# Patient Record
Sex: MALE | Race: WHITE | Employment: OTHER | URBAN - METROPOLITAN AREA
[De-identification: names, ages, dates, MRNs, and addresses within clinical notes are randomized per-mention and may not be internally consistent; named-entity substitution may affect disease eponyms.]

---

## 2017-03-28 ENCOUNTER — OFFICE VISIT (OUTPATIENT)
Dept: FAMILY MEDICINE CLINIC | Age: 77
End: 2017-03-28

## 2017-03-28 VITALS
HEART RATE: 80 BPM | RESPIRATION RATE: 16 BRPM | WEIGHT: 193 LBS | BODY MASS INDEX: 26.92 KG/M2 | SYSTOLIC BLOOD PRESSURE: 112 MMHG | DIASTOLIC BLOOD PRESSURE: 64 MMHG | TEMPERATURE: 97.5 F

## 2017-03-28 DIAGNOSIS — I10 ESSENTIAL HYPERTENSION: Primary | Chronic | ICD-10-CM

## 2017-03-28 PROCEDURE — 1123F ACP DISCUSS/DSCN MKR DOCD: CPT | Performed by: FAMILY MEDICINE

## 2017-03-28 PROCEDURE — G8484 FLU IMMUNIZE NO ADMIN: HCPCS | Performed by: FAMILY MEDICINE

## 2017-03-28 PROCEDURE — G8420 CALC BMI NORM PARAMETERS: HCPCS | Performed by: FAMILY MEDICINE

## 2017-03-28 PROCEDURE — G8427 DOCREV CUR MEDS BY ELIG CLIN: HCPCS | Performed by: FAMILY MEDICINE

## 2017-03-28 PROCEDURE — 99213 OFFICE O/P EST LOW 20 MIN: CPT | Performed by: FAMILY MEDICINE

## 2017-03-28 PROCEDURE — 1036F TOBACCO NON-USER: CPT | Performed by: FAMILY MEDICINE

## 2017-03-28 PROCEDURE — 4040F PNEUMOC VAC/ADMIN/RCVD: CPT | Performed by: FAMILY MEDICINE

## 2017-03-28 RX ORDER — VERAPAMIL HYDROCHLORIDE 240 MG/1
240 TABLET, FILM COATED, EXTENDED RELEASE ORAL DAILY
Qty: 90 TABLET | Refills: 3 | Status: SHIPPED | OUTPATIENT
Start: 2017-03-28 | End: 2018-05-31 | Stop reason: SDUPTHER

## 2017-03-28 RX ORDER — OMEGA-3S/DHA/EPA/FISH OIL/D3 300MG-1000
400 CAPSULE ORAL DAILY
Status: ON HOLD | COMMUNITY
End: 2020-01-01 | Stop reason: HOSPADM

## 2017-03-28 RX ORDER — LOSARTAN POTASSIUM AND HYDROCHLOROTHIAZIDE 25; 100 MG/1; MG/1
1 TABLET ORAL DAILY
Qty: 90 TABLET | Refills: 3 | Status: SHIPPED | OUTPATIENT
Start: 2017-03-28 | End: 2018-05-31 | Stop reason: SDUPTHER

## 2017-03-28 RX ORDER — POTASSIUM CHLORIDE 20 MEQ/1
TABLET, EXTENDED RELEASE ORAL
Qty: 90 TABLET | Refills: 3 | Status: SHIPPED | OUTPATIENT
Start: 2017-03-28 | End: 2017-07-02 | Stop reason: CLARIF

## 2017-03-28 ASSESSMENT — PATIENT HEALTH QUESTIONNAIRE - PHQ9
2. FEELING DOWN, DEPRESSED OR HOPELESS: 0
1. LITTLE INTEREST OR PLEASURE IN DOING THINGS: 0
SUM OF ALL RESPONSES TO PHQ QUESTIONS 1-9: 0
SUM OF ALL RESPONSES TO PHQ9 QUESTIONS 1 & 2: 0

## 2017-03-29 ASSESSMENT — ENCOUNTER SYMPTOMS
DIARRHEA: 0
SHORTNESS OF BREATH: 0
ABDOMINAL PAIN: 0
RHINORRHEA: 0
EYE PAIN: 0
SORE THROAT: 0
NAUSEA: 0
ABDOMINAL DISTENTION: 0
SINUS PRESSURE: 0
COUGH: 0
CONSTIPATION: 0

## 2017-03-30 LAB
A/G RATIO: 1.7 (ref 1.5–2.5)
ABSOLUTE BASO #: 0 /CMM (ref 0–200)
ABSOLUTE EOS #: 400 /CMM (ref 0–500)
ABSOLUTE LYMPH #: 2000 /CMM (ref 1000–4800)
ABSOLUTE MONO #: 500 /CMM (ref 0–800)
ABSOLUTE NEUT #: 7000 /CMM (ref 1800–7700)
ALBUMIN SERPL-MCNC: 4.5 GM/DL (ref 3.5–5)
ALP BLD-CCNC: 71 IU/L (ref 41–137)
ALT SERPL-CCNC: 21 IU/L (ref 10–40)
ANION GAP SERPL CALCULATED.3IONS-SCNC: 10 MMOL/L (ref 4–12)
AST SERPL-CCNC: 19 IU/L (ref 15–41)
BASOPHILS RELATIVE PERCENT: 0.2 % (ref 0–2)
BILIRUB SERPL-MCNC: 0.9 MG/DL (ref 0.2–1)
BUN BLDV-MCNC: 22 MG/DL (ref 7–20)
CALCIUM SERPL-MCNC: 9.4 MG/DL (ref 8.8–10.5)
CHLORIDE BLD-SCNC: 104 MEQ/L (ref 101–111)
CHOLESTEROL/HDL RELATIVE RISK: 5.1 (ref 4–5)
CHOLESTEROL: 160 MG/DL
CO2: 28 MEQ/L (ref 21–32)
CREAT SERPL-MCNC: 1.26 MG/DL (ref 0.7–1.3)
CREATININE CLEARANCE: 55
DIRECT-LDL / HDL RISK: 3.5
EOSINOPHILS RELATIVE PERCENT: 3.5 % (ref 0–6)
GLUCOSE: 158 MG/DL (ref 70–110)
HCT VFR BLD CALC: 41.7 % (ref 40–49)
HDLC SERPL-MCNC: 31 MG/DL
HEMOGLOBIN: 14.6 GM/DL (ref 13.5–16.5)
LDL CHOLESTEROL DIRECT: 109 MG/DL
LYMPHOCYTES RELATIVE PERCENT: 20.1 % (ref 15–45)
MCH RBC QN AUTO: 31.1 PG (ref 27.5–33)
MCHC RBC AUTO-ENTMCNC: 35 GM/DL (ref 33–36)
MCV RBC AUTO: 88.8 CU MIC (ref 80–97)
MONOCYTES RELATIVE PERCENT: 5.3 % (ref 2–10)
NEUTROPHILS RELATIVE PERCENT: 70.9 % (ref 40–70)
NUCLEATED RBCS: 0.2 /100 WBC
PDW BLD-RTO: 13 % (ref 12–16)
PLATELET # BLD: 165 TH/CMM (ref 150–400)
POTASSIUM SERPL-SCNC: 3.7 MEQ/L (ref 3.6–5)
RBC # BLD: 4.7 MIL/CMM (ref 4.5–6)
SODIUM BLD-SCNC: 142 MEQ/L (ref 135–145)
TOTAL PROTEIN: 7.1 G/DL (ref 6.2–8)
TRIGL SERPL-MCNC: 234 MG/DL
VLDLC SERPL CALC-MCNC: 46 MG/DL
WBC # BLD: 9.9 TH/CMM (ref 4.4–10.5)

## 2017-06-26 ENCOUNTER — OFFICE VISIT (OUTPATIENT)
Dept: FAMILY MEDICINE CLINIC | Age: 77
End: 2017-06-26

## 2017-06-26 VITALS
TEMPERATURE: 98.2 F | WEIGHT: 186 LBS | SYSTOLIC BLOOD PRESSURE: 138 MMHG | BODY MASS INDEX: 25.19 KG/M2 | DIASTOLIC BLOOD PRESSURE: 76 MMHG | RESPIRATION RATE: 20 BRPM | HEIGHT: 72 IN | HEART RATE: 84 BPM

## 2017-06-26 DIAGNOSIS — R13.10 DYSPHAGIA, UNSPECIFIED TYPE: ICD-10-CM

## 2017-06-26 DIAGNOSIS — E43 SEVERE MALNUTRITION (HCC): ICD-10-CM

## 2017-06-26 DIAGNOSIS — R42 DIZZINESS: ICD-10-CM

## 2017-06-26 DIAGNOSIS — F32.A DEPRESSION, UNSPECIFIED DEPRESSION TYPE: ICD-10-CM

## 2017-06-26 DIAGNOSIS — R73.9 HYPERGLYCEMIA: ICD-10-CM

## 2017-06-26 DIAGNOSIS — R23.1 PALLOR: ICD-10-CM

## 2017-06-26 DIAGNOSIS — G47.9 SLEEP DISTURBANCE: ICD-10-CM

## 2017-06-26 DIAGNOSIS — R63.4 WEIGHT LOSS: Primary | ICD-10-CM

## 2017-06-26 DIAGNOSIS — R53.82 CHRONIC FATIGUE: ICD-10-CM

## 2017-06-26 PROCEDURE — 99214 OFFICE O/P EST MOD 30 MIN: CPT | Performed by: NURSE PRACTITIONER

## 2017-06-26 PROCEDURE — G8419 CALC BMI OUT NRM PARAM NOF/U: HCPCS | Performed by: NURSE PRACTITIONER

## 2017-06-26 PROCEDURE — 4040F PNEUMOC VAC/ADMIN/RCVD: CPT | Performed by: NURSE PRACTITIONER

## 2017-06-26 PROCEDURE — 1036F TOBACCO NON-USER: CPT | Performed by: NURSE PRACTITIONER

## 2017-06-26 PROCEDURE — 1123F ACP DISCUSS/DSCN MKR DOCD: CPT | Performed by: NURSE PRACTITIONER

## 2017-06-26 PROCEDURE — G8427 DOCREV CUR MEDS BY ELIG CLIN: HCPCS | Performed by: NURSE PRACTITIONER

## 2017-06-26 RX ORDER — HYDROXYZINE HYDROCHLORIDE 25 MG/1
25 TABLET, FILM COATED ORAL 3 TIMES DAILY PRN
Qty: 60 TABLET | Refills: 0 | Status: SHIPPED | OUTPATIENT
Start: 2017-06-26 | End: 2017-07-11

## 2017-06-26 RX ORDER — CITALOPRAM 10 MG/1
10 TABLET ORAL DAILY
Qty: 30 TABLET | Refills: 3 | Status: SHIPPED | OUTPATIENT
Start: 2017-06-26 | End: 2017-06-29

## 2017-06-27 LAB
A/G RATIO: 2 (ref 1.5–2.5)
ABSOLUTE BASO #: 0 /CMM (ref 0–200)
ABSOLUTE EOS #: 297 /CMM (ref 0–500)
ABSOLUTE LYMPH #: 1485 /CMM (ref 1000–4800)
ABSOLUTE MONO #: 495 /CMM (ref 0–800)
ABSOLUTE NEUT #: 7623 /CMM (ref 1800–7700)
ALBUMIN SERPL-MCNC: 4.7 GM/DL (ref 3.5–5)
ALP BLD-CCNC: 68 IU/L (ref 41–137)
ALT SERPL-CCNC: 21 IU/L (ref 10–40)
ANION GAP SERPL CALCULATED.3IONS-SCNC: 7 MMOL/L (ref 4–12)
APPEARANCE: CLEAR
AST SERPL-CCNC: 19 IU/L (ref 15–41)
BILIRUB SERPL-MCNC: 1 MG/DL (ref 0.2–1)
BILIRUBIN URINE: NEGATIVE
BUN BLDV-MCNC: 24 MG/DL (ref 7–20)
CALCIUM SERPL-MCNC: 9.6 MG/DL (ref 8.8–10.5)
CHLORIDE BLD-SCNC: 104 MEQ/L (ref 101–111)
CO2: 28 MEQ/L (ref 21–32)
COLOR: YELLOW
CREAT SERPL-MCNC: 1.03 MG/DL (ref 0.7–1.3)
CREATININE CLEARANCE: >60
EOSINOPHIL # BLD: 3 % (ref 0–6)
FOLATE: > 23.7 NG/ML
GLUCOSE URINE: NEGATIVE
GLUCOSE: 198 MG/DL (ref 70–110)
HCT VFR BLD CALC: 42.7 % (ref 40–49)
HEMOGLOBIN: 15 GM/DL (ref 13.5–16.5)
INSULIN,ULTRASENSITIVE: 11.58 MIU/ML (ref 1.9–23)
IRON SATURATION: 34 % (ref 20–50)
IRON, SERUM: 99 MCG/DL (ref 45–182)
KETONES, URINE: NEGATIVE
LEUKOCYTES, UA: NEGATIVE
LYMPHOCYTES # BLD: 15 % (ref 15–45)
MCH RBC QN AUTO: 30.8 PG (ref 27.5–33)
MCHC RBC AUTO-ENTMCNC: 35.1 GM/DL (ref 33–36)
MCV RBC AUTO: 87.8 CU MIC (ref 80–97)
MONOCYTES: 5 % (ref 2–10)
MORPHOLOGY: ABNORMAL
MUCUS: PRESENT
NEUTROPHILS SEGMENTED: 77 % (ref 40–70)
NITRITE, URINE: NEGATIVE
PDW BLD-RTO: 13.1 % (ref 12–16)
PH, URINE: 6 (ref 5–8)
PLATELET # BLD: 152 TH/CMM (ref 150–400)
POTASSIUM SERPL-SCNC: 3.5 MEQ/L (ref 3.6–5)
PROTEIN, URINE: NEGATIVE
RBC # BLD: 4.86 MIL/CMM (ref 4.5–6)
RBC URINE: NORMAL /HPF
SODIUM BLD-SCNC: 139 MEQ/L (ref 135–145)
SPECIFIC GRAVITY, URINE: 1.01 (ref 1–1.03)
SQUAMOUS EPITHELIAL: NORMAL /HPF
T4 FREE: 1.24 NG/DL (ref 0.61–1.12)
TOTAL PROTEIN: 7.1 G/DL (ref 6.2–8)
TRANSFERRIN: 207 MG/DL (ref 180–329)
TSH SERPL DL<=0.05 MIU/L-ACNC: 1.97 MCIU/ML (ref 0.49–4.67)
URINALYSIS REFLEX: NO
URINE HGB: NEGATIVE
UROBILINOGEN, URINE: NORMAL (ref 0.2–1)
VITAMIN B-12: 397 PG/ML (ref 180–914)
WBC # BLD: 9.9 TH/CMM (ref 4.4–10.5)
WBC URINE: NORMAL /HPF

## 2017-06-28 LAB — THYROID PEROXIDASE ANTIBODY: 0.3 IU/ML

## 2017-06-29 ENCOUNTER — CARE COORDINATOR VISIT (OUTPATIENT)
Dept: CARE COORDINATION | Age: 77
End: 2017-06-29

## 2017-06-29 ENCOUNTER — OFFICE VISIT (OUTPATIENT)
Dept: FAMILY MEDICINE CLINIC | Age: 77
End: 2017-06-29

## 2017-06-29 VITALS
SYSTOLIC BLOOD PRESSURE: 130 MMHG | HEART RATE: 112 BPM | TEMPERATURE: 98.1 F | WEIGHT: 182 LBS | DIASTOLIC BLOOD PRESSURE: 80 MMHG | RESPIRATION RATE: 16 BRPM | BODY MASS INDEX: 24.65 KG/M2 | HEIGHT: 72 IN

## 2017-06-29 DIAGNOSIS — R63.0 ANOREXIA: ICD-10-CM

## 2017-06-29 DIAGNOSIS — R63.4 WEIGHT LOSS: ICD-10-CM

## 2017-06-29 DIAGNOSIS — R73.01 IMPAIRED FASTING GLUCOSE: Primary | ICD-10-CM

## 2017-06-29 DIAGNOSIS — I10 ESSENTIAL HYPERTENSION: Chronic | ICD-10-CM

## 2017-06-29 DIAGNOSIS — R22.32 MASS OF SKIN OF LEFT SHOULDER: ICD-10-CM

## 2017-06-29 LAB
ESTIMATED AVERAGE GLUCOSE: 157 MG/DL
HBA1C MFR BLD: 7.1 % (ref 4.4–6.4)
VITAMIN D 1,25-DIHYDROXY: 41 PG/ML (ref 18–64)

## 2017-06-29 PROCEDURE — G8420 CALC BMI NORM PARAMETERS: HCPCS | Performed by: NURSE PRACTITIONER

## 2017-06-29 PROCEDURE — 1036F TOBACCO NON-USER: CPT | Performed by: NURSE PRACTITIONER

## 2017-06-29 PROCEDURE — 4040F PNEUMOC VAC/ADMIN/RCVD: CPT | Performed by: NURSE PRACTITIONER

## 2017-06-29 PROCEDURE — 99214 OFFICE O/P EST MOD 30 MIN: CPT | Performed by: NURSE PRACTITIONER

## 2017-06-29 PROCEDURE — G8427 DOCREV CUR MEDS BY ELIG CLIN: HCPCS | Performed by: NURSE PRACTITIONER

## 2017-06-29 PROCEDURE — 1123F ACP DISCUSS/DSCN MKR DOCD: CPT | Performed by: NURSE PRACTITIONER

## 2017-06-29 PROCEDURE — 96372 THER/PROPH/DIAG INJ SC/IM: CPT | Performed by: NURSE PRACTITIONER

## 2017-06-29 RX ORDER — CYANOCOBALAMIN 1000 UG/ML
1000 INJECTION INTRAMUSCULAR; SUBCUTANEOUS ONCE
Status: COMPLETED | OUTPATIENT
Start: 2017-06-29 | End: 2017-06-29

## 2017-06-29 RX ORDER — SERTRALINE HYDROCHLORIDE 25 MG/1
25 TABLET, FILM COATED ORAL DAILY
Qty: 30 TABLET | Refills: 3 | Status: SHIPPED | OUTPATIENT
Start: 2017-06-29 | End: 2017-07-12

## 2017-06-29 RX ORDER — METFORMIN HYDROCHLORIDE 750 MG/1
750 TABLET, EXTENDED RELEASE ORAL
Qty: 30 TABLET | Refills: 3 | Status: SHIPPED | OUTPATIENT
Start: 2017-06-29 | End: 2017-08-25 | Stop reason: SDUPTHER

## 2017-06-29 RX ADMIN — CYANOCOBALAMIN 1000 MCG: 1000 INJECTION INTRAMUSCULAR; SUBCUTANEOUS at 10:03

## 2017-06-30 ENCOUNTER — TELEPHONE (OUTPATIENT)
Dept: FAMILY MEDICINE CLINIC | Age: 77
End: 2017-06-30

## 2017-07-03 PROBLEM — R33.9 URINARY RETENTION: Status: ACTIVE | Noted: 2017-07-03

## 2017-07-03 PROBLEM — N40.0 BPH (BENIGN PROSTATIC HYPERPLASIA): Status: ACTIVE | Noted: 2017-07-03

## 2017-07-03 RX ORDER — GLUCOSAMINE HCL/CHONDROITIN SU 500-400 MG
CAPSULE ORAL
Qty: 100 STRIP | Refills: 3 | Status: SHIPPED | OUTPATIENT
Start: 2017-07-03

## 2017-07-03 RX ORDER — LANCETS 30 GAUGE
EACH MISCELLANEOUS
Qty: 100 EACH | Refills: 3 | Status: SHIPPED | OUTPATIENT
Start: 2017-07-03

## 2017-07-04 PROBLEM — K22.10 EROSIVE ESOPHAGITIS: Status: ACTIVE | Noted: 2017-07-04

## 2017-07-04 PROBLEM — N40.1 BENIGN PROSTATIC HYPERPLASIA WITH LOWER URINARY TRACT SYMPTOMS: Status: ACTIVE | Noted: 2017-07-03

## 2017-07-04 PROBLEM — K29.00 ACUTE GASTRITIS WITHOUT HEMORRHAGE: Status: ACTIVE | Noted: 2017-07-04

## 2017-07-04 PROBLEM — K29.80 DUODENITIS: Status: ACTIVE | Noted: 2017-07-04

## 2017-07-05 ENCOUNTER — TELEPHONE (OUTPATIENT)
Dept: FAMILY MEDICINE CLINIC | Age: 77
End: 2017-07-05

## 2017-07-10 ENCOUNTER — TELEPHONE (OUTPATIENT)
Dept: FAMILY MEDICINE CLINIC | Age: 77
End: 2017-07-10

## 2017-07-11 ENCOUNTER — TELEPHONE (OUTPATIENT)
Dept: FAMILY MEDICINE CLINIC | Age: 77
End: 2017-07-11

## 2017-07-11 RX ORDER — TAMSULOSIN HYDROCHLORIDE 0.4 MG/1
0.4 CAPSULE ORAL NIGHTLY
COMMUNITY

## 2017-07-11 RX ORDER — ESOMEPRAZOLE MAGNESIUM 40 MG/1
40 CAPSULE, DELAYED RELEASE ORAL
COMMUNITY
End: 2018-05-10 | Stop reason: DRUGHIGH

## 2017-07-11 ASSESSMENT — ENCOUNTER SYMPTOMS
GASTROINTESTINAL NEGATIVE: 1
RESPIRATORY NEGATIVE: 1
EYES NEGATIVE: 1

## 2017-07-12 ENCOUNTER — OFFICE VISIT (OUTPATIENT)
Dept: FAMILY MEDICINE CLINIC | Age: 77
End: 2017-07-12

## 2017-07-12 VITALS
TEMPERATURE: 98.4 F | DIASTOLIC BLOOD PRESSURE: 70 MMHG | SYSTOLIC BLOOD PRESSURE: 136 MMHG | BODY MASS INDEX: 23.52 KG/M2 | RESPIRATION RATE: 20 BRPM | WEIGHT: 173.4 LBS | HEART RATE: 88 BPM

## 2017-07-12 DIAGNOSIS — R44.0 AUDITORY HALLUCINATION: Primary | ICD-10-CM

## 2017-07-12 DIAGNOSIS — R41.0 CONFUSION: ICD-10-CM

## 2017-07-12 DIAGNOSIS — E46 MALNUTRITION (HCC): ICD-10-CM

## 2017-07-12 PROCEDURE — 1123F ACP DISCUSS/DSCN MKR DOCD: CPT | Performed by: FAMILY MEDICINE

## 2017-07-12 PROCEDURE — G8427 DOCREV CUR MEDS BY ELIG CLIN: HCPCS | Performed by: FAMILY MEDICINE

## 2017-07-12 PROCEDURE — 99214 OFFICE O/P EST MOD 30 MIN: CPT | Performed by: FAMILY MEDICINE

## 2017-07-12 PROCEDURE — 4040F PNEUMOC VAC/ADMIN/RCVD: CPT | Performed by: FAMILY MEDICINE

## 2017-07-12 PROCEDURE — 1036F TOBACCO NON-USER: CPT | Performed by: FAMILY MEDICINE

## 2017-07-12 PROCEDURE — G8420 CALC BMI NORM PARAMETERS: HCPCS | Performed by: FAMILY MEDICINE

## 2017-07-12 PROCEDURE — 1111F DSCHRG MED/CURRENT MED MERGE: CPT | Performed by: FAMILY MEDICINE

## 2017-07-13 ENCOUNTER — TELEPHONE (OUTPATIENT)
Dept: FAMILY MEDICINE CLINIC | Age: 77
End: 2017-07-13

## 2017-07-13 DIAGNOSIS — R41.82 ALTERED MENTAL STATUS, UNSPECIFIED ALTERED MENTAL STATUS TYPE: Primary | ICD-10-CM

## 2017-07-13 RX ORDER — CEFUROXIME AXETIL 250 MG/1
250 TABLET ORAL 2 TIMES DAILY
Qty: 28 TABLET | Refills: 0 | Status: SHIPPED | OUTPATIENT
Start: 2017-07-13 | End: 2017-07-27

## 2017-07-14 ENCOUNTER — TELEPHONE (OUTPATIENT)
Dept: FAMILY MEDICINE CLINIC | Age: 77
End: 2017-07-14

## 2017-07-14 ENCOUNTER — CARE COORDINATION (OUTPATIENT)
Dept: FAMILY MEDICINE CLINIC | Age: 77
End: 2017-07-14

## 2017-07-15 ASSESSMENT — ENCOUNTER SYMPTOMS
EYES NEGATIVE: 1
RESPIRATORY NEGATIVE: 1
GASTROINTESTINAL NEGATIVE: 1

## 2017-07-17 ENCOUNTER — TELEPHONE (OUTPATIENT)
Dept: PHARMACY | Age: 77
End: 2017-07-17

## 2017-07-18 ENCOUNTER — CARE COORDINATION (OUTPATIENT)
Dept: CARE COORDINATION | Age: 77
End: 2017-07-18

## 2017-07-25 ENCOUNTER — CARE COORDINATION (OUTPATIENT)
Dept: CARE COORDINATION | Age: 77
End: 2017-07-25

## 2017-08-01 ENCOUNTER — INITIAL CONSULT (OUTPATIENT)
Dept: NEUROLOGY | Age: 77
End: 2017-08-01
Payer: MEDICARE

## 2017-08-01 VITALS
WEIGHT: 172.4 LBS | SYSTOLIC BLOOD PRESSURE: 124 MMHG | HEART RATE: 74 BPM | HEIGHT: 73 IN | DIASTOLIC BLOOD PRESSURE: 71 MMHG | BODY MASS INDEX: 22.85 KG/M2

## 2017-08-01 DIAGNOSIS — R41.0 CONFUSION: Primary | ICD-10-CM

## 2017-08-01 PROCEDURE — 1123F ACP DISCUSS/DSCN MKR DOCD: CPT | Performed by: PSYCHIATRY & NEUROLOGY

## 2017-08-01 PROCEDURE — 99204 OFFICE O/P NEW MOD 45 MIN: CPT | Performed by: PSYCHIATRY & NEUROLOGY

## 2017-08-01 PROCEDURE — G8420 CALC BMI NORM PARAMETERS: HCPCS | Performed by: PSYCHIATRY & NEUROLOGY

## 2017-08-01 PROCEDURE — 1111F DSCHRG MED/CURRENT MED MERGE: CPT | Performed by: PSYCHIATRY & NEUROLOGY

## 2017-08-01 PROCEDURE — 4040F PNEUMOC VAC/ADMIN/RCVD: CPT | Performed by: PSYCHIATRY & NEUROLOGY

## 2017-08-01 PROCEDURE — 1036F TOBACCO NON-USER: CPT | Performed by: PSYCHIATRY & NEUROLOGY

## 2017-08-01 PROCEDURE — G8427 DOCREV CUR MEDS BY ELIG CLIN: HCPCS | Performed by: PSYCHIATRY & NEUROLOGY

## 2017-08-01 RX ORDER — RIVASTIGMINE 4.6 MG/24H
1 PATCH, EXTENDED RELEASE TRANSDERMAL DAILY
COMMUNITY
End: 2017-08-03 | Stop reason: SDUPTHER

## 2017-08-01 RX ORDER — ESCITALOPRAM OXALATE 20 MG/1
20 TABLET ORAL DAILY
COMMUNITY
End: 2017-08-03 | Stop reason: SDUPTHER

## 2017-08-03 ENCOUNTER — OFFICE VISIT (OUTPATIENT)
Dept: FAMILY MEDICINE CLINIC | Age: 77
End: 2017-08-03
Payer: MEDICARE

## 2017-08-03 VITALS
DIASTOLIC BLOOD PRESSURE: 58 MMHG | RESPIRATION RATE: 20 BRPM | BODY MASS INDEX: 22.59 KG/M2 | WEIGHT: 171.2 LBS | SYSTOLIC BLOOD PRESSURE: 102 MMHG | TEMPERATURE: 98.4 F | HEART RATE: 88 BPM

## 2017-08-03 DIAGNOSIS — F03.90 DEMENTIA WITHOUT BEHAVIORAL DISTURBANCE, UNSPECIFIED DEMENTIA TYPE: ICD-10-CM

## 2017-08-03 DIAGNOSIS — F41.9 ANXIETY DISORDER, UNSPECIFIED TYPE: Primary | ICD-10-CM

## 2017-08-03 DIAGNOSIS — R44.0 AUDITORY HALLUCINATIONS: Primary | ICD-10-CM

## 2017-08-03 DIAGNOSIS — R41.0 CONFUSION: ICD-10-CM

## 2017-08-03 PROCEDURE — G8427 DOCREV CUR MEDS BY ELIG CLIN: HCPCS | Performed by: FAMILY MEDICINE

## 2017-08-03 PROCEDURE — 99213 OFFICE O/P EST LOW 20 MIN: CPT | Performed by: FAMILY MEDICINE

## 2017-08-03 PROCEDURE — 4040F PNEUMOC VAC/ADMIN/RCVD: CPT | Performed by: FAMILY MEDICINE

## 2017-08-03 PROCEDURE — 1036F TOBACCO NON-USER: CPT | Performed by: FAMILY MEDICINE

## 2017-08-03 PROCEDURE — 1123F ACP DISCUSS/DSCN MKR DOCD: CPT | Performed by: FAMILY MEDICINE

## 2017-08-03 PROCEDURE — 1111F DSCHRG MED/CURRENT MED MERGE: CPT | Performed by: FAMILY MEDICINE

## 2017-08-03 PROCEDURE — G8420 CALC BMI NORM PARAMETERS: HCPCS | Performed by: FAMILY MEDICINE

## 2017-08-03 RX ORDER — RIVASTIGMINE 4.6 MG/24H
1 PATCH, EXTENDED RELEASE TRANSDERMAL DAILY
Qty: 90 PATCH | Refills: 3 | Status: SHIPPED | OUTPATIENT
Start: 2017-08-03 | End: 2019-01-01 | Stop reason: SDUPTHER

## 2017-08-03 RX ORDER — OLANZAPINE 2.5 MG/1
1.25 TABLET ORAL NIGHTLY
Qty: 45 TABLET | Refills: 3 | Status: SHIPPED | OUTPATIENT
Start: 2017-08-03 | End: 2018-03-22

## 2017-08-03 RX ORDER — ESCITALOPRAM OXALATE 20 MG/1
20 TABLET ORAL DAILY
Qty: 90 TABLET | Refills: 3 | Status: SHIPPED | OUTPATIENT
Start: 2017-08-03 | End: 2019-01-01 | Stop reason: SDUPTHER

## 2017-08-03 ASSESSMENT — ENCOUNTER SYMPTOMS
ABDOMINAL DISTENTION: 0
SINUS PRESSURE: 0
ABDOMINAL PAIN: 0
NAUSEA: 0
RHINORRHEA: 0
COUGH: 0
DIARRHEA: 0
SHORTNESS OF BREATH: 0
SORE THROAT: 0
EYE PAIN: 0
CONSTIPATION: 0

## 2017-08-07 ENCOUNTER — TELEPHONE (OUTPATIENT)
Dept: FAMILY MEDICINE CLINIC | Age: 77
End: 2017-08-07

## 2017-08-09 ENCOUNTER — TELEPHONE (OUTPATIENT)
Dept: FAMILY MEDICINE CLINIC | Age: 77
End: 2017-08-09

## 2017-08-15 ENCOUNTER — HOSPITAL ENCOUNTER (OUTPATIENT)
Dept: NEUROLOGY | Age: 77
Discharge: HOME | End: 2017-08-15
Payer: MEDICARE

## 2017-08-15 PROCEDURE — 95819 EEG AWAKE AND ASLEEP: CPT

## 2017-08-25 ENCOUNTER — CARE COORDINATION (OUTPATIENT)
Dept: CARE COORDINATION | Age: 77
End: 2017-08-25

## 2017-08-25 ENCOUNTER — TELEPHONE (OUTPATIENT)
Dept: FAMILY MEDICINE CLINIC | Age: 77
End: 2017-08-25

## 2017-08-28 RX ORDER — METFORMIN HYDROCHLORIDE 750 MG/1
750 TABLET, EXTENDED RELEASE ORAL
Qty: 90 TABLET | Refills: 3 | Status: SHIPPED | OUTPATIENT
Start: 2017-08-28 | End: 2018-05-31 | Stop reason: SDUPTHER

## 2017-10-09 ENCOUNTER — CARE COORDINATION (OUTPATIENT)
Dept: CARE COORDINATION | Age: 77
End: 2017-10-09

## 2017-10-10 ENCOUNTER — CARE COORDINATION (OUTPATIENT)
Dept: CARE COORDINATION | Age: 77
End: 2017-10-10

## 2017-10-10 NOTE — CARE COORDINATION
MD Dwain   Glucose Blood (BLOOD GLUCOSE TEST STRIPS) STRP One Touch Verio Test Strips Test QD or as directed --DX E11.9 7/3/17   Davin Levi NP   Lancets MISC One Touch Verio Lancets  DX E11.9 Test QD or as directed 7/3/17   Davin Levi NP   potassium chloride (KLOR-CON M20) 20 MEQ extended release tablet Take 20 mEq by mouth daily    Historical Provider, MD   vitamin D3 (CHOLECALCIFEROL) 400 UNITS TABS tablet Take 400 Units by mouth daily    Historical Provider, MD   verapamil (CALAN SR) 240 MG extended release tablet Take 1 tablet by mouth daily 3/28/17   Nely Sorensen MD   losartan-hydrochlorothiazide [facility]) 100-25 MG per tablet Take 1 tablet by mouth daily 3/28/17   Nely Sorensen MD   Ascorbic Acid (VITAMIN C) 500 MG tablet Take 500 mg by mouth daily    Historical Provider, MD   finasteride (PROSCAR) 5 MG tablet Take 5 mg by mouth daily  12/29/14   Historical Provider, MD   aspirin 81 MG tablet Take 81 mg by mouth daily. Historical Provider, MD   therapeutic multivitamin-minerals (THERAGRAN-M) tablet Take 1 tablet by mouth daily.       Historical Provider, MD       Future Appointments  Date Time Provider Genesis Welsh   10/23/2017 3:15 PM Mica Bateman MD [address] [facility] [email or web address]   10/25/2017 10:30 AM Nely [facility]nsen [facility]

## 2017-10-18 ENCOUNTER — NURSE ONLY (OUTPATIENT)
Dept: FAMILY MEDICINE CLINIC | Age: 77
End: 2017-10-18
Payer: MEDICARE

## 2017-10-18 DIAGNOSIS — Z23 NEED FOR INFLUENZA VACCINATION: Primary | ICD-10-CM

## 2017-10-18 PROCEDURE — 90662 IIV NO PRSV INCREASED AG IM: CPT | Performed by: NURSE PRACTITIONER

## 2017-10-18 PROCEDURE — G0008 ADMIN INFLUENZA VIRUS VAC: HCPCS | Performed by: NURSE PRACTITIONER

## 2017-10-23 ENCOUNTER — OFFICE VISIT (OUTPATIENT)
Dept: NEUROLOGY | Age: 77
End: 2017-10-23
Payer: MEDICARE

## 2017-10-23 VITALS
HEART RATE: 66 BPM | DIASTOLIC BLOOD PRESSURE: 67 MMHG | SYSTOLIC BLOOD PRESSURE: 116 MMHG | WEIGHT: 192.4 LBS | HEIGHT: 72 IN | BODY MASS INDEX: 26.06 KG/M2

## 2017-10-23 DIAGNOSIS — R41.0 CONFUSION: Primary | ICD-10-CM

## 2017-10-23 PROCEDURE — 4040F PNEUMOC VAC/ADMIN/RCVD: CPT | Performed by: PSYCHIATRY & NEUROLOGY

## 2017-10-23 PROCEDURE — G8417 CALC BMI ABV UP PARAM F/U: HCPCS | Performed by: PSYCHIATRY & NEUROLOGY

## 2017-10-23 PROCEDURE — G8484 FLU IMMUNIZE NO ADMIN: HCPCS | Performed by: PSYCHIATRY & NEUROLOGY

## 2017-10-23 PROCEDURE — 1036F TOBACCO NON-USER: CPT | Performed by: PSYCHIATRY & NEUROLOGY

## 2017-10-23 PROCEDURE — 1123F ACP DISCUSS/DSCN MKR DOCD: CPT | Performed by: PSYCHIATRY & NEUROLOGY

## 2017-10-23 PROCEDURE — 99213 OFFICE O/P EST LOW 20 MIN: CPT | Performed by: PSYCHIATRY & NEUROLOGY

## 2017-10-23 PROCEDURE — G8427 DOCREV CUR MEDS BY ELIG CLIN: HCPCS | Performed by: PSYCHIATRY & NEUROLOGY

## 2017-10-23 NOTE — PROGRESS NOTES
no intra-or extra-axial collections. There is no hydrocephalus, midline shift or mass effect. There is mineralization in the basal ganglia bilaterally. No other areas of susceptibility artifact are present. The major intracranial vascular flow voids are present. The midline craniocervical junction structures are normal.  The pituitary gland and brainstem are normal.       The right maxillary sinus is completely opacified. This is stable. Impression       1. No evidence of an acute process. 2. Mild severity chronic small vessel ischemic changes. 3. Completely opacified right maxillary sinus. Assessment:    1. Confusion          No reported confusion episodes. He is doing much better. He is better and back to baseline. He is doing much better since changing his medications by psychiatry. After detailed discussion with patient we agreed on the following plan. Plan:  1. Follow up as needed. 2. Call if any questions or concerns. Please call if any questions.      Brooke Salazar MD

## 2017-10-25 ENCOUNTER — OFFICE VISIT (OUTPATIENT)
Dept: FAMILY MEDICINE CLINIC | Age: 77
End: 2017-10-25
Payer: MEDICARE

## 2017-10-25 VITALS
SYSTOLIC BLOOD PRESSURE: 100 MMHG | BODY MASS INDEX: 26.15 KG/M2 | HEART RATE: 72 BPM | DIASTOLIC BLOOD PRESSURE: 58 MMHG | WEIGHT: 192.8 LBS | TEMPERATURE: 98.3 F | RESPIRATION RATE: 18 BRPM

## 2017-10-25 DIAGNOSIS — F03.90 DEMENTIA WITHOUT BEHAVIORAL DISTURBANCE, UNSPECIFIED DEMENTIA TYPE: ICD-10-CM

## 2017-10-25 DIAGNOSIS — I10 ESSENTIAL HYPERTENSION: Primary | Chronic | ICD-10-CM

## 2017-10-25 DIAGNOSIS — R44.3 HALLUCINATIONS: ICD-10-CM

## 2017-10-25 DIAGNOSIS — F41.9 ANXIETY DISORDER, UNSPECIFIED TYPE: ICD-10-CM

## 2017-10-25 DIAGNOSIS — E11.9 TYPE 2 DIABETES MELLITUS TREATED WITHOUT INSULIN (HCC): ICD-10-CM

## 2017-10-25 PROCEDURE — 4040F PNEUMOC VAC/ADMIN/RCVD: CPT | Performed by: FAMILY MEDICINE

## 2017-10-25 PROCEDURE — 1123F ACP DISCUSS/DSCN MKR DOCD: CPT | Performed by: FAMILY MEDICINE

## 2017-10-25 PROCEDURE — G8417 CALC BMI ABV UP PARAM F/U: HCPCS | Performed by: FAMILY MEDICINE

## 2017-10-25 PROCEDURE — G8427 DOCREV CUR MEDS BY ELIG CLIN: HCPCS | Performed by: FAMILY MEDICINE

## 2017-10-25 PROCEDURE — G8484 FLU IMMUNIZE NO ADMIN: HCPCS | Performed by: FAMILY MEDICINE

## 2017-10-25 PROCEDURE — 1036F TOBACCO NON-USER: CPT | Performed by: FAMILY MEDICINE

## 2017-10-25 PROCEDURE — 99214 OFFICE O/P EST MOD 30 MIN: CPT | Performed by: FAMILY MEDICINE

## 2017-10-29 ASSESSMENT — ENCOUNTER SYMPTOMS
EYE PAIN: 0
SORE THROAT: 0
ABDOMINAL PAIN: 0
DIARRHEA: 0
ABDOMINAL DISTENTION: 0
SINUS PRESSURE: 0
SHORTNESS OF BREATH: 0
COUGH: 0
CONSTIPATION: 0
NAUSEA: 0
RHINORRHEA: 0

## 2017-10-29 NOTE — PROGRESS NOTES
[address]  [facility]  [facility]  Dept: [phone]  Dept Fax: [phone]: [phone]  PROGRESS NOTE      Visit Date: 10/29/2017    Hamida Monsalve is a 68 y.o. male who presents today for:  Chief Complaint   Patient presents with    Follow-up     Mental status         Subjective:  HPI  Patient comes in follow-up medical issues and mental state. Hypertension stable. Blood sugars improved. Sugar log reviewed. Mental status is improved greatly and appears to be stable. His wife is pleased with his interaction. Patient feels more clear cognitively    Review of Systems   Constitutional: Negative for appetite change and fever. HENT: Negative for congestion, ear pain, postnasal drip, rhinorrhea, sinus pressure and sore throat. Eyes: Negative for pain and visual disturbance. Respiratory: Negative for cough and shortness of breath. Cardiovascular: Negative for chest pain. Gastrointestinal: Negative for abdominal distention, abdominal pain, constipation, diarrhea and nausea. Genitourinary: Negative for dysuria, frequency and urgency. Musculoskeletal: Negative for arthralgias. Skin: Negative for rash. Neurological: Negative for dizziness.      Past Medical History:   Diagnosis Date    Depression     Diabetes mellitus ([address]     Hyperglycemia     Hypertension       Past Surgical History:   Procedure Laterality Date    OTHER SURGICAL HISTORY  1970    right thumb surgery      Family History   Problem Relation Age of Onset    Alzheimer's Disease Mother     Stroke Father     Heart Attack Brother      Social History   Substance Use Topics    Smoking status: Passive Smoke Exposure - Never Smoker    Smokeless tobacco: Never Used    Alcohol use No      Current Outpatient Prescriptions   Medication Sig Dispense Refill    metFORMIN (GLUCOPHAGE XR) 750 MG extended release tablet Take 1 tablet by mouth daily (with breakfast) 90 tablet 3 atraumatic. Right Ear: External ear normal.   Left Ear: External ear normal.   Eyes: Conjunctivae are normal.   Neck: No JVD present. Cardiovascular: Normal rate, regular rhythm and normal heart sounds. Pulmonary/Chest: Effort normal and breath sounds normal. He has no wheezes. He has no rales. Musculoskeletal: He exhibits no edema or tenderness. Neurological: He is alert and oriented to person, place, and time. Skin: Skin is warm and dry. He is not diaphoretic. No pallor. BP (!) 100/58 (Site: Left Arm, Position: Sitting, Cuff Size: Medium Adult)   Pulse 72   Temp 98.3 °F (36.8 °C)   Resp 18   Wt 192 lb 12.8 oz (87.5 kg)   BMI 26.15 kg/m²       Impression/Plan:  1. Essential hypertension    2. Type 2 diabetes mellitus treated without insulin (HCC)    3. Hallucinations    4. Dementia without behavioral disturbance, unspecified dementia type    5. Anxiety disorder, unspecified type      Requested Prescriptions      No prescriptions requested or ordered in this encounter     Orders Placed This Encounter   Procedures    Lipid Panel     Standing Status:   Future     Standing Expiration Date:   10/25/2018     Order Specific Question:   Is Patient Fasting?/# of Hours     Answer:   yes/12    CBC Auto Differential     Standing Status:   Future     Standing Expiration Date:   10/25/2018    Comprehensive Metabolic Panel     Standing Status:   Future     Standing Expiration Date:   10/25/2018    Hemoglobin A1C     Standing Status:   Future     Standing Expiration Date:   10/25/2018    Amb External Referral To Psychiatry     Referral Priority:   Routine     Referral Type:   Consult for Advice and Opinion     Referred to Provider:   Kamilla Villasenor MD     Requested Specialty:   Psychiatry     Number of Visits Requested:   1     Discussed the need for continued psychiatric surveillance and he would like to get set up locally with Dr. Josi Kim  Patient given educational materials - see patient instructions.

## 2017-11-14 ENCOUNTER — CARE COORDINATION (OUTPATIENT)
Dept: CARE COORDINATION | Age: 77
End: 2017-11-14

## 2017-11-14 LAB
A/G RATIO: 1.8 (ref 1.5–2.5)
ABSOLUTE BASO #: 0 /CMM (ref 0–200)
ABSOLUTE EOS #: 300 /CMM (ref 0–500)
ABSOLUTE LYMPH #: 1900 /CMM (ref 1000–4800)
ABSOLUTE MONO #: 600 /CMM (ref 0–800)
ABSOLUTE NEUT #: 4500 /CMM (ref 1800–7700)
ALBUMIN SERPL-MCNC: 4.2 GM/DL (ref 3.5–5)
ALP BLD-CCNC: 60 IU/L (ref 41–137)
ALT SERPL-CCNC: 29 IU/L (ref 10–40)
ANION GAP SERPL CALCULATED.3IONS-SCNC: 7 MMOL/L (ref 4–12)
AST SERPL-CCNC: 18 IU/L (ref 15–41)
BASOPHILS RELATIVE PERCENT: 0.2 % (ref 0–2)
BILIRUB SERPL-MCNC: 0.8 MG/DL (ref 0.2–1)
BUN BLDV-MCNC: 27 MG/DL (ref 7–20)
CALCIUM SERPL-MCNC: 9.2 MG/DL (ref 8.8–10.5)
CHLORIDE BLD-SCNC: 106 MEQ/L (ref 101–111)
CHOLESTEROL/HDL RELATIVE RISK: 4.8 (ref 4–5)
CHOLESTEROL: 154 MG/DL
CO2: 28 MEQ/L (ref 21–32)
CREAT SERPL-MCNC: 1.21 MG/DL (ref 0.7–1.3)
CREATININE CLEARANCE: 58
DIRECT-LDL / HDL RISK: 3.2
EOSINOPHILS RELATIVE PERCENT: 4.3 % (ref 0–6)
ESTIMATED AVERAGE GLUCOSE: 126 MG/DL
GLUCOSE: 141 MG/DL (ref 70–110)
HBA1C MFR BLD: 6 % (ref 4.4–6.4)
HCT VFR BLD CALC: 37.9 % (ref 40–49)
HDLC SERPL-MCNC: 32 MG/DL
HEMOGLOBIN: 13.4 GM/DL (ref 13.5–16.5)
LDL CHOLESTEROL DIRECT: 105 MG/DL
LYMPHOCYTES RELATIVE PERCENT: 26.4 % (ref 15–45)
MCH RBC QN AUTO: 31 PG (ref 27.5–33)
MCHC RBC AUTO-ENTMCNC: 35.3 GM/DL (ref 33–36)
MCV RBC AUTO: 87.7 CU MIC (ref 80–97)
MONOCYTES RELATIVE PERCENT: 8 % (ref 2–10)
NEUTROPHILS RELATIVE PERCENT: 61.1 % (ref 40–70)
NUCLEATED RBCS: 0.1 /100 WBC
PDW BLD-RTO: 12.6 % (ref 12–16)
PLATELET # BLD: 132 TH/CMM (ref 150–400)
POTASSIUM SERPL-SCNC: 3.7 MEQ/L (ref 3.6–5)
RBC # BLD: 4.32 MIL/CMM (ref 4.5–6)
SODIUM BLD-SCNC: 141 MEQ/L (ref 135–145)
TOTAL PROTEIN: 6.6 G/DL (ref 6.2–8)
TRIGL SERPL-MCNC: 242 MG/DL
VLDLC SERPL CALC-MCNC: 48 MG/DL
WBC # BLD: 7.3 TH/CMM (ref 4.4–10.5)

## 2017-11-16 ENCOUNTER — TELEPHONE (OUTPATIENT)
Dept: FAMILY MEDICINE CLINIC | Age: 77
End: 2017-11-16

## 2017-11-17 NOTE — TELEPHONE ENCOUNTER
Pt's wife called back stating that is ok. They will keep seeing Psychiatrist Dr. Melisa Valle in [facility]. States he has seen her once and is doing fine and has a f/u appt in February. If sx would change she will call and move up appointment.  Will cancel this referral.

## 2017-11-20 ENCOUNTER — TELEPHONE (OUTPATIENT)
Dept: FAMILY MEDICINE CLINIC | Age: 77
End: 2017-11-20

## 2017-11-20 DIAGNOSIS — E11.9 TYPE 2 DIABETES MELLITUS TREATED WITHOUT INSULIN (HCC): Primary | ICD-10-CM

## 2017-11-20 DIAGNOSIS — E78.1 HYPERTRIGLYCERIDEMIA: ICD-10-CM

## 2017-11-20 NOTE — TELEPHONE ENCOUNTER
----- Message from Delma Duncan MD sent at 11/19/2017  1:37 PM EST -----  Sugars improved but triglycerides remain elevated.   Continue to follow diabetic diet, repeat lipid panel hemoglobin A1c and CMP in 6 months

## 2017-11-20 NOTE — TELEPHONE ENCOUNTER
----- Message from Sallie Stokes MD sent at 11/19/2017  1:37 PM EST -----  Sugars improved but triglycerides remain elevated.   Continue to follow diabetic diet, repeat lipid panel hemoglobin A1c and CMP in 6 months

## 2017-12-28 ENCOUNTER — CARE COORDINATION (OUTPATIENT)
Dept: CARE COORDINATION | Age: 77
End: 2017-12-28

## 2018-01-02 ENCOUNTER — CARE COORDINATION (OUTPATIENT)
Dept: CARE COORDINATION | Age: 78
End: 2018-01-02

## 2018-01-02 NOTE — CARE COORDINATION
Ambulatory Care Coordination Note  1/2/2018  CM Risk Score: 6  Ned Mortality Risk Score: 20.84    ACC: Samantha Russo, RN    Summary Note: Wife reports Sophia Painter is doing well. He is following with Psychiatry in [facility] and his next appt is Feb 8th. Mentally is doing better and has been able to help her with things. Blood sugars are doing well, highest 140 per wife. Care Coordination Interventions    Program Enrollment:  Rising Risk  Referral from Primary Care Provider:  Yes  Suggested Interventions and Community Resources  Diabetes Education: In Process  Fall Risk Prevention:  Not Started  Zone Management Tools:  Not Started         Prior to Admission medications    Medication Sig Start Date End Date Taking?  Authorizing Provider   metFORMIN (GLUCOPHAGE XR) 750 MG extended release tablet Take 1 tablet by mouth daily (with breakfast) 8/28/17   Avtar Lind MD   rivastigmine (EXELON) 4.6 MG/24HR Place 1 patch onto the skin daily 8/3/17   Avtar Lind MD   escitalopram (LEXAPRO) 20 MG tablet Take 1 tablet by mouth daily 8/3/17   Avtar Lind MD   OLANZapine [facility]) 2.5 MG tablet Take 0.5 tablets by mouth nightly 8/3/17   Avtar Lind MD   esomeprazole (NEXIUM) 40 MG delayed release capsule Take 40 mg by mouth every morning (before breakfast)    Historical Provider, MD   tamsulosin (FLOMAX) 0.4 MG capsule Take 0.4 mg by mouth nightly Per Mio Forman    Historical Provider, MD   acetaminophen (TYLENOL) 325 MG tablet Take 2 tablets by mouth every 4 hours as needed for Pain 7/5/17   Fabiola Prakash MD   Glucose Blood (BLOOD GLUCOSE TEST STRIPS) STRP One Touch Verio Test Strips Test QD or as directed --DX E11.9 7/3/17   Princess Sensor, NP   Lancets MISC One Touch Verio Lancets  DX E11.9 Test QD or as directed 7/3/17   Princess Sensor, NP   potassium chloride (KLOR-CON M20) 20 MEQ extended release tablet Take 20 mEq by mouth daily    Historical Provider, MD   vitamin D3 (CHOLECALCIFEROL) 400 UNITS TABS tablet Take 400 Units by mouth daily    Historical Provider, MD   verapamil (CALAN SR) 240 MG extended release tablet Take 1 tablet by mouth daily 3/28/17   Goyo Jordan MD   losartan-hydrochlorothiazide [facility]) 100-25 MG per tablet Take 1 tablet by mouth daily 3/28/17   Goyo Jordan MD   Ascorbic Acid (VITAMIN C) 500 MG tablet Take 500 mg by mouth daily    Historical Provider, MD   finasteride (PROSCAR) 5 MG tablet Take 5 mg by mouth daily  12/29/14   Historical Provider, MD   aspirin 81 MG tablet Take 81 mg by mouth daily. Historical Provider, MD   therapeutic multivitamin-minerals (THERAGRAN-M) tablet Take 1 tablet by mouth daily.       Historical Provider, MD       Future Appointments  Date Time Provider Genesis Welsh   3/15/2018 3:00 PM Alinda Chough, MD [facility]

## 2018-02-12 ENCOUNTER — CARE COORDINATION (OUTPATIENT)
Dept: CARE COORDINATION | Age: 78
End: 2018-02-12

## 2018-02-12 NOTE — CARE COORDINATION
Ambulatory Care Coordination Note  2/12/18  CM Risk Score: 6  Ned Mortality Risk Score: 20.84    ACC: Luis Mendoza, RN    Summary Note: Early March will have an appt with his psychiatrist. It was rescheduled due to provider being ill. Per wife he is doing well. No psych issues. Helping around the house, eating well, doing word search books. Is using his stationary bike. Monitors BS twice weekly 109-130. Will place in surveillance as Fani Mccracken is doing well. Care Coordination Interventions    Program Enrollment:  Complex Care  Referral from Primary Care Provider:  Yes  Suggested Interventions and Community Resources  Diabetes Education:  Completed  Fall Risk Prevention:  Completed  Zone Management Tools:  Completed         Goals Addressed             Most Recent     Conditions and Symptoms   On track (2/12/2018)             I will follow my Zone Management tool to seek urgent or emergent care. Barriers: impairment:  cognitive and overwhelmed by complexity of regimen  Plan for overcoming my barriers: wife will assist patient with needs  Confidence: 10/10  Anticipated Goal Completion Date: 9/29/17              Prior to Admission medications    Medication Sig Start Date End Date Taking?  Authorizing Provider   metFORMIN (GLUCOPHAGE XR) 750 MG extended release tablet Take 1 tablet by mouth daily (with breakfast) 8/28/17   Ale Espinoza MD   rivastigmine (EXELON) 4.6 MG/24HR Place 1 patch onto the skin daily 8/3/17   Ale Espinoza MD   escitalopram (LEXAPRO) 20 MG tablet Take 1 tablet by mouth daily 8/3/17   Ale Espinoza MD   OLANZapine [facility]) 2.5 MG tablet Take 0.5 tablets by mouth nightly 8/3/17   Ale Espinoza MD   esomeprazole (NEXIUM) 40 MG delayed release capsule Take 40 mg by mouth every morning (before breakfast)    Historical Provider, MD   tamsulosin (FLOMAX) 0.4 MG capsule Take 0.4 mg by mouth nightly Per Abhi Garcia    Historical Provider, MD   acetaminophen (TYLENOL) 325 MG tablet Take 2

## 2018-04-18 ENCOUNTER — CARE COORDINATION (OUTPATIENT)
Dept: CARE COORDINATION | Age: 78
End: 2018-04-18

## 2018-04-19 ENCOUNTER — CARE COORDINATION (OUTPATIENT)
Dept: CARE COORDINATION | Age: 78
End: 2018-04-19

## 2018-05-10 ENCOUNTER — OFFICE VISIT (OUTPATIENT)
Dept: FAMILY MEDICINE CLINIC | Age: 78
End: 2018-05-10
Payer: MEDICARE

## 2018-05-10 VITALS
TEMPERATURE: 97.4 F | SYSTOLIC BLOOD PRESSURE: 120 MMHG | RESPIRATION RATE: 20 BRPM | BODY MASS INDEX: 24.95 KG/M2 | WEIGHT: 184 LBS | HEART RATE: 88 BPM | DIASTOLIC BLOOD PRESSURE: 80 MMHG

## 2018-05-10 DIAGNOSIS — R04.0 RIGHT-SIDED EPISTAXIS: Primary | ICD-10-CM

## 2018-05-10 DIAGNOSIS — E43 SEVERE MALNUTRITION (HCC): ICD-10-CM

## 2018-05-10 PROCEDURE — G8420 CALC BMI NORM PARAMETERS: HCPCS | Performed by: NURSE PRACTITIONER

## 2018-05-10 PROCEDURE — 99213 OFFICE O/P EST LOW 20 MIN: CPT | Performed by: NURSE PRACTITIONER

## 2018-05-10 PROCEDURE — G8427 DOCREV CUR MEDS BY ELIG CLIN: HCPCS | Performed by: NURSE PRACTITIONER

## 2018-05-10 PROCEDURE — 1036F TOBACCO NON-USER: CPT | Performed by: NURSE PRACTITIONER

## 2018-05-10 PROCEDURE — 1123F ACP DISCUSS/DSCN MKR DOCD: CPT | Performed by: NURSE PRACTITIONER

## 2018-05-10 PROCEDURE — 4040F PNEUMOC VAC/ADMIN/RCVD: CPT | Performed by: NURSE PRACTITIONER

## 2018-05-10 RX ORDER — ESOMEPRAZOLE MAGNESIUM 40 MG/1
40 CAPSULE, DELAYED RELEASE ORAL EVERY OTHER DAY
COMMUNITY
End: 2018-11-30 | Stop reason: DRUGHIGH

## 2018-05-11 LAB
A/G RATIO: 1.7 (ref 1.5–2.5)
ABSOLUTE BASO #: 0 /CMM (ref 0–200)
ABSOLUTE EOS #: 200 /CMM (ref 0–500)
ABSOLUTE LYMPH #: 2100 /CMM (ref 1000–4800)
ABSOLUTE MONO #: 600 /CMM (ref 0–800)
ABSOLUTE NEUT #: 6400 /CMM (ref 1800–7700)
ALBUMIN SERPL-MCNC: 4.5 GM/DL (ref 3.5–5)
ALP BLD-CCNC: 65 IU/L (ref 41–137)
ALT SERPL-CCNC: 30 IU/L (ref 10–40)
ANION GAP SERPL CALCULATED.3IONS-SCNC: 11 MMOL/L (ref 4–12)
AST SERPL-CCNC: 23 IU/L (ref 15–41)
BASOPHILS RELATIVE PERCENT: 0.2 % (ref 0–2)
BILIRUB SERPL-MCNC: 0.8 MG/DL (ref 0.2–1)
BUN BLDV-MCNC: 27 MG/DL (ref 7–20)
CALCIUM SERPL-MCNC: 9.5 MG/DL (ref 8.8–10.5)
CHLORIDE BLD-SCNC: 104 MEQ/L (ref 101–111)
CO2: 25 MEQ/L (ref 21–32)
CREAT SERPL-MCNC: 1.3 MG/DL (ref 0.7–1.3)
CREATININE CLEARANCE: 53
EOSINOPHILS RELATIVE PERCENT: 2.3 % (ref 0–6)
ESTIMATED AVERAGE GLUCOSE: 123 MG/DL
GLUCOSE: 180 MG/DL (ref 70–110)
HBA1C MFR BLD: 5.9 % (ref 4.4–6.4)
HCT VFR BLD CALC: 40.9 % (ref 40–49)
HEMOGLOBIN: 14.3 GM/DL (ref 13.5–16.5)
LYMPHOCYTES RELATIVE PERCENT: 22.1 % (ref 15–45)
MCH RBC QN AUTO: 31.7 PG (ref 27.5–33)
MCHC RBC AUTO-ENTMCNC: 34.8 GM/DL (ref 33–36)
MCV RBC AUTO: 91 CU MIC (ref 80–97)
MONOCYTES RELATIVE PERCENT: 6.2 % (ref 2–10)
NEUTROPHILS RELATIVE PERCENT: 69.2 % (ref 40–70)
NUCLEATED RBCS: 0.1 /100 WBC
PDW BLD-RTO: 13.5 % (ref 12–16)
PLATELET # BLD: 166 TH/CMM (ref 150–400)
POTASSIUM SERPL-SCNC: 3.4 MEQ/L (ref 3.6–5)
RBC # BLD: 4.5 MIL/CMM (ref 4.5–6)
SODIUM BLD-SCNC: 140 MEQ/L (ref 135–145)
TOTAL PROTEIN: 7.1 G/DL (ref 6.2–8)
WBC # BLD: 9.3 TH/CMM (ref 4.4–10.5)

## 2018-05-24 ENCOUNTER — CARE COORDINATION (OUTPATIENT)
Dept: CARE COORDINATION | Age: 78
End: 2018-05-24

## 2018-05-31 ENCOUNTER — OFFICE VISIT (OUTPATIENT)
Dept: FAMILY MEDICINE CLINIC | Age: 78
End: 2018-05-31
Payer: MEDICARE

## 2018-05-31 VITALS
SYSTOLIC BLOOD PRESSURE: 112 MMHG | WEIGHT: 183 LBS | DIASTOLIC BLOOD PRESSURE: 68 MMHG | HEART RATE: 96 BPM | RESPIRATION RATE: 16 BRPM | TEMPERATURE: 98.2 F | BODY MASS INDEX: 24.82 KG/M2

## 2018-05-31 DIAGNOSIS — E11.9 TYPE 2 DIABETES MELLITUS TREATED WITHOUT INSULIN (HCC): ICD-10-CM

## 2018-05-31 DIAGNOSIS — I10 ESSENTIAL HYPERTENSION: Primary | Chronic | ICD-10-CM

## 2018-05-31 DIAGNOSIS — E43 SEVERE MALNUTRITION (HCC): ICD-10-CM

## 2018-05-31 PROCEDURE — G8427 DOCREV CUR MEDS BY ELIG CLIN: HCPCS | Performed by: FAMILY MEDICINE

## 2018-05-31 PROCEDURE — 1036F TOBACCO NON-USER: CPT | Performed by: FAMILY MEDICINE

## 2018-05-31 PROCEDURE — 99213 OFFICE O/P EST LOW 20 MIN: CPT | Performed by: FAMILY MEDICINE

## 2018-05-31 PROCEDURE — 4040F PNEUMOC VAC/ADMIN/RCVD: CPT | Performed by: FAMILY MEDICINE

## 2018-05-31 PROCEDURE — 1123F ACP DISCUSS/DSCN MKR DOCD: CPT | Performed by: FAMILY MEDICINE

## 2018-05-31 PROCEDURE — G8420 CALC BMI NORM PARAMETERS: HCPCS | Performed by: FAMILY MEDICINE

## 2018-05-31 RX ORDER — POTASSIUM CHLORIDE 20 MEQ/1
20 TABLET, EXTENDED RELEASE ORAL DAILY
Qty: 90 TABLET | Refills: 3 | Status: SHIPPED | OUTPATIENT
Start: 2018-05-31 | End: 2019-01-01 | Stop reason: SDUPTHER

## 2018-05-31 RX ORDER — LOSARTAN POTASSIUM AND HYDROCHLOROTHIAZIDE 25; 100 MG/1; MG/1
1 TABLET ORAL DAILY
Qty: 90 TABLET | Refills: 3 | Status: SHIPPED | OUTPATIENT
Start: 2018-05-31 | End: 2018-07-26 | Stop reason: DRUGHIGH

## 2018-05-31 RX ORDER — METFORMIN HYDROCHLORIDE 750 MG/1
750 TABLET, EXTENDED RELEASE ORAL
Qty: 90 TABLET | Refills: 3 | Status: SHIPPED | OUTPATIENT
Start: 2018-05-31 | End: 2019-01-01 | Stop reason: SDUPTHER

## 2018-05-31 RX ORDER — VERAPAMIL HYDROCHLORIDE 240 MG/1
240 TABLET, FILM COATED, EXTENDED RELEASE ORAL DAILY
Qty: 90 TABLET | Refills: 3 | Status: SHIPPED | OUTPATIENT
Start: 2018-05-31 | End: 2019-01-01 | Stop reason: SDUPTHER

## 2018-06-02 ASSESSMENT — ENCOUNTER SYMPTOMS
DIARRHEA: 0
NAUSEA: 0
SORE THROAT: 0
SHORTNESS OF BREATH: 0
EYE PAIN: 0
ABDOMINAL PAIN: 0
CONSTIPATION: 0
COUGH: 0
SINUS PRESSURE: 0
RHINORRHEA: 0
ABDOMINAL DISTENTION: 0

## 2018-07-13 ENCOUNTER — CARE COORDINATION (OUTPATIENT)
Dept: CARE COORDINATION | Age: 78
End: 2018-07-13

## 2018-07-26 ENCOUNTER — OFFICE VISIT (OUTPATIENT)
Dept: FAMILY MEDICINE CLINIC | Age: 78
End: 2018-07-26
Payer: MEDICARE

## 2018-07-26 VITALS
HEIGHT: 72 IN | WEIGHT: 172.6 LBS | HEART RATE: 88 BPM | TEMPERATURE: 98.2 F | SYSTOLIC BLOOD PRESSURE: 94 MMHG | DIASTOLIC BLOOD PRESSURE: 62 MMHG | BODY MASS INDEX: 23.38 KG/M2 | RESPIRATION RATE: 16 BRPM

## 2018-07-26 DIAGNOSIS — K40.90 NON-RECURRENT UNILATERAL INGUINAL HERNIA WITHOUT OBSTRUCTION OR GANGRENE: Primary | ICD-10-CM

## 2018-07-26 DIAGNOSIS — D17.22 LIPOMA OF LEFT SHOULDER: ICD-10-CM

## 2018-07-26 PROCEDURE — 4040F PNEUMOC VAC/ADMIN/RCVD: CPT | Performed by: FAMILY MEDICINE

## 2018-07-26 PROCEDURE — 1101F PT FALLS ASSESS-DOCD LE1/YR: CPT | Performed by: FAMILY MEDICINE

## 2018-07-26 PROCEDURE — 1123F ACP DISCUSS/DSCN MKR DOCD: CPT | Performed by: FAMILY MEDICINE

## 2018-07-26 PROCEDURE — G8427 DOCREV CUR MEDS BY ELIG CLIN: HCPCS | Performed by: FAMILY MEDICINE

## 2018-07-26 PROCEDURE — 99213 OFFICE O/P EST LOW 20 MIN: CPT | Performed by: FAMILY MEDICINE

## 2018-07-26 PROCEDURE — 1036F TOBACCO NON-USER: CPT | Performed by: FAMILY MEDICINE

## 2018-07-26 PROCEDURE — G8420 CALC BMI NORM PARAMETERS: HCPCS | Performed by: FAMILY MEDICINE

## 2018-07-26 RX ORDER — LOSARTAN POTASSIUM AND HYDROCHLOROTHIAZIDE 25; 100 MG/1; MG/1
0.5 TABLET ORAL DAILY
Qty: 90 TABLET | Refills: 3 | Status: SHIPPED
Start: 2018-07-26 | End: 2019-01-01 | Stop reason: SDUPTHER

## 2018-07-29 ASSESSMENT — ENCOUNTER SYMPTOMS
DIARRHEA: 0
SORE THROAT: 0
RHINORRHEA: 0
ABDOMINAL DISTENTION: 0
COUGH: 0
EYE PAIN: 0
ABDOMINAL PAIN: 0
CONSTIPATION: 0
SINUS PRESSURE: 0
SHORTNESS OF BREATH: 0
NAUSEA: 0

## 2018-07-29 NOTE — PROGRESS NOTES
[address]  [facility]  [facility]  Dept: [phone]  Dept Fax: [phone]: [phone]  PROGRESS NOTE      Visit Date: 7/29/2018    Pasquale Tucker is a 66 y.o. male who presents today for:  Chief Complaint   Patient presents with    Hernia     here today for hernia- lower abdomen area- bothering patinet.  Other     has a cyst on back of left shoulder- wants taken off. Subjective:  HPI  Patient comes in as to hernia release a bulging area in his groin causing some pain and discomfort. He also has a lipoma the shoulder that he would like taken off if he is going to have hernia surgery. Discussed this with the patient and his wife that I would really be at the discretion of the surgeon    Review of Systems   Constitutional: Negative for appetite change and fever. HENT: Negative for congestion, ear pain, postnasal drip, rhinorrhea, sinus pressure and sore throat. Eyes: Negative for pain and visual disturbance. Respiratory: Negative for cough and shortness of breath. Cardiovascular: Negative for chest pain. Gastrointestinal: Negative for abdominal distention, abdominal pain, constipation, diarrhea and nausea. Genitourinary: Negative for dysuria, frequency and urgency. Musculoskeletal: Negative for arthralgias. Skin: Negative for rash. Neurological: Negative for dizziness.      Past Medical History:   Diagnosis Date    Anxiety     Depression     Diabetes mellitus (HCC)     GERD (gastroesophageal reflux disease)     Hyperglycemia     Hypertension       Past Surgical History:   Procedure Laterality Date    OTHER SURGICAL HISTORY  1970    right thumb surgery     UPPER GASTROINTESTINAL ENDOSCOPY  2017    EGD     Family History   Problem Relation Age of Onset    Alzheimer's Disease Mother     Stroke Father     Heart Attack Brother     Colon Cancer Neg Hx      Social History   Substance Use Topics    Smoking status: recognition technology. **       Electronically signed by Kely Mckeon MD on 7/29/2018 at 7:33 PM

## 2018-08-01 ENCOUNTER — OFFICE VISIT (OUTPATIENT)
Dept: SURGERY | Age: 78
End: 2018-08-01
Payer: MEDICARE

## 2018-08-01 VITALS
TEMPERATURE: 97.3 F | BODY MASS INDEX: 24.23 KG/M2 | SYSTOLIC BLOOD PRESSURE: 120 MMHG | WEIGHT: 173.1 LBS | OXYGEN SATURATION: 98 % | DIASTOLIC BLOOD PRESSURE: 60 MMHG | HEART RATE: 106 BPM | RESPIRATION RATE: 18 BRPM | HEIGHT: 71 IN

## 2018-08-01 DIAGNOSIS — I10 ESSENTIAL HYPERTENSION: Primary | ICD-10-CM

## 2018-08-01 DIAGNOSIS — K40.90 INGUINAL HERNIA OF LEFT SIDE WITHOUT OBSTRUCTION OR GANGRENE: ICD-10-CM

## 2018-08-01 DIAGNOSIS — D17.22 LIPOMA OF LEFT SHOULDER: ICD-10-CM

## 2018-08-01 DIAGNOSIS — K40.30 NON-RECURRENT INGUINAL HERNIA OF RIGHT SIDE WITH OBSTRUCTION AND WITHOUT GANGRENE: ICD-10-CM

## 2018-08-01 DIAGNOSIS — Z01.818 PRE-OP TESTING: ICD-10-CM

## 2018-08-01 PROBLEM — K40.20 BILATERAL INGUINAL HERNIA WITHOUT OBSTRUCTION OR GANGRENE: Status: ACTIVE | Noted: 2018-08-01

## 2018-08-01 PROCEDURE — 1036F TOBACCO NON-USER: CPT | Performed by: SURGERY

## 2018-08-01 PROCEDURE — 99203 OFFICE O/P NEW LOW 30 MIN: CPT | Performed by: SURGERY

## 2018-08-01 PROCEDURE — G8427 DOCREV CUR MEDS BY ELIG CLIN: HCPCS | Performed by: SURGERY

## 2018-08-01 PROCEDURE — 1123F ACP DISCUSS/DSCN MKR DOCD: CPT | Performed by: SURGERY

## 2018-08-01 PROCEDURE — 1101F PT FALLS ASSESS-DOCD LE1/YR: CPT | Performed by: SURGERY

## 2018-08-01 PROCEDURE — G8420 CALC BMI NORM PARAMETERS: HCPCS | Performed by: SURGERY

## 2018-08-01 PROCEDURE — 4040F PNEUMOC VAC/ADMIN/RCVD: CPT | Performed by: SURGERY

## 2018-08-01 ASSESSMENT — ENCOUNTER SYMPTOMS
EYE REDNESS: 0
NAUSEA: 0
DIARRHEA: 0
RECTAL PAIN: 0
PHOTOPHOBIA: 0
EYE PAIN: 0
RHINORRHEA: 0
SORE THROAT: 0
SINUS PRESSURE: 0
STRIDOR: 0
COLOR CHANGE: 0
APNEA: 0
FACIAL SWELLING: 0
CHEST TIGHTNESS: 0
SHORTNESS OF BREATH: 0
CHOKING: 0
ABDOMINAL PAIN: 1
COUGH: 0
EYE ITCHING: 0
WHEEZING: 0
BACK PAIN: 0
EYE DISCHARGE: 0
VOMITING: 0
TROUBLE SWALLOWING: 0
ABDOMINAL DISTENTION: 0
VOICE CHANGE: 0
SINUS PAIN: 0
ANAL BLEEDING: 0
CONSTIPATION: 1
BLOOD IN STOOL: 0

## 2018-08-01 NOTE — PROGRESS NOTES
Subjective:      Patient ID: Cisco Sultana is a 66 y.o. male. Chief Complaint   Patient presents with    Surgical Consult     New patient-referred by Dr Russell-Bilateral inguinal hernia,lipoma of shoulder       HPI    Review of Systems   Constitutional: Positive for appetite change and unexpected weight change. Negative for activity change, chills, diaphoresis, fatigue and fever. HENT: Negative for congestion, dental problem, drooling, ear discharge, ear pain, facial swelling, hearing loss, mouth sores, nosebleeds, postnasal drip, rhinorrhea, sinus pain, sinus pressure, sneezing, sore throat, tinnitus, trouble swallowing and voice change. Eyes: Negative for photophobia, pain, discharge, redness, itching and visual disturbance. Respiratory: Negative for apnea, cough, choking, chest tightness, shortness of breath, wheezing and stridor. Cardiovascular: Negative for chest pain, palpitations and leg swelling. Gastrointestinal: Positive for abdominal pain and constipation. Negative for abdominal distention, anal bleeding, blood in stool, diarrhea, nausea, rectal pain and vomiting. Endocrine: Negative for cold intolerance, heat intolerance, polydipsia, polyphagia and polyuria. Genitourinary: Positive for difficulty urinating, scrotal swelling and testicular pain. Negative for decreased urine volume, discharge, dysuria, enuresis, flank pain, frequency, genital sores, hematuria, penile pain, penile swelling and urgency. Musculoskeletal: Negative for arthralgias, back pain, gait problem, joint swelling, myalgias, neck pain and neck stiffness. Skin: Negative for color change, pallor, rash and wound. Allergic/Immunologic: Negative for environmental allergies, food allergies and immunocompromised state. Neurological: Positive for dizziness and weakness. Negative for tremors, seizures, syncope, facial asymmetry, speech difficulty, light-headedness, numbness and headaches.    Hematological: Negative for

## 2018-08-01 NOTE — LETTER
the use of mesh and its advantages and disadvantages. We discussed the anesthetic options, conduct of the operation, outpatient status, post op recovery and length of time off of work. After this discussion, the patient's questions were answered and has elected to proceed with surgical repair. (OPEN)  3. In the office, I had a discussion with the patient regarding the risks of hernia surgery to include bleeding, infection, recurrence, injury to surrounding structures, continued pain in the area and possible conversion to an open procedure . We also discussed the use of mesh and its advantages and disadvantages. We discussed the anesthetic options, conduct of the operation, outpatient status, post op recovery and length of time off of work. After this discussion, the patient's questions were answered and has elected to proceed with surgical repair. (L/S)  4. Status: outpatient  5. Planned anesthesia: MAC  6. He will undergo pre-operative clearance per anesthesia guidelines with risk factors listed under the past medical history diagnosis & problem list.  7. Perioperative discontinuation of ASA, Plavix, warfarin, Brillinta, Effient, Pradaxa, Eliquis and Xarelto. Continuation of 81 mg Aspirin is acceptable. 8. Perioperative medical clearance is not required   Orders Placed This Encounter:  Orders Placed This Encounter   Procedures    REPAIR HERNIA INGUINAL     Standing Status:   Future     Standing Expiration Date:   8/1/2019     Order Specific Question:   Pre-procedure Diagnosis     Answer:   RIGHT INGUINAL HERNIA    EKG 12 Lead     Standing Status:   Future     Standing Expiration Date:   8/1/2019     Order Specific Question:   Reason for Exam?     Answer:   Hypertension    VA IMPLANT MESH HERNIA REPAIR/DEBRIDEMENT CLOSURE                If I can provide any additional assistance or you have any concerns, please feel free to contact me. Thank you for allowing to participate in the care of your patients.

## 2018-08-01 NOTE — PROGRESS NOTES
(GLUCOPHAGE XR) 750 MG extended release tablet Take 1 tablet by mouth daily (with breakfast) 90 tablet 3    esomeprazole (NEXIUM) 40 MG delayed release capsule Take 40 mg by mouth every other day      ibuprofen (ADVIL;MOTRIN) 200 MG tablet Take 200 mg by mouth as needed for Pain      rivastigmine (EXELON) 4.6 MG/24HR Place 1 patch onto the skin daily 90 patch 3    escitalopram (LEXAPRO) 20 MG tablet Take 1 tablet by mouth daily 90 tablet 3    tamsulosin (FLOMAX) 0.4 MG capsule Take 0.4 mg by mouth nightly Per       Glucose Blood (BLOOD GLUCOSE TEST STRIPS) STRP One Touch Verio Test Strips Test QD or as directed --DX E11.9 100 strip 3    Lancets MISC One Touch Verio Lancets  DX E11.9 Test QD or as directed 100 each 3    vitamin D3 (CHOLECALCIFEROL) 400 UNITS TABS tablet Take 400 Units by mouth daily      Ascorbic Acid (VITAMIN C) 500 MG tablet Take 500 mg by mouth daily      finasteride (PROSCAR) 5 MG tablet Take 5 mg by mouth daily       aspirin 81 MG tablet Take 81 mg by mouth daily.  therapeutic multivitamin-minerals (THERAGRAN-M) tablet Take 1 tablet by mouth daily. No current facility-administered medications on file prior to visit. Allergies  No Known Allergies    Family History  Family History   Problem Relation Age of Onset    Alzheimer's Disease Mother     Stroke Father     Heart Attack Brother     Colon Cancer Neg Hx        Social History  Social History     Social History    Marital status:      Spouse name: N/A    Number of children: N/A    Years of education: N/A     Occupational History    Not on file.      Social History Main Topics    Smoking status: Never Smoker    Smokeless tobacco: Never Used    Alcohol use No    Drug use: No    Sexual activity: Not on file     Other Topics Concern    Not on file     Social History Narrative    No narrative on file       [address] Maintenance   Topic Date Due    Shingles Vaccine (1 of 2 - 2 Dose Series) 03/29/1990    Flu vaccine (1) 09/01/2018    Potassium monitoring  05/11/2019    Creatinine monitoring  05/11/2019    DTaP/Tdap/Td vaccine (2 - Td) 03/02/2020    Pneumococcal low/med risk  Completed       Review of Systems  Constitutional: Positive for appetite change and unexpected weight change. Negative for activity change, chills, diaphoresis, fatigue and fever. HENT: Negative for congestion, dental problem, drooling, ear discharge, ear pain, facial swelling, hearing loss, mouth sores, nosebleeds, postnasal drip, rhinorrhea, sinus pain, sinus pressure, sneezing, sore throat, tinnitus, trouble swallowing and voice change. Eyes: Negative for photophobia, pain, discharge, redness, itching and visual disturbance. Respiratory: Negative for apnea, cough, choking, chest tightness, shortness of breath, wheezing and stridor. Cardiovascular: Negative for chest pain, palpitations and leg swelling. Gastrointestinal: Positive for abdominal pain and constipation. Negative for abdominal distention, anal bleeding, blood in stool, diarrhea, nausea, rectal pain and vomiting. Endocrine: Negative for cold intolerance, heat intolerance, polydipsia, polyphagia and polyuria. Genitourinary: Positive for difficulty urinating, scrotal swelling and testicular pain. Negative for decreased urine volume, discharge, dysuria, enuresis, flank pain, frequency, genital sores, hematuria, penile pain, penile swelling and urgency. Musculoskeletal: Negative for arthralgias, back pain, gait problem, joint swelling, myalgias, neck pain and neck stiffness. Skin: Negative for color change, pallor, rash and wound. Allergic/Immunologic: Negative for environmental allergies, food allergies and immunocompromised state. Neurological: Positive for dizziness and weakness. Negative for tremors, seizures, syncope, facial asymmetry, speech difficulty, light-headedness, numbness and headaches.    Hematological:

## 2018-08-01 NOTE — PATIENT INSTRUCTIONS
Patient Education        Inguinal Hernia Repair: Before Your Surgery  What is inguinal hernia repair? Inguinal hernia repair is a type of surgery. An inguinal hernia is a bulge under the skin in your groin. It happens when there is a weak spot in the groin muscle and a piece of the intestines or tissue pokes through the muscle. This can be painful. You may have pain when you're active. Or it may be painful when you strain during a bowel movement or lift something heavy. Surgery can help with your pain. It can also prevent serious problems that can happen if an organ or tissue gets stuck in the hernia. There are two ways to do this surgery. In open surgery, the doctor makes one cut near the hernia. This cut is called an incision. In laparoscopic surgery, the doctor makes several very small incisions and uses a thin, lighted scope and small tools. During surgery, the doctor pushes the bulge back in place. He or she may place a piece of mesh on top of the bulge to help keep it in place. The doctor then sews the healthy tissue back together. Laparoscopic surgery leaves several small scars. Open surgery leaves one long scar. The scars fade with time. After the surgery, you can probably return to light activity after 1 to 3 weeks. How long it takes will depend on the type of surgery. Follow-up care is a key part of your treatment and safety. Be sure to make and go to all appointments, and call your doctor if you are having problems. It's also a good idea to know your test results and keep a list of the medicines you take. What happens before surgery?   Surgery can be stressful. This information will help you understand what you can expect. And it will help you safely prepare for surgery.   Preparing for surgery    · Understand exactly what surgery is planned, along with the risks, benefits, and other options. · Tell your doctors ALL the medicines, vitamins, supplements, and herbal remedies you take.  Some of these can increase the risk of bleeding or interact with anesthesia.     · If you take aspirin or some other blood thinner, be sure to talk to your doctor. He or she will tell you if you should stop taking it before your surgery. Make sure that you understand exactly what your doctor wants you to do.     · Your doctor will tell you which medicines to take or stop before your surgery. You may need to stop taking certain medicines a week or more before surgery. So talk to your doctor as soon as you can.     · If you have an advance directive, let your doctor know. It may include a living will and a durable power of  for health care. Bring a copy to the hospital. If you don't have one, you may want to prepare one. It lets your doctor and loved ones know your health care wishes. Doctors advise that everyone prepare these papers before any type of surgery or procedure. What happens on the day of surgery? · Follow the instructions exactly about when to stop eating and drinking. If you don't, your surgery may be canceled. If your doctor told you to take your medicines on the day of surgery, take them with only a sip of water.     · Take a bath or shower before you come in for your surgery. Do not apply lotions, perfumes, deodorants, or nail polish.     · Do not shave the surgical site yourself.     · Take off all jewelry and piercings. And take out contact lenses, if you wear them.    At the hospital or surgery center   · Bring a picture ID.     · The area for surgery is often marked to make sure there are no errors.     · You will be kept comfortable and safe by your anesthesia provider. The anesthesia may make you sleep. Or it may just numb the area being worked on.     · The surgery will take about 1 to 2 hours. Going home   · Be sure you have someone to drive you home.  Anesthesia and pain medicine make it unsafe for you to drive.     · You will be given more specific instructions about recovering from your surgery. They will cover things like diet, wound care, follow-up care, driving, and getting back to your normal routine. When should you call your doctor? · You have questions or concerns.     · You don't understand how to prepare for your surgery.     · You become ill before the surgery (such as fever, flu, or a cold).     · You need to reschedule or have changed your mind about having the surgery. Where can you learn more? Go to [email or web address]"[email or web address]. org and sign in to your [employer] account. Enter [phone] in the LocalCustomer box to learn more about \"Inguinal Hernia Repair: Before Your Surgery. \"     If you do not have an account, please click on the \"Sign Up Now\" link. Current as of: May 12, 2017  Content Version: 11.6  © 0666-5109 [employer]. Care instructions adapted under license by [address] [place] [address] St. If you have questions about a medical condition or this instruction, always ask your healthcare professional. [place] [address] any warranty or liability for your use of this information. Surgery scheduled 8/20/18. Arrive at 8 am to [facility] 2nd floor registration desk, NPO after midnight, bring a , no jewelry or piercings, shower with antibacterial soap morning of surgery, hold metformin 2 days prior.

## 2018-08-02 ENCOUNTER — HOSPITAL ENCOUNTER (OUTPATIENT)
Age: 78
Discharge: HOME | End: 2018-08-02
Payer: MEDICARE

## 2018-08-02 DIAGNOSIS — I10 ESSENTIAL HYPERTENSION: ICD-10-CM

## 2018-08-02 DIAGNOSIS — Z01.818 PRE-OP TESTING: ICD-10-CM

## 2018-08-02 DIAGNOSIS — K40.30 NON-RECURRENT INGUINAL HERNIA OF RIGHT SIDE WITH OBSTRUCTION AND WITHOUT GANGRENE: ICD-10-CM

## 2018-08-02 DIAGNOSIS — D17.22 LIPOMA OF LEFT SHOULDER: ICD-10-CM

## 2018-08-02 LAB
EKG ATRIAL RATE: 69 BPM
EKG P AXIS: 64 DEGREES
EKG P-R INTERVAL: 150 MS
EKG Q-T INTERVAL: 416 MS
EKG QRS DURATION: 96 MS
EKG QTC CALCULATION (BAZETT): 445 MS
EKG R AXIS: -17 DEGREES
EKG T AXIS: 54 DEGREES
EKG VENTRICULAR RATE: 69 BPM

## 2018-08-02 PROCEDURE — 93010 ELECTROCARDIOGRAM REPORT: CPT | Performed by: INTERNAL MEDICINE

## 2018-08-02 PROCEDURE — 93005 ELECTROCARDIOGRAM TRACING: CPT

## 2018-08-06 DIAGNOSIS — Z01.818 PRE-OP EXAM: Primary | ICD-10-CM

## 2018-08-08 ENCOUNTER — TELEPHONE (OUTPATIENT)
Dept: SURGERY | Age: 78
End: 2018-08-08

## 2018-08-10 ENCOUNTER — TELEPHONE (OUTPATIENT)
Dept: SURGERY | Age: 78
End: 2018-08-10

## 2018-08-14 LAB
ANION GAP SERPL CALCULATED.3IONS-SCNC: 10 MMOL/L (ref 4–12)
BUN BLDV-MCNC: 24 MG/DL (ref 7–20)
CALCIUM SERPL-MCNC: 9.3 MG/DL (ref 8.8–10.5)
CHLORIDE BLD-SCNC: 105 MEQ/L (ref 101–111)
CO2: 27 MEQ/L (ref 21–32)
CREAT SERPL-MCNC: 1.23 MG/DL (ref 0.7–1.3)
CREATININE CLEARANCE: 57
GLUCOSE: 153 MG/DL (ref 70–110)
HCT VFR BLD CALC: 39.1 % (ref 40–49)
HEMOGLOBIN: 13.9 GM/DL (ref 13.5–16.5)
POTASSIUM SERPL-SCNC: 3.9 MEQ/L (ref 3.6–5)
SODIUM BLD-SCNC: 142 MEQ/L (ref 135–145)

## 2018-08-23 ENCOUNTER — HOSPITAL ENCOUNTER (OUTPATIENT)
Age: 78
Setting detail: OUTPATIENT SURGERY
Discharge: HOME | End: 2018-08-23
Attending: SURGERY | Admitting: SURGERY
Payer: MEDICARE

## 2018-08-23 ENCOUNTER — ANESTHESIA EVENT (OUTPATIENT)
Dept: OPERATING ROOM | Age: 78
End: 2018-08-23
Payer: MEDICARE

## 2018-08-23 ENCOUNTER — ANESTHESIA (OUTPATIENT)
Dept: OPERATING ROOM | Age: 78
End: 2018-08-23
Payer: MEDICARE

## 2018-08-23 VITALS
SYSTOLIC BLOOD PRESSURE: 157 MMHG | DIASTOLIC BLOOD PRESSURE: 78 MMHG | TEMPERATURE: 97.2 F | HEART RATE: 67 BPM | OXYGEN SATURATION: 99 % | RESPIRATION RATE: 16 BRPM

## 2018-08-23 VITALS
DIASTOLIC BLOOD PRESSURE: 74 MMHG | SYSTOLIC BLOOD PRESSURE: 138 MMHG | OXYGEN SATURATION: 98 % | RESPIRATION RATE: 1 BRPM

## 2018-08-23 DIAGNOSIS — Z87.19 S/P RIGHT INGUINAL HERNIA REPAIR: Primary | ICD-10-CM

## 2018-08-23 DIAGNOSIS — Z98.890 S/P RIGHT INGUINAL HERNIA REPAIR: Primary | ICD-10-CM

## 2018-08-23 LAB
GLUCOSE BLD-MCNC: 194 MG/DL (ref 70–108)
POTASSIUM SERPL-SCNC: 3.5 MEQ/L (ref 3.5–5.2)

## 2018-08-23 PROCEDURE — 2500000003 HC RX 250 WO HCPCS: Performed by: SURGERY

## 2018-08-23 PROCEDURE — 3600000013 HC SURGERY LEVEL 3 ADDTL 15MIN: Performed by: SURGERY

## 2018-08-23 PROCEDURE — 84132 ASSAY OF SERUM POTASSIUM: CPT

## 2018-08-23 PROCEDURE — 7100000011 HC PHASE II RECOVERY - ADDTL 15 MIN: Performed by: SURGERY

## 2018-08-23 PROCEDURE — 2500000003 HC RX 250 WO HCPCS: Performed by: SPECIALIST

## 2018-08-23 PROCEDURE — 2580000003 HC RX 258: Performed by: SURGERY

## 2018-08-23 PROCEDURE — 2709999900 HC NON-CHARGEABLE SUPPLY: Performed by: SURGERY

## 2018-08-23 PROCEDURE — C1781 MESH (IMPLANTABLE): HCPCS | Performed by: SURGERY

## 2018-08-23 PROCEDURE — 36415 COLL VENOUS BLD VENIPUNCTURE: CPT

## 2018-08-23 PROCEDURE — 3700000000 HC ANESTHESIA ATTENDED CARE: Performed by: SURGERY

## 2018-08-23 PROCEDURE — 7100000010 HC PHASE II RECOVERY - FIRST 15 MIN: Performed by: SURGERY

## 2018-08-23 PROCEDURE — 3600000003 HC SURGERY LEVEL 3 BASE: Performed by: SURGERY

## 2018-08-23 PROCEDURE — 3700000001 HC ADD 15 MINUTES (ANESTHESIA): Performed by: SURGERY

## 2018-08-23 PROCEDURE — 49505 PRP I/HERN INIT REDUC >5 YR: CPT | Performed by: SURGERY

## 2018-08-23 PROCEDURE — 82948 REAGENT STRIP/BLOOD GLUCOSE: CPT

## 2018-08-23 PROCEDURE — 6360000002 HC RX W HCPCS: Performed by: SURGERY

## 2018-08-23 PROCEDURE — 6360000002 HC RX W HCPCS: Performed by: SPECIALIST

## 2018-08-23 DEVICE — MESH HERN W1.8XL4IN INGUINAL POLYPR PRESHAPED SPERMATIC CRD: Type: IMPLANTABLE DEVICE | Site: GROIN | Status: FUNCTIONAL

## 2018-08-23 RX ORDER — HYDROCODONE BITARTRATE AND ACETAMINOPHEN 5; 325 MG/1; MG/1
1 TABLET ORAL EVERY 4 HOURS PRN
Status: DISCONTINUED | OUTPATIENT
Start: 2018-08-23 | End: 2018-08-23 | Stop reason: HOSPADM

## 2018-08-23 RX ORDER — ONDANSETRON 2 MG/ML
INJECTION INTRAMUSCULAR; INTRAVENOUS PRN
Status: DISCONTINUED | OUTPATIENT
Start: 2018-08-23 | End: 2018-08-23 | Stop reason: SDUPTHER

## 2018-08-23 RX ORDER — MORPHINE SULFATE 2 MG/ML
2 INJECTION, SOLUTION INTRAMUSCULAR; INTRAVENOUS
Status: DISCONTINUED | OUTPATIENT
Start: 2018-08-23 | End: 2018-08-23 | Stop reason: HOSPADM

## 2018-08-23 RX ORDER — ONDANSETRON 2 MG/ML
4 INJECTION INTRAMUSCULAR; INTRAVENOUS EVERY 6 HOURS PRN
Status: DISCONTINUED | OUTPATIENT
Start: 2018-08-23 | End: 2018-08-23 | Stop reason: HOSPADM

## 2018-08-23 RX ORDER — HYDROCODONE BITARTRATE AND ACETAMINOPHEN 5; 325 MG/1; MG/1
1 TABLET ORAL EVERY 6 HOURS PRN
Qty: 28 TABLET | Refills: 0 | Status: SHIPPED | OUTPATIENT
Start: 2018-08-23 | End: 2018-08-23 | Stop reason: HOSPADM

## 2018-08-23 RX ORDER — SODIUM CHLORIDE 0.9 % (FLUSH) 0.9 %
10 SYRINGE (ML) INJECTION PRN
Status: DISCONTINUED | OUTPATIENT
Start: 2018-08-23 | End: 2018-08-23 | Stop reason: HOSPADM

## 2018-08-23 RX ORDER — MORPHINE SULFATE 2 MG/ML
4 INJECTION, SOLUTION INTRAMUSCULAR; INTRAVENOUS
Status: DISCONTINUED | OUTPATIENT
Start: 2018-08-23 | End: 2018-08-23 | Stop reason: HOSPADM

## 2018-08-23 RX ORDER — DEXAMETHASONE SODIUM PHOSPHATE 4 MG/ML
INJECTION, SOLUTION INTRA-ARTICULAR; INTRALESIONAL; INTRAMUSCULAR; INTRAVENOUS; SOFT TISSUE PRN
Status: DISCONTINUED | OUTPATIENT
Start: 2018-08-23 | End: 2018-08-23 | Stop reason: SDUPTHER

## 2018-08-23 RX ORDER — ACETAMINOPHEN 325 MG/1
650 TABLET ORAL EVERY 4 HOURS PRN
Status: DISCONTINUED | OUTPATIENT
Start: 2018-08-23 | End: 2018-08-23 | Stop reason: HOSPADM

## 2018-08-23 RX ORDER — HYDROCODONE BITARTRATE AND ACETAMINOPHEN 5; 325 MG/1; MG/1
1 TABLET ORAL EVERY 6 HOURS PRN
Qty: 28 TABLET | Refills: 0 | Status: SHIPPED | OUTPATIENT
Start: 2018-08-23 | End: 2018-08-30

## 2018-08-23 RX ORDER — HYDROCODONE BITARTRATE AND ACETAMINOPHEN 5; 325 MG/1; MG/1
2 TABLET ORAL EVERY 4 HOURS PRN
Status: DISCONTINUED | OUTPATIENT
Start: 2018-08-23 | End: 2018-08-23 | Stop reason: HOSPADM

## 2018-08-23 RX ORDER — LIDOCAINE HYDROCHLORIDE 20 MG/ML
INJECTION, SOLUTION INFILTRATION; PERINEURAL PRN
Status: DISCONTINUED | OUTPATIENT
Start: 2018-08-23 | End: 2018-08-23 | Stop reason: SDUPTHER

## 2018-08-23 RX ORDER — METOPROLOL TARTRATE 5 MG/5ML
INJECTION INTRAVENOUS PRN
Status: DISCONTINUED | OUTPATIENT
Start: 2018-08-23 | End: 2018-08-23 | Stop reason: SDUPTHER

## 2018-08-23 RX ORDER — ONDANSETRON 2 MG/ML
INJECTION INTRAMUSCULAR; INTRAVENOUS
Status: COMPLETED
Start: 2018-08-23 | End: 2018-08-23

## 2018-08-23 RX ORDER — SODIUM CHLORIDE 9 MG/ML
INJECTION, SOLUTION INTRAVENOUS CONTINUOUS
Status: DISCONTINUED | OUTPATIENT
Start: 2018-08-23 | End: 2018-08-23 | Stop reason: HOSPADM

## 2018-08-23 RX ORDER — PROPOFOL 10 MG/ML
INJECTION, EMULSION INTRAVENOUS PRN
Status: DISCONTINUED | OUTPATIENT
Start: 2018-08-23 | End: 2018-08-23 | Stop reason: SDUPTHER

## 2018-08-23 RX ORDER — GLYCOPYRROLATE 1 MG/5 ML
SYRINGE (ML) INTRAVENOUS PRN
Status: DISCONTINUED | OUTPATIENT
Start: 2018-08-23 | End: 2018-08-23 | Stop reason: SDUPTHER

## 2018-08-23 RX ORDER — SODIUM CHLORIDE 0.9 % (FLUSH) 0.9 %
10 SYRINGE (ML) INJECTION EVERY 12 HOURS SCHEDULED
Status: DISCONTINUED | OUTPATIENT
Start: 2018-08-23 | End: 2018-08-23 | Stop reason: HOSPADM

## 2018-08-23 RX ORDER — FENTANYL CITRATE 50 UG/ML
INJECTION, SOLUTION INTRAMUSCULAR; INTRAVENOUS PRN
Status: DISCONTINUED | OUTPATIENT
Start: 2018-08-23 | End: 2018-08-23 | Stop reason: SDUPTHER

## 2018-08-23 RX ADMIN — LIDOCAINE HYDROCHLORIDE 50 MG: 20 INJECTION, SOLUTION INFILTRATION; PERINEURAL at 09:34

## 2018-08-23 RX ADMIN — SODIUM CHLORIDE: 9 INJECTION, SOLUTION INTRAVENOUS at 13:06

## 2018-08-23 RX ADMIN — PROPOFOL 200 MG: 10 INJECTION, EMULSION INTRAVENOUS at 09:34

## 2018-08-23 RX ADMIN — PROPOFOL 100 MG: 10 INJECTION, EMULSION INTRAVENOUS at 10:00

## 2018-08-23 RX ADMIN — FENTANYL CITRATE 50 MCG: 50 INJECTION INTRAMUSCULAR; INTRAVENOUS at 09:34

## 2018-08-23 RX ADMIN — METOPROLOL TARTRATE 2 MG: 5 INJECTION, SOLUTION INTRAVENOUS at 10:12

## 2018-08-23 RX ADMIN — SODIUM CHLORIDE: 9 INJECTION, SOLUTION INTRAVENOUS at 08:58

## 2018-08-23 RX ADMIN — Medication 0.1 MG: at 09:34

## 2018-08-23 RX ADMIN — DEXAMETHASONE SODIUM PHOSPHATE 8 MG: 4 INJECTION, SOLUTION INTRAMUSCULAR; INTRAVENOUS at 10:00

## 2018-08-23 RX ADMIN — Medication 0.2 MG: at 10:00

## 2018-08-23 RX ADMIN — LIDOCAINE HYDROCHLORIDE 50 MG: 20 INJECTION, SOLUTION INFILTRATION; PERINEURAL at 09:53

## 2018-08-23 RX ADMIN — ONDANSETRON HYDROCHLORIDE 4 MG: 4 INJECTION, SOLUTION INTRAMUSCULAR; INTRAVENOUS at 10:07

## 2018-08-23 RX ADMIN — Medication 2 G: at 09:35

## 2018-08-23 ASSESSMENT — PULMONARY FUNCTION TESTS
PIF_VALUE: 0
PIF_VALUE: 1
PIF_VALUE: 0
PIF_VALUE: 1
PIF_VALUE: 0
PIF_VALUE: 1
PIF_VALUE: 1
PIF_VALUE: 0

## 2018-08-23 ASSESSMENT — PAIN SCALES - GENERAL
PAINLEVEL_OUTOF10: 0

## 2018-08-23 NOTE — PROGRESS NOTES
Up to the bathroom. Voided. Tolerated well. Back to room in bed. Side rails up.  Call light in reach

## 2018-08-23 NOTE — ANESTHESIA PRE PROCEDURE
Social History:    Social History   Substance Use Topics    Smoking status: Never Smoker    Smokeless tobacco: Never Used    Alcohol use No                                Counseling given: Not Answered      Vital Signs (Current):   Vitals:    08/23/18 0817   BP: 134/77   Pulse: 93   Resp: 18   Temp: 98.8 °F (37.1 °C)   TempSrc: Temporal   SpO2: 98%                                              BP Readings from Last 3 Encounters:   08/23/18 134/77   08/01/18 120/60   07/26/18 94/62       NPO Status: Time of last liquid consumption: 2200                        Time of last solid consumption: 2200                        Date of last liquid consumption: 08/22/18                        Date of last solid food consumption: 08/22/18    BMI:   Wt Readings from Last 3 Encounters:   08/01/18 173 lb 1.6 oz (78.5 kg)   07/26/18 172 lb 9.6 oz (78.3 kg)   05/31/18 183 lb (83 kg)     There is no height or weight on file to calculate BMI.    CBC:   Lab Results   Component Value Date    WBC 9.3 05/11/2018    RBC 4.50 05/11/2018    HGB 13.9 08/14/2018    HCT 39.1 08/14/2018    MCV 91.0 05/11/2018    RDW 13.5 05/11/2018     05/11/2018       CMP:   Lab Results   Component Value Date     08/14/2018    K 3.9 08/14/2018     08/14/2018    CO2 27 08/14/2018    BUN 24 08/14/2018    CREATININE 1.23 08/14/2018    AGRATIO 1.7 05/11/2018    LABGLOM 65 07/13/2017    GLUCOSE 153 08/14/2018    PROT 7.1 05/11/2018    CALCIUM 9.3 08/14/2018    BILITOT 0.8 05/11/2018    ALKPHOS 65 05/11/2018    AST 23 05/11/2018    ALT 30 05/11/2018       POC Tests:   Recent Labs      08/23/18   0842   POCGLU  194*       Coags: No results found for: PROTIME, INR, APTT    HCG (If Applicable): No results found for: PREGTESTUR, PREGSERUM, HCG, HCGQUANT     ABGs: No results found for: PHART, PO2ART, WTI1SZM, CWO9MSF, BEART, L9PGIMRL     Type & Screen (If Applicable):  No results found for: LABABO, [address]    Anesthesia Evaluation    Airway: Mallampati: II  TM distance: >3 FB   Neck ROM: full  Mouth opening: > = 3 FB Dental:          Pulmonary: breath sounds clear to auscultation                             Cardiovascular:    (+) hypertension:,         Rhythm: regular                      Neuro/Psych:   (+) psychiatric history:            GI/Hepatic/Renal:   (+) GERD:,           Endo/Other:    (+) Diabetes, . Abdominal:           Vascular:                                        Anesthesia Plan      MAC     ASA 3     (History of dementia)  Induction: intravenous. MIPS: Postoperative opioids intended and Prophylactic antiemetics administered. Anesthetic plan and risks discussed with patient, spouse and child/children. Plan discussed with CRNA.                   Marian Montano MD   8/23/2018

## 2018-08-23 NOTE — OP NOTE
dissect this free from the structures and then reduce the large direct hernia it and encircled the cord structures with Penrose and retracted and then delineated the shelving portion of the inguinal ligament and the conjoined tendon. A keyhole mesh as brought into the field, cut to the appropriate size and then secured in a Bassini fashion, starting at the pubic tubercle and then running along the shelving portion of the inguinal ligament and then the conjoined  tendon. Then, the ends of the mesh were brought back together and sewed  back to recreate the internal ring. Ends of the mesh were cut and tucked  underneath the external oblique fascia. The ilioinguinal nerve was  identified running in the muscle and down on the cord. This was left  intact. The external oblique fascia was then closed over the top of the  cord structures with 2-0 silk. Bahman's closed with 3-0 plain. Skin was  closed with 4-0 Vicryl subcuticular stitch. Skin Affix glue applied.   The  patient was brought back to outpatient in stable condition    Findings:  Hernia as above          Herman Ray MD FACS  Electronically signed 8/23/2018 at 10:36 AM

## 2018-08-23 NOTE — PROGRESS NOTES
Discharge criteria met. Discharge instructions given to the patient and his wife.  Both verbalized understanding of discharge instructions

## 2018-08-24 ENCOUNTER — TELEPHONE (OUTPATIENT)
Dept: SURGERY | Age: 78
End: 2018-08-24

## 2018-09-09 PROBLEM — Z87.19 S/P RIGHT INGUINAL HERNIA REPAIR: Status: ACTIVE | Noted: 2018-09-09

## 2018-09-09 PROBLEM — Z98.890 S/P RIGHT INGUINAL HERNIA REPAIR: Status: ACTIVE | Noted: 2018-09-09

## 2018-09-09 NOTE — PROGRESS NOTES
Trevor Palmer MD [facility]  General Surgery  Postprocedure Evaluation in Office  Pt Name: Pepe Harman  Date of Birth 1940   Today's Date: 9/10/2018  Medical Record Number: [id number]  Referring Provider: No ref. provider found  Primary Care Provider: Shantel Gilliland MD  Chief Complaint   Patient presents with   [facility] Post-Op Check     s/p Right Direct Inguinal hernia repair 8/23/18     ASSESSMENT      Problem List Items Addressed This Visit     S/P right inguinal hernia repair           PLANS       Pathology reviewed with the patient who understands. All questions were answered. New Prescriptions    No medications on file     There are no Patient Instructions on file for this visit. Follow up: Return if symptoms worsen or fail to improve. Orders Placed This Encounter:  No orders of the defined types were placed in this encounter. Brooke Alvarado is seen today for post-op follow-up. He is 2 week(s) status post Right inguinal hernia repair. He is tolerating a regular diet, having regular bowel movements. Symptoms and activity have gradually improved compared to preoperative. The surgical site is clean and has no drainage. Pain is controlled without any medications. . He has compliant with postoperative instructions.   Past Medical History  Past Medical History:   Diagnosis Date    Anxiety     Depression     Diabetes mellitus (HCC)     GERD (gastroesophageal reflux disease)     Hyperglycemia     Hypertension      Past Surgical History  Past Surgical History:   Procedure Laterality Date    OTHER SURGICAL HISTORY  1970    right thumb surgery     [place] REPAIR ING HERNIA,5+Y/O,REDUCIBL Right 8/23/2018    RIGHT INGUINAL HERNIA REPAIR WITH MESH performed by Trevor Palmer MD at [facility]  2017    EGD     Medications  Current Outpatient Prescriptions on File Prior to Visit   Medication Sig Dispense Refill    losartan-hydrochlorothiazide (HYZAAR) 100-25 MG per

## 2018-09-10 ENCOUNTER — OFFICE VISIT (OUTPATIENT)
Dept: SURGERY | Age: 78
End: 2018-09-10

## 2018-09-10 VITALS
DIASTOLIC BLOOD PRESSURE: 62 MMHG | SYSTOLIC BLOOD PRESSURE: 110 MMHG | TEMPERATURE: 98.1 F | BODY MASS INDEX: 23.03 KG/M2 | HEART RATE: 93 BPM | OXYGEN SATURATION: 96 % | HEIGHT: 72 IN | RESPIRATION RATE: 18 BRPM | WEIGHT: 170 LBS

## 2018-09-10 DIAGNOSIS — Z98.890 S/P RIGHT INGUINAL HERNIA REPAIR: ICD-10-CM

## 2018-09-10 DIAGNOSIS — Z87.19 S/P RIGHT INGUINAL HERNIA REPAIR: ICD-10-CM

## 2018-09-10 PROCEDURE — 99024 POSTOP FOLLOW-UP VISIT: CPT | Performed by: SURGERY

## 2018-09-26 ENCOUNTER — CARE COORDINATION (OUTPATIENT)
Dept: CARE COORDINATION | Age: 78
End: 2018-09-26

## 2018-10-22 ENCOUNTER — TELEPHONE (OUTPATIENT)
Dept: SURGERY | Age: 78
End: 2018-10-22

## 2018-10-22 ENCOUNTER — OFFICE VISIT (OUTPATIENT)
Dept: SURGERY | Age: 78
End: 2018-10-22
Payer: MEDICARE

## 2018-10-22 VITALS
TEMPERATURE: 96.7 F | HEIGHT: 72 IN | BODY MASS INDEX: 22.75 KG/M2 | DIASTOLIC BLOOD PRESSURE: 62 MMHG | HEART RATE: 100 BPM | WEIGHT: 168 LBS | SYSTOLIC BLOOD PRESSURE: 120 MMHG | OXYGEN SATURATION: 99 % | RESPIRATION RATE: 18 BRPM

## 2018-10-22 DIAGNOSIS — I10 ESSENTIAL HYPERTENSION: Chronic | ICD-10-CM

## 2018-10-22 DIAGNOSIS — K40.90 INGUINAL HERNIA OF LEFT SIDE WITHOUT OBSTRUCTION OR GANGRENE: Primary | ICD-10-CM

## 2018-10-22 DIAGNOSIS — E11.9 TYPE 2 DIABETES MELLITUS TREATED WITHOUT INSULIN (HCC): ICD-10-CM

## 2018-10-22 PROCEDURE — 1036F TOBACCO NON-USER: CPT | Performed by: SURGERY

## 2018-10-22 PROCEDURE — 99214 OFFICE O/P EST MOD 30 MIN: CPT | Performed by: SURGERY

## 2018-10-22 PROCEDURE — 1123F ACP DISCUSS/DSCN MKR DOCD: CPT | Performed by: SURGERY

## 2018-10-22 PROCEDURE — G8427 DOCREV CUR MEDS BY ELIG CLIN: HCPCS | Performed by: SURGERY

## 2018-10-22 PROCEDURE — G8484 FLU IMMUNIZE NO ADMIN: HCPCS | Performed by: SURGERY

## 2018-10-22 PROCEDURE — G8420 CALC BMI NORM PARAMETERS: HCPCS | Performed by: SURGERY

## 2018-10-22 PROCEDURE — 1101F PT FALLS ASSESS-DOCD LE1/YR: CPT | Performed by: SURGERY

## 2018-10-22 PROCEDURE — 4040F PNEUMOC VAC/ADMIN/RCVD: CPT | Performed by: SURGERY

## 2018-10-22 ASSESSMENT — ENCOUNTER SYMPTOMS
RECTAL PAIN: 0
STRIDOR: 0
EYE DISCHARGE: 0
COLOR CHANGE: 0
ABDOMINAL DISTENTION: 0
EYE ITCHING: 0
SINUS PRESSURE: 0
BACK PAIN: 0
NAUSEA: 0
VOICE CHANGE: 0
SHORTNESS OF BREATH: 0
EYE PAIN: 0
FACIAL SWELLING: 0
APNEA: 0
DIARRHEA: 0
ABDOMINAL PAIN: 0
BLOOD IN STOOL: 0
SORE THROAT: 0
TROUBLE SWALLOWING: 0
PHOTOPHOBIA: 0
ANAL BLEEDING: 0
CONSTIPATION: 0
EYE REDNESS: 0
VOMITING: 0
CHOKING: 0
RHINORRHEA: 0
COUGH: 0
WHEEZING: 0
CHEST TIGHTNESS: 0

## 2018-10-22 NOTE — LETTER
[facility]  Radu Baltazar MD [facility]  Phone- [phone]  Fax [phone]    Pt Name: Lebron Dunbar  Medical Record Number: [id number]  Date of Birth 1940   Today's Date: 10/22/2018    Alberta Romero was evaluated in the office today. My assessment and plans are listed below. Assessment:     Edie Mobley was seen today for surgical consult. Diagnoses and all orders for this visit:    Inguinal hernia of left side without obstruction or gangrene  -     REPAIR HERNIA INGUINAL; Future    Essential hypertension    Type 2 diabetes mellitus treated without insulin ([facility] [address]         Plan:     1. Schedule Kayden for:  1. LEFT inguinal hernia repair with mesh  2. In the office, I had a discussion with the patient regarding the risks of hernia surgery to include bleeding, infection, recurrence, injury to surrounding structures and continued pain in the area. We also discussed the use of mesh and its advantages and disadvantages. We discussed the anesthetic options, conduct of the operation, outpatient status, post op recovery and length of time off of work. After this discussion, the patient's questions were answered and has elected to proceed with surgical repair. (OPEN)  3. In the office, I had a discussion with the patient regarding the risks of hernia surgery to include bleeding, infection, recurrence, injury to surrounding structures, continued pain in the area and possible conversion to an open procedure . We also discussed the use of mesh and its advantages and disadvantages. We discussed the anesthetic options, conduct of the operation, outpatient status, post op recovery and length of time off of work. After this discussion, the patient's questions were answered and has elected to proceed with surgical repair. (L/S)  4. Status: outpatient  5. Planned anesthesia: MAC  6.  He will undergo pre-operative clearance per anesthesia guidelines with

## 2018-10-22 NOTE — PROGRESS NOTES
easily. Psychiatric/Behavioral: Negative for agitation, behavioral problems, confusion, decreased concentration, dysphoric mood, hallucinations, self-injury, sleep disturbance and suicidal ideas. The patient is not nervous/anxious and is not hyperactive.       /62 (Site: Right Upper Arm, Position: Sitting, Cuff Size: Medium Adult)   Pulse 100   Temp 96.7 °F (35.9 °C) (Tympanic)   Resp 18   Ht 6' (1.829 m)   Wt 168 lb (76.2 kg)   SpO2 99%   BMI 22.78 kg/m²     Objective:   Physical Exam    Assessment:            Plan:              Ari Moncada LPN

## 2018-10-22 NOTE — PROGRESS NOTES
DX E11.9 Test QD or as directed 100 each 3    vitamin D3 (CHOLECALCIFEROL) 400 UNITS TABS tablet Take 400 Units by mouth daily      Ascorbic Acid (VITAMIN C) 500 MG tablet Take 500 mg by mouth daily      finasteride (PROSCAR) 5 MG tablet Take 5 mg by mouth daily       aspirin 81 MG tablet Take 81 mg by mouth daily.  therapeutic multivitamin-minerals (THERAGRAN-M) tablet Take 1 tablet by mouth daily. No current facility-administered medications on file prior to visit. Allergies  No Known Allergies    Family History  Family History   Problem Relation Age of Onset    Alzheimer's Disease Mother     Stroke Father     Heart Attack Brother     Colon Cancer Neg Hx        Social History  Social History     Social History    Marital status:      Spouse name: N/A    Number of children: N/A    Years of education: N/A     Occupational History    Not on file. Social History Main Topics    Smoking status: Never Smoker    Smokeless tobacco: Never Used    Alcohol use No    Drug use: No    Sexual activity: Not on file     Other Topics Concern    Not on file     Social History Narrative    No narrative on file       [address] Maintenance   Topic Date Due    Shingles Vaccine (1 of 2 - 2 Dose Series) 03/29/1990    Flu vaccine (1) 09/01/2018    Creatinine monitoring  08/14/2019    Potassium monitoring  08/23/2019    DTaP/Tdap/Td vaccine (2 - Td) 03/02/2020    Pneumococcal low/med risk  Completed       Review of Systems  Constitutional: Positive for appetite change and unexpected weight change. Negative for activity change, chills, diaphoresis, fatigue and fever. HENT: Negative for congestion, dental problem, drooling, ear discharge, ear pain, facial swelling, hearing loss, mouth sores, nosebleeds, postnasal drip, rhinorrhea, sinus pain, sinus pressure, sneezing, sore throat, tinnitus, trouble swallowing and voice change.     Eyes: Negative for photophobia, pain, discharge, redness, itching and visual disturbance. Respiratory: Negative for apnea, cough, choking, chest tightness, shortness of breath, wheezing and stridor. Cardiovascular: Negative for chest pain, palpitations and leg swelling. Gastrointestinal: Positive for abdominal pain and constipation. Negative for abdominal distention, anal bleeding, blood in stool, diarrhea, nausea, rectal pain and vomiting. Endocrine: Negative for cold intolerance, heat intolerance, polydipsia, polyphagia and polyuria. Genitourinary: Positive for difficulty urinating, scrotal swelling and testicular pain. Negative for decreased urine volume, discharge, dysuria, enuresis, flank pain, frequency, genital sores, hematuria, penile pain, penile swelling and urgency. Musculoskeletal: Negative for arthralgias, back pain, gait problem, joint swelling, myalgias, neck pain and neck stiffness. Skin: Negative for color change, pallor, rash and wound. Allergic/Immunologic: Negative for environmental allergies, food allergies and immunocompromised state. Neurological: Positive for dizziness and weakness. Negative for tremors, seizures, syncope, facial asymmetry, speech difficulty, light-headedness, numbness and headaches. Hematological: Negative for adenopathy. Does not bruise/bleed easily. Psychiatric/Behavioral: Positive for confusion and sleep disturbance. Negative for agitation, behavioral problems, decreased concentration, dysphoric mood, hallucinations, self-injury and suicidal ideas. The patient is nervous/anxious. The patient is not hyperactive    OBJECTIVE    VITALS:  height is 6' (1.829 m) and weight is 168 lb (76.2 kg). His tympanic temperature is 96.7 °F (35.9 °C). His blood pressure is 120/62 and his pulse is 100. His respiration is 18 and oxygen saturation is 99%.    CONSTITUTIONAL: Awake somewhat flat affect difficult to get him to answer any questions when he does answers are appropriate  SKIN: Skin color,

## 2018-10-23 ENCOUNTER — TELEPHONE (OUTPATIENT)
Dept: SURGERY | Age: 78
End: 2018-10-23

## 2018-10-23 NOTE — TELEPHONE ENCOUNTER
LM for patient to call me regarding his surgery as the time has changed & I need to give him the updated info.

## 2018-11-01 ENCOUNTER — HOSPITAL ENCOUNTER (OUTPATIENT)
Age: 78
Setting detail: OUTPATIENT SURGERY
Discharge: HOME | End: 2018-11-01
Attending: SURGERY | Admitting: SURGERY
Payer: MEDICARE

## 2018-11-01 ENCOUNTER — ANESTHESIA (OUTPATIENT)
Dept: OPERATING ROOM | Age: 78
End: 2018-11-01
Payer: MEDICARE

## 2018-11-01 ENCOUNTER — ANESTHESIA EVENT (OUTPATIENT)
Dept: OPERATING ROOM | Age: 78
End: 2018-11-01
Payer: MEDICARE

## 2018-11-01 VITALS — OXYGEN SATURATION: 99 % | DIASTOLIC BLOOD PRESSURE: 76 MMHG | SYSTOLIC BLOOD PRESSURE: 136 MMHG

## 2018-11-01 VITALS
SYSTOLIC BLOOD PRESSURE: 158 MMHG | TEMPERATURE: 97.8 F | HEIGHT: 73 IN | OXYGEN SATURATION: 99 % | RESPIRATION RATE: 16 BRPM | HEART RATE: 53 BPM | DIASTOLIC BLOOD PRESSURE: 73 MMHG | WEIGHT: 166 LBS | BODY MASS INDEX: 22 KG/M2

## 2018-11-01 DIAGNOSIS — Z98.890 S/P LEFT INGUINAL HERNIA REPAIR: Primary | ICD-10-CM

## 2018-11-01 DIAGNOSIS — Z87.19 S/P LEFT INGUINAL HERNIA REPAIR: Primary | ICD-10-CM

## 2018-11-01 LAB — GLUCOSE BLD-MCNC: 157 MG/DL (ref 70–108)

## 2018-11-01 PROCEDURE — 3600000003 HC SURGERY LEVEL 3 BASE: Performed by: SURGERY

## 2018-11-01 PROCEDURE — 82948 REAGENT STRIP/BLOOD GLUCOSE: CPT

## 2018-11-01 PROCEDURE — 6360000002 HC RX W HCPCS: Performed by: NURSE ANESTHETIST, CERTIFIED REGISTERED

## 2018-11-01 PROCEDURE — 49505 PRP I/HERN INIT REDUC >5 YR: CPT | Performed by: SURGERY

## 2018-11-01 PROCEDURE — 3700000000 HC ANESTHESIA ATTENDED CARE: Performed by: SURGERY

## 2018-11-01 PROCEDURE — 2500000003 HC RX 250 WO HCPCS: Performed by: NURSE ANESTHETIST, CERTIFIED REGISTERED

## 2018-11-01 PROCEDURE — 2580000003 HC RX 258: Performed by: SURGERY

## 2018-11-01 PROCEDURE — 2709999900 HC NON-CHARGEABLE SUPPLY: Performed by: SURGERY

## 2018-11-01 PROCEDURE — C1713 ANCHOR/SCREW BN/BN,TIS/BN: HCPCS | Performed by: SURGERY

## 2018-11-01 PROCEDURE — 7100000011 HC PHASE II RECOVERY - ADDTL 15 MIN: Performed by: SURGERY

## 2018-11-01 PROCEDURE — 3700000001 HC ADD 15 MINUTES (ANESTHESIA): Performed by: SURGERY

## 2018-11-01 PROCEDURE — C1781 MESH (IMPLANTABLE): HCPCS | Performed by: SURGERY

## 2018-11-01 PROCEDURE — 3600000013 HC SURGERY LEVEL 3 ADDTL 15MIN: Performed by: SURGERY

## 2018-11-01 PROCEDURE — 2500000003 HC RX 250 WO HCPCS: Performed by: SURGERY

## 2018-11-01 PROCEDURE — 6360000002 HC RX W HCPCS: Performed by: SURGERY

## 2018-11-01 PROCEDURE — 7100000010 HC PHASE II RECOVERY - FIRST 15 MIN: Performed by: SURGERY

## 2018-11-01 DEVICE — IMPLANTABLE DEVICE: Type: IMPLANTABLE DEVICE | Site: GROIN | Status: FUNCTIONAL

## 2018-11-01 RX ORDER — MORPHINE SULFATE 2 MG/ML
2 INJECTION, SOLUTION INTRAMUSCULAR; INTRAVENOUS
Status: DISCONTINUED | OUTPATIENT
Start: 2018-11-01 | End: 2018-11-01 | Stop reason: HOSPADM

## 2018-11-01 RX ORDER — HYDROCODONE BITARTRATE AND ACETAMINOPHEN 5; 325 MG/1; MG/1
1 TABLET ORAL EVERY 4 HOURS PRN
Status: DISCONTINUED | OUTPATIENT
Start: 2018-11-01 | End: 2018-11-01 | Stop reason: HOSPADM

## 2018-11-01 RX ORDER — FENTANYL CITRATE 50 UG/ML
INJECTION, SOLUTION INTRAMUSCULAR; INTRAVENOUS PRN
Status: DISCONTINUED | OUTPATIENT
Start: 2018-11-01 | End: 2018-11-01 | Stop reason: SDUPTHER

## 2018-11-01 RX ORDER — KETOROLAC TROMETHAMINE 30 MG/ML
15 INJECTION, SOLUTION INTRAMUSCULAR; INTRAVENOUS EVERY 6 HOURS
Status: DISCONTINUED | OUTPATIENT
Start: 2018-11-01 | End: 2018-11-01 | Stop reason: HOSPADM

## 2018-11-01 RX ORDER — HYDROCODONE BITARTRATE AND ACETAMINOPHEN 5; 325 MG/1; MG/1
1 TABLET ORAL EVERY 6 HOURS PRN
Qty: 20 TABLET | Refills: 0 | Status: SHIPPED | OUTPATIENT
Start: 2018-11-01 | End: 2018-11-06

## 2018-11-01 RX ORDER — LIDOCAINE HYDROCHLORIDE 20 MG/ML
INJECTION, SOLUTION INFILTRATION; PERINEURAL PRN
Status: DISCONTINUED | OUTPATIENT
Start: 2018-11-01 | End: 2018-11-01 | Stop reason: SDUPTHER

## 2018-11-01 RX ORDER — ONDANSETRON 2 MG/ML
4 INJECTION INTRAMUSCULAR; INTRAVENOUS EVERY 6 HOURS PRN
Status: DISCONTINUED | OUTPATIENT
Start: 2018-11-01 | End: 2018-11-01 | Stop reason: HOSPADM

## 2018-11-01 RX ORDER — ACETAMINOPHEN 325 MG/1
650 TABLET ORAL EVERY 4 HOURS PRN
Status: DISCONTINUED | OUTPATIENT
Start: 2018-11-01 | End: 2018-11-01 | Stop reason: HOSPADM

## 2018-11-01 RX ORDER — SODIUM CHLORIDE 0.9 % (FLUSH) 0.9 %
10 SYRINGE (ML) INJECTION EVERY 12 HOURS SCHEDULED
Status: DISCONTINUED | OUTPATIENT
Start: 2018-11-01 | End: 2018-11-01 | Stop reason: HOSPADM

## 2018-11-01 RX ORDER — MORPHINE SULFATE 2 MG/ML
4 INJECTION, SOLUTION INTRAMUSCULAR; INTRAVENOUS
Status: DISCONTINUED | OUTPATIENT
Start: 2018-11-01 | End: 2018-11-01 | Stop reason: HOSPADM

## 2018-11-01 RX ORDER — SODIUM CHLORIDE 0.9 % (FLUSH) 0.9 %
10 SYRINGE (ML) INJECTION PRN
Status: DISCONTINUED | OUTPATIENT
Start: 2018-11-01 | End: 2018-11-01 | Stop reason: HOSPADM

## 2018-11-01 RX ORDER — SODIUM CHLORIDE 9 MG/ML
INJECTION, SOLUTION INTRAVENOUS CONTINUOUS
Status: DISCONTINUED | OUTPATIENT
Start: 2018-11-01 | End: 2018-11-01 | Stop reason: HOSPADM

## 2018-11-01 RX ORDER — PROPOFOL 10 MG/ML
INJECTION, EMULSION INTRAVENOUS PRN
Status: DISCONTINUED | OUTPATIENT
Start: 2018-11-01 | End: 2018-11-01 | Stop reason: SDUPTHER

## 2018-11-01 RX ORDER — HYDROCODONE BITARTRATE AND ACETAMINOPHEN 5; 325 MG/1; MG/1
2 TABLET ORAL EVERY 4 HOURS PRN
Status: DISCONTINUED | OUTPATIENT
Start: 2018-11-01 | End: 2018-11-01 | Stop reason: HOSPADM

## 2018-11-01 RX ADMIN — SODIUM CHLORIDE: 9 INJECTION, SOLUTION INTRAVENOUS at 09:12

## 2018-11-01 RX ADMIN — LIDOCAINE HYDROCHLORIDE 40 MG: 20 INJECTION, SOLUTION INFILTRATION; PERINEURAL at 09:23

## 2018-11-01 RX ADMIN — PROPOFOL 200 MG: 10 INJECTION, EMULSION INTRAVENOUS at 09:27

## 2018-11-01 RX ADMIN — FENTANYL CITRATE 25 MCG: 50 INJECTION INTRAMUSCULAR; INTRAVENOUS at 09:39

## 2018-11-01 RX ADMIN — Medication 2 G: at 09:24

## 2018-11-01 RX ADMIN — PROPOFOL 30 MG: 10 INJECTION, EMULSION INTRAVENOUS at 09:23

## 2018-11-01 RX ADMIN — FENTANYL CITRATE 25 MCG: 50 INJECTION INTRAMUSCULAR; INTRAVENOUS at 09:46

## 2018-11-01 RX ADMIN — FENTANYL CITRATE 50 MCG: 50 INJECTION INTRAMUSCULAR; INTRAVENOUS at 09:21

## 2018-11-01 ASSESSMENT — PULMONARY FUNCTION TESTS
PIF_VALUE: 0
PIF_VALUE: 0
PIF_VALUE: 1
PIF_VALUE: 0
PIF_VALUE: 1
PIF_VALUE: 1
PIF_VALUE: 0
PIF_VALUE: 1
PIF_VALUE: 0
PIF_VALUE: 1
PIF_VALUE: 0
PIF_VALUE: 1
PIF_VALUE: 0
PIF_VALUE: 1
PIF_VALUE: 0
PIF_VALUE: 1
PIF_VALUE: 0
PIF_VALUE: 1
PIF_VALUE: 0
PIF_VALUE: 1
PIF_VALUE: 1
PIF_VALUE: 0
PIF_VALUE: 1
PIF_VALUE: 0
PIF_VALUE: 1

## 2018-11-01 ASSESSMENT — PAIN SCALES - GENERAL
PAINLEVEL_OUTOF10: 0
PAINLEVEL_OUTOF10: 2
PAINLEVEL_OUTOF10: 0
PAINLEVEL_OUTOF10: 0

## 2018-11-01 NOTE — OP NOTE
screws, the patch was fixed to the periosteum of the inferior pubic ramus, pubic tubercle, and into the posterior side of the rectus muscle anterior medially. The retractors were removed to allow the retroperitoneal contents to come down against the patch and from lateral were assured that the defect was completely covered by palpation underneath the patch. With the patch in good position, the retractors were then removed and the anterior lateral portion of the mesh was secured in the closure. The transversalis fascia was closed with a running 2-0 Vicryl. The external oblique fascia was reapproximated with 2-0 silk. Bahman's fascia was reapproximated with 3-0 chromic. The skin was closed with 4-0 Vicryl subcuticular stitch. Skin affix glue was applied. At the end of the procedure, sponge and needle counts correct. Patient tolerated procedure well with no apparent complications.     Findings:  Hernia as above            Clyde Tomlin MD FACS  Electronically signed 11/1/2018 at 10:15 AM

## 2018-11-02 ENCOUNTER — TELEPHONE (OUTPATIENT)
Dept: SURGERY | Age: 78
End: 2018-11-02

## 2018-11-14 ENCOUNTER — OFFICE VISIT (OUTPATIENT)
Dept: SURGERY | Age: 78
End: 2018-11-14

## 2018-11-14 VITALS
TEMPERATURE: 97.4 F | RESPIRATION RATE: 20 BRPM | OXYGEN SATURATION: 99 % | DIASTOLIC BLOOD PRESSURE: 76 MMHG | HEART RATE: 105 BPM | SYSTOLIC BLOOD PRESSURE: 134 MMHG

## 2018-11-14 DIAGNOSIS — Z98.890 S/P LEFT INGUINAL HERNIA REPAIR: Primary | ICD-10-CM

## 2018-11-14 DIAGNOSIS — Z87.19 S/P LEFT INGUINAL HERNIA REPAIR: Primary | ICD-10-CM

## 2018-11-14 PROCEDURE — 99024 POSTOP FOLLOW-UP VISIT: CPT | Performed by: SURGERY

## 2018-11-14 NOTE — PROGRESS NOTES
Anahi Brandon MD [facility]  General Surgery  Postprocedure Evaluation in Office  Pt Name: Shanice Larson  Date of Birth 1940   Today's Date: 11/14/2018  Medical Record Number: [id number]  Referring Provider: No ref. provider found  Primary Care Provider: Burnadette Mohs, MD  Chief Complaint   Patient presents with    Post-Op Check     s/p Left Direct Inguinal hernia repair-11/1/18     ASSESSMENT      Problem List Items Addressed This Visit     S/P left inguinal hernia repair - Primary           PLANS       Pathology reviewed with the patient who understands. All questions were answered. New Prescriptions    No medications on file     Patient Instructions   May return to full activity without restrictions. Follow up: Return if symptoms worsen or fail to improve. Orders Placed This Encounter:  No orders of the defined types were placed in this encounter. Iraj Wheeler is seen today for post-op follow-up. He is 2 week(s) status post Left inguinal hernia repair. He is tolerating a regular diet, having regular bowel movements. Symptoms and activity have gradually improved compared to preoperative. The surgical site is clean and has no drainage. Pain is controlled without any medications. . He has compliant with postoperative instructions.   Past Medical History  Past Medical History:   Diagnosis Date    Anxiety     Depression     Diabetes mellitus ([address]     GERD (gastroesophageal reflux disease)     Hyperglycemia     Hypertension      Past Surgical History  Past Surgical History:   Procedure Laterality Date    ENDOSCOPY, COLON, DIAGNOSTIC      OTHER SURGICAL HISTORY  1970    right thumb surgery     [place] REPAIR ING HERNIA,5+Y/O,REDUCIBL Right 8/23/2018    RIGHT INGUINAL HERNIA REPAIR WITH MESH performed by Anahi Brandon MD at [address] [facility] HERNIA,5+Y/O,REDUCIBL Left 11/1/2018    LEFT HERNIA INGUINAL REPAIR WITH MESH performed by Anahi Brandon MD at [address] [place] [address] GASTROINTESTINAL ENDOSCOPY  2017    EGD     Medications  Current Outpatient Prescriptions on File Prior to Visit   Medication Sig Dispense Refill    losartan-hydrochlorothiazide (HYZAAR) 100-25 MG per tablet Take 0.5 tablets by mouth daily 90 tablet 3    potassium chloride (KLOR-CON M20) 20 MEQ extended release tablet Take 1 tablet by mouth daily 90 tablet 3    verapamil (CALAN SR) 240 MG extended release tablet Take 1 tablet by mouth daily 90 tablet 3    metFORMIN (GLUCOPHAGE XR) 750 MG extended release tablet Take 1 tablet by mouth daily (with breakfast) 90 tablet 3    esomeprazole (NEXIUM) 40 MG delayed release capsule Take 40 mg by mouth every other day      rivastigmine (EXELON) 4.6 MG/24HR Place 1 patch onto the skin daily 90 patch 3    escitalopram (LEXAPRO) 20 MG tablet Take 1 tablet by mouth daily 90 tablet 3    tamsulosin (FLOMAX) 0.4 MG capsule Take 0.4 mg by mouth nightly Per       Glucose Blood (BLOOD GLUCOSE TEST STRIPS) STRP One Touch Verio Test Strips Test QD or as directed --DX E11.9 100 strip 3    Lancets MISC One Touch Verio Lancets  DX E11.9 Test QD or as directed 100 each 3    vitamin D3 (CHOLECALCIFEROL) 400 UNITS TABS tablet Take 400 Units by mouth daily      Ascorbic Acid (VITAMIN C) 500 MG tablet Take 500 mg by mouth daily      finasteride (PROSCAR) 5 MG tablet Take 5 mg by mouth daily       aspirin 81 MG tablet Take 81 mg by mouth daily.  therapeutic multivitamin-minerals (THERAGRAN-M) tablet Take 1 tablet by mouth daily.  ibuprofen (ADVIL;MOTRIN) 200 MG tablet Take 200 mg by mouth as needed for Pain       No current facility-administered medications on file prior to visit. Allergies  No Known Allergies  Social History  Social History     Social History    Marital status:      Spouse name: N/A    Number of children: N/A    Years of education: N/A     Occupational History    Not on file.      Social History Main Topics    Smoking status:

## 2018-11-19 LAB
A/G RATIO: 1.7 (ref 1.5–2.5)
ALBUMIN SERPL-MCNC: 4.3 GM/DL (ref 3.5–5)
ALP BLD-CCNC: 68 IU/L (ref 41–137)
ALT SERPL-CCNC: 20 IU/L (ref 10–40)
ANION GAP SERPL CALCULATED.3IONS-SCNC: 7 MMOL/L (ref 4–12)
AST SERPL-CCNC: 17 IU/L (ref 15–41)
BILIRUB SERPL-MCNC: 1.3 MG/DL (ref 0.2–1)
BUN BLDV-MCNC: 26 MG/DL (ref 7–20)
CALCIUM SERPL-MCNC: 9.6 MG/DL (ref 8.8–10.5)
CHLORIDE BLD-SCNC: 106 MEQ/L (ref 101–111)
CHOLESTEROL/HDL RELATIVE RISK: 4.6 (ref 4–5)
CHOLESTEROL: 166 MG/DL
CO2: 29 MEQ/L (ref 21–32)
CREAT SERPL-MCNC: 1.07 MG/DL (ref 0.7–1.3)
CREATININE CLEARANCE: >60
DIRECT-LDL / HDL RISK: 3.6
ESTIMATED AVERAGE GLUCOSE: 123 MG/DL
GLUCOSE: 141 MG/DL (ref 70–110)
HBA1C MFR BLD: 5.9 % (ref 4.4–6.4)
HDLC SERPL-MCNC: 36 MG/DL
LDL CHOLESTEROL DIRECT: 133 MG/DL
POTASSIUM SERPL-SCNC: 4.2 MEQ/L (ref 3.6–5)
SODIUM BLD-SCNC: 142 MEQ/L (ref 135–145)
TOTAL PROTEIN: 6.8 G/DL (ref 6.2–8)
TRIGL SERPL-MCNC: 146 MG/DL
VLDLC SERPL CALC-MCNC: 29 MG/DL

## 2018-11-30 ENCOUNTER — OFFICE VISIT (OUTPATIENT)
Dept: FAMILY MEDICINE CLINIC | Age: 78
End: 2018-11-30
Payer: MEDICARE

## 2018-11-30 VITALS
SYSTOLIC BLOOD PRESSURE: 120 MMHG | HEART RATE: 88 BPM | RESPIRATION RATE: 20 BRPM | DIASTOLIC BLOOD PRESSURE: 60 MMHG | WEIGHT: 165 LBS | BODY MASS INDEX: 21.77 KG/M2 | TEMPERATURE: 97.9 F

## 2018-11-30 DIAGNOSIS — I10 ESSENTIAL HYPERTENSION: Chronic | ICD-10-CM

## 2018-11-30 DIAGNOSIS — E11.9 TYPE 2 DIABETES MELLITUS TREATED WITHOUT INSULIN (HCC): Primary | ICD-10-CM

## 2018-11-30 PROCEDURE — 4040F PNEUMOC VAC/ADMIN/RCVD: CPT | Performed by: FAMILY MEDICINE

## 2018-11-30 PROCEDURE — 99213 OFFICE O/P EST LOW 20 MIN: CPT | Performed by: FAMILY MEDICINE

## 2018-11-30 PROCEDURE — G8427 DOCREV CUR MEDS BY ELIG CLIN: HCPCS | Performed by: FAMILY MEDICINE

## 2018-11-30 PROCEDURE — 1036F TOBACCO NON-USER: CPT | Performed by: FAMILY MEDICINE

## 2018-11-30 PROCEDURE — 1123F ACP DISCUSS/DSCN MKR DOCD: CPT | Performed by: FAMILY MEDICINE

## 2018-11-30 PROCEDURE — G8482 FLU IMMUNIZE ORDER/ADMIN: HCPCS | Performed by: FAMILY MEDICINE

## 2018-11-30 PROCEDURE — G8420 CALC BMI NORM PARAMETERS: HCPCS | Performed by: FAMILY MEDICINE

## 2018-11-30 PROCEDURE — 1101F PT FALLS ASSESS-DOCD LE1/YR: CPT | Performed by: FAMILY MEDICINE

## 2018-11-30 PROCEDURE — 90662 IIV NO PRSV INCREASED AG IM: CPT | Performed by: FAMILY MEDICINE

## 2018-11-30 PROCEDURE — G0008 ADMIN INFLUENZA VIRUS VAC: HCPCS | Performed by: FAMILY MEDICINE

## 2018-11-30 RX ORDER — DOCUSATE SODIUM 100 MG/1
100 CAPSULE, LIQUID FILLED ORAL PRN
Status: ON HOLD | COMMUNITY
End: 2020-01-01 | Stop reason: HOSPADM

## 2018-11-30 NOTE — PROGRESS NOTES
Immunization(s) given during visit:    Immunizations     Name Date Dose Route    Influenza, High Dose (Fluzone 65 yrs and older) 11/30/2018 0.5 mL Intramuscular    Site: Deltoid- Left    Lot: Lilliam Longoria    NDC: 58761-420-39          Most recent Vaccine Information Sheet given to pt

## 2018-12-02 ASSESSMENT — ENCOUNTER SYMPTOMS
SORE THROAT: 0
SHORTNESS OF BREATH: 0
WHEEZING: 0
VOMITING: 0
CONSTIPATION: 0
ABDOMINAL PAIN: 0
NAUSEA: 0
RHINORRHEA: 0
COUGH: 0
DIARRHEA: 0
BACK PAIN: 0

## 2019-01-01 ENCOUNTER — HOSPITAL ENCOUNTER (INPATIENT)
Age: 79
LOS: 9 days | Discharge: NURSING, REHAB OR LONG-TERM CARE | DRG: 062 | End: 2020-01-02
Attending: INTERNAL MEDICINE | Admitting: INTERNAL MEDICINE
Payer: MEDICARE

## 2019-01-01 ENCOUNTER — APPOINTMENT (OUTPATIENT)
Dept: CT IMAGING | Age: 79
DRG: 062 | End: 2019-01-01
Payer: MEDICARE

## 2019-01-01 ENCOUNTER — APPOINTMENT (OUTPATIENT)
Dept: ULTRASOUND IMAGING | Age: 79
DRG: 062 | End: 2019-01-01
Payer: MEDICARE

## 2019-01-01 ENCOUNTER — APPOINTMENT (OUTPATIENT)
Dept: MRI IMAGING | Age: 79
DRG: 062 | End: 2019-01-01
Payer: MEDICARE

## 2019-01-01 ENCOUNTER — OFFICE VISIT (OUTPATIENT)
Dept: FAMILY MEDICINE CLINIC | Age: 79
End: 2019-01-01
Payer: MEDICARE

## 2019-01-01 ENCOUNTER — TELEPHONE (OUTPATIENT)
Dept: FAMILY MEDICINE CLINIC | Age: 79
End: 2019-01-01

## 2019-01-01 ENCOUNTER — APPOINTMENT (OUTPATIENT)
Dept: GENERAL RADIOLOGY | Age: 79
DRG: 062 | End: 2019-01-01
Payer: MEDICARE

## 2019-01-01 VITALS
RESPIRATION RATE: 16 BRPM | DIASTOLIC BLOOD PRESSURE: 60 MMHG | HEART RATE: 84 BPM | BODY MASS INDEX: 21.26 KG/M2 | WEIGHT: 157 LBS | HEIGHT: 72 IN | TEMPERATURE: 98.1 F | SYSTOLIC BLOOD PRESSURE: 120 MMHG

## 2019-01-01 VITALS
BODY MASS INDEX: 20.77 KG/M2 | DIASTOLIC BLOOD PRESSURE: 64 MMHG | WEIGHT: 157.4 LBS | RESPIRATION RATE: 16 BRPM | HEART RATE: 64 BPM | SYSTOLIC BLOOD PRESSURE: 110 MMHG

## 2019-01-01 VITALS
SYSTOLIC BLOOD PRESSURE: 128 MMHG | TEMPERATURE: 98.1 F | BODY MASS INDEX: 20.83 KG/M2 | DIASTOLIC BLOOD PRESSURE: 62 MMHG | HEART RATE: 84 BPM | OXYGEN SATURATION: 98 % | WEIGHT: 153.6 LBS

## 2019-01-01 DIAGNOSIS — Z00.00 ROUTINE GENERAL MEDICAL EXAMINATION AT A HEALTH CARE FACILITY: Primary | ICD-10-CM

## 2019-01-01 DIAGNOSIS — E43 SEVERE MALNUTRITION (HCC): ICD-10-CM

## 2019-01-01 DIAGNOSIS — E11.9 TYPE 2 DIABETES MELLITUS TREATED WITHOUT INSULIN (HCC): Primary | ICD-10-CM

## 2019-01-01 DIAGNOSIS — I10 ESSENTIAL HYPERTENSION: Chronic | ICD-10-CM

## 2019-01-01 DIAGNOSIS — E11.9 TYPE 2 DIABETES MELLITUS TREATED WITHOUT INSULIN (HCC): ICD-10-CM

## 2019-01-01 DIAGNOSIS — I10 ESSENTIAL HYPERTENSION: Primary | Chronic | ICD-10-CM

## 2019-01-01 DIAGNOSIS — F32.2 CURRENT SEVERE EPISODE OF MAJOR DEPRESSIVE DISORDER WITHOUT PSYCHOTIC FEATURES, UNSPECIFIED WHETHER RECURRENT (HCC): ICD-10-CM

## 2019-01-01 DIAGNOSIS — F03.90 DEMENTIA WITHOUT BEHAVIORAL DISTURBANCE, UNSPECIFIED DEMENTIA TYPE: ICD-10-CM

## 2019-01-01 LAB
A/G RATIO: 1.7 (ref 1.5–2.5)
ABSOLUTE BASO #: 0 /CMM (ref 0–200)
ABSOLUTE EOS #: 300 /CMM (ref 0–500)
ABSOLUTE LYMPH #: 1400 /CMM (ref 1000–4800)
ABSOLUTE MONO #: 300 /CMM (ref 0–800)
ABSOLUTE NEUT #: 4600 /CMM (ref 1800–7700)
ALBUMIN SERPL-MCNC: 3.6 G/DL (ref 3.5–5.1)
ALBUMIN SERPL-MCNC: 4 GM/DL (ref 3.5–5)
ALP BLD-CCNC: 70 U/L (ref 38–126)
ALP BLD-CCNC: 77 IU/L (ref 41–137)
ALT SERPL-CCNC: 13 IU/L (ref 10–40)
ALT SERPL-CCNC: 18 U/L (ref 11–66)
ANION GAP SERPL CALCULATED.3IONS-SCNC: 10 MEQ/L (ref 8–16)
ANION GAP SERPL CALCULATED.3IONS-SCNC: 10 MEQ/L (ref 8–16)
ANION GAP SERPL CALCULATED.3IONS-SCNC: 12 MEQ/L (ref 8–16)
ANION GAP SERPL CALCULATED.3IONS-SCNC: 14 MEQ/L (ref 8–16)
ANION GAP SERPL CALCULATED.3IONS-SCNC: 15 MEQ/L (ref 8–16)
ANION GAP SERPL CALCULATED.3IONS-SCNC: 16 MEQ/L (ref 8–16)
ANION GAP SERPL CALCULATED.3IONS-SCNC: 21 MEQ/L (ref 8–16)
ANION GAP SERPL CALCULATED.3IONS-SCNC: 9 MMOL/L (ref 4–12)
APTT: 26.2 SECONDS (ref 22–38)
APTT: 34.7 SECONDS (ref 22–38)
APTT: 45.9 SECONDS (ref 22–38)
APTT: 49.1 SECONDS (ref 22–38)
APTT: 74.5 SECONDS (ref 22–38)
APTT: 89.8 SECONDS (ref 22–38)
APTT: 91.8 SECONDS (ref 22–38)
AST SERPL-CCNC: 14 IU/L (ref 15–41)
AST SERPL-CCNC: 23 U/L (ref 5–40)
AVERAGE GLUCOSE: 102 MG/DL (ref 70–126)
AVERAGE GLUCOSE: 99 MG/DL (ref 70–126)
BACTERIA: ABNORMAL
BASOPHILS # BLD: 0.1 %
BASOPHILS # BLD: 0.2 %
BASOPHILS ABSOLUTE: 0 THOU/MM3 (ref 0–0.1)
BASOPHILS ABSOLUTE: 0 THOU/MM3 (ref 0–0.1)
BASOPHILS RELATIVE PERCENT: 0.2 % (ref 0–2)
BILIRUB SERPL-MCNC: 0.8 MG/DL (ref 0.2–1)
BILIRUB SERPL-MCNC: 1 MG/DL (ref 0.3–1.2)
BILIRUBIN URINE: NEGATIVE
BLOOD, URINE: ABNORMAL
BUN BLDV-MCNC: 20 MG/DL (ref 7–22)
BUN BLDV-MCNC: 23 MG/DL (ref 7–22)
BUN BLDV-MCNC: 24 MG/DL (ref 7–22)
BUN BLDV-MCNC: 24 MG/DL (ref 7–22)
BUN BLDV-MCNC: 27 MG/DL (ref 7–20)
BUN BLDV-MCNC: 30 MG/DL (ref 7–22)
BUN BLDV-MCNC: 34 MG/DL (ref 7–22)
BUN BLDV-MCNC: 37 MG/DL (ref 7–22)
CALCIUM SERPL-MCNC: 8.4 MG/DL (ref 8.5–10.5)
CALCIUM SERPL-MCNC: 9 MG/DL (ref 8.5–10.5)
CALCIUM SERPL-MCNC: 9.2 MG/DL (ref 8.5–10.5)
CALCIUM SERPL-MCNC: 9.3 MG/DL (ref 8.5–10.5)
CALCIUM SERPL-MCNC: 9.3 MG/DL (ref 8.5–10.5)
CALCIUM SERPL-MCNC: 9.6 MG/DL (ref 8.8–10.5)
CASTS: ABNORMAL /LPF
CASTS: ABNORMAL /LPF
CHARACTER, URINE: CLEAR
CHLORIDE BLD-SCNC: 105 MEQ/L (ref 98–111)
CHLORIDE BLD-SCNC: 106 MEQ/L (ref 101–111)
CHLORIDE BLD-SCNC: 107 MEQ/L (ref 98–111)
CHLORIDE BLD-SCNC: 107 MEQ/L (ref 98–111)
CHLORIDE BLD-SCNC: 108 MEQ/L (ref 98–111)
CHLORIDE BLD-SCNC: 109 MEQ/L (ref 98–111)
CHLORIDE BLD-SCNC: 112 MEQ/L (ref 98–111)
CHLORIDE BLD-SCNC: 112 MEQ/L (ref 98–111)
CHOLESTEROL, TOTAL: 130 MG/DL (ref 100–199)
CHOLESTEROL, TOTAL: 134 MG/DL (ref 100–199)
CHOLESTEROL/HDL RELATIVE RISK: 3.7 (ref 4–5)
CHOLESTEROL: 145 MG/DL
CO2: 15 MEQ/L (ref 23–33)
CO2: 20 MEQ/L (ref 23–33)
CO2: 21 MEQ/L (ref 23–33)
CO2: 21 MEQ/L (ref 23–33)
CO2: 22 MEQ/L (ref 23–33)
CO2: 23 MEQ/L (ref 23–33)
CO2: 26 MEQ/L (ref 23–33)
CO2: 28 MEQ/L (ref 21–32)
COLOR: YELLOW
CREAT SERPL-MCNC: 0.9 MG/DL (ref 0.4–1.2)
CREAT SERPL-MCNC: 1 MG/DL (ref 0.4–1.2)
CREAT SERPL-MCNC: 1 MG/DL (ref 0.4–1.2)
CREAT SERPL-MCNC: 1.1 MG/DL (ref 0.4–1.2)
CREAT SERPL-MCNC: 1.12 MG/DL (ref 0.6–1.3)
CREAT SERPL-MCNC: 1.2 MG/DL (ref 0.4–1.2)
CREAT SERPL-MCNC: 3 MG/DL (ref 0.4–1.2)
CREAT SERPL-MCNC: 3.2 MG/DL (ref 0.4–1.2)
CREATININE CLEARANCE: >60
CREATININE URINE: 72.3 MG/DL
CRYSTALS: ABNORMAL
DIRECT-LDL / HDL RISK: 2.8
EKG ATRIAL RATE: 63 BPM
EKG ATRIAL RATE: 64 BPM
EKG ATRIAL RATE: 66 BPM
EKG P AXIS: 52 DEGREES
EKG P AXIS: 67 DEGREES
EKG P AXIS: 69 DEGREES
EKG P-R INTERVAL: 132 MS
EKG P-R INTERVAL: 142 MS
EKG P-R INTERVAL: 144 MS
EKG Q-T INTERVAL: 436 MS
EKG Q-T INTERVAL: 516 MS
EKG Q-T INTERVAL: 606 MS
EKG QRS DURATION: 90 MS
EKG QRS DURATION: 92 MS
EKG QRS DURATION: 94 MS
EKG QTC CALCULATION (BAZETT): 457 MS
EKG QTC CALCULATION (BAZETT): 532 MS
EKG QTC CALCULATION (BAZETT): 620 MS
EKG R AXIS: -29 DEGREES
EKG R AXIS: -3 DEGREES
EKG R AXIS: -41 DEGREES
EKG T AXIS: -123 DEGREES
EKG T AXIS: -92 DEGREES
EKG T AXIS: 38 DEGREES
EKG VENTRICULAR RATE: 63 BPM
EKG VENTRICULAR RATE: 64 BPM
EKG VENTRICULAR RATE: 66 BPM
EOSINOPHIL # BLD: 3.5 %
EOSINOPHIL # BLD: 4 %
EOSINOPHILS ABSOLUTE: 0.2 THOU/MM3 (ref 0–0.4)
EOSINOPHILS ABSOLUTE: 0.3 THOU/MM3 (ref 0–0.4)
EOSINOPHILS RELATIVE PERCENT: 4.3 % (ref 0–6)
EPITHELIAL CELLS, UA: ABNORMAL /HPF
ERYTHROCYTE [DISTWIDTH] IN BLOOD BY AUTOMATED COUNT: 11.6 % (ref 11.5–14.5)
ERYTHROCYTE [DISTWIDTH] IN BLOOD BY AUTOMATED COUNT: 11.8 % (ref 11.5–14.5)
ERYTHROCYTE [DISTWIDTH] IN BLOOD BY AUTOMATED COUNT: 11.8 % (ref 11.5–14.5)
ERYTHROCYTE [DISTWIDTH] IN BLOOD BY AUTOMATED COUNT: 11.9 % (ref 11.5–14.5)
ERYTHROCYTE [DISTWIDTH] IN BLOOD BY AUTOMATED COUNT: 12 % (ref 11.5–14.5)
ERYTHROCYTE [DISTWIDTH] IN BLOOD BY AUTOMATED COUNT: 12.1 % (ref 11.5–14.5)
ERYTHROCYTE [DISTWIDTH] IN BLOOD BY AUTOMATED COUNT: 12.1 % (ref 11.5–14.5)
ERYTHROCYTE [DISTWIDTH] IN BLOOD BY AUTOMATED COUNT: 39.4 FL (ref 35–45)
ERYTHROCYTE [DISTWIDTH] IN BLOOD BY AUTOMATED COUNT: 40.1 FL (ref 35–45)
ERYTHROCYTE [DISTWIDTH] IN BLOOD BY AUTOMATED COUNT: 40.2 FL (ref 35–45)
ERYTHROCYTE [DISTWIDTH] IN BLOOD BY AUTOMATED COUNT: 40.2 FL (ref 35–45)
ERYTHROCYTE [DISTWIDTH] IN BLOOD BY AUTOMATED COUNT: 40.3 FL (ref 35–45)
ERYTHROCYTE [DISTWIDTH] IN BLOOD BY AUTOMATED COUNT: 40.7 FL (ref 35–45)
ERYTHROCYTE [DISTWIDTH] IN BLOOD BY AUTOMATED COUNT: 40.9 FL (ref 35–45)
ERYTHROCYTE [DISTWIDTH] IN BLOOD BY AUTOMATED COUNT: 41.7 FL (ref 35–45)
ERYTHROCYTE [DISTWIDTH] IN BLOOD BY AUTOMATED COUNT: 42.3 FL (ref 35–45)
ESTIMATED AVERAGE GLUCOSE: 108 MG/DL
GFR SERPL CREATININE-BSD FRML MDRD: 19 ML/MIN/1.73M2
GFR SERPL CREATININE-BSD FRML MDRD: 20 ML/MIN/1.73M2
GFR SERPL CREATININE-BSD FRML MDRD: 58 ML/MIN/1.73M2
GFR SERPL CREATININE-BSD FRML MDRD: 64 ML/MIN/1.73M2
GFR SERPL CREATININE-BSD FRML MDRD: 72 ML/MIN/1.73M2
GFR SERPL CREATININE-BSD FRML MDRD: 72 ML/MIN/1.73M2
GFR SERPL CREATININE-BSD FRML MDRD: 81 ML/MIN/1.73M2
GLUCOSE BLD-MCNC: 105 MG/DL (ref 70–108)
GLUCOSE BLD-MCNC: 109 MG/DL (ref 70–108)
GLUCOSE BLD-MCNC: 129 MG/DL (ref 70–108)
GLUCOSE BLD-MCNC: 130 MG/DL (ref 70–108)
GLUCOSE BLD-MCNC: 132 MG/DL (ref 70–108)
GLUCOSE BLD-MCNC: 133 MG/DL (ref 70–108)
GLUCOSE BLD-MCNC: 134 MG/DL (ref 70–108)
GLUCOSE BLD-MCNC: 138 MG/DL (ref 70–108)
GLUCOSE BLD-MCNC: 138 MG/DL (ref 70–108)
GLUCOSE BLD-MCNC: 139 MG/DL (ref 70–108)
GLUCOSE BLD-MCNC: 144 MG/DL (ref 70–108)
GLUCOSE BLD-MCNC: 144 MG/DL (ref 70–108)
GLUCOSE BLD-MCNC: 146 MG/DL (ref 70–108)
GLUCOSE BLD-MCNC: 147 MG/DL (ref 70–108)
GLUCOSE BLD-MCNC: 148 MG/DL (ref 70–108)
GLUCOSE BLD-MCNC: 148 MG/DL (ref 70–108)
GLUCOSE BLD-MCNC: 152 MG/DL (ref 70–108)
GLUCOSE BLD-MCNC: 154 MG/DL (ref 70–108)
GLUCOSE BLD-MCNC: 155 MG/DL (ref 70–108)
GLUCOSE BLD-MCNC: 184 MG/DL (ref 70–108)
GLUCOSE BLD-MCNC: 83 MG/DL (ref 70–108)
GLUCOSE BLD-MCNC: 84 MG/DL (ref 70–108)
GLUCOSE, URINE: NEGATIVE MG/DL
GLUCOSE: 122 MG/DL (ref 70–110)
HBA1C MFR BLD: 5.3 % (ref 4.4–6.4)
HBA1C MFR BLD: 5.4 % (ref 4.4–6.4)
HBA1C MFR BLD: 5.4 % (ref 4.4–6.4)
HCT VFR BLD CALC: 34.7 % (ref 42–52)
HCT VFR BLD CALC: 35 % (ref 42–52)
HCT VFR BLD CALC: 35.2 % (ref 42–52)
HCT VFR BLD CALC: 36.1 % (ref 42–52)
HCT VFR BLD CALC: 36.5 % (ref 42–52)
HCT VFR BLD CALC: 36.6 % (ref 42–52)
HCT VFR BLD CALC: 36.7 % (ref 42–52)
HCT VFR BLD CALC: 38.3 % (ref 42–52)
HCT VFR BLD CALC: 40.4 % (ref 40–49)
HCT VFR BLD CALC: 40.8 % (ref 42–52)
HDLC SERPL-MCNC: 33 MG/DL
HDLC SERPL-MCNC: 34 MG/DL
HDLC SERPL-MCNC: 39 MG/DL
HEMOGLOBIN: 11.8 GM/DL (ref 14–18)
HEMOGLOBIN: 11.9 GM/DL (ref 14–18)
HEMOGLOBIN: 12.2 GM/DL (ref 14–18)
HEMOGLOBIN: 12.4 GM/DL (ref 14–18)
HEMOGLOBIN: 12.4 GM/DL (ref 14–18)
HEMOGLOBIN: 12.5 GM/DL (ref 14–18)
HEMOGLOBIN: 12.6 GM/DL (ref 14–18)
HEMOGLOBIN: 13.2 GM/DL (ref 14–18)
HEMOGLOBIN: 13.8 GM/DL (ref 14–18)
HEMOGLOBIN: 13.9 GM/DL (ref 13.5–16.5)
IMMATURE GRANS (ABS): 0.02 THOU/MM3 (ref 0–0.07)
IMMATURE GRANS (ABS): 0.03 THOU/MM3 (ref 0–0.07)
IMMATURE GRANULOCYTES: 0.3 %
IMMATURE GRANULOCYTES: 0.4 %
KETONES, URINE: ABNORMAL
LDL CHOLESTEROL CALCULATED: 76 MG/DL
LDL CHOLESTEROL CALCULATED: 80 MG/DL
LDL CHOLESTEROL DIRECT: 111 MG/DL
LEUKOCYTE EST, POC: NEGATIVE
LV EF: 25 %
LVEF MODALITY: NORMAL
LYMPHOCYTES # BLD: 18.8 %
LYMPHOCYTES # BLD: 19.7 %
LYMPHOCYTES ABSOLUTE: 1.3 THOU/MM3 (ref 1–4.8)
LYMPHOCYTES ABSOLUTE: 1.5 THOU/MM3 (ref 1–4.8)
LYMPHOCYTES RELATIVE PERCENT: 20.8 % (ref 15–45)
MAGNESIUM: 1.8 MG/DL (ref 1.6–2.4)
MAGNESIUM: 1.8 MG/DL (ref 1.6–2.4)
MAGNESIUM: 1.9 MG/DL (ref 1.6–2.4)
MAGNESIUM: 2 MG/DL (ref 1.6–2.4)
MCH RBC QN AUTO: 31.4 PG (ref 26–33)
MCH RBC QN AUTO: 31.5 PG (ref 26–33)
MCH RBC QN AUTO: 31.8 PG (ref 26–33)
MCH RBC QN AUTO: 32 PG (ref 26–33)
MCH RBC QN AUTO: 32 PG (ref 26–33)
MCH RBC QN AUTO: 32 PG (ref 27.5–33)
MCH RBC QN AUTO: 32.1 PG (ref 26–33)
MCH RBC QN AUTO: 32.2 PG (ref 26–33)
MCH RBC QN AUTO: 32.3 PG (ref 26–33)
MCH RBC QN AUTO: 32.4 PG (ref 26–33)
MCHC RBC AUTO-ENTMCNC: 33.7 GM/DL (ref 32.2–35.5)
MCHC RBC AUTO-ENTMCNC: 33.8 GM/DL (ref 32.2–35.5)
MCHC RBC AUTO-ENTMCNC: 33.8 GM/DL (ref 32.2–35.5)
MCHC RBC AUTO-ENTMCNC: 34 GM/DL (ref 32.2–35.5)
MCHC RBC AUTO-ENTMCNC: 34.2 GM/DL (ref 32.2–35.5)
MCHC RBC AUTO-ENTMCNC: 34.3 GM/DL (ref 32.2–35.5)
MCHC RBC AUTO-ENTMCNC: 34.4 GM/DL (ref 33–36)
MCHC RBC AUTO-ENTMCNC: 34.5 GM/DL (ref 32.2–35.5)
MCHC RBC AUTO-ENTMCNC: 34.7 GM/DL (ref 32.2–35.5)
MCHC RBC AUTO-ENTMCNC: 34.9 GM/DL (ref 32.2–35.5)
MCV RBC AUTO: 92.1 FL (ref 80–94)
MCV RBC AUTO: 92.2 FL (ref 80–94)
MCV RBC AUTO: 92.4 FL (ref 80–94)
MCV RBC AUTO: 93 CU MIC (ref 80–97)
MCV RBC AUTO: 93.1 FL (ref 80–94)
MCV RBC AUTO: 93.6 FL (ref 80–94)
MCV RBC AUTO: 94.1 FL (ref 80–94)
MCV RBC AUTO: 94.6 FL (ref 80–94)
MCV RBC AUTO: 94.7 FL (ref 80–94)
MCV RBC AUTO: 94.8 FL (ref 80–94)
MISCELLANEOUS LAB TEST RESULT: ABNORMAL
MONOCYTES # BLD: 5.7 %
MONOCYTES # BLD: 7.5 %
MONOCYTES ABSOLUTE: 0.4 THOU/MM3 (ref 0.4–1.3)
MONOCYTES ABSOLUTE: 0.6 THOU/MM3 (ref 0.4–1.3)
MONOCYTES RELATIVE PERCENT: 5.3 % (ref 2–10)
MRSA SCREEN RT-PCR: NEGATIVE
MRSA SCREEN: NORMAL
NEUTROPHILS RELATIVE PERCENT: 69.4 % (ref 40–70)
NITRITE, URINE: NEGATIVE
NUCLEATED RBCS: 0.1 /100 WBC
NUCLEATED RED BLOOD CELLS: 0 /100 WBC
NUCLEATED RED BLOOD CELLS: 0 /100 WBC
OSMOLALITY CALCULATION: 288.1 MOSMOL/KG (ref 275–300)
PDW BLD-RTO: 12.6 % (ref 12–16)
PH UA: 5.5 (ref 5–9)
PLATELET # BLD: 111 THOU/MM3 (ref 130–400)
PLATELET # BLD: 124 THOU/MM3 (ref 130–400)
PLATELET # BLD: 129 THOU/MM3 (ref 130–400)
PLATELET # BLD: 138 THOU/MM3 (ref 130–400)
PLATELET # BLD: 141 THOU/MM3 (ref 130–400)
PLATELET # BLD: 143 THOU/MM3 (ref 130–400)
PLATELET # BLD: 145 THOU/MM3 (ref 130–400)
PLATELET # BLD: 148 THOU/MM3 (ref 130–400)
PLATELET # BLD: 156 TH/CMM (ref 150–400)
PLATELET # BLD: 158 THOU/MM3 (ref 130–400)
PMV BLD AUTO: 11.1 FL (ref 9.4–12.4)
PMV BLD AUTO: 11.2 FL (ref 9.4–12.4)
PMV BLD AUTO: 11.5 FL (ref 9.4–12.4)
PMV BLD AUTO: 11.6 FL (ref 9.4–12.4)
PMV BLD AUTO: 11.6 FL (ref 9.4–12.4)
PMV BLD AUTO: 11.7 FL (ref 9.4–12.4)
PMV BLD AUTO: 11.7 FL (ref 9.4–12.4)
PMV BLD AUTO: 11.9 FL (ref 9.4–12.4)
PMV BLD AUTO: 11.9 FL (ref 9.4–12.4)
POTASSIUM SERPL-SCNC: 3.4 MEQ/L (ref 3.5–5.2)
POTASSIUM SERPL-SCNC: 3.7 MEQ/L (ref 3.5–5.2)
POTASSIUM SERPL-SCNC: 3.9 MEQ/L (ref 3.5–5.2)
POTASSIUM SERPL-SCNC: 3.9 MEQ/L (ref 3.6–5)
PROTEIN UA: NEGATIVE MG/DL
RBC # BLD: 3.67 MILL/MM3 (ref 4.7–6.1)
RBC # BLD: 3.76 MILL/MM3 (ref 4.7–6.1)
RBC # BLD: 3.81 MILL/MM3 (ref 4.7–6.1)
RBC # BLD: 3.85 MILL/MM3 (ref 4.7–6.1)
RBC # BLD: 3.9 MILL/MM3 (ref 4.7–6.1)
RBC # BLD: 3.92 MILL/MM3 (ref 4.7–6.1)
RBC # BLD: 3.97 MILL/MM3 (ref 4.7–6.1)
RBC # BLD: 4.09 MILL/MM3 (ref 4.7–6.1)
RBC # BLD: 4.31 MILL/MM3 (ref 4.7–6.1)
RBC # BLD: 4.34 MIL/CMM (ref 4.5–6)
RBC URINE: ABNORMAL /HPF
RENAL EPITHELIAL, UA: ABNORMAL
SEG NEUTROPHILS: 69.2 %
SEG NEUTROPHILS: 70.6 %
SEGMENTED NEUTROPHILS ABSOLUTE COUNT: 4.6 THOU/MM3 (ref 1.8–7.7)
SEGMENTED NEUTROPHILS ABSOLUTE COUNT: 5.6 THOU/MM3 (ref 1.8–7.7)
SODIUM BLD-SCNC: 139 MEQ/L (ref 135–145)
SODIUM BLD-SCNC: 141 MEQ/L (ref 135–145)
SODIUM BLD-SCNC: 142 MEQ/L (ref 135–145)
SODIUM BLD-SCNC: 143 MEQ/L (ref 135–145)
SODIUM BLD-SCNC: 143 MEQ/L (ref 135–145)
SODIUM BLD-SCNC: 144 MEQ/L (ref 135–145)
SODIUM BLD-SCNC: 148 MEQ/L (ref 135–145)
SODIUM BLD-SCNC: 149 MEQ/L (ref 135–145)
SPECIFIC GRAVITY UA: 1.02 (ref 1–1.03)
TOTAL PROTEIN: 5.9 G/DL (ref 6.1–8)
TOTAL PROTEIN: 6.4 G/DL (ref 6.2–8)
TRIGL SERPL-MCNC: 100 MG/DL (ref 0–199)
TRIGL SERPL-MCNC: 107 MG/DL (ref 0–199)
TRIGL SERPL-MCNC: 110 MG/DL
TROPONIN T: 0.32 NG/ML
TROPONIN T: 0.44 NG/ML
TROPONIN T: < 0.01 NG/ML
UREA NITROGEN, UR: 461 MG/DL
URINE CULTURE, ROUTINE: NORMAL
UROBILINOGEN, URINE: 0.2 EU/DL (ref 0–1)
VANCOMYCIN RESISTANT ENTEROCOCCUS: NEGATIVE
VLDLC SERPL CALC-MCNC: 22 MG/DL
WBC # BLD: 10.7 THOU/MM3 (ref 4.8–10.8)
WBC # BLD: 12 THOU/MM3 (ref 4.8–10.8)
WBC # BLD: 6.5 THOU/MM3 (ref 4.8–10.8)
WBC # BLD: 6.6 TH/CMM (ref 4.4–10.5)
WBC # BLD: 7.9 THOU/MM3 (ref 4.8–10.8)
WBC # BLD: 8.1 THOU/MM3 (ref 4.8–10.8)
WBC # BLD: 8.3 THOU/MM3 (ref 4.8–10.8)
WBC # BLD: 8.5 THOU/MM3 (ref 4.8–10.8)
WBC # BLD: 8.8 THOU/MM3 (ref 4.8–10.8)
WBC # BLD: 8.9 THOU/MM3 (ref 4.8–10.8)
WBC UA: ABNORMAL /HPF
YEAST: ABNORMAL

## 2019-01-01 PROCEDURE — 99222 1ST HOSP IP/OBS MODERATE 55: CPT | Performed by: PSYCHIATRY & NEUROLOGY

## 2019-01-01 PROCEDURE — 2709999900 HC NON-CHARGEABLE SUPPLY

## 2019-01-01 PROCEDURE — 2500000003 HC RX 250 WO HCPCS: Performed by: INTERNAL MEDICINE

## 2019-01-01 PROCEDURE — 2000000000 HC ICU R&B

## 2019-01-01 PROCEDURE — 2580000003 HC RX 258: Performed by: INTERNAL MEDICINE

## 2019-01-01 PROCEDURE — 2060000000 HC ICU INTERMEDIATE R&B

## 2019-01-01 PROCEDURE — 6370000000 HC RX 637 (ALT 250 FOR IP): Performed by: PSYCHIATRY & NEUROLOGY

## 2019-01-01 PROCEDURE — 97110 THERAPEUTIC EXERCISES: CPT

## 2019-01-01 PROCEDURE — 80048 BASIC METABOLIC PNL TOTAL CA: CPT

## 2019-01-01 PROCEDURE — 6360000002 HC RX W HCPCS: Performed by: NURSE PRACTITIONER

## 2019-01-01 PROCEDURE — 93005 ELECTROCARDIOGRAM TRACING: CPT | Performed by: INTERNAL MEDICINE

## 2019-01-01 PROCEDURE — 87500 VANOMYCIN DNA AMP PROBE: CPT

## 2019-01-01 PROCEDURE — 84540 ASSAY OF URINE/UREA-N: CPT

## 2019-01-01 PROCEDURE — 70498 CT ANGIOGRAPHY NECK: CPT

## 2019-01-01 PROCEDURE — 81001 URINALYSIS AUTO W/SCOPE: CPT

## 2019-01-01 PROCEDURE — 97530 THERAPEUTIC ACTIVITIES: CPT

## 2019-01-01 PROCEDURE — 92611 MOTION FLUOROSCOPY/SWALLOW: CPT

## 2019-01-01 PROCEDURE — 99232 SBSQ HOSP IP/OBS MODERATE 35: CPT | Performed by: PSYCHIATRY & NEUROLOGY

## 2019-01-01 PROCEDURE — 92507 TX SP LANG VOICE COMM INDIV: CPT

## 2019-01-01 PROCEDURE — 85027 COMPLETE CBC AUTOMATED: CPT

## 2019-01-01 PROCEDURE — 97166 OT EVAL MOD COMPLEX 45 MIN: CPT

## 2019-01-01 PROCEDURE — 6370000000 HC RX 637 (ALT 250 FOR IP): Performed by: PHYSICIAN ASSISTANT

## 2019-01-01 PROCEDURE — 97116 GAIT TRAINING THERAPY: CPT

## 2019-01-01 PROCEDURE — 70496 CT ANGIOGRAPHY HEAD: CPT

## 2019-01-01 PROCEDURE — 84484 ASSAY OF TROPONIN QUANT: CPT

## 2019-01-01 PROCEDURE — 6360000004 HC RX CONTRAST MEDICATION: Performed by: NURSE PRACTITIONER

## 2019-01-01 PROCEDURE — 70450 CT HEAD/BRAIN W/O DYE: CPT

## 2019-01-01 PROCEDURE — C8929 TTE W OR WO FOL WCON,DOPPLER: HCPCS

## 2019-01-01 PROCEDURE — 83735 ASSAY OF MAGNESIUM: CPT

## 2019-01-01 PROCEDURE — 90653 IIV ADJUVANT VACCINE IM: CPT | Performed by: FAMILY MEDICINE

## 2019-01-01 PROCEDURE — 6370000000 HC RX 637 (ALT 250 FOR IP): Performed by: INTERNAL MEDICINE

## 2019-01-01 PROCEDURE — 93307 TTE W/O DOPPLER COMPLETE: CPT

## 2019-01-01 PROCEDURE — 85025 COMPLETE CBC W/AUTO DIFF WBC: CPT

## 2019-01-01 PROCEDURE — 99232 SBSQ HOSP IP/OBS MODERATE 35: CPT | Performed by: INTERNAL MEDICINE

## 2019-01-01 PROCEDURE — 99223 1ST HOSP IP/OBS HIGH 75: CPT | Performed by: PHYSICAL MEDICINE & REHABILITATION

## 2019-01-01 PROCEDURE — 6370000000 HC RX 637 (ALT 250 FOR IP): Performed by: NURSE PRACTITIONER

## 2019-01-01 PROCEDURE — 36415 COLL VENOUS BLD VENIPUNCTURE: CPT

## 2019-01-01 PROCEDURE — 99232 SBSQ HOSP IP/OBS MODERATE 35: CPT | Performed by: NURSE PRACTITIONER

## 2019-01-01 PROCEDURE — 97535 SELF CARE MNGMENT TRAINING: CPT

## 2019-01-01 PROCEDURE — G8427 DOCREV CUR MEDS BY ELIG CLIN: HCPCS | Performed by: FAMILY MEDICINE

## 2019-01-01 PROCEDURE — G8482 FLU IMMUNIZE ORDER/ADMIN: HCPCS | Performed by: FAMILY MEDICINE

## 2019-01-01 PROCEDURE — 82948 REAGENT STRIP/BLOOD GLUCOSE: CPT

## 2019-01-01 PROCEDURE — 94760 N-INVAS EAR/PLS OXIMETRY 1: CPT

## 2019-01-01 PROCEDURE — 87081 CULTURE SCREEN ONLY: CPT

## 2019-01-01 PROCEDURE — 2580000003 HC RX 258: Performed by: NURSE PRACTITIONER

## 2019-01-01 PROCEDURE — 1123F ACP DISCUSS/DSCN MKR DOCD: CPT | Performed by: FAMILY MEDICINE

## 2019-01-01 PROCEDURE — 85730 THROMBOPLASTIN TIME PARTIAL: CPT

## 2019-01-01 PROCEDURE — 4040F PNEUMOC VAC/ADMIN/RCVD: CPT | Performed by: FAMILY MEDICINE

## 2019-01-01 PROCEDURE — 74230 X-RAY XM SWLNG FUNCJ C+: CPT

## 2019-01-01 PROCEDURE — 6360000002 HC RX W HCPCS: Performed by: PSYCHIATRY & NEUROLOGY

## 2019-01-01 PROCEDURE — 2500000003 HC RX 250 WO HCPCS: Performed by: NURSE PRACTITIONER

## 2019-01-01 PROCEDURE — 6360000004 HC RX CONTRAST MEDICATION: Performed by: PHYSICIAN ASSISTANT

## 2019-01-01 PROCEDURE — 99223 1ST HOSP IP/OBS HIGH 75: CPT | Performed by: NURSE PRACTITIONER

## 2019-01-01 PROCEDURE — 37195 THROMBOLYTIC THERAPY STROKE: CPT

## 2019-01-01 PROCEDURE — 51702 INSERT TEMP BLADDER CATH: CPT

## 2019-01-01 PROCEDURE — 51798 US URINE CAPACITY MEASURE: CPT

## 2019-01-01 PROCEDURE — 97163 PT EVAL HIGH COMPLEX 45 MIN: CPT

## 2019-01-01 PROCEDURE — 76770 US EXAM ABDO BACK WALL COMP: CPT

## 2019-01-01 PROCEDURE — 2580000003 HC RX 258: Performed by: PHYSICIAN ASSISTANT

## 2019-01-01 PROCEDURE — G0008 ADMIN INFLUENZA VIRUS VAC: HCPCS | Performed by: FAMILY MEDICINE

## 2019-01-01 PROCEDURE — 99221 1ST HOSP IP/OBS SF/LOW 40: CPT | Performed by: INTERNAL MEDICINE

## 2019-01-01 PROCEDURE — 93005 ELECTROCARDIOGRAM TRACING: CPT | Performed by: PHYSICIAN ASSISTANT

## 2019-01-01 PROCEDURE — 83036 HEMOGLOBIN GLYCOSYLATED A1C: CPT

## 2019-01-01 PROCEDURE — 70551 MRI BRAIN STEM W/O DYE: CPT

## 2019-01-01 PROCEDURE — 99222 1ST HOSP IP/OBS MODERATE 55: CPT | Performed by: INTERNAL MEDICINE

## 2019-01-01 PROCEDURE — 92526 ORAL FUNCTION THERAPY: CPT

## 2019-01-01 PROCEDURE — 99213 OFFICE O/P EST LOW 20 MIN: CPT | Performed by: FAMILY MEDICINE

## 2019-01-01 PROCEDURE — 99214 OFFICE O/P EST MOD 30 MIN: CPT | Performed by: FAMILY MEDICINE

## 2019-01-01 PROCEDURE — 87086 URINE CULTURE/COLONY COUNT: CPT

## 2019-01-01 PROCEDURE — G0509 CRIT CARE TELEHEA CONSULT 50: HCPCS | Performed by: PSYCHIATRY & NEUROLOGY

## 2019-01-01 PROCEDURE — 80061 LIPID PANEL: CPT

## 2019-01-01 PROCEDURE — 1036F TOBACCO NON-USER: CPT | Performed by: FAMILY MEDICINE

## 2019-01-01 PROCEDURE — 97112 NEUROMUSCULAR REEDUCATION: CPT

## 2019-01-01 PROCEDURE — 6360000002 HC RX W HCPCS: Performed by: INTERNAL MEDICINE

## 2019-01-01 PROCEDURE — 99233 SBSQ HOSP IP/OBS HIGH 50: CPT | Performed by: INTERNAL MEDICINE

## 2019-01-01 PROCEDURE — 6360000002 HC RX W HCPCS: Performed by: PHYSICIAN ASSISTANT

## 2019-01-01 PROCEDURE — 92523 SPEECH SOUND LANG COMPREHEN: CPT

## 2019-01-01 PROCEDURE — 94761 N-INVAS EAR/PLS OXIMETRY MLT: CPT

## 2019-01-01 PROCEDURE — G0438 PPPS, INITIAL VISIT: HCPCS | Performed by: FAMILY MEDICINE

## 2019-01-01 PROCEDURE — G8420 CALC BMI NORM PARAMETERS: HCPCS | Performed by: FAMILY MEDICINE

## 2019-01-01 PROCEDURE — 92610 EVALUATE SWALLOWING FUNCTION: CPT

## 2019-01-01 PROCEDURE — 87641 MR-STAPH DNA AMP PROBE: CPT

## 2019-01-01 PROCEDURE — 3E03317 INTRODUCTION OF OTHER THROMBOLYTIC INTO PERIPHERAL VEIN, PERCUTANEOUS APPROACH: ICD-10-PCS | Performed by: RADIOLOGY

## 2019-01-01 PROCEDURE — 99285 EMERGENCY DEPT VISIT HI MDM: CPT

## 2019-01-01 PROCEDURE — 99231 SBSQ HOSP IP/OBS SF/LOW 25: CPT | Performed by: INTERNAL MEDICINE

## 2019-01-01 PROCEDURE — 80053 COMPREHEN METABOLIC PANEL: CPT

## 2019-01-01 PROCEDURE — 82570 ASSAY OF URINE CREATININE: CPT

## 2019-01-01 RX ORDER — FINASTERIDE 5 MG/1
5 TABLET, FILM COATED ORAL DAILY
Status: CANCELLED | OUTPATIENT
Start: 2020-01-01

## 2019-01-01 RX ORDER — HEPARIN SODIUM 10000 [USP'U]/100ML
19 INJECTION, SOLUTION INTRAVENOUS CONTINUOUS
Status: DISPENSED | OUTPATIENT
Start: 2019-01-01 | End: 2019-01-01

## 2019-01-01 RX ORDER — ACETAMINOPHEN 325 MG/1
650 TABLET ORAL EVERY 4 HOURS PRN
Status: DISCONTINUED | OUTPATIENT
Start: 2019-01-01 | End: 2020-01-01 | Stop reason: HOSPADM

## 2019-01-01 RX ORDER — LOSARTAN POTASSIUM AND HYDROCHLOROTHIAZIDE 25; 100 MG/1; MG/1
0.5 TABLET ORAL DAILY
Status: DISCONTINUED | OUTPATIENT
Start: 2019-01-01 | End: 2019-01-01 | Stop reason: CLARIF

## 2019-01-01 RX ORDER — DEXTROSE MONOHYDRATE 50 MG/ML
INJECTION, SOLUTION INTRAVENOUS CONTINUOUS
Status: DISCONTINUED | OUTPATIENT
Start: 2019-01-01 | End: 2019-01-01

## 2019-01-01 RX ORDER — METOPROLOL SUCCINATE 100 MG/1
100 TABLET, EXTENDED RELEASE ORAL DAILY
Status: DISCONTINUED | OUTPATIENT
Start: 2019-01-01 | End: 2019-01-01

## 2019-01-01 RX ORDER — SODIUM CHLORIDE 9 MG/ML
INJECTION, SOLUTION INTRAVENOUS CONTINUOUS
Status: DISCONTINUED | OUTPATIENT
Start: 2019-01-01 | End: 2019-01-01

## 2019-01-01 RX ORDER — LOSARTAN POTASSIUM AND HYDROCHLOROTHIAZIDE 25; 100 MG/1; MG/1
0.5 TABLET ORAL DAILY
Qty: 90 TABLET | Refills: 3 | Status: SHIPPED | OUTPATIENT
Start: 2019-01-01 | End: 2019-01-01 | Stop reason: SDUPTHER

## 2019-01-01 RX ORDER — VERAPAMIL HYDROCHLORIDE 240 MG/1
240 TABLET, FILM COATED, EXTENDED RELEASE ORAL DAILY
Status: DISCONTINUED | OUTPATIENT
Start: 2019-01-01 | End: 2019-01-01

## 2019-01-01 RX ORDER — POTASSIUM CHLORIDE 20 MEQ/1
40 TABLET, EXTENDED RELEASE ORAL ONCE
Status: COMPLETED | OUTPATIENT
Start: 2019-01-01 | End: 2019-01-01

## 2019-01-01 RX ORDER — ASPIRIN 81 MG/1
324 TABLET, CHEWABLE ORAL ONCE
Status: DISCONTINUED | OUTPATIENT
Start: 2019-01-01 | End: 2019-01-01

## 2019-01-01 RX ORDER — RIVASTIGMINE 4.6 MG/24H
1 PATCH, EXTENDED RELEASE TRANSDERMAL DAILY
Qty: 90 PATCH | Refills: 3 | Status: CANCELLED | OUTPATIENT
Start: 2019-01-01

## 2019-01-01 RX ORDER — ASPIRIN 81 MG/1
81 TABLET, CHEWABLE ORAL DAILY
Status: CANCELLED | OUTPATIENT
Start: 2020-01-01

## 2019-01-01 RX ORDER — ATORVASTATIN CALCIUM 80 MG/1
80 TABLET, FILM COATED ORAL NIGHTLY
Status: DISCONTINUED | OUTPATIENT
Start: 2019-01-01 | End: 2020-01-01 | Stop reason: HOSPADM

## 2019-01-01 RX ORDER — HYDROCHLOROTHIAZIDE 25 MG/1
25 TABLET ORAL DAILY
Status: DISCONTINUED | OUTPATIENT
Start: 2019-01-01 | End: 2020-01-01 | Stop reason: HOSPADM

## 2019-01-01 RX ORDER — METOPROLOL SUCCINATE 25 MG/1
25 TABLET, EXTENDED RELEASE ORAL DAILY
Status: CANCELLED | OUTPATIENT
Start: 2020-01-01

## 2019-01-01 RX ORDER — SODIUM CHLORIDE 0.9 % (FLUSH) 0.9 %
10 SYRINGE (ML) INJECTION PRN
Status: DISCONTINUED | OUTPATIENT
Start: 2019-01-01 | End: 2020-01-01 | Stop reason: HOSPADM

## 2019-01-01 RX ORDER — ESOMEPRAZOLE MAGNESIUM 40 MG/1
40 CAPSULE, DELAYED RELEASE ORAL EVERY OTHER DAY
Status: ON HOLD | COMMUNITY
End: 2020-01-01 | Stop reason: HOSPADM

## 2019-01-01 RX ORDER — METFORMIN HYDROCHLORIDE 750 MG/1
750 TABLET, EXTENDED RELEASE ORAL
Qty: 90 TABLET | Refills: 3 | Status: SHIPPED | OUTPATIENT
Start: 2019-01-01 | End: 2019-01-01

## 2019-01-01 RX ORDER — METOPROLOL SUCCINATE 50 MG/1
50 TABLET, EXTENDED RELEASE ORAL DAILY
Status: DISCONTINUED | OUTPATIENT
Start: 2019-01-01 | End: 2019-01-01

## 2019-01-01 RX ORDER — PANTOPRAZOLE SODIUM 40 MG/1
40 TABLET, DELAYED RELEASE ORAL
Status: CANCELLED | OUTPATIENT
Start: 2020-01-01

## 2019-01-01 RX ORDER — CLOPIDOGREL BISULFATE 75 MG/1
75 TABLET ORAL DAILY
Status: CANCELLED | OUTPATIENT
Start: 2020-01-01

## 2019-01-01 RX ORDER — ONDANSETRON 2 MG/ML
4 INJECTION INTRAMUSCULAR; INTRAVENOUS EVERY 6 HOURS PRN
Status: DISCONTINUED | OUTPATIENT
Start: 2019-01-01 | End: 2020-01-01 | Stop reason: HOSPADM

## 2019-01-01 RX ORDER — PANTOPRAZOLE SODIUM 40 MG/1
40 TABLET, DELAYED RELEASE ORAL
Status: DISCONTINUED | OUTPATIENT
Start: 2019-01-01 | End: 2020-01-01 | Stop reason: HOSPADM

## 2019-01-01 RX ORDER — ACETAMINOPHEN 325 MG/1
650 TABLET ORAL EVERY 4 HOURS PRN
Status: CANCELLED | OUTPATIENT
Start: 2019-01-01

## 2019-01-01 RX ORDER — DEXTROSE MONOHYDRATE 25 G/50ML
12.5 INJECTION, SOLUTION INTRAVENOUS PRN
Status: DISCONTINUED | OUTPATIENT
Start: 2019-01-01 | End: 2020-01-01 | Stop reason: HOSPADM

## 2019-01-01 RX ORDER — LOSARTAN POTASSIUM 100 MG/1
100 TABLET ORAL DAILY
Status: DISCONTINUED | OUTPATIENT
Start: 2019-01-01 | End: 2020-01-01 | Stop reason: HOSPADM

## 2019-01-01 RX ORDER — ESCITALOPRAM OXALATE 20 MG/1
20 TABLET ORAL DAILY
Qty: 90 TABLET | Refills: 3 | Status: SHIPPED | OUTPATIENT
Start: 2019-01-01

## 2019-01-01 RX ORDER — DEXTROSE MONOHYDRATE 50 MG/ML
100 INJECTION, SOLUTION INTRAVENOUS PRN
Status: DISCONTINUED | OUTPATIENT
Start: 2019-01-01 | End: 2020-01-01 | Stop reason: HOSPADM

## 2019-01-01 RX ORDER — ASCORBIC ACID 500 MG
500 TABLET ORAL DAILY
Status: CANCELLED | OUTPATIENT
Start: 2019-01-01

## 2019-01-01 RX ORDER — RIVASTIGMINE 9.5 MG/24H
1 PATCH, EXTENDED RELEASE TRANSDERMAL DAILY
Status: CANCELLED | OUTPATIENT
Start: 2019-01-01

## 2019-01-01 RX ORDER — ESCITALOPRAM OXALATE 20 MG/1
20 TABLET ORAL DAILY
Status: DISCONTINUED | OUTPATIENT
Start: 2019-01-01 | End: 2020-01-01 | Stop reason: HOSPADM

## 2019-01-01 RX ORDER — POTASSIUM CHLORIDE 20 MEQ/1
20 TABLET, EXTENDED RELEASE ORAL DAILY
Qty: 90 TABLET | Refills: 3 | Status: ON HOLD | OUTPATIENT
Start: 2019-01-01 | End: 2020-01-01 | Stop reason: HOSPADM

## 2019-01-01 RX ORDER — RIVASTIGMINE 4.6 MG/24H
1 PATCH, EXTENDED RELEASE TRANSDERMAL DAILY
Qty: 90 PATCH | Refills: 3 | Status: SHIPPED | OUTPATIENT
Start: 2019-01-01 | End: 2019-01-01

## 2019-01-01 RX ORDER — LOSARTAN POTASSIUM AND HYDROCHLOROTHIAZIDE 25; 100 MG/1; MG/1
0.5 TABLET ORAL DAILY
Qty: 90 TABLET | Refills: 3 | Status: ON HOLD | OUTPATIENT
Start: 2019-01-01 | End: 2020-01-01 | Stop reason: HOSPADM

## 2019-01-01 RX ORDER — SODIUM CHLORIDE 0.9 % (FLUSH) 0.9 %
10 SYRINGE (ML) INJECTION EVERY 12 HOURS SCHEDULED
Status: DISCONTINUED | OUTPATIENT
Start: 2019-01-01 | End: 2019-01-01 | Stop reason: SDUPTHER

## 2019-01-01 RX ORDER — VERAPAMIL HYDROCHLORIDE 240 MG/1
240 TABLET, FILM COATED, EXTENDED RELEASE ORAL DAILY
Qty: 90 TABLET | Refills: 3 | Status: ON HOLD | OUTPATIENT
Start: 2019-01-01 | End: 2020-01-01 | Stop reason: HOSPADM

## 2019-01-01 RX ORDER — POTASSIUM CHLORIDE 20 MEQ/1
20 TABLET, EXTENDED RELEASE ORAL DAILY
Qty: 90 TABLET | Refills: 3 | Status: SHIPPED | OUTPATIENT
Start: 2019-01-01 | End: 2019-01-01 | Stop reason: SDUPTHER

## 2019-01-01 RX ORDER — ATORVASTATIN CALCIUM 80 MG/1
80 TABLET, FILM COATED ORAL NIGHTLY
Status: CANCELLED | OUTPATIENT
Start: 2019-01-01

## 2019-01-01 RX ORDER — RIVASTIGMINE 9.5 MG/24H
1 PATCH, EXTENDED RELEASE TRANSDERMAL DAILY
Qty: 90 PATCH | Refills: 3 | Status: SHIPPED | OUTPATIENT
Start: 2019-01-01 | End: 2020-01-01 | Stop reason: ALTCHOICE

## 2019-01-01 RX ORDER — CLOPIDOGREL BISULFATE 75 MG/1
75 TABLET ORAL DAILY
Status: DISCONTINUED | OUTPATIENT
Start: 2019-01-01 | End: 2020-01-01 | Stop reason: HOSPADM

## 2019-01-01 RX ORDER — NICOTINE POLACRILEX 4 MG
15 LOZENGE BUCCAL PRN
Status: CANCELLED | OUTPATIENT
Start: 2019-01-01

## 2019-01-01 RX ORDER — ASPIRIN 81 MG/1
81 TABLET, CHEWABLE ORAL DAILY
Status: DISCONTINUED | OUTPATIENT
Start: 2019-01-01 | End: 2020-01-01 | Stop reason: HOSPADM

## 2019-01-01 RX ORDER — SODIUM CHLORIDE 0.9 % (FLUSH) 0.9 %
10 SYRINGE (ML) INJECTION EVERY 12 HOURS SCHEDULED
Status: DISCONTINUED | OUTPATIENT
Start: 2019-01-01 | End: 2020-01-01 | Stop reason: HOSPADM

## 2019-01-01 RX ORDER — DEXTROSE MONOHYDRATE 25 G/50ML
12.5 INJECTION, SOLUTION INTRAVENOUS
Status: ACTIVE | OUTPATIENT
Start: 2019-01-01 | End: 2019-01-01

## 2019-01-01 RX ORDER — 0.9 % SODIUM CHLORIDE 0.9 %
50 INTRAVENOUS SOLUTION INTRAVENOUS ONCE
Status: DISCONTINUED | OUTPATIENT
Start: 2019-01-01 | End: 2020-01-01 | Stop reason: HOSPADM

## 2019-01-01 RX ORDER — FINASTERIDE 5 MG/1
5 TABLET, FILM COATED ORAL DAILY
Status: DISCONTINUED | OUTPATIENT
Start: 2019-01-01 | End: 2020-01-01 | Stop reason: HOSPADM

## 2019-01-01 RX ORDER — ESCITALOPRAM OXALATE 20 MG/1
20 TABLET ORAL DAILY
Status: CANCELLED | OUTPATIENT
Start: 2020-01-01

## 2019-01-01 RX ORDER — SODIUM CHLORIDE 0.9 % (FLUSH) 0.9 %
10 SYRINGE (ML) INJECTION PRN
Status: DISCONTINUED | OUTPATIENT
Start: 2019-01-01 | End: 2019-01-01 | Stop reason: SDUPTHER

## 2019-01-01 RX ORDER — METOPROLOL SUCCINATE 25 MG/1
25 TABLET, EXTENDED RELEASE ORAL DAILY
Status: DISCONTINUED | OUTPATIENT
Start: 2019-01-01 | End: 2020-01-01 | Stop reason: HOSPADM

## 2019-01-01 RX ORDER — NICOTINE POLACRILEX 4 MG
15 LOZENGE BUCCAL PRN
Status: DISCONTINUED | OUTPATIENT
Start: 2019-01-01 | End: 2020-01-01 | Stop reason: HOSPADM

## 2019-01-01 RX ORDER — M-VIT,TX,IRON,MINS/CALC/FOLIC 27MG-0.4MG
1 TABLET ORAL DAILY
Status: CANCELLED | OUTPATIENT
Start: 2019-01-01

## 2019-01-01 RX ORDER — OMEGA-3S/DHA/EPA/FISH OIL/D3 300MG-1000
400 CAPSULE ORAL DAILY
Status: CANCELLED | OUTPATIENT
Start: 2019-01-01

## 2019-01-01 RX ADMIN — Medication 10 ML: at 08:10

## 2019-01-01 RX ADMIN — ESCITALOPRAM 20 MG: 20 TABLET, FILM COATED ORAL at 08:53

## 2019-01-01 RX ADMIN — CLOPIDOGREL BISULFATE 75 MG: 75 TABLET ORAL at 14:33

## 2019-01-01 RX ADMIN — ACETAMINOPHEN 650 MG: 325 TABLET ORAL at 08:12

## 2019-01-01 RX ADMIN — POTASSIUM CHLORIDE 40 MEQ: 1500 TABLET, EXTENDED RELEASE ORAL at 09:40

## 2019-01-01 RX ADMIN — ENOXAPARIN SODIUM 40 MG: 40 INJECTION SUBCUTANEOUS at 22:47

## 2019-01-01 RX ADMIN — Medication 10 ML: at 20:15

## 2019-01-01 RX ADMIN — SODIUM CHLORIDE: 9 INJECTION, SOLUTION INTRAVENOUS at 01:03

## 2019-01-01 RX ADMIN — PANTOPRAZOLE SODIUM 40 MG: 40 TABLET, DELAYED RELEASE ORAL at 05:39

## 2019-01-01 RX ADMIN — ATORVASTATIN CALCIUM 80 MG: 80 TABLET, FILM COATED ORAL at 20:37

## 2019-01-01 RX ADMIN — FINASTERIDE 5 MG: 5 TABLET, FILM COATED ORAL at 08:52

## 2019-01-01 RX ADMIN — Medication 10 ML: at 21:01

## 2019-01-01 RX ADMIN — DEXTROSE MONOHYDRATE 5 MG/HR: 50 INJECTION, SOLUTION INTRAVENOUS at 13:14

## 2019-01-01 RX ADMIN — FINASTERIDE 5 MG: 5 TABLET, FILM COATED ORAL at 08:12

## 2019-01-01 RX ADMIN — ENOXAPARIN SODIUM 40 MG: 40 INJECTION SUBCUTANEOUS at 21:17

## 2019-01-01 RX ADMIN — SODIUM CHLORIDE: 9 INJECTION, SOLUTION INTRAVENOUS at 12:35

## 2019-01-01 RX ADMIN — HEPARIN SODIUM AND DEXTROSE 20 UNITS/KG/HR: 10000; 5 INJECTION INTRAVENOUS at 16:56

## 2019-01-01 RX ADMIN — Medication 10 ML: at 12:35

## 2019-01-01 RX ADMIN — ASPIRIN 81 MG 81 MG: 81 TABLET ORAL at 08:12

## 2019-01-01 RX ADMIN — ASPIRIN 81 MG 81 MG: 81 TABLET ORAL at 14:33

## 2019-01-01 RX ADMIN — PANTOPRAZOLE SODIUM 40 MG: 40 TABLET, DELAYED RELEASE ORAL at 05:22

## 2019-01-01 RX ADMIN — ATORVASTATIN CALCIUM 80 MG: 80 TABLET, FILM COATED ORAL at 22:45

## 2019-01-01 RX ADMIN — POTASSIUM CHLORIDE 40 MEQ: 20 TABLET, EXTENDED RELEASE ORAL at 11:03

## 2019-01-01 RX ADMIN — ASPIRIN 81 MG 81 MG: 81 TABLET ORAL at 08:51

## 2019-01-01 RX ADMIN — BARIUM SULFATE 70 ML: 400 SUSPENSION ORAL at 11:22

## 2019-01-01 RX ADMIN — CLOPIDOGREL BISULFATE 75 MG: 75 TABLET ORAL at 08:12

## 2019-01-01 RX ADMIN — BARIUM SULFATE 20 ML: 400 PASTE ORAL at 11:22

## 2019-01-01 RX ADMIN — ASPIRIN 81 MG 81 MG: 81 TABLET ORAL at 09:29

## 2019-01-01 RX ADMIN — Medication 10 ML: at 21:17

## 2019-01-01 RX ADMIN — ESCITALOPRAM 20 MG: 20 TABLET, FILM COATED ORAL at 14:33

## 2019-01-01 RX ADMIN — ASPIRIN 81 MG 81 MG: 81 TABLET ORAL at 09:40

## 2019-01-01 RX ADMIN — Medication 10 ML: at 21:24

## 2019-01-01 RX ADMIN — FINASTERIDE 5 MG: 5 TABLET, FILM COATED ORAL at 09:40

## 2019-01-01 RX ADMIN — ESCITALOPRAM 20 MG: 20 TABLET, FILM COATED ORAL at 09:40

## 2019-01-01 RX ADMIN — IOPAMIDOL 80 ML: 755 INJECTION, SOLUTION INTRAVENOUS at 12:04

## 2019-01-01 RX ADMIN — FINASTERIDE 5 MG: 5 TABLET, FILM COATED ORAL at 09:29

## 2019-01-01 RX ADMIN — METOPROLOL SUCCINATE 25 MG: 25 TABLET, FILM COATED, EXTENDED RELEASE ORAL at 09:29

## 2019-01-01 RX ADMIN — CLOPIDOGREL BISULFATE 75 MG: 75 TABLET ORAL at 09:40

## 2019-01-01 RX ADMIN — SODIUM CHLORIDE: 9 INJECTION, SOLUTION INTRAVENOUS at 07:51

## 2019-01-01 RX ADMIN — ATORVASTATIN CALCIUM 80 MG: 80 TABLET, FILM COATED ORAL at 21:17

## 2019-01-01 RX ADMIN — ENOXAPARIN SODIUM 40 MG: 40 INJECTION SUBCUTANEOUS at 20:46

## 2019-01-01 RX ADMIN — CLOPIDOGREL BISULFATE 75 MG: 75 TABLET ORAL at 10:29

## 2019-01-01 RX ADMIN — PANTOPRAZOLE SODIUM 40 MG: 40 TABLET, DELAYED RELEASE ORAL at 09:40

## 2019-01-01 RX ADMIN — SODIUM BICARBONATE: 84 INJECTION, SOLUTION INTRAVENOUS at 20:49

## 2019-01-01 RX ADMIN — ESCITALOPRAM 20 MG: 20 TABLET, FILM COATED ORAL at 10:29

## 2019-01-01 RX ADMIN — PERFLUTREN 1.65 MG: 6.52 INJECTION, SUSPENSION INTRAVENOUS at 09:55

## 2019-01-01 RX ADMIN — ATORVASTATIN CALCIUM 80 MG: 80 TABLET, FILM COATED ORAL at 21:12

## 2019-01-01 RX ADMIN — CLOPIDOGREL BISULFATE 75 MG: 75 TABLET ORAL at 08:51

## 2019-01-01 RX ADMIN — VERAPAMIL HYDROCHLORIDE 240 MG: 240 TABLET, FILM COATED, EXTENDED RELEASE ORAL at 14:31

## 2019-01-01 RX ADMIN — ATORVASTATIN CALCIUM 80 MG: 80 TABLET, FILM COATED ORAL at 20:46

## 2019-01-01 RX ADMIN — FINASTERIDE 5 MG: 5 TABLET, FILM COATED ORAL at 10:29

## 2019-01-01 RX ADMIN — ESCITALOPRAM 20 MG: 20 TABLET, FILM COATED ORAL at 08:11

## 2019-01-01 RX ADMIN — HEPARIN SODIUM AND DEXTROSE 19 UNITS/KG/HR: 10000; 5 INJECTION INTRAVENOUS at 13:28

## 2019-01-01 RX ADMIN — HYDROCHLOROTHIAZIDE 25 MG: 25 TABLET ORAL at 14:31

## 2019-01-01 RX ADMIN — ATORVASTATIN CALCIUM 80 MG: 80 TABLET, FILM COATED ORAL at 21:00

## 2019-01-01 RX ADMIN — ALTEPLASE 59.4 MG: KIT at 11:37

## 2019-01-01 RX ADMIN — Medication 10 ML: at 21:12

## 2019-01-01 RX ADMIN — CLOPIDOGREL BISULFATE 75 MG: 75 TABLET ORAL at 09:29

## 2019-01-01 RX ADMIN — SODIUM CHLORIDE: 9 INJECTION, SOLUTION INTRAVENOUS at 08:09

## 2019-01-01 RX ADMIN — ALTEPLASE 6.6 MG: KIT at 11:33

## 2019-01-01 RX ADMIN — PANTOPRAZOLE SODIUM 40 MG: 40 TABLET, DELAYED RELEASE ORAL at 08:51

## 2019-01-01 RX ADMIN — ACETAMINOPHEN 650 MG: 325 TABLET ORAL at 01:31

## 2019-01-01 RX ADMIN — DEXTROSE MONOHYDRATE 2.5 MG/HR: 50 INJECTION, SOLUTION INTRAVENOUS at 01:00

## 2019-01-01 RX ADMIN — SODIUM CHLORIDE: 9 INJECTION, SOLUTION INTRAVENOUS at 21:33

## 2019-01-01 RX ADMIN — PANTOPRAZOLE SODIUM 40 MG: 40 TABLET, DELAYED RELEASE ORAL at 14:32

## 2019-01-01 RX ADMIN — ONDANSETRON 4 MG: 2 INJECTION INTRAMUSCULAR; INTRAVENOUS at 00:58

## 2019-01-01 RX ADMIN — BARIUM SULFATE 100 ML: 0.81 POWDER, FOR SUSPENSION ORAL at 11:22

## 2019-01-01 RX ADMIN — HEPARIN SODIUM AND DEXTROSE 12 UNITS/KG/HR: 10000; 5 INJECTION INTRAVENOUS at 18:05

## 2019-01-01 RX ADMIN — ASPIRIN 81 MG 81 MG: 81 TABLET ORAL at 10:29

## 2019-01-01 RX ADMIN — PANTOPRAZOLE SODIUM 40 MG: 40 TABLET, DELAYED RELEASE ORAL at 10:30

## 2019-01-01 RX ADMIN — LOSARTAN POTASSIUM 100 MG: 100 TABLET, FILM COATED ORAL at 14:31

## 2019-01-01 RX ADMIN — DEXTROSE MONOHYDRATE: 50 INJECTION, SOLUTION INTRAVENOUS at 10:53

## 2019-01-01 RX ADMIN — ESCITALOPRAM 20 MG: 20 TABLET, FILM COATED ORAL at 09:29

## 2019-01-01 RX ADMIN — Medication 10 ML: at 09:30

## 2019-01-01 RX ADMIN — FINASTERIDE 5 MG: 5 TABLET, FILM COATED ORAL at 14:32

## 2019-01-01 ASSESSMENT — ENCOUNTER SYMPTOMS
SORE THROAT: 0
CONSTIPATION: 0
NAUSEA: 0
RHINORRHEA: 0
SINUS PAIN: 0
SINUS PRESSURE: 0
ABDOMINAL DISTENTION: 0
SORE THROAT: 0
ABDOMINAL PAIN: 0
SINUS PRESSURE: 0
BLOOD IN STOOL: 0
SORE THROAT: 0
ABDOMINAL DISTENTION: 0
DIARRHEA: 0
BACK PAIN: 0
DIARRHEA: 0
VOMITING: 0
PHOTOPHOBIA: 0
RHINORRHEA: 0
EYE PAIN: 0
ABDOMINAL DISTENTION: 0
DIARRHEA: 0
ABDOMINAL PAIN: 0
SHORTNESS OF BREATH: 0
SHORTNESS OF BREATH: 0
COUGH: 0
COUGH: 0
TROUBLE SWALLOWING: 1
EYE PAIN: 0
CONSTIPATION: 0
NAUSEA: 0
COLOR CHANGE: 0
COUGH: 0
EYE PAIN: 0
CONSTIPATION: 0
SHORTNESS OF BREATH: 0
ABDOMINAL PAIN: 0
NAUSEA: 0
WHEEZING: 0
SINUS PRESSURE: 0

## 2019-01-01 ASSESSMENT — PAIN SCALES - GENERAL
PAINLEVEL_OUTOF10: 0
PAINLEVEL_OUTOF10: 3
PAINLEVEL_OUTOF10: 0

## 2019-01-01 ASSESSMENT — PAIN DESCRIPTION - LOCATION: LOCATION: GENERALIZED

## 2019-01-01 ASSESSMENT — LIFESTYLE VARIABLES: HOW OFTEN DO YOU HAVE A DRINK CONTAINING ALCOHOL: 0

## 2019-01-01 ASSESSMENT — PAIN - FUNCTIONAL ASSESSMENT: PAIN_FUNCTIONAL_ASSESSMENT: PREVENTS OR INTERFERES SOME ACTIVE ACTIVITIES AND ADLS

## 2019-01-01 ASSESSMENT — PAIN DESCRIPTION - PAIN TYPE: TYPE: ACUTE PAIN

## 2019-01-01 ASSESSMENT — PATIENT HEALTH QUESTIONNAIRE - PHQ9
SUM OF ALL RESPONSES TO PHQ QUESTIONS 1-9: 0
SUM OF ALL RESPONSES TO PHQ9 QUESTIONS 1 & 2: 0
SUM OF ALL RESPONSES TO PHQ QUESTIONS 1-9: 0
1. LITTLE INTEREST OR PLEASURE IN DOING THINGS: 0
2. FEELING DOWN, DEPRESSED OR HOPELESS: 0

## 2019-01-01 ASSESSMENT — ANXIETY QUESTIONNAIRES: GAD7 TOTAL SCORE: 0

## 2019-01-01 ASSESSMENT — PAIN DESCRIPTION - PROGRESSION: CLINICAL_PROGRESSION: GRADUALLY WORSENING

## 2019-01-01 ASSESSMENT — PAIN DESCRIPTION - ONSET: ONSET: GRADUAL

## 2019-05-07 NOTE — PROGRESS NOTES
HERNIA INGUINAL REPAIR WITH MESH performed by Shabnam George MD at [address] [facility]  2017    EGD     Family History   Problem Relation Age of Onset   Jennifer Shane Alzheimer's Disease Mother     Stroke Father     Heart Attack Brother     Colon Cancer Neg Hx      Social History     Tobacco Use    Smoking status: Never Smoker    Smokeless tobacco: Never Used   Substance Use Topics    Alcohol use: No      Current Outpatient Medications   Medication Sig Dispense Refill    escitalopram (LEXAPRO) 20 MG tablet Take 1 tablet by mouth daily 90 tablet 3    rivastigmine (EXELON) 4.6 MG/24HR Place 1 patch onto the skin daily 90 patch 3    metFORMIN (GLUCOPHAGE XR) 750 MG extended release tablet Take 1 tablet by mouth daily (with breakfast) 90 tablet 3    potassium chloride (KLOR-CON M20) 20 MEQ extended release tablet Take 1 tablet by mouth daily 90 tablet 3    losartan-hydrochlorothiazide (HYZAAR) 100-25 MG per tablet Take 0.5 tablets by mouth daily 90 tablet 3    verapamil (CALAN SR) 240 MG extended release tablet Take 1 tablet by mouth daily 90 tablet 3    esomeprazole (NEXIUM) 20 MG delayed release capsule Take 20 mg by mouth every morning (before breakfast)      ibuprofen (ADVIL;MOTRIN) 200 MG tablet Take 200 mg by mouth as needed for Pain      tamsulosin (FLOMAX) 0.4 MG capsule Take 0.4 mg by mouth nightly Per       Glucose Blood (BLOOD GLUCOSE TEST STRIPS) STRP One Touch Verio Test Strips Test QD or as directed --DX E11.9 100 strip 3    Lancets MISC One Touch Verio Lancets  DX E11.9 Test QD or as directed 100 each 3    vitamin D3 (CHOLECALCIFEROL) 400 UNITS TABS tablet Take 400 Units by mouth daily      Ascorbic Acid (VITAMIN C) 500 MG tablet Take 500 mg by mouth daily      finasteride (PROSCAR) 5 MG tablet Take 5 mg by mouth daily       aspirin 81 MG tablet Take 81 mg by mouth daily.         therapeutic multivitamin-minerals (THERAGRAN-M) tablet Take 1 tablet by mouth daily.        docusate sodium (COLACE) 100 MG capsule Take 100 mg by mouth as needed for Constipation       No current facility-administered medications for this visit. No Known Allergies  Health Maintenance   Topic Date Due    Shingles Vaccine (1 of 2) 03/29/1990    Potassium monitoring  11/19/2019    Creatinine monitoring  11/19/2019    DTaP/Tdap/Td vaccine (2 - Td) 03/02/2020    Flu vaccine  Completed    Pneumococcal 65+ years Vaccine  Completed         Objective:     Physical Exam   Constitutional: He is oriented to person, place, and time. He appears well-developed and well-nourished. No distress. HENT:   Head: Normocephalic and atraumatic. Right Ear: External ear normal.   Left Ear: External ear normal.   Eyes: Conjunctivae are normal.   Neck: No JVD present. Cardiovascular: Normal rate, regular rhythm and normal heart sounds. Pulmonary/Chest: Effort normal and breath sounds normal. He has no wheezes. He has no rales. Musculoskeletal: He exhibits no edema or tenderness. Neurological: He is alert and oriented to person, place, and time. Skin: Skin is warm and dry. He is not diaphoretic. No pallor. /64 (Site: Left Upper Arm)   Pulse 64   Resp 16   Wt 157 lb 6.4 oz (71.4 kg)   BMI 20.77 kg/m²       Impression/Plan:  1. Type 2 diabetes mellitus treated without insulin ([facility] [address]    2. Essential hypertension    3. Current severe episode of major depressive disorder without psychotic features, unspecified whether recurrent ([facility] [address]    4. Dementia without behavioral disturbance, unspecified dementia type    5.  Severe malnutrition (HCC)      Requested Prescriptions     Signed Prescriptions Disp Refills    escitalopram (LEXAPRO) 20 MG tablet 90 tablet 3     Sig: Take 1 tablet by mouth daily    rivastigmine (EXELON) 4.6 MG/24HR 90 patch 3     Sig: Place 1 patch onto the skin daily    metFORMIN (GLUCOPHAGE XR) 750 MG extended release tablet 90 tablet 3     Sig: Take 1 tablet by mouth daily (with breakfast)    potassium chloride (KLOR-CON M20) 20 MEQ extended release tablet 90 tablet 3     Sig: Take 1 tablet by mouth daily    losartan-hydrochlorothiazide (HYZAAR) 100-25 MG per tablet 90 tablet 3     Sig: Take 0.5 tablets by mouth daily    verapamil (CALAN SR) 240 MG extended release tablet 90 tablet 3     Sig: Take 1 tablet by mouth daily     Orders Placed This Encounter   Procedures    Lipid Panel     Standing Status:   Future     Standing Expiration Date:   5/5/2020     Order Specific Question:   Is Patient Fasting?/# of Hours     Answer:   yes/12    Hemoglobin A1C     Standing Status:   Future     Standing Expiration Date:   5/5/2020    Comprehensive Metabolic Panel     Standing Status:   Future     Standing Expiration Date:   5/5/2020    CBC Auto Differential     Standing Status:   Future     Standing Expiration Date:   5/5/2020   Counseled on adequate nutritional intake. Much of history provided by the patient's wife. Patient giveneducational materials - see patient instructions. Discussed use, benefit, and side effects of prescribed medications. All patient questions answered. Pt voiced understanding. Reviewed health maintenance. Patient agreedwith treatment plan. Follow up as directed. **This report has been created using voice recognition software. It may contain minor errorswhich are inherent in voice recognition technology. **       Electronically signed by José Clay MD on 5/6/2019 at 10:24 PM

## 2019-06-11 NOTE — PATIENT INSTRUCTIONS
Personalized Preventive Plan for Cristina Velazquez - 6/11/2019  Medicare offers a range of preventive health benefits. Some of the tests and screenings are paid in full while other may be subject to a deductible, co-insurance, and/or copay. Some of these benefits include a comprehensive review of your medical history including lifestyle, illnesses that may run in your family, and various assessments and screenings as appropriate. After reviewing your medical record and screening and assessments performed today your provider may have ordered immunizations, labs, imaging, and/or referrals for you. A list of these orders (if applicable) as well as your Preventive Care list are included within your After Visit Summary for your review. Other Preventive Recommendations:    · A preventive eye exam performed by an eye specialist is recommended every 1-2 years to screen for glaucoma; cataracts, macular degeneration, and other eye disorders. · A preventive dental visit is recommended every 6 months. · Try to get at least 150 minutes of exercise per week or 10,000 steps per day on a pedometer . · Order or download the FREE \"Exercise & Physical Activity: Your Everyday Guide\" from [employer]. Call 7-413.202.2261 or search [employer] online. · You need 3944-8915 mg of calcium and 3693-5520 IU of vitamin D per day. It is possible to meet your calcium requirement with diet alone, but a vitamin D supplement is usually necessary to meet this goal.  · When exposed to the sun, use a sunscreen that protects against both UVA and UVB radiation with an SPF of 30 or greater. Reapply every 2 to 3 hours or after sweating, drying off with a towel, or swimming. · Always wear a seat belt when traveling in a car. Always wear a helmet when riding a bicycle or motorcycle.

## 2019-06-11 NOTE — PROGRESS NOTES
cognition reveals remote memory intact, recent memory impaired. General Appearance: alert and oriented to person, place and time, well developed and well- nourished, in no acute distress  Skin: warm and dry, no rash or erythema  Head: normocephalic and atraumatic  Eyes: pupils equal, round, and reactive to light, extraocular eye movements intact, conjunctivae normal  ENT: tympanic membrane, external ear and ear canal normal bilaterally, nose without deformity, nasal mucosa and turbinates normal without polyps  Neck: supple and non-tender without mass, no thyromegaly or thyroid nodules, no cervical lymphadenopathy  Pulmonary/Chest: clear to auscultation bilaterally- no wheezes, rales or rhonchi, normal air movement, no respiratory distress  Cardiovascular: normal rate, regular rhythm, normal S1 and S2, no murmurs, rubs, clicks, or gallops, distal pulses intact, no carotid bruits  Abdomen: soft, non-tender, non-distended, normal bowel sounds, no masses or organomegaly  Extremities: no cyanosis, clubbing or edema  Musculoskeletal: normal range of motion, no joint swelling, deformity or tenderness  Neurologic: reflexes normal and symmetric, no cranial nerve deficit, gait, coordination and speech normal    Patient's complete Health Risk Assessment and screening values have been reviewed and are found in Flowsheets. The following problems were reviewed today and where indicated follow up appointments were made and/or referrals ordered. Positive Risk Factor Screenings with Interventions:     Cognitive:   Words recalled: 1 Word Recalled  Clock Drawing Test (CDT) Score: (!) Abnormal  Total Score Interpretation: Positive Mini-Cog  Did the patient refuse to take the cognition test?: No  Cognitive Impairment Interventions:  · Patient advised to follow-up in this office for further evaluation and treatment within 3 month(s)    General Health:  General  In general, how would you say your health is?: Very Good  In the past 7 days, have you experienced any of the following? New or Increased Pain, New or Increased Fatigue, Loneliness, Social Isolation, Stress or Anger?: None of These  Do you get the social and emotional support that you need?: Yes  Do you have a Living Will?: (!) No  General Health Risk Interventions:  · No Living Will: have paperwork    Health Habits/Nutrition:  Health Habits/Nutrition  Do you exercise for at least 20 minutes 2-3 times per week?: (!) No  Have you lost any weight without trying in the past 3 months?: (!) Yes  Do you eat fewer than 2 meals per day?: No  Have you seen a dentist within the past year?: Yes  Body mass index is 21.29 kg/m². Health Habits/Nutrition Interventions:  · na    Hearing/Vision:  Hearing/Vision  Do you or your family notice any trouble with your hearing?: No  Do you have difficulty driving, watching TV, or doing any of your daily activities because of your eyesight?: No  Have you had an eye exam within the past year?: (!) No  Hearing/Vision Interventions:  · na    ADL:  ADLs  In the past 7 days, did you need help from others to perform any of the following everyday activities? Eating, dressing, grooming, bathing, toileting, or walking/balance?: None  In the past 7 days, did you need help from others to take care of any of the following?  Laundry, housekeeping, banking/finances, shopping, telephone use, food preparation, transportation, or taking medications?: (!) Laundry, Banking/Finances, Shopping, Telephone Use, Food Preparation, Transportation  ADL Interventions:  · Patient declines any further evaluation/treatment for this issue    Personalized Preventive Plan   Current Health Maintenance Status  Immunization History   Administered Date(s) Administered    Influenza Vaccine, unspecified formulation 10/27/2014, 10/04/2015    Influenza Whole 09/28/2011    Influenza, High Dose (Fluzone 65 yrs and older) 10/18/2017, 11/30/2018    Influenza, [place], 3 Years and older, IM (Fluzone 3 yrs and older or Afluria 5 yrs and older) 10/10/2016    Pneumococcal 13-valent Conjugate ([id number]) 09/20/2016    Pneumococcal Polysaccharide ([id number]) 09/10/2010    Tdap (Boostrix, Adacel) 03/02/2010        Health Maintenance   Topic Date Due    Shingles Vaccine (1 of 2) 03/29/1990    Potassium monitoring  11/19/2019    Creatinine monitoring  11/19/2019    DTaP/Tdap/Td vaccine (2 - Td) 03/02/2020    Flu vaccine  Completed    Pneumococcal 65+ years Vaccine  Completed     Recommendations for Preventive Services Due: see orders and patient instructions/AVS.  .   Recommended screening schedule for the next 5-10 years is provided to the patient in written form: see Patient Instructions/AVS.

## 2019-12-24 PROBLEM — I63.9 ACUTE CVA (CEREBROVASCULAR ACCIDENT) (HCC): Status: ACTIVE | Noted: 2019-01-01

## 2019-12-24 NOTE — ED PROVIDER NOTES
[facility]  eMERGENCY dEPARTMENT eNCOUnter          CHIEF COMPLAINT       Chief Complaint   Patient presents with   Valerio Joseph Cerebrovascular Accident       Nurses Notes reviewed and I agree except as noted in the HPI. HISTORY OF PRESENT ILLNESS    Constancia Sandifer is a 78 y.o. male who presents to the Emergency Department via EMS for the evaluation of a CVA. The patient was found on the floor sitting up around 0630 this morning by his wife, who heard him fall. She reports a drooping mouth on the right side, slurred speech, and right sided weakness when she found him at 0630. The patient's last known well was 22:00 last night. The patient is unable to walk because his left leg is weak, and the patient had to be lifted into a chair after he was found on the floor. The patient takes 81 mg ASA per day, but he hasn't take it or any of his other medications today. The patient has a history of DM, and his wife states he had some \"sugary Cheerios\" this morning. The patient has see Dr. Charmaine Jordan (neurology) in the past. The past medical history includes anxiety, GERD, hyperglycemia ,and HTN. There are no further symptoms or concerns at this time. The HPI was provided by the patient. REVIEW OF SYSTEMS     Review of Systems   Constitutional: Negative for chills and fever. HENT: Negative for congestion, ear pain, sinus pressure, sinus pain and sore throat. Eyes: Negative for pain. Respiratory: Negative for cough and shortness of breath. Cardiovascular: Negative for chest pain and leg swelling. Gastrointestinal: Negative for abdominal distention, abdominal pain, blood in stool, constipation, diarrhea, nausea and vomiting. Genitourinary: Negative for difficulty urinating, frequency and hematuria. Musculoskeletal: Positive for gait problem (unable to ambulate from weakness). Negative for arthralgias. Skin: Negative for rash.    Neurological: Positive for facial asymmetry (droop of right side of mouth), speech difficulty (slurred speech) and weakness (right sided). Negative for dizziness and headaches. PAST MEDICAL HISTORY    has a past medical history of Anxiety, Depression, Diabetes mellitus ([address] GERD (gastroesophageal reflux disease), Hyperglycemia, and Hypertension. SURGICAL HISTORY      has a past surgical history that includes other surgical history (1970); Upper gastrointestinal endoscopy (2017); pr repair ing hernia,5+y/o,reducibl (Right, 8/23/2018); Endoscopy, colon, diagnostic; and pr repair ing hernia,5+y/o,reducibl (Left, 11/1/2018).     CURRENT MEDICATIONS       Current Discharge Medication List      CONTINUE these medications which have NOT CHANGED    Details   losartan-hydrochlorothiazide (HYZAAR) 100-25 MG per tablet Take 0.5 tablets by mouth daily  Qty: 90 tablet, Refills: 3      potassium chloride (KLOR-CON M20) 20 MEQ extended release tablet Take 1 tablet by mouth daily  Qty: 90 tablet, Refills: 3      rivastigmine (EXELON) 9.5 MG/24HR Place 1 patch onto the skin daily  Qty: 90 patch, Refills: 3      esomeprazole (NEXIUM) 40 MG delayed release capsule Take 40 mg by mouth every other day      escitalopram (LEXAPRO) 20 MG tablet Take 1 tablet by mouth daily  Qty: 90 tablet, Refills: 3      verapamil (CALAN SR) 240 MG extended release tablet Take 1 tablet by mouth daily  Qty: 90 tablet, Refills: 3      docusate sodium (COLACE) 100 MG capsule Take 100 mg by mouth as needed for Constipation      ibuprofen (ADVIL;MOTRIN) 200 MG tablet Take 200 mg by mouth as needed for Pain      tamsulosin (FLOMAX) 0.4 MG capsule Take 0.4 mg by mouth nightly Per       Glucose Blood (BLOOD GLUCOSE TEST STRIPS) STRP One Touch Verio Test Strips Test QD or as directed --DX E11.9  Qty: 100 strip, Refills: 3      Lancets MISC One Touch Verio Lancets  DX E11.9 Test QD or as directed  Qty: 100 each, Refills: 3      vitamin D3 (CHOLECALCIFEROL) 400 UNITS TABS tablet Take 400 Units by mouth daily Ascorbic Acid (VITAMIN C) 500 MG tablet Take 500 mg by mouth daily      finasteride (PROSCAR) 5 MG tablet Take 5 mg by mouth daily       aspirin 81 MG tablet Take 81 mg by mouth daily. therapeutic multivitamin-minerals (THERAGRAN-M) tablet Take 1 tablet by mouth daily. ALLERGIES     has No Known Allergies. FAMILY HISTORY     He indicated that his mother is . He indicated that his father is . He indicated that his brother is . He indicated that the status of his neg hx is unknown.   family history includes Alzheimer's Disease in his mother; Heart Attack in his brother; Stroke in his father. SOCIAL HISTORY      reports that he has never smoked. He has never used smokeless tobacco. He reports that he does not drink alcohol or use drugs. PHYSICAL EXAM     INITIAL VITALS:  height is 6' (1.829 m) and weight is 152 lb 1.9 oz (69 kg). His oral temperature is 98.1 °F (36.7 °C). His blood pressure is 159/76 (abnormal) and his pulse is 70. His respiration is 19 and oxygen saturation is 99%. Physical Exam  Vitals signs and nursing note reviewed. Constitutional:       Appearance: He is well-developed. He is not toxic-appearing or diaphoretic. HENT:      Head: Normocephalic and atraumatic. Right Ear: Tympanic membrane and external ear normal.      Left Ear: Tympanic membrane and external ear normal.      Nose: Nose normal.      Mouth/Throat:      Pharynx: No oropharyngeal exudate or posterior oropharyngeal erythema. Eyes:      Conjunctiva/sclera: Conjunctivae normal.   Neck:      Musculoskeletal: Normal range of motion and neck supple. Vascular: No JVD. Cardiovascular:      Rate and Rhythm: Normal rate and regular rhythm. Pulses: Normal pulses. Heart sounds: Normal heart sounds. No murmur. No friction rub. No gallop. Pulmonary:      Effort: Pulmonary effort is normal. No respiratory distress. Breath sounds: Normal breath sounds.  No decreased breath sounds, wheezing, rhonchi or rales. Abdominal:      General: Bowel sounds are normal. There is no distension. Palpations: Abdomen is soft. Tenderness: There is no tenderness. There is no guarding or rebound. Musculoskeletal: Normal range of motion. Skin:     General: Skin is warm and dry. Findings: No rash. Neurological:      Mental Status: He is alert and oriented to person, place, and time. Motor: Pronator drift (right upper extremity) present. No abnormal muscle tone. Coordination: Coordination normal.      Comments: The patient has an NIH Stroke Score of 8. INITIAL NIH STROKE SCALE    Time Performed:  10:04 AM     1a. Level of consciousness:  1 - not alert but arousable by minor stimulation to obey, answer or respond  1b. Level of consciousness questions:  0 - answers both questions correctly  1c. Level of consciousness questions:  0 - performs both tasks correctly  2. Best Gaze:  0 - normal  3. Visual:  0 - no visual loss  4. Facial Palsy:  2 - partial paralysis (total or near total paralysis of the lower face)  5a. Motor left arm:  0 - no drift, limb holds 90 (or 45) degrees for full 10 seconds  5b. Motor right arm:  2 - some effort against gravity, limb cannot get to or maintain (if cued) 90 (or 45) degrees, drifts down to bed, but has some effort against gravity   6a. Motor left le - no drift; leg holds 30 degree position for full 5 seconds  6b. Motor right le - no drift; leg holds 30 degree position for full 5 seconds  7. Limb Ataxia:  2 - present in two limbs  8. Sensory:  0 - normal; no sensory loss  9. Best Language:  1 - mild to moderate aphasia; some obvious loss of fluency or facility of comprehension without significant limitation on ideas expressed or form of expression. Reduction of speech and/or comprehension, however, makes conversation about provided materials difficult or impossible.   For example, in conversation about provided materials, examiner can identify picture or naming card content from patient's response. 10.  Dysarthria:  0 - normal  11. Extinction and Inattention:  0 - no abnormality    TOTAL:  8          DIFFERENTIAL DIAGNOSIS:   Including but not limited to a TIA, CVA, and others    DIAGNOSTIC RESULTS     EKG: All EKG's are interpreted by the Emergency Department Physician who either signs or Co-signs this chart in the absence of a cardiologist.        RADIOLOGY: non-plainfilm images(s) such as CT, Ultrasound and MRI are read by the radiologist.    CTA HEAD W WO CONTRAST   Final Result    CTA head:   1. Mural calcifications in the cavernous and clinoid segments of the internal carotid arteries with a focal area of moderate stenosis in the right cavernous segment and focal area of mild to moderate stenosis in the left clinoid segment. 2. Long segment of mild to moderate stenosis of the basilar artery. 3. Focal area of moderate stenosis in the P1 segment and focal area of severe stenosis in the P2 segment of the right posterior cerebral artery. 4. Focal area of moderate to severe stenosis in the right superior cerebellar artery. CTA NECK:   1. Mild mural plaque at the carotid bifurcations without hemodynamically significant stenosis by NASCET criteria. 2. Focal area of moderate stenosis in the V4 segment of the dominant left vertebral artery. **This report has been created using voice recognition software. It may contain minor errors which are inherent in voice recognition technology. **      Final report electronically signed by Dr. Iglesia Clayton MD on 12/24/2019 1:23 PM      CTA NECK W WO CONTRAST   Final Result    CTA head:   1. Mural calcifications in the cavernous and clinoid segments of the internal carotid arteries with a focal area of moderate stenosis in the right cavernous segment and focal area of mild to moderate stenosis in the left clinoid segment. 2. Long segment of mild to moderate stenosis of the basilar artery. 3. Focal area of moderate stenosis in the P1 segment and focal area of severe stenosis in the P2 segment of the right posterior cerebral artery. 4. Focal area of moderate to severe stenosis in the right superior cerebellar artery. CTA NECK:   1. Mild mural plaque at the carotid bifurcations without hemodynamically significant stenosis by NASCET criteria. 2. Focal area of moderate stenosis in the V4 segment of the dominant left vertebral artery. **This report has been created using voice recognition software. It may contain minor errors which are inherent in voice recognition technology. **      Final report electronically signed by Dr. Thais Morton MD on 12/24/2019 1:23 PM      MRI BRAIN WO CONTRAST   Final Result       1. 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 2. Moderate chronic microvascular angiopathy superimposed on volume loss, grossly similar to prior exam.      Findings were discussed with Dr. Andreia Gonzalez via telephone at the time of interpretation. **This report has been created using voice recognition software. It may contain minor errors which are inherent in voice recognition technology. **      Final report electronically signed by Dr. Thais Morton MD on 12/24/2019 11:17 AM      CT HEAD WO CONTRAST (CODE STROKE)   Final Result   No acute process. **This report has been created using voice recognition software. It may contain minor errors which are inherent in voice recognition technology. **      Final report electronically signed by Dr. Kavita Wren on 12/24/2019 9:58 AM      RADIOLOGY REPORT   Final Result          LABS:     Labs Reviewed   CBC WITH AUTO DIFFERENTIAL - Abnormal; Notable for the following components:       Result Value    RBC 3.76 (*)     Hemoglobin 11.8 (*)     Hematocrit 35.0 (*)     Platelets 566 (*)     All other components within normal limits   BASIC with a voice recognition program.  Efforts were made to edit the dictations but occasionally words are mis-transcribed.)    The patient was given an opportunity to see the Emergency Attending. The patient voiced understanding that I was a Mid-LevelProvider and was in agreement with being seen independently by myself. Scribe:  Ronna Conde 12/24/19 10:04 AM Scribing for and in the presence of Yaima Fisher PA-C. Signed by: Ronna Conde (Name), Scribmegan, 12/24/19 1:57 PM    Provider:  I personally performed the services described in the documentation, reviewed and edited the documentation which was dictated to the scribe in my presence, and it accurately records my words and actions.     Yaima Fisher PA-C 12/24/19 1:57 PM       Caryle Parkins, PA  12/24/19 [id number]

## 2019-12-24 NOTE — LETTER
[facility]  [address] [facility] [address]  Phone: [phone]             January 1, 2020    Patient: Natasha Murray   YOB: 1940   Date of Visit: 12/24/2019       To Whom It May Concern:    Leah Pulliam was seen and treated in our facility  beginning 12/24/2019 and Continues to be in the hospital.. Jose C Gama was visiting his family member on December 26, 27.         Sincerely,       Norlene Goldberg, RN         Signature:__________________________________

## 2019-12-24 NOTE — PLAN OF CARE
Problem: COMMUNICATION IMPAIRMENT  Goal: Ability to express needs and understand communication  Outcome: Met This Shift  Note:   Pt able to express needs though is experiencing some dysarthria and delayed speech     Problem: HEMODYNAMIC STATUS  Goal: Patient has stable vital signs and fluid balance  Outcome: Ongoing  Note:   Pt requiring low dose Nicardipine infusion for BP control. Problem: Bleeding:  Goal: Will show no signs and symptoms of excessive bleeding  Description  Will show no signs and symptoms of excessive bleeding  Outcome: Ongoing  Note:   Continuing to monitor for any signs of bleeding post TPA infusion. Problem: ACTIVITY INTOLERANCE/IMPAIRED MOBILITY  Goal: Mobility/activity is maintained at optimum level for patient  Outcome: Not Met This Shift  Note:   Pt remains on bed rest post TPA infusion. Right side much weaker than left.

## 2019-12-24 NOTE — H&P
Assessment and Plan:          1. Acute CVA: s/p tPA administration, Lipitor, speech, PT/OT, will need rehab. Plans for dual antiplatelet therapy after 24 hours, neurology following. 2. Normocytic anemia: monitor, daily H/H   3. Hypertension: Cardene drip, goal b/p less then 180  4. GERD: Protonix   5. DM: A1C pending, noted no to be on home meds, but does have DM diagnosis   6. Depression/anxiety: Lexapro     CC:  CVA  HPI: Marcelle Ramirez is a 78year old white male who presented to the ED via EMS with complaints of possible CVA. He has a past medical history of hypertension, hyperglycemia, GERD, DM, depression and anxiety. Per report he was found sitting on the floor around 0630 by his wife, who heard him fall. She reports that he was drooling out of the right side of his mouth and had slurred speech with right sided weakness. His last known well was 2200 the prior evening. He does take 81 mg ASA daily. On arrival to ED he was taken to CT and tele stroke was called. Dr. Kaushik Gonzalez did evaluate and did give tPA. He was admitted to the ICU, Cardene was started to keep b/p less then 180. Review of Systems   Constitutional: Negative for fatigue and fever. HENT: Positive for trouble swallowing. Negative for sore throat. Eyes: Positive for visual disturbance. Negative for photophobia. Respiratory: Negative for shortness of breath and wheezing. Cardiovascular: Negative for chest pain. Gastrointestinal: Negative for abdominal pain, nausea and vomiting. Endocrine: Negative for polydipsia and polyphagia. Genitourinary: Negative for flank pain. Musculoskeletal: Negative for back pain and neck pain. Skin: Negative for color change, pallor and rash. Allergic/Immunologic: Negative for food allergies and immunocompromised state. Neurological: Positive for speech difficulty and weakness (right sided). Negative for seizures and headaches. Hematological: Negative for adenopathy.    Psychiatric/Behavioral: Dr. Jose Austin     Electronically signed by Rod Martinez. Feldman Pop - CNP on 12/24/2019 at 5:20 PM              Patient seen by me. Case discussed with NP. Case discussed with Dr. Td Jha. No indication for BP augmentation. Electronically signed by Michelle Austin MD.

## 2019-12-24 NOTE — ED NOTES
[facility] speaking w/ Dr Hughes Balloon- robot at bedside     Cale Ramirez RN  12/24/19 [id number]

## 2019-12-24 NOTE — ED NOTES
Pt and family updated on POC- pt continues restful on cart. VSS.  Will monitor     Kerri Cunha RN  12/24/19 [id number]

## 2019-12-25 PROBLEM — R47.1 DYSARTHRIA: Status: ACTIVE | Noted: 2019-01-01

## 2019-12-25 PROBLEM — G81.91 RIGHT HEMIPARESIS (HCC): Status: ACTIVE | Noted: 2019-01-01

## 2019-12-25 NOTE — CONSULTS
Movements: intact   Cranial nerve V: Facial sensation: intact   Cranial nerve VII:Facial strength: there is with facial asymmetry. Cranial nerve VIII: Hearing: intact   Cranial nerve IX: Palate Elevation intact bilaterally  Cranial nerve XI: Shoulder shrug intact bilaterally  Cranial nerve XII: Tongue midline   neck supple without rigidity  DTR's are decreased distal and symmetric  Babinski sign is negative on bilaterally. Motor exam is right hemiparesis, arm and leg. Left side power is 5/5 in the arm and leg. Normal muscle tone . There is no muscle atrophy. There is no muscle fasciculation . Drift Yes, right. Sensory is intact forlight touch and cortical sensation . Coordination: finger to nose intact on the left  Gait and station not tested  Abnormal movement none. Long tracts are intact . Skin - no rashes or lesions, warm and dry to touch  Superficial temporal artery pulses are normal.   Musculoskeletal: Has hand arthritis, no limitation of ROM in any of the four extremities. no joint tenderness, deformity or swelling. There is no leg edema. The Heart was regular in rate and rhythm. No heart murmur  Chest- Clear, good effort. Abdomen: soft, intact bowel sounds. Labs:    CBC:   Recent Labs     12/24/19  1013   WBC 6.5   HGB 11.8*   *   MCV 93.1   MCH 31.4   MCHC 33.7     CMP:  Recent Labs     12/24/19  1013      K 3.9      CO2 26   BUN 24*   CREATININE 1.1   LABGLOM 64*   GLUCOSE 148*   CALCIUM 9.3     Reviewed   Results for orders placed during the hospital encounter of 12/24/19   CTA HEAD W WO CONTRAST    Narrative PROCEDURE: CTA HEAD W WO CONTRAST, CTA [address] INFORMATION: Stroke . COMPARISON: CT head and MR brain from the same date.     TECHNIQUE: Helical CT from the aortic arch through the head in arterial phase during intravenous administration of 80 mL Isovue-370 injected in the right forearm with multiplanar reformatted images to include volumetric maximum intensity projection   sequences. FINDINGS:     CTA HEAD:  There are mural calcifications in the cavernous and clinoid segments of the internal carotid arteries with an area of moderate stenosis in the right cavernous segment and the area of mild to moderate stenosis in the left clinoid segment. Intracranial   segments of internal carotid arteries are otherwise patent without flow-limiting stenosis or visualized aneurysm. The bilateral middle cerebral and anterior cerebral arteries are patent without focal abnormality identified. There is long segment mild to   moderate stenosis of the basilar artery. There is a focal area of moderate stenosis at the P1 segment of the right posterior cerebral artery and a focal area of severe stenosis at the P2 segment. The left posterior cerebral artery appears patent. There   is a focal area of moderate to severe stenosis in the right superior cerebellar artery. The left superior cerebral artery appears patent. The left inferior cerebellar artery is patent. The right inferior cerebral artery is not well visualized. The   anterior inferior cerebral arteries are not visualized. CTA NECK:  There is a typical 3 vessel arch. The brachiocephalic artery is patent. The proximal left subclavian artery is widely patent. There are areas of mild stenosis more distally in the left subclavian artery. The common carotid arteries are patent without   focal stenosis. There is mild calcified and noncalcified mural plaque at the carotid bifurcations without hemodynamically significant stenosis by NASCET criteria. Cervical segments of the internal carotid arteries are patent without focal stenosis. The   left vertebral artery is dominant. There is mural calcification in the V4 segment of the left vertebral artery just proximal to the posterior inferior cerebellar artery takeoff with an area of moderate stenosis.  Vertebral arteries are otherwise patent   without focal and Speech Therapy for Dysphagia/Languge  Modify Risk factors (example Hypertension, Diabetes, hyperlipidemia, Smoking Cessation)  CTA head & Neck noted. Cardiac echo with bubble study. Permissive hypertension for up to one week desired -180. DVT prophylaxis. Homocysteine Level, Start Folic acid 1 mg Daily until results obtained. Patient and family questions were answered. He may be transferred out of ICU from neurology stand point. Case was discussed with primary service. All questions were answered. It was my pleasure to evaluate Briana Larry today. Please call with questions.       Electronically signed by Kerri Antonio MD on 12/25/2019 at 11:17 AM

## 2019-12-25 NOTE — PLAN OF CARE
Problem: HEMODYNAMIC STATUS  Goal: Patient has stable vital signs and fluid balance  Outcome: Ongoing     Problem: ACTIVITY INTOLERANCE/IMPAIRED MOBILITY  Goal: Mobility/activity is maintained at optimum level for patient  Outcome: Ongoing  Note:   PT OT eval and treat has been placed     Problem: COMMUNICATION IMPAIRMENT  Goal: Ability to express needs and understand communication  Outcome: Ongoing     Problem: Bleeding:  Goal: Will show no signs and symptoms of excessive bleeding  Description  Will show no signs and symptoms of excessive bleeding  Outcome: Ongoing     Problem: Falls - Risk of:  Goal: Will remain free from falls  Description  Will remain free from falls  Outcome: Ongoing  Goal: Absence of physical injury  Description  Absence of physical injury  Outcome: Ongoing     Problem: Risk for Impaired Skin Integrity  Goal: Tissue integrity - skin and mucous membranes  Description  Structural intactness and normal physiological function of skin and  mucous membranes.   Outcome: Ongoing     Problem: Discharge Planning:  Goal: Discharged to appropriate level of care  Description  Discharged to appropriate level of care  Outcome: Ongoing     Problem: Aspiration:  Goal: Absence of aspiration  Description  Absence of aspiration  Outcome: Ongoing     Problem: Serum Glucose Level - Abnormal:  Goal: Ability to maintain appropriate glucose levels will improve to within specified parameters  Description  Ability to maintain appropriate glucose levels will improve to within specified parameters  Outcome: Ongoing     Problem: Urinary Elimination:  Goal: Signs and symptoms of infection will decrease  Description  Signs and symptoms of infection will decrease  Outcome: Ongoing     Problem: Infection - Central Venous Catheter-Associated Bloodstream Infection:  Goal: Will show no infection signs and symptoms  Description  Will show no infection signs and symptoms  Outcome: Ongoing     Problem: Infection - Ventilator-Associated Pneumonia:  Goal: Prevent a ventilator associated event (VAE)  Description  Prevent a ventilator associated event (VAE)  Outcome: Ongoing  . Dexter Carver Care plan reviewed with patient. Patient verbalize understanding of the plan of care and contribute to goal setting.

## 2019-12-26 NOTE — PROGRESS NOTES
palpitations. Renal : no flank pain, no hematuria  Skin: no rash    Medications:     verapamil  240 mg Oral Daily    losartan  100 mg Oral Daily    And    hydrochlorothiazide  25 mg Oral Daily    aspirin  81 mg Oral Daily    clopidogrel  75 mg Oral Daily    sodium chloride flush  10 mL Intravenous 2 times per day    sodium chloride  50 mL Intravenous Once    enoxaparin  40 mg Subcutaneous Q24H    atorvastatin  80 mg Oral Nightly    escitalopram  20 mg Oral Daily    finasteride  5 mg Oral Daily    pantoprazole  40 mg Oral QAM AC       Data:   CBC:   Recent Labs     12/24/19  1013 12/25/19  1725 12/26/19  0352   WBC 6.5 7.9 8.3  8.1   HGB 11.8* 12.5* 12.4*  12.4*   * 148 138  141     BMP:    Recent Labs     12/24/19  1013 12/26/19  0352    144   K 3.9 3.4*    107   CO2 26 22*   BUN 24* 24*   CREATININE 1.1 0.9   GLUCOSE 148* 84     Reviewed   Results for orders placed during the hospital encounter of 12/24/19   CTA HEAD W WO CONTRAST    Narrative PROCEDURE: CTA HEAD W WO CONTRAST, CTA NECK W WO CONTRAST    CLINICAL INFORMATION: Stroke . COMPARISON: CT head and MR brain from the same date. TECHNIQUE: Helical CT from the aortic arch through the head in arterial phase during intravenous administration of 80 mL Isovue-370 injected in the right forearm with multiplanar reformatted images to include volumetric maximum intensity projection   sequences. FINDINGS:     CTA HEAD:  There are mural calcifications in the cavernous and clinoid segments of the internal carotid arteries with an area of moderate stenosis in the right cavernous segment and the area of mild to moderate stenosis in the left clinoid segment. Intracranial   segments of internal carotid arteries are otherwise patent without flow-limiting stenosis or visualized aneurysm. The bilateral middle cerebral and anterior cerebral arteries are patent without focal abnormality identified.  There is long segment mild to moderate stenosis of the basilar artery. There is a focal area of moderate stenosis at the P1 segment of the right posterior cerebral artery and a focal area of severe stenosis at the P2 segment. The left posterior cerebral artery appears patent. There   is a focal area of moderate to severe stenosis in the right superior cerebellar artery. The left superior cerebral artery appears patent. The left inferior cerebellar artery is patent. The right inferior cerebral artery is not well visualized. The   anterior inferior cerebral arteries are not visualized. CTA NECK:  There is a typical 3 vessel arch. The brachiocephalic artery is patent. The proximal left subclavian artery is widely patent. There are areas of mild stenosis more distally in the left subclavian artery. The common carotid arteries are patent without   focal stenosis. There is mild calcified and noncalcified mural plaque at the carotid bifurcations without hemodynamically significant stenosis by NASCET criteria. Cervical segments of the internal carotid arteries are patent without focal stenosis. The   left vertebral artery is dominant. There is mural calcification in the V4 segment of the left vertebral artery just proximal to the posterior inferior cerebellar artery takeoff with an area of moderate stenosis. Vertebral arteries are otherwise patent   without focal stenosis. There is streak artifact from dental amalgam which partially obscures oral and perioral soft tissues. Given this caveat, mucosal surfaces of the aerodigestive tract are symmetric without focal nodular thickening or visualized mass. No cervical   lymphadenopathy is identified. The parotid, submandibular and thyroid glands are unremarkable. The visualized portions of the lungs are clear. There are moderate degenerative changes of the cervical spine. Impression  CTA head:  1.  Mural calcifications in the cavernous and clinoid segments of the internal carotid arteries with a focal area of moderate stenosis in the right cavernous segment and focal area of mild to moderate stenosis in the left clinoid segment. 2. Long segment of mild to moderate stenosis of the basilar artery. 3. Focal area of moderate stenosis in the P1 segment and focal area of severe stenosis in the P2 segment of the right posterior cerebral artery. 4. Focal area of moderate to severe stenosis in the right superior cerebellar artery. CTA NECK:  1. Mild mural plaque at the carotid bifurcations without hemodynamically significant stenosis by NASCET criteria. 2. Focal area of moderate stenosis in the V4 segment of the dominant left vertebral artery. **This report has been created using voice recognition software. It may contain minor errors which are inherent in voice recognition technology. **    Final report electronically signed by Dr. Brianna Mejia MD on 12/24/2019 1:23 PM     Results for orders placed during the hospital encounter of 12/24/19   CTA NECK W WO CONTRAST    Narrative PROCEDURE: CTA HEAD W WO CONTRAST, CTA [address] INFORMATION: Stroke . COMPARISON: CT head and MR brain from the same date. TECHNIQUE: Helical CT from the aortic arch through the head in arterial phase during intravenous administration of 80 mL Isovue-370 injected in the right forearm with multiplanar reformatted images to include volumetric maximum intensity projection   sequences. FINDINGS:     CTA HEAD:  There are mural calcifications in the cavernous and clinoid segments of the internal carotid arteries with an area of moderate stenosis in the right cavernous segment and the area of mild to moderate stenosis in the left clinoid segment. Intracranial   segments of internal carotid arteries are otherwise patent without flow-limiting stenosis or visualized aneurysm. The bilateral middle cerebral and anterior cerebral arteries are patent without focal abnormality identified.  There is long segment mild to   moderate stenosis of the basilar artery. There is a focal area of moderate stenosis at the P1 segment of the right posterior cerebral artery and a focal area of severe stenosis at the P2 segment. The left posterior cerebral artery appears patent. There   is a focal area of moderate to severe stenosis in the right superior cerebellar artery. The left superior cerebral artery appears patent. The left inferior cerebellar artery is patent. The right inferior cerebral artery is not well visualized. The   anterior inferior cerebral arteries are not visualized. CTA NECK:  There is a typical 3 vessel arch. The brachiocephalic artery is patent. The proximal left subclavian artery is widely patent. There are areas of mild stenosis more distally in the left subclavian artery. The common carotid arteries are patent without   focal stenosis. There is mild calcified and noncalcified mural plaque at the carotid bifurcations without hemodynamically significant stenosis by NASCET criteria. Cervical segments of the internal carotid arteries are patent without focal stenosis. The   left vertebral artery is dominant. There is mural calcification in the V4 segment of the left vertebral artery just proximal to the posterior inferior cerebellar artery takeoff with an area of moderate stenosis. Vertebral arteries are otherwise patent   without focal stenosis. There is streak artifact from dental amalgam which partially obscures oral and perioral soft tissues. Given this caveat, mucosal surfaces of the aerodigestive tract are symmetric without focal nodular thickening or visualized mass. No cervical   lymphadenopathy is identified. The parotid, submandibular and thyroid glands are unremarkable. The visualized portions of the lungs are clear. There are moderate degenerative changes of the cervical spine. Impression  CTA head:  1.  Mural calcifications in the cavernous and clinoid segments of the internal carotid arteries with a focal area of moderate stenosis in the right cavernous segment and focal area of mild to moderate stenosis in the left clinoid segment. 2. Long segment of mild to moderate stenosis of the basilar artery. 3. Focal area of moderate stenosis in the P1 segment and focal area of severe stenosis in the P2 segment of the right posterior cerebral artery. 4. Focal area of moderate to severe stenosis in the right superior cerebellar artery. CTA NECK:  1. Mild mural plaque at the carotid bifurcations without hemodynamically significant stenosis by NASCET criteria. 2. Focal area of moderate stenosis in the V4 segment of the dominant left vertebral artery. **This report has been created using voice recognition software. It may contain minor errors which are inherent in voice recognition technology. **    Final report electronically signed by Dr. Elizabeth Grover MD on 12/24/2019 1:23 PM     Results for orders placed during the hospital encounter of 12/24/19   MRI BRAIN WO CONTRAST    Narrative PROCEDURE: MRI BRAIN WO CONTRAST    INDICATION:  RIght side weakness. Symptoms starting this morning. COMPARISON: CT head from the same date and MR brain dated 7/12/2017. TECHNIQUE: Multiplanar and multiple spin echo MRI images were obtained of the brain without contrast.    FINDINGS:  There is stable moderate volume loss. There is a small to moderate-sized area of restricted diffusion in the left hemipons with minimal hyperintense T2 signal in a portion of the lesion. No other focal areas of restricted diffusion are present. There are   moderate patchy areas of T2/FLAIR prolongation in the periventricular, subcortical and deep white matter elsewhere, grossly stable compared to prior exam. No intra or extra-axial mass is identified. The major vascular flow voids appear patent. Orbits are unremarkable. There is minimal mucosal thickening in the left maxillary sinus. Mastoid air cells are clear.

## 2019-12-26 NOTE — PROGRESS NOTES
Bernard [facility] 60  INPATIENT OCCUPATIONAL THERAPY  [facility] NEUROSCIENCES 4A  EVALUATION    Time:    Time In: 1430  Time Out: 1502  Timed Code Treatment Minutes: 17 Minutes  Minutes: 32          Date: 2019  Patient Name: Chago Tello,   Gender: male      MRN: [id number]  : 1940  (78 y.o.)  Referring Practitioner: LYNDON HEDRICK, CNP  Diagnosis: acute CVA  Additional Pertinent Hx: Per ER note on 19:  78 y.o. male who presents to the Emergency Department via EMS for the evaluation of a CVA. The patient was found on the floor sitting up around 0630 this morning by his wife, who heard him fall. She reports a drooping mouth on the right side, slurred speech, and right sided weakness when she found him at 0630. The patient's last known well was 22:00 last night. The patient is unable to walk because his left leg is weak, and the patient had to be lifted into a chair after he was found on the floor. 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 Restrictions/Precautions:  Restrictions/Precautions: General Precautions, Fall Risk  Position Activity Restriction  Other position/activity restrictions: pt pushes to right, R romain    Subjective  Chart Reviewed: Yes, Orders, Progress Notes, History and Physical  Patient assessed for rehabilitation services?: Yes  Family / Caregiver Present: No    Subjective: Pt up in recliner upon arrival of therapist, nurse requesting A to get Pt back to bed. Pt sliding out of chair-high fall risk per nurse   **therapist noted Pt closing L eye when interacting with nurse upon arrival of therapist, Pt denies visual deficits. Need to continue to monitor.      Pain:  Pain Assessment  Patient Currently in Pain: No(no c/o pain during session)    Social/Functional History:  Lives With: Spouse  Type of Home: House  Home Layout: One level  Home Access: Stairs to enter with rails  Entrance Stairs - Number of Steps: 2 with grab bar  Home Equipment: Quad cane(used appropriate  Long term goals  Time Frame for Long term goals : No LTG set d/t short ELOS         Following session, patient left in safe position with all fall risk precautions in place.

## 2019-12-26 NOTE — PROGRESS NOTES
[address]  Bedside Swallowing Evaluation      SLP Individual Minutes  Time In: 6988  Time Out: 7380  Minutes: 10  Timed Code Treatment Minutes: 0 Minutes       Date: 2019  Patient Name: Vanna Nguyen      CSN: [id number]   : 1940  (78 y.o.)  Gender: male   Referring Physician: RYLEY Shepherd CNP  Diagnosis: Acute CVA   Secondary Diagnosis: Dysphagia; dysarthria     History of Present Illness/Injury: Pt admit with above dx. Per chart review, imaging results indicative of left pontine stroke. ST consult for BSE in light of new stroke and presence of dysarthria for completion of BSE to further assess and determine appropriate dietary recommendations. Past Medical History:   Diagnosis Date    Anxiety     Depression     Diabetes mellitus (HCC)     GERD (gastroesophageal reflux disease)     Hyperglycemia     Hypertension        SUBJECTIVE:  Pt seen upright in bed with wife and son present. Family left upon initiation of session. Pt alert and cooperative. OBJECTIVE:    Pain:  No pain reported.     Current Diet: NPO    Respiratory Status:  Independent    Behavioral Observation:  Alert and Dysarthric    Oral Mechanism Evaluation:      Facial / Labial Impaired Right facial droop, decreased strength and labial closure    Lingual Impaired Decreased strength, ROM and coordination    Dentition WFL    Velum WFL    Vocal Quality WFL    Sensation Not Tested    Cough WFL      Patient Evaluated Using: Puree, thin liquids by cup/straw     Oral Phase:  Impaired:  anterior spillage, concerns for decreased oral bolus control     Pharyngeal Phase: Impaired:  Delayed Swallow, Decreased Hyolaryngeal Elevation and Audible Swallow    Signs and Symptoms of Laryngeal Penetration/Aspiration: Delayed Cough    Impresssions: Pt presents with at least mild-moderate oropharyngeal dysphagia evidenced by presence of anterior spillage of liquid bolus d/t decreased

## 2019-12-26 NOTE — FLOWSHEET NOTE
12/25/19 1927   Rhythm Interpretation   Pulse 57   Provider Notification   Reason for Communication Review case   Provider Name intensivist PM   Provider Notification Physician   Method of Communication Other (Comment)   Response See orders   Notification Time 1927   Talked with intensivist regarding CT scan result and that Neuro was fine with transferring patient to  also stated patient is throwing PACs occasionally

## 2019-12-26 NOTE — PROGRESS NOTES
[address]  Modified Barium Swallow    SLP Individual Minutes  Time In: 4765  Time Out: 1120  Minutes: 15  Timed Code Treatment Minutes: 0 Minutes       Date: 2019  Patient Name: Mary Daigle      CSN: [id number]   : 1940  (78 y.o.)  Gender: male   Referring Physician: Dr. Velásquez   Diagnosis: Acute CVA  Secondary Diagnosis: Dysphagia   Precautions: Fall risk, aspiration precautions   History of Present Illness/Injury: Pt admit with above dx. Please refer to H&P for full pt medical hx. Questionable s/s of aspiration present with completion of BSE. Recommendations for MBS to further assess and r/o pharyngeal dysfunction. has a past medical history of Anxiety, Depression, Diabetes mellitus ([address] GERD (gastroesophageal reflux disease), Hyperglycemia, and Hypertension. Current Diet: NPO    Pain: No pain reported. SUBJECTIVE:  Pt brought to fluoroscopy suite via bed. Alert and cooperative.      OBJECTIVE:    Respiratory Status:  Independent    Behavioral Observation:  Alert    PATIENT WAS EVALUATED USING:  Puree, soft solid, thin barium by spoon/cup/straw, mildly thick barium by cup/straw     ORAL PREPARATION PHASE:  Impaired:  Slow Mastication, Uncoordinated Mastication, Decreased Bolus Control and Decreased Bolus Formation    ORAL PHASE: Impaired:  Uncoordinated AP Movement, Slow AP Movement and Uncontrolled Bolus/Diffuse Fall Over Tongue Base     ORAL PHASE SHIMA SCORE: (Dysphagia outcome and severity scale)  3 = Moderate Dysphagia - Total assist with strategies - Two or more consistencies restricted - Moderate oral residue clears with cue    PHARYNGEAL PHASE:  Impaired: Delayed Swallow, Decreased Airway Protection, Decreased Hyolaryngeal Elevation, Residue in the Valleculae and Residue Along the Posterior Pharyngeal Wall     PHARYNGEAL PHASE SHIMA SCORE: (Dysphagia outcome and severity scale)  4 = Mild-Moderate Dysphagia - One or two consistencies during the swallow with trials of thin barium towards end of the study by cup. Deep penetration of mildly thick barium via straw with no aspiration to follow. Volitional/cued cough ineffective in clearing penetrated/aspirated material. No observed penetration/aspiration of mildly thick barium by cup, puree and/or soft solid. Recommendations for diet advancement to minced/moist and mildly thick liquids with restriction on use of straw. Diet Recommendations:  Minced/moist and mildly (nectar) thick liquids   Strategies:  Full Upright Position, Small Bite/Sip, No Straw, Multiple Swallow, Pulmonary Monitoring, Oral Care after all Meals, Alternate Solids and Liquids, Limit Distractions and Monitor for Fatigue   Rehabilitation Potential: excellent    EDUCATION:  Learner: Patient  Education:  Reviewed results and recommendations of this evaluation, Reviewed diet and strategies and Reviewed signs, symptoms and risks of aspiration  Evaluation of Education: Verbalizes understanding, Demonstrates with assistance, Needs further instruction and Family not present    PLAN:  Speech Therapy evaluation to assess speech, language, cognition and/or voice and Skilled SLP intervention on acute care 3-5 x per week or until goals met and/or pt plateaus in function. Specific interventions for next session may include: ST evaluation . PATIENT GOAL:    Did not state. Will further assess during treatment. SHORT TERM GOALS:  Short-term Goals  Timeframe for Short-term Goals: 2 weeks   Goal 1: Pt will tolerate a minced/moist diet and mildly thick liquids (no straws) without overt s/s of aspiration to meet nutritional/hydration measures safely. Goal 2: Pt will complete labial/lingual/pharyngeal strengthening, coordionation, ROM exercises x10 for improved timeliness of oral bolus formation/transit, maximized speech intelligibility and improved airway protection. Goal 3: Complete full ST evaluation and add goals as indicated. LONG TERM GOALS: No established LTG's given short ELOS     Haroldo Tracey M.S. Sofya Mayberry 12/26/2019

## 2019-12-26 NOTE — PROGRESS NOTES
[facility]  INPATIENT PHYSICAL THERAPY  EVALUATION  [facility] 4A - 4A-15/015-A    Time In: 1300  Time Out: 1337  Timed Code Treatment Minutes: 25 Minutes  Minutes: 37          Date: 2019  Patient Name: Chester Ramirez,  Gender:  male        MRN: [id number]  : 1940  (78 y.o.)      Referring Practitioner: Cresencio Edwards CNP  Diagnosis: acute CVA  Additional Pertinent Hx: admit with above diagnosis, left pontine stroke, right hemiparesis, dysphagia, dysarthria, did receive TPA     Restrictions/Precautions:  Restrictions/Precautions: General Precautions, Fall Risk  Position Activity Restriction  Other position/activity restrictions: pt pushes to right    Subjective:  Chart Reviewed: Yes  Patient assessed for rehabilitation services?: Yes  Family / Caregiver Present: Yes  Subjective: cooperative, family very supportive, pt attempting to communicate but slurred speech    General:                      Pain:  Denies.   Pain Assessment  Pain Level: 0       Social/Functional History:    Lives With: Spouse  Type of Home: House  Home Layout: One level(has basement but rarely goes down there per family)  Home Access: Stairs to enter with rails  Entrance Stairs - Number of Steps: 2 with grab bar  Home Equipment: Quad cane(used cane intermittently, family wanted pt to use all of the time)                   Ambulation Assistance: Independent  Transfer Assistance: Independent               OBJECTIVE:  Range of Motion:  Right Lower Extremity: Impaired - A/AAROM WFL  Left Lower Extremity: WNL    Strength:  Right Lower Extremity: Impaired - hip girdle 2/5, quad 1/5, hams 2/5, ankle 0-1/5  LLE WNL    Sensation: pt with min dec sensation RLE compared to LLE, pt answered correctly to lt touch testing however when right foot was getting caught on BR pt not able to sense it    Balance:  Static Sitting Balance:  Maximum Assistance, fluctuated to CGA, pt pushes to right, pt tactile and vcs pt correcting but with right, dec sensation RLE, inc assist for safe mobility, pt with heart monitor and IV lines, recommend cont PT to inc pt functional mobility  Prognosis: Excellent    REQUIRES PT FOLLOW UP: Yes    Discharge Recommendations:  Discharge Recommendations: Continue to assess pending progress, IP Rehab, Patient would benefit from continued therapy after discharge    Patient Education:  PT Education: Plan of Care  Patient Education: midline orientation, mobility    Equipment Recommendations: Other: cont to assess needs    Plan:  Times per week: 6x N  Times per day: Daily  Specific instructions for Next Treatment: therex, bed mobility, sitting balance to improve midline, transfers, pregait activity, PT To assess gait    Goals:  Patient goals : get stronger  Short term goals  Time Frame for Short term goals: by discharge  Short term goal 1: bed mobility with modA  Short term goal 2: sit to std, maintaining midline, with Anne to get in/out of chairs  Short term goal 3: std pivot transfer with modA to get bed to/from chair safely  Short term goal 4: tolerate >30 seconds std pregait activity with RUE supported and </=modA to demonstrate improved balance/strength for progression with amb  Short term goal 5: PT to assess gait  Long term goals  Time Frame for Long term goals : no LTGs set secondary to short ELOS    Following session, patient left in safe position with all fall risk precautions in place.

## 2019-12-27 NOTE — PROGRESS NOTES
[facility] 4A  Speech - Language - Cognitive Evaluation    SLP Individual Minutes  Time In: 5680  Time Out: 8849  Minutes: 30  Timed Code Treatment Minutes: 0 Minutes       Date: 2019  Patient Name: Efren Kerns      CSN: [id number]   : 1940  (78 y.o.)  Gender: male   Referring Physician: RYLEY Alejo CNP  Diagnosis: Acute CVA  Secondary Diagnosis: Dysphagia; dysarthria; cognitive-linguistic deficits   Precautions: Fall risk, aspiration precautions   History of Present Illness/Injury: Pt admit with above dx. Per chart review, imaging results indicative of left pontine stroke. ST consult for full ST assessment in order to further establish Goals/POC. Past Medical History:   Diagnosis Date    Anxiety     Depression     Diabetes mellitus (HCC)     GERD (gastroesophageal reflux disease)     Hyperglycemia     Hypertension        Pain: No pain reported. Subjective: Pt seen resting in bed. Easily awakened via verbal cues. High level of lethargy noted this date. Poor ability to maintain upright positioning (despite ST assist with repositioning pt to midline) with heavy lean towards pt's left (strong) side. Physician, Dr. Taran Finn in during [address] [facility] for completion of rounding/assessment. Plans for potential to IPR Monday pending pt medical status. SOCIAL HISTORY: Pt living with wife and 37year old son. Independent with ADL's PTA. Wife completes majority of cooking/cleaning tasks. Pt responsible for financial management. Wife manages pt's finances and completes driving tasks. Pt reports discontinuing driving tasks ~3 years ago. Pt is right handed and does have hearing aids which he reports he \"occasionally\" wears. No difficulty with comprehending ST this date. Pt wears glasses and has hx of 12 years of education. Reports he is retired from [employer].    Type of Home: House  Home Layout: One level  Home Access: Stairs to enter with abstraction- 2/2  Sequencing: impaired   Thought Organization: at least moderate impairment   Insight: fair   Attention: impaired basic attention   Math Computation: 0/2  Executive Functioning: unable to complete d/t extremity weakness     SWALLOWING:  Current Diet: Minced/moist and mildly thick liquids   Impaired - MBS completed 12/26-- see report for details          RECOMMENDATIONS/ASSESSMENT:  DIAGNOSTIC IMPRESSIONS:  Pt presents with at least moderate cognitive impairments evidenced by derived adjusted MOCA score of 11/25. Deficits within the domains of immediate/delayed/working recall, impaired functional problem solving/safety awareness with presence of telesitter, decreased thought organization, basic attention and math computation. Receptive language skills fully intact. Pt with moderate expressive language deficits evidenced by impaired high level naming, decreased lexical retrieval, impaired sentence formulation with reliance on 1-2 word phrases and reduced syntax, reduced sentence repetition and poor verbal sequencing/description. Fluctuating dysarthria that varies from moderate-severe given presence of imprecise articulatory precision, blended word boundaries and reduced breath support. Mild dysphonia with hoarse vocal quality present. MBS completed prior date with recommendations for minced/moist diet and mildly thick liquids (see report for details).  Pt would highly benefit from continued ST intervention in order to progress [id number] skills to a supervision level of assist in order to progress towards functional discharge to home setting upon discharge    **Great IPR candidate   Rehabilitation Potential: excellent    EDUCATION:  Learner: Patient  Education:  Reviewed results and recommendations of this evaluation and Reviewed ST goals and Plan of Care  Evaluation of Education: Verbalizes understanding, Demonstrates with assistance, Needs further instruction and Family not present    PLAN:  Skilled SLP intervention on acute care 3-5 x per week or until goals met and/or pt plateaus in function. Specific interventions for next session may include: memory, organization, verbal description/sequencing, dysarthria/dysphagia tx . PATIENT GOAL:    Did not state. Will further assess during treatment. SHORT TERM GOALS:  Short-term Goals  Timeframe for Short-term Goals: 2 weeks   Goal 1: Pt will tolerate a minced/moist diet and mildly thick liquids (no straws) without overt s/s of aspiration to meet nutritional/hydration measures safely. Goal 2: Pt will complete labial/lingual/pharyngeal strengthening, coordionation, ROM exercises, DDK rates, rote speech tasks with SOS strategies and tongue twisters x10 for improved timeliness of oral bolus formation/transit, maximized speech intelligibility and improved airway protection. Goal 3: Pt will complete immediate/delayed/working memory tasks with 70% accuracy, mod cues for improved retention of newly learned medical information. Goal 4: Pt will complete problem solving/safety awareness, executive functioning and attention tasks (i.e. time/money management, scheduling, etc) with 70% accuracy, mod cues for improved ADL/IADL management and safety awareness/insight. Goal 5: Pt will complete complex naming, verbal sequencing/description and sentence formulation/repetition tasks with 70% accuracy, mod cues for improved expression of basic/complex thoughts.        LONG TERM GOALS: No established LTG's given short VIGNESH Connor M.S. Dahlia Barrientos 12/27/2019

## 2019-12-27 NOTE — PLAN OF CARE
reviewed with patient. Patient verbalized understanding of the plan of care and contributed to goal setting.

## 2019-12-27 NOTE — PROGRESS NOTES
of the right posterior cerebral artery and a focal area of severe stenosis at the P2 segment. The left posterior cerebral artery appears patent. There   is a focal area of moderate to severe stenosis in the right superior cerebellar artery. The left superior cerebral artery appears patent. The left inferior cerebellar artery is patent. The right inferior cerebral artery is not well visualized. The   anterior inferior cerebral arteries are not visualized. CTA NECK:  There is a typical 3 vessel arch. The brachiocephalic artery is patent. The proximal left subclavian artery is widely patent. There are areas of mild stenosis more distally in the left subclavian artery. The common carotid arteries are patent without   focal stenosis. There is mild calcified and noncalcified mural plaque at the carotid bifurcations without hemodynamically significant stenosis by NASCET criteria. Cervical segments of the internal carotid arteries are patent without focal stenosis. The   left vertebral artery is dominant. There is mural calcification in the V4 segment of the left vertebral artery just proximal to the posterior inferior cerebellar artery takeoff with an area of moderate stenosis. Vertebral arteries are otherwise patent   without focal stenosis. There is streak artifact from dental amalgam which partially obscures oral and perioral soft tissues. Given this caveat, mucosal surfaces of the aerodigestive tract are symmetric without focal nodular thickening or visualized mass. No cervical   lymphadenopathy is identified. The parotid, submandibular and thyroid glands are unremarkable. The visualized portions of the lungs are clear. There are moderate degenerative changes of the cervical spine. Impression  CTA head:  1.  Mural calcifications in the cavernous and clinoid segments of the internal carotid arteries with a focal area of moderate stenosis in the right cavernous segment and focal area of mild to moderate

## 2019-12-27 NOTE — PROGRESS NOTES
fluctuates during needing cues and unable to correct when LOB occurs performed some therex and stretching at EOB. Exercise:  Patient was guided in seated/supine set(s) 10 reps of exercise to both lower extremities. Ankle pumps, Heelslides, Long arc quads, Hip abduction/adduction, Seated hip flexion and also perfomed B stetches [id number] for heelcords, and hips . Exercises were completed for increased independence with functional mobility. Functional Outcome Measures: Completed  AM-PAC Inpatient Mobility without Stair Climbing Raw Score : 9  AM-PAC Inpatient without Stair Climbing T-Scale Score : 32.44    ASSESSMENT:  Assessment: Patient progressing toward established goals. Activity Tolerance:  Patient tolerance of  treatment: fair. Equipment Recommendations: Other: cont to assess needs  Discharge Recommendations:    Continue to assess pending progress, IP Rehab, Patient would benefit from continued therapy after discharge    Plan: Times per week: 6x N  Times per day: Daily  Specific instructions for Next Treatment: therex, bed mobility, sitting balance to improve midline, transfers, pregait activity, PT To assess gait    Patient Education  Patient Education: Plan of Care, Bed Mobility    Goals:  Patient goals : get stronger  Short term goals  Time Frame for Short term goals: by discharge  Short term goal 1: bed mobility with modA  Short term goal 2: sit to std, maintaining midline, with Anne to get in/out of chairs  Short term goal 3: std pivot transfer with modA to get bed to/from chair safely  Short term goal 4: tolerate >30 seconds std pregait activity with RUE supported and </=modA to demonstrate improved balance/strength for progression with amb  Short term goal 5: PT to assess gait  Long term goals  Time Frame for Long term goals : no LTGs set secondary to short ELOS    Following session, patient left in safe position with all fall risk precautions in place.

## 2019-12-27 NOTE — CONSULTS
mobility. O.T:  ADL's:  Feeding: Setup(Pt completing with non dominant hand with difficulty)  LE Dressing: Dependent/Total  Toileting: Dependent/Total(incontinent of stool, max A x 1 to  FELISHA STEDY while dependent of another for washing bottom)     Functional Mobility:  Bed mobility  Sit to Supine: Maximum assistance(x 2)            Balance:  Balance  Sitting Balance: Maximum assistance(Pt pushing to R side)  Standing Balance: Maximum assistance(x 2 )     Transfers:  Sit to stand: Dependent/Total(x 2 from recliner (1st trial, limited WB through LE even with blocking of BLE))  Transfer Comments: 2nd sit-stand onto FELISHA STEDY with heavy max A x 2; Pt leaning to R side-very difficult to break pushing pattern     Upper Extremity Assessment:   LUE AROM : WFL  RUE AROM : (0 AROM noted on this date)     LUE Strength  Gross LUE Strength: (4/5 grossly)  RUE Strength  Gross RUE Strength: (0/5)     Sensation  Overall Sensation Status: (decreased proproception RUE, reports numbness ).      Speech:  PATIENT WAS EVALUATED USING:  Puree, soft solid, thin barium by spoon/cup/straw, mildly thick barium by cup/straw      ORAL PREPARATION PHASE:  Impaired:  Slow Mastication, Uncoordinated Mastication, Decreased Bolus Control and Decreased Bolus Formation     ORAL PHASE: Impaired:  Uncoordinated AP Movement, Slow AP Movement and Uncontrolled Bolus/Diffuse Fall Over Tongue Base       ORAL PHASE SHIMA SCORE: (Dysphagia outcome and severity scale)  3 = Moderate Dysphagia - Total assist with strategies - Two or more consistencies restricted - Moderate oral residue clears with cue     PHARYNGEAL PHASE:  Impaired: Delayed Swallow, Decreased Airway Protection, Decreased Hyolaryngeal Elevation, Residue in the Valleculae and Residue Along the Posterior Pharyngeal Wall                 PHARYNGEAL PHASE SHIMA SCORE: (Dysphagia outcome and severity scale)  4 = Mild-Moderate Dysphagia - One or two consistencies restricted - may exhibit one with trials of thin barium towards end of the study by cup. Deep penetration of mildly thick barium via straw with no aspiration to follow. Volitional/cued cough ineffective in clearing penetrated/aspirated material. No observed penetration/aspiration of mildly thick barium by cup, puree and/or soft solid. Recommendations for diet advancement to minced/moist and mildly thick liquids with restriction on use of straw.      Diet Recommendations:  Minced/moist and mildly (nectar) thick liquids   Strategies:  Full Upright Position, Small Bite/Sip, No Straw, Multiple Swallow, Pulmonary Monitoring, Oral Care after all Meals, Alternate Solids and Liquids, Limit Distractions and Monitor for Fatigue              Rehabilitation Potential: excellent.       Past Medical History:        Diagnosis Date    Anxiety     Depression     Diabetes mellitus (HCC)     GERD (gastroesophageal reflux disease)     Hyperglycemia     Hypertension        Past Surgical History:        Procedure Laterality Date    ENDOSCOPY, COLON, DIAGNOSTIC      OTHER SURGICAL HISTORY  1970    right thumb surgery     WI REPAIR ING HERNIA,5+Y/O,REDUCIBL Right 8/23/2018    RIGHT INGUINAL HERNIA REPAIR WITH MESH performed [facility] Elisabeth Casas MD at [facility] HERNIA,5+Y/O,REDUCIBL Left 11/1/2018    LEFT HERNIA INGUINAL REPAIR WITH MESH performed by Elisabeth Casas MD at [facility]  2017    EGD       Allergies:    No Known Allergies     Current Medications:   Current Facility-Administered Medications: acetaminophen (TYLENOL) tablet 650 mg, 650 mg, Oral, Q4H PRN  perflutren lipid microspheres (DEFINITY) injection 2.2 mg, 2 mL, Intravenous, ONCE PRN  verapamil (CALAN SR) extended release tablet 240 mg, 240 mg, Oral, Daily  losartan (COZAAR) tablet 100 mg, 100 mg, Oral, Daily **AND** hydrochlorothiazide (HYDRODIURIL) tablet 25 mg, 25 mg, Oral, Daily  aspirin chewable tablet 81 mg, 81 mg, Oral, Daily  clopidogrel (PLAVIX) tablet 75 mg, 75 mg, Oral, Daily  sodium chloride flush 0.9 % injection 10 mL, 10 mL, Intravenous, 2 times per day  sodium chloride flush 0.9 % injection 10 mL, 10 mL, Intravenous, PRN  [COMPLETED] alteplase (ACTIVASE) injection 6.6 mg, 0.09 mg/kg, Intravenous, Once **FOLLOWED BY** [COMPLETED] alteplase (ACTIVASE) injection 59.4 mg, 0.81 mg/kg, Intravenous, Once **FOLLOWED BY** 0.9 % sodium chloride bolus, 50 mL, Intravenous, Once  magnesium hydroxide (MILK OF MAGNESIA) 400 MG/5ML suspension 30 mL, 30 mL, Oral, Daily PRN  ondansetron (ZOFRAN) injection 4 mg, 4 mg, Intravenous, Q6H PRN  enoxaparin (LOVENOX) injection 40 mg, 40 mg, Subcutaneous, Q24H  atorvastatin (LIPITOR) tablet 80 mg, 80 mg, Oral, Nightly  escitalopram (LEXAPRO) tablet 20 mg, 20 mg, Oral, Daily  finasteride (PROSCAR) tablet 5 mg, 5 mg, Oral, Daily  pantoprazole (PROTONIX) tablet 40 mg, 40 mg, Oral, QAM AC    Social History:  Social History     Socioeconomic History    Marital status:      Spouse name: Not on file    Number of children: Not on file    Years of education: Not on file    Highest education level: Not on file   Occupational History    Not on file   Social Needs    Financial resource strain: Not on file    Food insecurity:     Worry: Not on file     Inability: Not on file    Transportation needs:     Medical: Not on file     Non-medical: Not on file   Tobacco Use    Smoking status: Never Smoker    Smokeless tobacco: Never Used   Substance and Sexual Activity    Alcohol use: No    Drug use: No    Sexual activity: Not on file   Lifestyle    Physical activity:     Days per week: Not on file     Minutes per session: Not on file    Stress: Not on file   Relationships    Social connections:     Talks on phone: Not on file     Gets together: Not on file     Attends Anabaptist service: Not on file     Active member of club or organization: Not on file     Attends meetings of clubs or organizations: Not on file     Relationship status: Not on file    Intimate partner violence:     Fear of current or ex partner: Not on file     Emotionally abused: Not on file     Physically abused: Not on file     Forced sexual activity: Not on file   Other Topics Concern    Not on file   Social History Narrative    Not on file     Occupation: Retired from [employer]. Lives with: his wife. Home setup: 1 level home with 2 steps to enter. Previous level of independence: He had been independent but did use a cane at times with his gait. Family History:       Problem Relation Age of Onset    Alzheimer's Disease Mother     Stroke Father     Heart Attack Brother     Colon Cancer Neg Hx        Review of Systems:  CONSTITUTIONAL:  negative  EYES:  negative  HEENT:  positive for  hoarseness, voice change and dysphagia. RESPIRATORY:  negative  CARDIOVASCULAR:  negative  GASTROINTESTINAL:  negative  GENITOURINARY:  negative  SKIN:  negative  HEMATOLOGIC/LYMPHATIC:  negative  MUSCULOSKELETAL:  positive for  decreased range of motion and muscle weakness  NEUROLOGICAL:  positive for speech problems, coordination problems, gait problems, dysphagia, weakness and numbness  BEHAVIOR/PSYCH:  positive for decreased energy level, poor concentration and fatigue. Chronic history of depression and anxiety. 10 point system review otherwise negative    Physical Exam:  /71   Pulse 80   Temp 98.3 °F (36.8 °C) (Oral)   Resp 16   Ht 6' (1.829 m)   Wt 145 lb 14.4 oz (66.2 kg)   SpO2 98%   BMI 19.79 kg/m²   He was noted to be awake, alert, and in no acute distress. Orientation: Not evaluated at this time. Mood: labile  Affect: labile, calm and flat  General appearance: Patient is well nourished, well developed, well groomed and in no acute distress, appearing stated age, flattened affect, thin. Memory:   abnormal   Attention/Concentration: abnormal   Language:  abnormal - with moderate expressive aphasia.   Herat: S-1 Acute CVA [facility]cerebrovascular accident) ([facility]    Right hemiparesis ([facility]    Dysarthria    Hypokalemia    Chronic depression      Recommendations:  · Patient will be a good candidate for inpatient rehab. once he is medically able. · He is to continue with his current inpatient rehab. therapies. · Will continue to monitor his medical and functional recovery. It was my pleasure to evaluate Jeferson Parada today. Please call with questions. Greater than 50% of the 70 minutes was spent with the patient on counseling and with respect to coordinating his inpatient rehab care so that his functional abilities are improved.     Neyda Hein MD

## 2019-12-27 NOTE — FLOWSHEET NOTE
12/26/19 1910   Encounter Summary   Services provided to: Patient and family together   Referral/Consult From: [facility];Family members   Place of [facility] none   Continue Visiting Yes  (12/26/19 Stroke/ continue support)   Complexity of Encounter Moderate   Length of Encounter 15 minutes   Spiritual Assessment Completed Yes   Routine   Type Initial   Assessment Calm;Coping   Intervention Active listening;Nurtured hope;Prayer;Discussed illness/injury and it's impact   Outcome Engaged in conversation;Expressed gratitude     Mick James is a 66year old male who is sitting on his bed with several family members in the room with him. Patient's grand daughter said patient had a stroke on [place] and was admitted to the hospital. She also said that patient is experiencing speech issues as he tried to communicate. Patient also made an effort to tell me that one side of his body is weak but will continue to work as time permits. I offered active listening and nurtured hope. SC will follow up with daily spiritual support and pastoral care.

## 2019-12-27 NOTE — FLOWSHEET NOTE
12/27/19 0115   Provider Notification   Reason for Communication Review case   Provider Name [facility]   Provider Notification Physician Assistant   Method of Communication Call   Response See orders   Notification Time 0115   Notified [facility], [place], that while patient was attempted to have a bowel movement, patient became tachycardic in the 130s and diaphoretic (patient is in the 110s now). Patient also complaining of nausea. Zofran given as well as tepid bath. Also requested order for tylenol for pain. See new orders. Monitoring heart rate at this time.

## 2019-12-27 NOTE — PROGRESS NOTES
25 mg Oral Daily    aspirin  81 mg Oral Daily    clopidogrel  75 mg Oral Daily    sodium chloride flush  10 mL Intravenous 2 times per day    sodium chloride  50 mL Intravenous Once    enoxaparin  40 mg Subcutaneous Q24H    atorvastatin  80 mg Oral Nightly    escitalopram  20 mg Oral Daily    finasteride  5 mg Oral Daily    pantoprazole  40 mg Oral QAM AC     PRN Meds: acetaminophen, perflutren lipid microspheres, sodium chloride flush, magnesium hydroxide, ondansetron    Ins and outs:      Intake/Output Summary (Last 24 hours) at 12/27/2019 1248  Last data filed at 12/27/2019 1000  Gross per 24 hour   Intake 1912.94 ml   Output 100 ml   Net 1812.94 ml       Physical Examination     /71   Pulse 80   Temp 98.3 °F (36.8 °C) (Oral)   Resp 16   Ht 6' (1.829 m)   Wt 145 lb 14.4 oz (66.2 kg)   SpO2 98%   BMI 19.79 kg/m²     General appearance: Restless and agitated  HEENT: Extraocular motion intact. Neck: Supple. Trachea midline  Respiratory:  Decreased breath sounds at bilateral lung lobes otherwise CTA. Cardiovascular: RRR with normal S1/S2 without murmurs, rubs or gallops. Abdomen: Soft, non-tender, non-distended with normal bowel sounds. Musculoskeletal: RUE weakness definite. Unable to obtain an accurate neurological and musculoskeletal examination otherwise  Neurologic: RUE weakness. Psychiatric: Unable to determine if patient oriented. Alert and restless in bed  Vascular: Dorsalis pedis palpable bilaterally. Radial pulses palpable bilaterally. Skin:  No visible rashes or lesions. Labs     Recent Labs     12/25/19  1725 12/26/19  0352   WBC 7.9 8.3  8.1   HGB 12.5* 12.4*  12.4*   HCT 36.6* 36.7*  36.5*    138  141     Recent Labs     12/26/19  0352      K 3.4*      CO2 22*   BUN 24*   CREATININE 0.9   CALCIUM 9.0     Recent Labs     12/26/19  0352   AST 23   ALT 18   BILITOT 1.0   ALKPHOS 70     No results for input(s): INR in the last 72 hours.   No results dominant left vertebral artery. **This report has been created using voice recognition software. It may contain minor errors which are inherent in voice recognition technology. ** Final report electronically signed by Dr. Nahed Severino MD on 12/24/2019 1:23 PM    Ct Head Wo Contrast    Result Date: 12/25/2019  PROCEDURE: CT HEAD WO CONTRAST CLINICAL INFORMATION: Recent TPA infusion TECHNIQUE: CT scan of the head was performed from the vertex to the skull base without use of intravenous contrast. Axial images as well as coronal and sagittal reconstructions were obtained. All CT scans at this facility use dose modulation, iterative reconstruction, and/or weight-based dosing when appropriate to reduce radiation dose to as low as reasonably achievable. COMPARISON: CT head 12/24/2019 FINDINGS: There is no mass effect, midline shift or acute hemorrhage. Ventricles and sulci are prominent. Diffuse periventricular white matter hypoattenuation is again noted. There is hypoattenuation of the bilateral basal ganglia are likely secondary to  prior lacunar infarcts. Cerebrovascular calcifications are noted. Visualized orbits, paranasal sinuses and mastoid air cells are unremarkable. 1. No mass effect or acute hemorrhage. 2. Chronic periventricular small vessel ischemic changes and cerebral atrophy. **This report has been created using voice recognition software. It may contain minor errors which are inherent in voice recognition technology. ** Final report electronically signed by Dr. Ugo Sam on 12/25/2019 6:32 PM    Ct Head Wo Contrast    Result Date: 12/24/2019  PROCEDURE: CT HEAD WO CONTRAST CLINICAL INFORMATION: CVA with worsening neuro deficits. COMPARISON: No prior study. TECHNIQUE: Noncontrast 5 mm axial images were obtained through the brain.  All CT scans at this facility use dose modulation, iterative reconstruction, and/or weight-based dosing when appropriate to reduce radiation dose to as low as reasonably achievable. FINDINGS: No acute intracranial findings. Mild generalized volume loss and small vessel ischemic changes. Old lacunar infarctions in the basal ganglia. Vascular calcifications. Ventricles are within normal limits for age. No acute hemorrhage or midline shift. Paranasal sinuses are clear. Mastoid air cells are patent. Skull: Unremarkable. Soft tissues: Unremarkable. No acute intracranial findings. **This report has been created using voice recognition software. It may contain minor errors which are inherent in voice recognition technology. ** Final report electronically signed by Dr. Shailesh Vivar on 12/24/2019 5:17 PM    Ct Head Wo Contrast (code Stroke)    Result Date: 12/24/2019  PROCEDURE: CT HEAD WO CONTRAST CLINICAL INFORMATION: stroke like symptoms . COMPARISON: 7/2/2017 TECHNIQUE: 2-D multiplanar noncontrast CT brain All CT scans at this facility use dose modulation, iterative reconstruction, and/or weight-based dosing when appropriate to reduce radiation dose to as low as reasonably achievable. FINDINGS: Generalized cerebral volume loss. Moderate severity chronic small vessel ischemic disease changes. No acute hemorrhage. No infarction identified. No extra-axial fluid collection. Ventricles are normal. No midline shift or mass effect. Calvarium is intact and visualized paranasal sinuses and mastoid air cells are clear. Dense calcific atherosclerosis of the vertebral and basilar artery and intracranial carotid arteries     No acute process. **This report has been created using voice recognition software. It may contain minor errors which are inherent in voice recognition technology. ** Final report electronically signed by Dr. Salima Saleem on 12/24/2019 9:58 AM    Cta Neck W Wo Contrast    Result Date: 12/24/2019  PROCEDURE: CTA HEAD W WO CONTRAST, CTA NECK W WO CONTRAST CLINICAL INFORMATION: Stroke . COMPARISON: CT head and MR brain from the same date.  TECHNIQUE: Helical CT from the to the posterior inferior cerebellar artery takeoff with an area of moderate stenosis. Vertebral arteries are otherwise patent without focal stenosis. There is streak artifact from dental amalgam which partially obscures oral and perioral soft tissues. Given this caveat, mucosal surfaces of the aerodigestive tract are symmetric without focal nodular thickening or visualized mass. No cervical lymphadenopathy is identified. The parotid, submandibular and thyroid glands are unremarkable. The visualized portions of the lungs are clear. There are moderate degenerative changes of the cervical spine. CTA head: 1. Mural calcifications in the cavernous and clinoid segments of the internal carotid arteries with a focal area of moderate stenosis in the right cavernous segment and focal area of mild to moderate stenosis in the left clinoid segment. 2. Long segment of mild to moderate stenosis of the basilar artery. 3. Focal area of moderate stenosis in the P1 segment and focal area of severe stenosis in the P2 segment of the right posterior cerebral artery. 4. Focal area of moderate to severe stenosis in the right superior cerebellar artery. CTA NECK: 1. Mild mural plaque at the carotid bifurcations without hemodynamically significant stenosis by NASCET criteria. 2. Focal area of moderate stenosis in the V4 segment of the dominant left vertebral artery. **This report has been created using voice recognition software. It may contain minor errors which are inherent in voice recognition technology. ** Final report electronically signed by Dr. Hector Patel MD on 12/24/2019 1:23 PM    Mri Brain Wo Contrast    Result Date: 12/24/2019  PROCEDURE: MRI BRAIN WO CONTRAST INDICATION:  RIght side weakness. Symptoms starting this morning. COMPARISON: CT head from the same date and MR brain dated 7/12/2017.  TECHNIQUE: Multiplanar and multiple spin echo MRI images were obtained of the brain without contrast. FINDINGS: There is stable moderate volume loss. There is a small to moderate-sized area of restricted diffusion in the left hemipons with minimal hyperintense T2 signal in a portion of the lesion. No other focal areas of restricted diffusion are present. There are  moderate patchy areas of T2/FLAIR prolongation in the periventricular, subcortical and deep white matter elsewhere, grossly stable compared to prior exam. No intra or extra-axial mass is identified. The major vascular flow voids appear patent. Orbits are unremarkable. There is minimal mucosal thickening in the left maxillary sinus. Mastoid air cells are clear. 1. 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 2. Moderate chronic microvascular angiopathy superimposed on volume loss, grossly similar to prior exam. Findings were discussed with Dr. Jo Spencer via telephone at the time of interpretation. **This report has been created using voice recognition software. It may contain minor errors which are inherent in voice recognition technology. ** Final report electronically signed by Dr. Katelin Norton MD on 12/24/2019 11:17 AM    Fl Modified Barium Swallow W Video    Result Date: 12/26/2019  PROCEDURE: FL MODIFIED BARIUM SWALLOW W VIDEO CLINICAL INFORMATION: Dysphasia TECHNIQUE: Fluoroscopy was provided for modified barium swallowing study performed by speech therapy. With the patient in the lateral projection, swallowing mechanism was evaluated using barium of various consistencies. The radiologist was available for the entire examination. 45 clips were obtained. Total fluoroscopy time 3.52 minutes. Speech therapist: Gumaro Cuevas COMPARISON: None. FINDINGS: Oral, pharyngeal and esophageal structures appear normal. There is laryngeal penetration of nectar thick and thin barium with aspiration of thin barium. 1. Laryngeal penetration of nectar thick and thin barium with aspiration of thin barium. 2. Additional recommendations from the speech therapist will follow.  **This report has been created using voice recognition software. It may contain minor errors which are inherent in voice recognition technology. ** Final report electronically signed by Dr. Rachel De Souza on 12/26/2019 12:20 PM      DVT prophylaxis: [x] Lovenox                                 [] SCDs                                 [] SQ Heparin                                 [] Encourage ambulation           [] Already on Anticoagulation     Disposition:    [x] Inpatient rehab if accepted versus outside nursing facility       [] TCU       [] Rehab       [] Psych       [] SNF       [] [place]       [] Other-

## 2019-12-28 PROBLEM — R33.8 ACUTE URINARY RETENTION: Status: ACTIVE | Noted: 2017-07-03

## 2019-12-28 NOTE — PROGRESS NOTES
Pharmacy Renal Adjustment    Sammi Concepcion is a 78 y.o. male. Pharmacy renally adjust the following medications per [employer] approved policy: lovenox    Recent Labs     12/26/19  0352 12/28/19  0422   BUN 24* 34*       Recent Labs     12/26/19  0352 12/28/19  0422   CREATININE 0.9 3.2*       Estimated Creatinine Clearance: 18 mL/min (A) (based on SCr of 3.2 mg/dL [facility] [place]   Calculated CrCl:    Height:   Ht Readings from Last 1 Encounters:   12/24/19 6' (1.829 m)     Weight:  Wt Readings from Last 1 Encounters:   12/28/19 150 lb 9.6 oz (68.3 kg)       Plan: Adjustments based on renal function:          Decrease lovenox 40 mg daily to 30 mg daily    Zack Cheung, PharmD, BCPS  12/28/2019  10:40 AM

## 2019-12-28 NOTE — FLOWSHEET NOTE
Bernard Shirley 60  OCCUPATIONAL THERAPY MISSED TREATMENT NOTE  [facility], [place] NEUROSCIENCES 4A  [id number]      Date: 2019  Patient Name: Gina Hubbard        CSN: [id number]   : 1940  (78 y.o.)  Gender: male   Referring Practitioner: LYNDON HEDRICK CNP  Diagnosis: acute CVA       REASON FOR MISSED TREATMENT: Hold Treatment per Nursing - patient on hold today d/t changes in EKG.

## 2019-12-28 NOTE — PLAN OF CARE
Problem: HEMODYNAMIC STATUS  Goal: Patient has stable vital signs and fluid balance  Outcome: Ongoing  Note:   Vitals:    12/27/19 1545   BP: 128/79   Pulse: 71   Resp: 16   Temp: 98.3 °F (36.8 °C)   SpO2: 98%          Problem: ACTIVITY INTOLERANCE/IMPAIRED MOBILITY  Goal: Mobility/activity is maintained at optimum level for patient  Outcome: Ongoing  Note:   Pt continues to work with therapy. Arm still flaccid. Minimal movement in leg. Problem: COMMUNICATION IMPAIRMENT  Goal: Ability to express needs and understand communication  Outcome: Ongoing     Problem: Bleeding:  Goal: Will show no signs and symptoms of excessive bleeding  Description  Will show no signs and symptoms of excessive bleeding  Outcome: Not Met This Shift     Problem: Falls - Risk of:  Goal: Will remain free from falls  Description  Will remain free from falls  Outcome: Ongoing  Note:   Telesitter in room for added safety. Care plan reviewed with patient and family. Patient and family verbalize understanding of the plan of care and contribute to goal setting.

## 2019-12-28 NOTE — PROGRESS NOTES
Hospitalist Progress Note    Patient:  Nick Medellin  YOB: 1940  MRN: [id number]   PCP: Ryley Rice MD         Acct: [de-identified]  Unit/Bed: [address] [place] [address]    Date of Admission: 12/24/2019      ASSESSMENT     1. 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 Neurology following on aspirin, Plavix, atorvastatin. PT/OT/ST. Question as to whether patient had a seizure  2. Abnormal EKG with elevated troponin: Echo showed EF of 25% with severe global hypokinesis of the left ventricle ith some sparing of the basal wall suggesting possible stress induced cardiomyopathy. Concern for NSTEMI vs Takotsubo cardiomyopathy. Cardiology consulted  3. DYAN: Likely cardiorenal syndrome. Patient also received contrast on admission. Avoid nephrotoxins. Has urinary retention and Solis catheter in place. UA does not show signs of a UTI. Nephrology consulted  4. Acute urinary retention: Solis catheter in place  5. Unspecified leukocytosis: Likely inflammatory related to #2. Continue to monitor. Blood cultures if patient has a fever  6. Oropharyngeal dysphagia: Barium swallow showed laryngeal penetration and aspiration. ST following  7. Uncontrolled HTN: on losartan, hydrochlorothiazide, verapamil. Will reassess prior to discharge. Permissive HTN for now  8. Hypokalemia: continue to monitor. Replace prn  9. BPH: finasteride  10. Chronic depression: escitalopram  11. ?DM Type 2: HgbA1C <6.5: patient denies history of diabetes    PLAN     1. Will await recommendations from Cardiology. 2. Nephrology consulted regarding DYAN  3.  Patient to be discharged to inpatient rehab when medically stable    Anticipated Discharge in :2-3 days    Code Status: Full Code    Electronically signed by Berenice Lawrence MD on 12/28/2019 at 1:20 PM      Chief Complaint     Right sided weakness    SUBJECTIVE     The patient is a 78 y.o. male w/ a PMH of hypertension, GERD rubs or gallops. Abdomen: Soft, non-tender, non-distended with normal bowel sounds. Musculoskeletal: RUE weakness definite. Neurologic: RUE weakness. Psychiatric: Lying in bed answers questions appropriately  Vascular: Dorsalis pedis palpable bilaterally. Radial pulses palpable bilaterally. Cool extremities. Skin:  No visible rashes or lesions. Labs     Recent Labs     12/25/19  1725 12/26/19  0352 12/28/19  0422   WBC 7.9 8.3  8.1 12.0*   HGB 12.5* 12.4*  12.4* 13.8*   HCT 36.6* 36.7*  36.5* 40.8*    138  141 158     Recent Labs     12/26/19  0352 12/28/19  0422    143   K 3.4* 3.9    107   CO2 22* 20*   BUN 24* 34*   CREATININE 0.9 3.2*   CALCIUM 9.0 9.2     Recent Labs     12/26/19  0352   AST 23   ALT 18   BILITOT 1.0   ALKPHOS 70     No results for input(s): INR in the last 72 hours. No results for input(s): [place] in the last 72 hours. Urinalysis:      Lab Results   Component Value Date    NITRU NEGATIVE 12/28/2019    WBCUA 0-2 12/28/2019    WBCUA 0-5 06/27/2017    BACTERIA NONE SEEN 12/28/2019    RBCUA 3-5 12/28/2019    BLOODU MODERATE 12/28/2019    SPECGRAV 1.020 12/28/2019    GLUCOSEU NEGATIVE 07/04/2017       Diagnostic imaging/procedures       Cta Head W Wo Contrast    Result Date: 12/24/2019  PROCEDURE: CTA HEAD W WO CONTRAST, CTA NECK W WO CONTRAST CLINICAL INFORMATION: Stroke . COMPARISON: CT head and MR brain from the same date. TECHNIQUE: Helical CT from the aortic arch through the head in arterial phase during intravenous administration of 80 mL Isovue-370 injected in the right forearm with multiplanar reformatted images to include volumetric maximum intensity projection sequences. FINDINGS: CTA HEAD: There are mural calcifications in the cavernous and clinoid segments of the internal carotid arteries with an area of moderate stenosis in the right cavernous segment and the area of mild to moderate stenosis in the left clinoid segment.  Intracranial segments of internal carotid arteries are otherwise patent without flow-limiting stenosis or visualized aneurysm. The bilateral middle cerebral and anterior cerebral arteries are patent without focal abnormality identified. There is long segment mild to moderate stenosis of the basilar artery. There is a focal area of moderate stenosis at the P1 segment of the right posterior cerebral artery and a focal area of severe stenosis at the P2 segment. The left posterior cerebral artery appears patent. There is a focal area of moderate to severe stenosis in the right superior cerebellar artery. The left superior cerebral artery appears patent. The left inferior cerebellar artery is patent. The right inferior cerebral artery is not well visualized. The anterior inferior cerebral arteries are not visualized. CTA NECK: There is a typical 3 vessel arch. The brachiocephalic artery is patent. The proximal left subclavian artery is widely patent. There are areas of mild stenosis more distally in the left subclavian artery. The common carotid arteries are patent without focal stenosis. There is mild calcified and noncalcified mural plaque at the carotid bifurcations without hemodynamically significant stenosis by NASCET criteria. Cervical segments of the internal carotid arteries are patent without focal stenosis. The left vertebral artery is dominant. There is mural calcification in the V4 segment of the left vertebral artery just proximal to the posterior inferior cerebellar artery takeoff with an area of moderate stenosis. Vertebral arteries are otherwise patent without focal stenosis. There is streak artifact from dental amalgam which partially obscures oral and perioral soft tissues. Given this caveat, mucosal surfaces of the aerodigestive tract are symmetric without focal nodular thickening or visualized mass. No cervical lymphadenopathy is identified. The parotid, submandibular and thyroid glands are unremarkable.  The visualized portions of the lungs are clear. There are moderate degenerative changes of the cervical spine. CTA head: 1. Mural calcifications in the cavernous and clinoid segments of the internal carotid arteries with a focal area of moderate stenosis in the right cavernous segment and focal area of mild to moderate stenosis in the left clinoid segment. 2. Long segment of mild to moderate stenosis of the basilar artery. 3. Focal area of moderate stenosis in the P1 segment and focal area of severe stenosis in the P2 segment of the right posterior cerebral artery. 4. Focal area of moderate to severe stenosis in the right superior cerebellar artery. CTA NECK: 1. Mild mural plaque at the carotid bifurcations without hemodynamically significant stenosis by NASCET criteria. 2. Focal area of moderate stenosis in the V4 segment of the dominant left vertebral artery. **This report has been created using voice recognition software. It may contain minor errors which are inherent in voice recognition technology. ** Final report electronically signed by Dr. Josee Ramirez MD on 12/24/2019 1:23 PM    Ct Head Wo Contrast    Result Date: 12/25/2019  PROCEDURE: CT HEAD WO CONTRAST CLINICAL INFORMATION: Recent TPA infusion TECHNIQUE: CT scan of the head was performed from the vertex to the skull base without use of intravenous contrast. Axial images as well as coronal and sagittal reconstructions were obtained. All CT scans at this facility use dose modulation, iterative reconstruction, and/or weight-based dosing when appropriate to reduce radiation dose to as low as reasonably achievable. COMPARISON: CT head 12/24/2019 FINDINGS: There is no mass effect, midline shift or acute hemorrhage. Ventricles and sulci are prominent. Diffuse periventricular white matter hypoattenuation is again noted. There is hypoattenuation of the bilateral basal ganglia are likely secondary to  prior lacunar infarcts.  Cerebrovascular calcifications are noted. Visualized orbits, paranasal sinuses and mastoid air cells are unremarkable. 1. No mass effect or acute hemorrhage. 2. Chronic periventricular small vessel ischemic changes and cerebral atrophy. **This report has been created using voice recognition software. It may contain minor errors which are inherent in voice recognition technology. ** Final report electronically signed by Dr. Zach Aleman on 12/25/2019 6:32 PM    Ct Head Wo Contrast    Result Date: 12/24/2019  PROCEDURE: CT HEAD WO CONTRAST CLINICAL INFORMATION: CVA with worsening neuro deficits. COMPARISON: No prior study. TECHNIQUE: Noncontrast 5 mm axial images were obtained through the brain. All CT scans at this facility use dose modulation, iterative reconstruction, and/or weight-based dosing when appropriate to reduce radiation dose to as low as reasonably achievable. FINDINGS: No acute intracranial findings. Mild generalized volume loss and small vessel ischemic changes. Old lacunar infarctions in the basal ganglia. Vascular calcifications. Ventricles are within normal limits for age. No acute hemorrhage or midline shift. Paranasal sinuses are clear. Mastoid air cells are patent. Skull: Unremarkable. Soft tissues: Unremarkable. No acute intracranial findings. **This report has been created using voice recognition software. It may contain minor errors which are inherent in voice recognition technology. ** Final report electronically signed by Dr. Tesha Knutson on 12/24/2019 5:17 PM    Ct Head Wo Contrast (code Stroke)    Result Date: 12/24/2019  PROCEDURE: CT HEAD WO CONTRAST CLINICAL INFORMATION: stroke like symptoms . COMPARISON: 7/2/2017 TECHNIQUE: 2-D multiplanar noncontrast CT brain All CT scans at this facility use dose modulation, iterative reconstruction, and/or weight-based dosing when appropriate to reduce radiation dose to as low as reasonably achievable. FINDINGS: Generalized cerebral volume loss.  Moderate severity chronic cerebral artery appears patent. The left inferior cerebellar artery is patent. The right inferior cerebral artery is not well visualized. The anterior inferior cerebral arteries are not visualized. CTA NECK: There is a typical 3 vessel arch. The brachiocephalic artery is patent. The proximal left subclavian artery is widely patent. There are areas of mild stenosis more distally in the left subclavian artery. The common carotid arteries are patent without focal stenosis. There is mild calcified and noncalcified mural plaque at the carotid bifurcations without hemodynamically significant stenosis by NASCET criteria. Cervical segments of the internal carotid arteries are patent without focal stenosis. The left vertebral artery is dominant. There is mural calcification in the V4 segment of the left vertebral artery just proximal to the posterior inferior cerebellar artery takeoff with an area of moderate stenosis. Vertebral arteries are otherwise patent without focal stenosis. There is streak artifact from dental amalgam which partially obscures oral and perioral soft tissues. Given this caveat, mucosal surfaces of the aerodigestive tract are symmetric without focal nodular thickening or visualized mass. No cervical lymphadenopathy is identified. The parotid, submandibular and thyroid glands are unremarkable. The visualized portions of the lungs are clear. There are moderate degenerative changes of the cervical spine. CTA head: 1. Mural calcifications in the cavernous and clinoid segments of the internal carotid arteries with a focal area of moderate stenosis in the right cavernous segment and focal area of mild to moderate stenosis in the left clinoid segment. 2. Long segment of mild to moderate stenosis of the basilar artery. 3. Focal area of moderate stenosis in the P1 segment and focal area of severe stenosis in the P2 segment of the right posterior cerebral artery.  4. Focal area of moderate to severe stenosis in the right superior cerebellar artery. CTA NECK: 1. Mild mural plaque at the carotid bifurcations without hemodynamically significant stenosis by NASCET criteria. 2. Focal area of moderate stenosis in the V4 segment of the dominant left vertebral artery. **This report has been created using voice recognition software. It may contain minor errors which are inherent in voice recognition technology. ** Final report electronically signed by Dr. Joseph De MD on 12/24/2019 1:23 PM    Mri Brain Wo Contrast    Result Date: 12/24/2019  PROCEDURE: MRI BRAIN WO CONTRAST INDICATION:  RIght side weakness. Symptoms starting this morning. COMPARISON: CT head from the same date and MR brain dated 7/12/2017. TECHNIQUE: Multiplanar and multiple spin echo MRI images were obtained of the brain without contrast. FINDINGS: There is stable moderate volume loss. There is a small to moderate-sized area of restricted diffusion in the left hemipons with minimal hyperintense T2 signal in a portion of the lesion. No other focal areas of restricted diffusion are present. There are  moderate patchy areas of T2/FLAIR prolongation in the periventricular, subcortical and deep white matter elsewhere, grossly stable compared to prior exam. No intra or extra-axial mass is identified. The major vascular flow voids appear patent. Orbits are unremarkable. There is minimal mucosal thickening in the left maxillary sinus. Mastoid air cells are clear. 1. 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 2. Moderate chronic microvascular angiopathy superimposed on volume loss, grossly similar to prior exam. Findings were discussed with Dr. Aviva Brooks via telephone at the time of interpretation. **This report has been created using voice recognition software. It may contain minor errors which are inherent in voice recognition technology. ** Final report electronically signed by Dr. Joseph De MD on 12/24/2019 11:17 AM    Fl Modified Barium Swallow W Video    Result Date: 12/26/2019  PROCEDURE: FL MODIFIED BARIUM SWALLOW W VIDEO CLINICAL INFORMATION: Dysphasia TECHNIQUE: Fluoroscopy was provided for modified barium swallowing study performed by speech therapy. With the patient in the lateral projection, swallowing mechanism was evaluated using barium of various consistencies. The radiologist was available for the entire examination. 45 clips were obtained. Total fluoroscopy time 3.52 minutes. Speech therapist: Luis Carlos Ochoa COMPARISON: None. FINDINGS: Oral, pharyngeal and esophageal structures appear normal. There is laryngeal penetration of nectar thick and thin barium with aspiration of thin barium. 1. Laryngeal penetration of nectar thick and thin barium with aspiration of thin barium. 2. Additional recommendations from the speech therapist will follow. **This report has been created using voice recognition software. It may contain minor errors which are inherent in voice recognition technology. ** Final report electronically signed by Dr. Hyun Catalan on 12/26/2019 12:20 PM      DVT prophylaxis: [x] Lovenox                                 [] SCDs                                 [] SQ Heparin                                 [] Encourage ambulation           [] Already on Anticoagulation     Disposition:    [x] Inpatient rehab if accepted versus outside nursing facility       [] TCU       [] Rehab       [] Psych       [] SNF       [] [place]       [] Other-

## 2019-12-28 NOTE — PROGRESS NOTES
[address] [address]  PHYSICAL THERAPY MISSED TREATMENT NOTE  ACUTE [facility]ARE  [facility]       Pt was HOLD per nursing request due to EKG changes, PTA asked if pt OK for bed therex only and nurse requested hold off on those as well for today.         Missed Treatment  [facility] PTA [id number]

## 2019-12-28 NOTE — CONSULTS
Physician  Leonard Ibarra MD      Age             78 year(s)   Sonographer          Teja Seo, [facility],                                                     RVT                                   Interpreting         Echo reader of the week                                Physician            Milana Wadsworth MD     Procedure    Type of Study      TTE procedure:ECHOCARDIOGRAM COMPLETE 2D W DOPPLER W COLOR. Procedure Date  Date: 12/27/2019 Start: 09:22 AM    Study Location: Bedside  Technical Quality: Limited visualization due to uncooperative patient. Indications:Stroke. Additional Medical History:hypertension, diabetes    Patient Status: Routine    Contrast Medium: Definity. Amount - 2 ml    Height: 72 inches Weight: 145 pounds BSA: 1.86 m^2 BMI: 19.67 kg/m^2    BP: 169/79 mmHg     Conclusions      Summary   Left Ventricular size is Moderately increased . Normal left ventricular wall thickness. There was severe global hypokinesis of the left ventricle with some   sparing of basal wall motion suggest possible stress induced   cardiomyopathy. Systolic function was severely reduced. Ejection fraction is visually estimated at 25%. Doppler parameters were consistent with abnormal left ventricular   relaxation (grade 1 diastolic dysfunction). The left atrium is Mildly dilated. Signature      ----------------------------------------------------------------   Electronically signed by Milana Wadsworth MD (Interpreting   physician) on 12/27/2019 at 06:58 PM   ----------------------------------------------------------------      Findings      Mitral Valve   The mitral valve structure was normal with normal leaflet separation. DOPPLER: The transmitral velocity was within the normal range with no   evidence for mitral stenosis. There was no evidence of mitral   regurgitation.       Aortic Valve   The aortic valve was trileaflet with normal thickness and cuspal   separation. DOPPLER: Transaortic velocity was within the normal range with   no evidence of aortic stenosis. Mild aortic regurgitation is noted. Tricuspid Valve   The tricuspid valve structure was normal with normal leaflet separation. DOPPLER: There was no evidence of tricuspid stenosis. Mild tricuspid regurgitation visualized. Pulmonic Valve   The pulmonic valve leaflets exhibited normal thickness, no calcification,   and normal cuspal separation. DOPPLER: The transpulmonic velocity was   within the normal range with no evidence for regurgitation. Left Atrium   The left atrium is Mildly dilated. Left Ventricle   Left Ventricular size is Moderately increased . Normal left ventricular wall thickness. There was severe global hypokinesis of the left ventricle with some   sparing of basal wall motion suggest possible stress induced   cardiomyopathy. Systolic function was severely reduced. Ejection fraction is visually estimated at 25%. Doppler parameters were consistent with abnormal left ventricular   relaxation (grade 1 diastolic dysfunction). Right Atrium   Right atrial size was normal.      Right Ventricle   The right ventricular size was normal with normal systolic function and   wall thickness. Pericardial Effusion   The pericardium was normal in appearance with no evidence of a pericardial   effusion. Pleural Effusion   No evidence of pleural effusion. Aorta / Great Vessels   -Aortic root dimension within normal limits.   -The Pulmonary artery is within normal limits. -IVC size is within normal limits with normal respiratory phasic changes.      M-Mode/2D Measurements & Calculations      LV Diastolic    LV Systolic Dimension:    AV Cusp Separation: 2.4 cmLA   Dimension: 4.5  3.9 cm                    Dimension: 3.2 cmAO Root   cm              LV Volume Diastolic:      Dimension: 4 cmLA Area: 17.1   LV FS:13.3 %    135.3 ml cm^2   LV PW           LV Volume Systolic: 94 ml   Diastolic: 0.7  LV EDV/LV EDV Index:   cm              135.3 ml/73 m^2LV ESV/LV   Septum          ESV Index: 94 QS/71 m^2   Diastolic: 1.2  EF Calculated: 30.5 %     LA/Aorta: 0.8   cm                   LV Length: 8.1 cm         LA volume/Index: 49.3 ml /27m^2      LV Area   Diastolic: 30.2   cm^2   LV Area   Systolic: 12.5   cm^2     Doppler Measurements & Calculations      MV Peak E-Wave: 52.8 cm/s AV Peak Velocity: 102  LVOT Peak Velocity: 76.4   MV Peak A-Wave: 124 cm/s  cm/s                   cm/s   MV E/A Ratio: 0.43        AV Peak Gradient: 4.16 LVOT Peak Gradient: 2   MV Peak Gradient: 1.12    mmHg                   mmHg   mmHg                                                    TV Peak E-Wave: 38.6 cm/s   MV Deceleration Time: 173                        TV Peak A-Wave: 31.1 cm/s   msec   MV P1/2t: 51 msec         AV P1/2t: 649 msec     TV Peak Gradient: 0.6   MVA by PHT:4.31 cm^2                             mmHg                                                    TR Velocity:250 cm/s   MV E' Septal Velocity:                           TR Gradient:25 mmHg   3.7 cm/s                  AV DVI (Vmax):0.75   MV A' Septal Velocity:   10.8 cm/s   MV E' Lateral Velocity:   2.7 cm/s   MV A' Lateral Velocity:   10 cm/s   E/E' septal: 14.27   E/E' lateral: 19.56     [id number]        Past Medical History:    Past Medical History:   Diagnosis Date    Anxiety     Depression     Diabetes mellitus (HCC)     GERD (gastroesophageal reflux disease)     Hyperglycemia     Hypertension        Past Surgical History:    Past Surgical History:   Procedure Laterality Date    ENDOSCOPY, COLON, DIAGNOSTIC      OTHER SURGICAL HISTORY  1970    right thumb surgery     OK REPAIR ING HERNIA,5+Y/O,REDUCIBL Right 8/23/2018    RIGHT INGUINAL HERNIA father. REVIEW OF SYSTEMS:  Constitutional: negative for anorexia, chills and fevers,weight change  Skin: negative for new skin rash per patient  HEENT: negative for head trauma or new visual changes  Respiratory: negative for cough, dyspnea on exertion, hemoptysis, shortness of breath and wheezing  Cardiovascular: negative for  orthopnea, palpitations and syncope. Gastrointestinal: negative for abdominal pain,nausea , vomiting, constipation, diarrhea. Hematologic/lymphatic: negative for bruising,prolonged bleeding,blood clots  Musculoskeletal:negative for muscle weakness, myalgias,wasting  Neurological: positive for hemiplegia, speech abnormality   Behavioral/Psych:negative for mood/sleep disturbance      PHYSICAL EXAM:   Vitals:  Patient Vitals for the past 24 hrs:   BP Temp Temp src Pulse Resp SpO2 Weight   12/28/19 1351 -- -- -- -- -- 100 % --   12/28/19 1147 114/67 97.4 °F (36.3 °C) Oral 62 16 100 % --   12/28/19 0755 (!) 105/59 97.5 °F (36.4 °C) Oral 63 16 95 % --   12/28/19 0316 119/65 98.4 °F (36.9 °C) Oral 68 18 95 % 150 lb 9.6 oz (68.3 kg)   12/28/19 0316 -- -- -- 68 -- -- --   12/27/19 2314 118/61 -- -- 67 -- 97 % --   12/27/19 2314 -- -- -- 67 -- -- --   12/27/19 2105 (!) 147/85 98.4 °F (36.9 °C) Oral 75 18 100 % --   12/27/19 1545 128/79 98.3 °F (36.8 °C) Oral 71 16 98 % --       Last 3 weights: Wt Readings from Last 3 Encounters:   12/28/19 150 lb 9.6 oz (68.3 kg)   11/06/19 153 lb 9.6 oz (69.7 kg)   06/11/19 157 lb (71.2 kg)     24 hour intake/output:    Intake/Output Summary (Last 24 hours) at 12/28/2019 1538  Last data filed at 12/28/2019 1516  Gross per 24 hour   Intake 842.35 ml   Output 1200 ml   Net -357.65 ml     BMI:Body mass index is 20.43 kg/m².     General Appearance: abnormal speech, alert and oriented to person, place and time, well developed and well- nourished, in no acute distress  Skin: warm and dry, no rash or erythema  Eyes: pupils equal, round, and reactive to light, extraocular eye movements intact, conjunctivae normal  Neck: supple and non-tender without mass, no thyromegaly or thyroid nodules, no cervical lymphadenopathy  Pulmonary/Chest: clear to auscultation bilaterally- no wheezes, rales or rhonchi, normal air movement, no respiratory distress  Cardiovascular: normal rate, regular rhythm, normal S1 and S2, II/VI systoluc murmur. No rubs, clicks, or gallops, distal pulses intact, no carotid bruits, Negative JVD  Radial Pulses: intact 2+  Abdomen: soft, non-tender, non-distended, normal bowel sounds, no masses or organomegaly  Extremities: no cyanosis, clubbing . no Edema. Right sided weakness   Musculoskeletal: normal range of motion, no joint swelling, deformity or tenderness    RADIOLOGY   No results found.     LABS:  Recent Labs     12/28/19  0918   TROPONINT 0.436*     CBC:   Lab Results   Component Value Date    WBC 12.0 12/28/2019    RBC 4.31 12/28/2019    HGB 13.8 12/28/2019    HCT 40.8 12/28/2019    MCV 94.7 12/28/2019    MCH 32.0 12/28/2019    MCHC 33.8 12/28/2019    RDW 12.6 11/04/2019     12/28/2019    MPV 11.7 12/28/2019     BMP:    Lab Results   Component Value Date     12/28/2019    K 3.7 12/28/2019     12/28/2019    CO2 15 12/28/2019    BUN 37 12/28/2019    LABALBU 3.6 12/26/2019    CREATININE 3.0 12/28/2019    CALCIUM 9.0 12/28/2019    LABGLOM 20 12/28/2019    GLUCOSE 133 12/28/2019    GLUCOSE 122 11/04/2019     Hepatic Function Panel:    Lab Results   Component Value Date    ALKPHOS 70 12/26/2019    ALT 18 12/26/2019    AST 23 12/26/2019    PROT 5.9 12/26/2019    BILITOT 1.0 12/26/2019    BILIDIR <0.2 07/13/2017    LABALBU 3.6 12/26/2019     Magnesium:    Lab Results   Component Value Date    MG 1.8 12/26/2019     Warfarin PT/INR:  No components found for: PTPATWAR, PTINRWAR  HgBA1c:    Lab Results   Component Value Date    LABA1C 5.3 12/26/2019     FLP:    Lab Results   Component Value Date    TRIG 107 12/26/2019    HDL 33 12/26/2019 daily, titrate dose up if BP allows   - monitor I/O, volume status  - Telemetry     Above findings and plan of care were discussed with patient. Thank you for allowing me to participate in the care of this patient. Please let me know if I can be of any further assistance.     Td Barnes MD   3:38 PM  12/28/2019

## 2019-12-28 NOTE — PROGRESS NOTES
[address] [address]  PHYSICAL THERAPY MISSED TREATMENT NOTE  ACUTE CARE  [facility]              Missed Treatment  Pt. On hold per RN until Cardio sees due to abnormal EKG. Will check back later this date as time allows.

## 2019-12-28 NOTE — PROGRESS NOTES
joint tenderness. Leg edema none  Skin: no lesions, no rash, warm and moist to touch. Abdomen is soft intact bowel sounds. ROS:    Cardiac: no chest pain. No palpitations. Renal : no flank pain, no hematuria  Skin: no rash    Medications:     enoxaparin  30 mg Subcutaneous Q24H    verapamil  240 mg Oral Daily    [Held by provider] losartan  100 mg Oral Daily    And    [Held by provider] hydrochlorothiazide  25 mg Oral Daily    aspirin  81 mg Oral Daily    clopidogrel  75 mg Oral Daily    sodium chloride flush  10 mL Intravenous 2 times per day    sodium chloride  50 mL Intravenous Once    atorvastatin  80 mg Oral Nightly    escitalopram  20 mg Oral Daily    finasteride  5 mg Oral Daily    pantoprazole  40 mg Oral QAM AC       Data:   CBC:   Recent Labs     12/25/19  1725 12/26/19  0352 12/28/19  0422   WBC 7.9 8.3  8.1 12.0*   HGB 12.5* 12.4*  12.4* 13.8*    138  141 158     BMP:    Recent Labs     12/26/19  0352 12/28/19  0422 12/28/19  0918    143 148*   K 3.4* 3.9 3.7    107 112*   CO2 22* 20* 15*   BUN 24* 34* 37*   CREATININE 0.9 3.2* 3.0*   GLUCOSE 84 138* 133*     Reviewed   Results for orders placed during the hospital encounter of 12/24/19   CTA HEAD W WO CONTRAST    Narrative PROCEDURE: CTA HEAD W WO CONTRAST, CTA NECK W WO CONTRAST    CLINICAL INFORMATION: Stroke . COMPARISON: CT head and MR brain from the same date. TECHNIQUE: Helical CT from the aortic arch through the head in arterial phase during intravenous administration of 80 mL Isovue-370 injected in the right forearm with multiplanar reformatted images to include volumetric maximum intensity projection   sequences. FINDINGS:     CTA HEAD:  There are mural calcifications in the cavernous and clinoid segments of the internal carotid arteries with an area of moderate stenosis in the right cavernous segment and the area of mild to moderate stenosis in the left clinoid segment.  Intracranial   segments of internal carotid arteries are otherwise patent without flow-limiting stenosis or visualized aneurysm. The bilateral middle cerebral and anterior cerebral arteries are patent without focal abnormality identified. There is long segment mild to   moderate stenosis of the basilar artery. There is a focal area of moderate stenosis at the P1 segment of the right posterior cerebral artery and a focal area of severe stenosis at the P2 segment. The left posterior cerebral artery appears patent. There   is a focal area of moderate to severe stenosis in the right superior cerebellar artery. The left superior cerebral artery appears patent. The left inferior cerebellar artery is patent. The right inferior cerebral artery is not well visualized. The   anterior inferior cerebral arteries are not visualized. CTA NECK:  There is a typical 3 vessel arch. The brachiocephalic artery is patent. The proximal left subclavian artery is widely patent. There are areas of mild stenosis more distally in the left subclavian artery. The common carotid arteries are patent without   focal stenosis. There is mild calcified and noncalcified mural plaque at the carotid bifurcations without hemodynamically significant stenosis by NASCET criteria. Cervical segments of the internal carotid arteries are patent without focal stenosis. The   left vertebral artery is dominant. There is mural calcification in the V4 segment of the left vertebral artery just proximal to the posterior inferior cerebellar artery takeoff with an area of moderate stenosis. Vertebral arteries are otherwise patent   without focal stenosis. There is streak artifact from dental amalgam which partially obscures oral and perioral soft tissues. Given this caveat, mucosal surfaces of the aerodigestive tract are symmetric without focal nodular thickening or visualized mass. No cervical   lymphadenopathy is identified.  The parotid, submandibular and thyroid glands are unremarkable. The visualized portions of the lungs are clear. There are moderate degenerative changes of the cervical spine. Impression  CTA head:  1. Mural calcifications in the cavernous and clinoid segments of the internal carotid arteries with a focal area of moderate stenosis in the right cavernous segment and focal area of mild to moderate stenosis in the left clinoid segment. 2. Long segment of mild to moderate stenosis of the basilar artery. 3. Focal area of moderate stenosis in the P1 segment and focal area of severe stenosis in the P2 segment of the right posterior cerebral artery. 4. Focal area of moderate to severe stenosis in the right superior cerebellar artery. CTA NECK:  1. Mild mural plaque at the carotid bifurcations without hemodynamically significant stenosis by NASCET criteria. 2. Focal area of moderate stenosis in the V4 segment of the dominant left vertebral artery. **This report has been created using voice recognition software. It may contain minor errors which are inherent in voice recognition technology. **    Final report electronically signed by Dr. Gabriel Gill MD on 12/24/2019 1:23 PM     Results for orders placed during the hospital encounter of 12/24/19   CTA NECK W WO CONTRAST    Narrative PROCEDURE: CTA HEAD W WO CONTRAST, CTA [address] INFORMATION: Stroke . COMPARISON: CT head and MR brain from the same date. TECHNIQUE: Helical CT from the aortic arch through the head in arterial phase during intravenous administration of 80 mL Isovue-370 injected in the right forearm with multiplanar reformatted images to include volumetric maximum intensity projection   sequences. FINDINGS:     CTA HEAD:  There are mural calcifications in the cavernous and clinoid segments of the internal carotid arteries with an area of moderate stenosis in the right cavernous segment and the area of mild to moderate stenosis in the left clinoid segment. Intracranial   segments of internal carotid arteries are otherwise patent without flow-limiting stenosis or visualized aneurysm. The bilateral middle cerebral and anterior cerebral arteries are patent without focal abnormality identified. There is long segment mild to   moderate stenosis of the basilar artery. There is a focal area of moderate stenosis at the P1 segment of the right posterior cerebral artery and a focal area of severe stenosis at the P2 segment. The left posterior cerebral artery appears patent. There   is a focal area of moderate to severe stenosis in the right superior cerebellar artery. The left superior cerebral artery appears patent. The left inferior cerebellar artery is patent. The right inferior cerebral artery is not well visualized. The   anterior inferior cerebral arteries are not visualized. CTA NECK:  There is a typical 3 vessel arch. The brachiocephalic artery is patent. The proximal left subclavian artery is widely patent. There are areas of mild stenosis more distally in the left subclavian artery. The common carotid arteries are patent without   focal stenosis. There is mild calcified and noncalcified mural plaque at the carotid bifurcations without hemodynamically significant stenosis by NASCET criteria. Cervical segments of the internal carotid arteries are patent without focal stenosis. The   left vertebral artery is dominant. There is mural calcification in the V4 segment of the left vertebral artery just proximal to the posterior inferior cerebellar artery takeoff with an area of moderate stenosis. Vertebral arteries are otherwise patent   without focal stenosis. There is streak artifact from dental amalgam which partially obscures oral and perioral soft tissues. Given this caveat, mucosal surfaces of the aerodigestive tract are symmetric without focal nodular thickening or visualized mass. No cervical   lymphadenopathy is identified.  The parotid, submandibular and cerebral atrophy. **This report has been created using voice recognition software. It may contain minor errors which are inherent in voice recognition technology. **    Final report electronically signed by Dr. Ean Royal on 12/25/2019 6:32 PM     Cardiac echo:12/27/2019   Conclusions      Summary   Left Ventricular size is Moderately increased . Normal left ventricular wall thickness. There was severe global hypokinesis of the left ventricle with some   sparing of basal wall motion suggest possible stress induced   cardiomyopathy. Systolic function was severely reduced. Ejection fraction is visually estimated at 25%. Doppler parameters were consistent with abnormal left ventricular   relaxation (grade 1 diastolic dysfunction). The left atrium is Mildly dilated. Signature    Assessment:  Principal Problem:    Left pontine stroke ([address]  Active Problems:    Uncontrolled hypertension    Dysphagia    Acute urinary retention    Type 2 diabetes mellitus treated without insulin (HCC)    Right hemiparesis (HCC)    Dysarthria    Hypokalemia    Chronic depression    Abnormal EKG    Elevated troponin    Acute kidney injury ([address]    Leukocytosis  Resolved Problems:    * No resolved hospital problems. *  This is a 70-year-old male status post left pontine ischemic stroke with resultant right hemiparesis dysarthric speech, symptoms not much improved after TPA. He denies headache. He has been maintained on permissive hypertension with systolic blood pressure target above 150. The patient was found to have a low ejection fraction, renal functions also show renal insufficiency in addition to elevated cardiac enzymes. He is pending an evaluation with nephrology, and cardiology. He has been started on dual antiplatelets, with the intent of maintaining back for at least 90 days and then changing him to aspirin.   He has been started on thickened food, as he is at risk of aspiration following swallow eval. He does have evidence of intra-and extracranial vascular stenosis that will be addressed electively. Plan:    1. Maintain on dual antiplatelets for 90 days  2. Lipid-lowering medication  3. Modify risk factors for stroke (blood pressure, cholesterol, diabetes, smoking cessation etc.. Control)   4. Physical therapy  5. Occupational Therapy  6. Speech therapy  7. DVT prophylaxis  8. Cardiac echo noted, cardiology consult pending. 9. Discussed with primary service. 8. All family questions were answered.      Deena Duran MD, 12/28/2019 2:56 PM

## 2019-12-28 NOTE — CONSULTS
Nephrology Consult Note  Patient's Name: Tim Ott  6:19 PM  12/28/2019    Nephrologist: Weekly    Reason for Consult: Acute kidney injury  Requesting Physician:  Toma Curtis MD  PCP: Miguelina Parry MD    Chief Complaint: None  Assessment  1. Acute kidney injury likely multifactorial resulting from combination of renal hypoperfusion from hypotension, recent diuretic and use of nonsteroidal anti-inflammatory medications. This may be further compounded by multiple intravenous contrast studies. Possibility of obstructive uropathy is considered because of his advanced age  3. Cardiomyopathy with low ejection fraction  3. Hypernatremia  4. Metabolic acidosis from acute kidney injury  5. Normocytic anemia  6. Moderate right carotid artery stenosis  7. Moderate stenosis of the basilar artery  8. Severe stenosis of the right posterior cerebral artery  9. Moderate to severe stenosis in the right superior cerebellar artery  10. Moderate stenosis of the left vertebral artery  11. Acute left cerebrovascular accident    Plan    1. I tried to discuss my thought processes with the patient but uncertain how much he understood as he appeared to be very sleepy. 2. Labs reviewed  3. MRI of the brain report reviewed  4. CT scan of the head report reviewed  5. CTA of the neck report reviewed  6. CTA of the head report reviewed   7. Echocardiogram report reviewed  8. Medications reviewed  9. Decrease metoprolol succinate from 100 mg a day to 50 mg a day as blood pressure has been on the low end of normal.  10. D5W with 100 mEq of sodium bicarbonate at 40 mL/h. This will help with both acidosis and hypernatremia  11. Kidney ultrasound  12. Labs in the morning  13.  We will follow      History Obtained From: Staff and medical record  History of Present Ilness:    Tim Ott is a 78 y.o. male with history of diabetes mellitus type 2, gastroesophageal reflux disease, chronic anxiety, mental depression, hypertension who was admitted through the emergency department after the patient presented there with the symptoms of possible cerebrovascular accident. CAT scan of the head were unremarkable. MRI of the brain confirmed  left cerebrovascular accident. Admitted for evaluation and management. Serum creatinine was 1.1 mg/dL on admission. Yesterday it was 0.9 mg/dL. However today it went up to 3.2 mg/dL. We thought that that may be an error. A repeat came back at 3.0 mg/dL. As a result we were asked to see him. Very sleepy. Unable to obtain history. History is from the staff and medical record. Past Medical History:   Diagnosis Date    Anxiety     Depression     Diabetes mellitus ([facility] [address]     GERD (gastroesophageal reflux disease)     Hyperglycemia     Hypertension        Past Surgical History:   Procedure Laterality Date    ENDOSCOPY, COLON, DIAGNOSTIC      OTHER SURGICAL HISTORY  1970    right thumb surgery     UT REPAIR ING HERNIA,5+Y/O,REDUCIBL Right 8/23/2018    RIGHT INGUINAL HERNIA REPAIR WITH MESH performed by Masoud Rose MD at [address] [facility] HERNIA,5+Y/O,REDUCIBL Left 11/1/2018    LEFT HERNIA INGUINAL REPAIR WITH MESH performed by Masoud Rose MD at [facility]  2017    EGD       Family History   Problem Relation Age of Onset    Alzheimer's Disease Mother     Stroke Father     Heart Attack Brother     Colon Cancer Neg Hx         reports that he has never smoked. He has never used smokeless tobacco. He reports that he does not drink alcohol or use drugs. Allergies:  Patient has no known allergies.     Current Medications:    0.9 % sodium chloride infusion, Continuous  enoxaparin (LOVENOX) injection 30 mg, Q24H  heparin 25,000 units in dextrose 5% 250 mL infusion, Continuous  [START ON 12/29/2019] metoprolol succinate (TOPROL XL) extended release tablet 100 mg, Daily  acetaminophen (TYLENOL) tablet 650 mg, Q4H PRN  perflutren lipid microspheres

## 2019-12-28 NOTE — PLAN OF CARE
Problem: HEMODYNAMIC STATUS  Goal: Patient has stable vital signs and fluid balance  Outcome: Ongoing  Note:   Patient SR on telemetry monitor. No changes in cardiac rhythm noted with each tele strip reading. No evidence of DVT this shift. DVT prophylaxis in place- patient has Lovenox. Patient denies chest pain or shortness of breath with assessments. Problem: ACTIVITY INTOLERANCE/IMPAIRED MOBILITY  Goal: Mobility/activity is maintained at optimum level for patient  Outcome: Ongoing  Note:   PT/OT/Speech ordered. On hold this shift due to EKG changes. Problem: COMMUNICATION IMPAIRMENT  Goal: Ability to express needs and understand communication  Outcome: Ongoing  Note:   Alert/oriented to person, place, time. Dysarthria noted. Able to express needs and concern.s     Problem: Bleeding:  Goal: Will show no signs and symptoms of excessive bleeding  Description  Will show no signs and symptoms of excessive bleeding  Outcome: Met This Shift     Problem: Falls - Risk of:  Goal: Will remain free from falls  Description  Will remain free from falls  Outcome: Ongoing  Note:   Call light in reach, bed in lowest position, and bed alarm activated. Education given on use of call light before ambulation and when in need of assistance. Patient expressed understanding. Hourly visual checks performed and charted. Toileting offered to patient. No falls this shift, at any time. Arm band and falling star in place. Will continue to monitor. Problem: Falls - Risk of:  Goal: Absence of physical injury  Description  Absence of physical injury  Outcome: Met This Shift     Problem: Discharge Planning:  Goal: Discharged to appropriate level of care  Description  Discharged to appropriate level of care  Outcome: Ongoing  Note:   Discharge planning in process and discussed with patient/family. Social work consulted for any additional needs. Care manager aware of discharge needs.         Problem: Aspiration:  Goal:

## 2019-12-29 NOTE — PROGRESS NOTES
Heparin Consult  Lab Results   Component Value Date    APTT 49.1 12/29/2019     Lab Results   Component Value Date    HGB 12.6 12/29/2019    HCT 36.1 12/29/2019     12/29/2019       Current Rate: 17 units/kg/hr    Plan:  Rate: Increase to 20 units/kg/hr  Next aPTT: 12/29/19 @ 2500 SKelli Muoñz PharmD, BCPS  12/29/2019  3:43 PM

## 2019-12-29 NOTE — PROGRESS NOTES
escitalopram  12. Thrombocytopenia: continue to monitor while on heparin drip  13. Incidental finding of distended gallbladder with gallstones on renal US  14. ?DM Type 2: HgbA1C <6.5: patient denies history of diabetes. May have been prediabetic in the past    PLAN     1. Will likely wait 48 hours before patient has cath to make sure he does not have a secondary hemorrhagic stroke- discussed with Dr. Ginny Gallegos and Dr. Denis Choi. Per Dr. Denis Choi no need for additional imaging,  unless patient develops neurological deficits while on heparin drip. 2. Nephrology managing CVA  3. Plan is for patient to be discharged to inpatient rehab when medically stable    Anticipated Discharge in :2-3 days    Code Status: Full Code    Electronically signed by Benji Cedeno MD on 12/29/2019 at 12:53 PM      Chief Complaint     Right sided weakness    SUBJECTIVE     The patient is a 78 y.o. male w/ a PMH of hypertension, GERD depression and anxiety  With last known normal at 220 on 12//23 who was found sitting on the floor around 0630 on 12/24/2019 by his wife who heard him fall. She reported drooling out of the right side of his mouth along with slurred speech and right sided weakness. He was evaluated by Dr. Melanie Childress in the ED and tPA was given. He was initially admitted to the ICU and I assumed care on 12/27/2019.    -  No acute issues. Tolerating heparin drip with no further neurological deficits.       OBJECTIVE     Medications:  Reviewed    Infusion Medications    dextrose 40 mL/hr at 12/29/19 1053    dextrose      heparin (porcine) 17 Units/kg/hr (12/29/19 1038)     Scheduled Medications    insulin lispro  0-12 Units Subcutaneous TID WC    insulin lispro  0-6 Units Subcutaneous Nightly    metoprolol succinate  50 mg Oral Daily    [Held by provider] losartan  100 mg Oral Daily    And    [Held by provider] hydrochlorothiazide  25 mg Oral Daily    aspirin  81 mg Oral Daily    clopidogrel  75 mg Oral Daily    sodium chloride flush  10 mL Intravenous 2 times per day    sodium chloride  50 mL Intravenous Once    atorvastatin  80 mg Oral Nightly    escitalopram  20 mg Oral Daily    finasteride  5 mg Oral Daily    pantoprazole  40 mg Oral QAM AC     PRN Meds: glucose, dextrose, glucagon (rDNA), dextrose, acetaminophen, perflutren lipid microspheres, sodium chloride flush, magnesium hydroxide, ondansetron    Ins and outs:      Intake/Output Summary (Last 24 hours) at 12/29/2019 1253  Last data filed at 12/29/2019 0426  Gross per 24 hour   Intake 1312.35 ml   Output 1575 ml   Net -262.65 ml       Physical Examination     /75   Pulse 68   Temp 98.3 °F (36.8 °C) (Oral)   Resp 16   Ht 6' (1.829 m)   Wt 150 lb 9.6 oz (68.3 kg)   SpO2 100%   BMI 20.43 kg/m²     General appearance: Lying in bed. Responsive to questions. Surrounded by family members at bedside . More alert today  HEENT: Extraocular motion intact. Neck: Supple. Trachea midline  Respiratory:  Decreased breath sounds at bilateral lung lobes otherwise CTA. Cardiovascular: RRR with normal S1/S2 without murmurs, rubs or gallops. Abdomen: Soft, non-tender, non-distended with normal bowel sounds. Musculoskeletal: RUE weakness definite. Neurologic: RUE weakness. Psychiatric: Lying in bed answers questions appropriately  Vascular: Dorsalis pedis palpable bilaterally. Radial pulses palpable bilaterally. Cool extremities improved  Skin:  No visible rashes or lesions. Labs     Recent Labs     12/28/19  0422 12/28/19  1655 12/29/19  0725   WBC 12.0* 8.5 8.9   HGB 13.8* 12.2* 12.6*   HCT 40.8* 35.2* 36.1*    145 124*     Recent Labs     12/28/19  0422 12/28/19  0918 12/29/19  0725    148* 149*   K 3.9 3.7 3.4*    112* 112*   CO2 20* 15* 23   BUN 34* 37* 30*   CREATININE 3.2* 3.0* 1.2   CALCIUM 9.2 9.0 9.0     No results for input(s): AST, ALT, BILIDIR, BILITOT, ALKPHOS in the last 72 hours.   No results for input(s): INR in the last 72 a typical 3 vessel arch. The brachiocephalic artery is patent. The proximal left subclavian artery is widely patent. There are areas of mild stenosis more distally in the left subclavian artery. The common carotid arteries are patent without focal stenosis. There is mild calcified and noncalcified mural plaque at the carotid bifurcations without hemodynamically significant stenosis by NASCET criteria. Cervical segments of the internal carotid arteries are patent without focal stenosis. The left vertebral artery is dominant. There is mural calcification in the V4 segment of the left vertebral artery just proximal to the posterior inferior cerebellar artery takeoff with an area of moderate stenosis. Vertebral arteries are otherwise patent without focal stenosis. There is streak artifact from dental amalgam which partially obscures oral and perioral soft tissues. Given this caveat, mucosal surfaces of the aerodigestive tract are symmetric without focal nodular thickening or visualized mass. No cervical lymphadenopathy is identified. The parotid, submandibular and thyroid glands are unremarkable. The visualized portions of the lungs are clear. There are moderate degenerative changes of the cervical spine. CTA head: 1. Mural calcifications in the cavernous and clinoid segments of the internal carotid arteries with a focal area of moderate stenosis in the right cavernous segment and focal area of mild to moderate stenosis in the left clinoid segment. 2. Long segment of mild to moderate stenosis of the basilar artery. 3. Focal area of moderate stenosis in the P1 segment and focal area of severe stenosis in the P2 segment of the right posterior cerebral artery. 4. Focal area of moderate to severe stenosis in the right superior cerebellar artery. CTA NECK: 1. Mild mural plaque at the carotid bifurcations without hemodynamically significant stenosis by NASCET criteria.  2. Focal area of moderate stenosis in the V4 segment of the dominant left vertebral artery. **This report has been created using voice recognition software. It may contain minor errors which are inherent in voice recognition technology. ** Final report electronically signed by Dr. Aster Sosa MD on 12/24/2019 1:23 PM    Ct Head Wo Contrast    Result Date: 12/25/2019  PROCEDURE: CT HEAD WO CONTRAST CLINICAL INFORMATION: Recent TPA infusion TECHNIQUE: CT scan of the head was performed from the vertex to the skull base without use of intravenous contrast. Axial images as well as coronal and sagittal reconstructions were obtained. All CT scans at this facility use dose modulation, iterative reconstruction, and/or weight-based dosing when appropriate to reduce radiation dose to as low as reasonably achievable. COMPARISON: CT head 12/24/2019 FINDINGS: There is no mass effect, midline shift or acute hemorrhage. Ventricles and sulci are prominent. Diffuse periventricular white matter hypoattenuation is again noted. There is hypoattenuation of the bilateral basal ganglia are likely secondary to  prior lacunar infarcts. Cerebrovascular calcifications are noted. Visualized orbits, paranasal sinuses and mastoid air cells are unremarkable. 1. No mass effect or acute hemorrhage. 2. Chronic periventricular small vessel ischemic changes and cerebral atrophy. **This report has been created using voice recognition software. It may contain minor errors which are inherent in voice recognition technology. ** Final report electronically signed by Dr. Jimi Hunter on 12/25/2019 6:32 PM    Ct Head Wo Contrast    Result Date: 12/24/2019  PROCEDURE: CT HEAD WO CONTRAST CLINICAL INFORMATION: CVA with worsening neuro deficits. COMPARISON: No prior study. TECHNIQUE: Noncontrast 5 mm axial images were obtained through the brain.  All CT scans at this facility use dose modulation, iterative reconstruction, and/or weight-based dosing when appropriate to reduce radiation dose to as low as aortic arch through the head in arterial phase during intravenous administration of 80 mL Isovue-370 injected in the right forearm with multiplanar reformatted images to include volumetric maximum intensity projection sequences. FINDINGS: CTA HEAD: There are mural calcifications in the cavernous and clinoid segments of the internal carotid arteries with an area of moderate stenosis in the right cavernous segment and the area of mild to moderate stenosis in the left clinoid segment. Intracranial segments of internal carotid arteries are otherwise patent without flow-limiting stenosis or visualized aneurysm. The bilateral middle cerebral and anterior cerebral arteries are patent without focal abnormality identified. There is long segment mild to moderate stenosis of the basilar artery. There is a focal area of moderate stenosis at the P1 segment of the right posterior cerebral artery and a focal area of severe stenosis at the P2 segment. The left posterior cerebral artery appears patent. There is a focal area of moderate to severe stenosis in the right superior cerebellar artery. The left superior cerebral artery appears patent. The left inferior cerebellar artery is patent. The right inferior cerebral artery is not well visualized. The anterior inferior cerebral arteries are not visualized. CTA NECK: There is a typical 3 vessel arch. The brachiocephalic artery is patent. The proximal left subclavian artery is widely patent. There are areas of mild stenosis more distally in the left subclavian artery. The common carotid arteries are patent without focal stenosis. There is mild calcified and noncalcified mural plaque at the carotid bifurcations without hemodynamically significant stenosis by NASCET criteria. Cervical segments of the internal carotid arteries are patent without focal stenosis. The left vertebral artery is dominant.  There is mural calcification in the V4 segment of the left vertebral artery just proximal to the posterior inferior cerebellar artery takeoff with an area of moderate stenosis. Vertebral arteries are otherwise patent without focal stenosis. There is streak artifact from dental amalgam which partially obscures oral and perioral soft tissues. Given this caveat, mucosal surfaces of the aerodigestive tract are symmetric without focal nodular thickening or visualized mass. No cervical lymphadenopathy is identified. The parotid, submandibular and thyroid glands are unremarkable. The visualized portions of the lungs are clear. There are moderate degenerative changes of the cervical spine. CTA head: 1. Mural calcifications in the cavernous and clinoid segments of the internal carotid arteries with a focal area of moderate stenosis in the right cavernous segment and focal area of mild to moderate stenosis in the left clinoid segment. 2. Long segment of mild to moderate stenosis of the basilar artery. 3. Focal area of moderate stenosis in the P1 segment and focal area of severe stenosis in the P2 segment of the right posterior cerebral artery. 4. Focal area of moderate to severe stenosis in the right superior cerebellar artery. CTA NECK: 1. Mild mural plaque at the carotid bifurcations without hemodynamically significant stenosis by NASCET criteria. 2. Focal area of moderate stenosis in the V4 segment of the dominant left vertebral artery. **This report has been created using voice recognition software. It may contain minor errors which are inherent in voice recognition technology. ** Final report electronically signed by Dr. Je Campbell MD on 12/24/2019 1:23 PM    Us Renal Complete    Result Date: 12/29/2019  PROCEDURE: US RENAL COMPLETE CLINICAL INFORMATION: acute kidney injury. COMPARISON: No prior study. TECHNIQUE:Real-time and color flow ultrasound of the kidneys and bladder were performed. FINDINGS: Kidneys: Morphology: Normal size and shape.  The kidneys are echogenic consistent with nonspecific chronic medical renal disease. Mass/cyst: There is a 2 x 1.5 x 1.9 cm simple cyst of the posterior upper pole the right kidney. Hydronephrosis: none Calculi: none RIGHT KIDNEY - 9.6 x 4.7 x 3.9 cm Resistive Index - 0.66 Cortical Thickness - 1.1 cm LEFT KIDNEY - 11.9 x 6.8 x 5.2 cm Resistive Index - 0.73 Cortical Thickness - 1.5 cm URINARY BLADDER-there is a Solis catheter in the bladder. There is no obvious bladder wall thickening or mass. There is possible debris in the dependent portion of the bladder versus artifact related to the Solis catheter balloon. Prostate: Prostate is enlarged measuring 5.2 x 3.9 x 3.8 cm with a volume of 41 cc. Gallbladder: Gallbladder is mildly distended and contains multiple gallstones. No hydronephrosis. Echogenic kidneys consistent with nonspecific chronic medical renal disease. 2 cm cyst upper pole right kidney. Elevated left renal resistive index. Enlarged prostate. Mildly distended gallbladder containing multiple stones. Possible debris in the bladder. **This report has been created using voice recognition software. It may contain minor errors which are inherent in voice recognition technology. ** Final report electronically signed by Dr. Dave Howard on 12/29/2019 1:58 AM    Mri Brain Wo Contrast    Result Date: 12/24/2019  PROCEDURE: MRI BRAIN WO CONTRAST INDICATION:  RIght side weakness. Symptoms starting this morning. COMPARISON: CT head from the same date and MR brain dated 7/12/2017. TECHNIQUE: Multiplanar and multiple spin echo MRI images were obtained of the brain without contrast. FINDINGS: There is stable moderate volume loss. There is a small to moderate-sized area of restricted diffusion in the left hemipons with minimal hyperintense T2 signal in a portion of the lesion. No other focal areas of restricted diffusion are present.  There are  moderate patchy areas of T2/FLAIR prolongation in the periventricular, subcortical and deep white matter elsewhere, grossly Encourage ambulation           [] Already on Anticoagulation     Disposition:    [x] Inpatient rehab if accepted versus outside nursing facility       [] TCU       [] Rehab       [] Psych       [] SNF       [] [place]       [] Other-

## 2019-12-29 NOTE — PROGRESS NOTES
Heparin Consult  Lab Results   Component Value Date    APTT 34.7 12/28/2019     Lab Results   Component Value Date    HGB 12.2 12/28/2019    HCT 35.2 12/28/2019     12/28/2019       Current Rate: 12 units/kg/hr    Plan:  Bolus: NONE  Rate: Increase drip to 14 units/kg/hr  Next aPTT: Colin Serrano RP 12/29/2019 12:21 AM

## 2019-12-29 NOTE — PROGRESS NOTES
[address]  Occupational Therapy  Daily Note  Time:   Time In: 1559  Time Out: 0969  Timed Code Treatment Minutes: 21 Minutes  Minutes: 21          Date: 2019  Patient Name: Kandace Davalos,   Gender: male      Room: -15/015-A  MRN: [id number]  : 1940  (78 y.o.)  Referring Practitioner: LYNDON HEDRICK CNP  Diagnosis: acute CVA  Additional Pertinent Hx: Per ER note on 19:  78 y.o. male who presents to the Emergency Department via EMS for the evaluation of a CVA. The patient was found on the floor sitting up around 0630 this morning by his wife, who heard him fall. She reports a drooping mouth on the right side, slurred speech, and right sided weakness when she found him at 0630. The patient's last known well was 22:00 last night. The patient is unable to walk because his left leg is weak, and the patient had to be lifted into a chair after he was found on the floor. 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 Restrictions/Precautions:  Restrictions/Precautions: General Precautions, Fall Risk  Position Activity Restriction  Other position/activity restrictions: pt pushes to right, R romain    SUBJECTIVE: Pleasant and cooperative. Pt reported feeling tired. Pt was able to respond with 1 word to questions. Easy to understand. Pt agreed to do some exercises only. He demonstrated use of the incentive spirometer with min cues provided. PAIN: Denies. COGNITION: WFL    ADL:   No ADL's completed this session. [place] BALANCE:  Not demonstrated secondary to pt's refusal    FUNCTIONAL MOBILITY:  Pt refused sitting at edge of bed at this time secondary to fatigue. ADDITIONAL ACTIVITIES:  Pt demonstrated BUE and BLE ROM exercises with assist provided for each leg and pt able to use his L arm to assist with movements of his RUE.   Exercises completed to increase his body awareness and his strength in his R hemibody: 10 reps each of Shoulder

## 2019-12-29 NOTE — PROGRESS NOTES
Heparin Consult  Lab Results   Component Value Date    APTT 45.9 12/29/2019     Lab Results   Component Value Date    HGB 12.6 12/29/2019    HCT 36.1 12/29/2019     12/29/2019       Current Rate: 14 units/kg/hr    Plan:  Bolus: NONE  Rate: Increase to 17 units/kg/hr  Next aPTT: 12/29/19 @ [address], PharmD, BCPS  12/29/2019  9:15 AM

## 2019-12-29 NOTE — PROGRESS NOTES
Leg edema none  Skin: no lesions, no rash, warm and moist to touch. Abdomen is soft intact bowel sounds. ROS:    Cardiac: no chest pain. No palpitations. Renal : no flank pain, no hematuria  Skin: no rash    Medications:     insulin lispro  0-12 Units Subcutaneous TID WC    insulin lispro  0-6 Units Subcutaneous Nightly    metoprolol succinate  50 mg Oral Daily    [Held by provider] losartan  100 mg Oral Daily    And    [Held by provider] hydrochlorothiazide  25 mg Oral Daily    aspirin  81 mg Oral Daily    clopidogrel  75 mg Oral Daily    sodium chloride flush  10 mL Intravenous 2 times per day    sodium chloride  50 mL Intravenous Once    atorvastatin  80 mg Oral Nightly    escitalopram  20 mg Oral Daily    finasteride  5 mg Oral Daily    pantoprazole  40 mg Oral QAM AC       Data:   CBC:   Recent Labs     12/28/19  0422 12/28/19  1655 12/29/19  0725   WBC 12.0* 8.5 8.9   HGB 13.8* 12.2* 12.6*    145 124*     BMP:    Recent Labs     12/28/19  0422 12/28/19  0918 12/29/19  0725    148* 149*   K 3.9 3.7 3.4*    112* 112*   CO2 20* 15* 23   BUN 34* 37* 30*   CREATININE 3.2* 3.0* 1.2   GLUCOSE 138* 133* 154*     Reviewed   Results for orders placed during the hospital encounter of 12/24/19   CTA HEAD W WO CONTRAST    Narrative PROCEDURE: CTA HEAD W WO CONTRAST, CTA NECK W WO CONTRAST    CLINICAL INFORMATION: Stroke . COMPARISON: CT head and MR brain from the same date. TECHNIQUE: Helical CT from the aortic arch through the head in arterial phase during intravenous administration of 80 mL Isovue-370 injected in the right forearm with multiplanar reformatted images to include volumetric maximum intensity projection   sequences.     FINDINGS:     CTA HEAD:  There are mural calcifications in the cavernous and clinoid segments of the internal carotid arteries with an area of moderate stenosis in the right cavernous segment and the area of mild to moderate stenosis in the left clinoid segment. Intracranial   segments of internal carotid arteries are otherwise patent without flow-limiting stenosis or visualized aneurysm. The bilateral middle cerebral and anterior cerebral arteries are patent without focal abnormality identified. There is long segment mild to   moderate stenosis of the basilar artery. There is a focal area of moderate stenosis at the P1 segment of the right posterior cerebral artery and a focal area of severe stenosis at the P2 segment. The left posterior cerebral artery appears patent. There   is a focal area of moderate to severe stenosis in the right superior cerebellar artery. The left superior cerebral artery appears patent. The left inferior cerebellar artery is patent. The right inferior cerebral artery is not well visualized. The   anterior inferior cerebral arteries are not visualized. CTA NECK:  There is a typical 3 vessel arch. The brachiocephalic artery is patent. The proximal left subclavian artery is widely patent. There are areas of mild stenosis more distally in the left subclavian artery. The common carotid arteries are patent without   focal stenosis. There is mild calcified and noncalcified mural plaque at the carotid bifurcations without hemodynamically significant stenosis by NASCET criteria. Cervical segments of the internal carotid arteries are patent without focal stenosis. The   left vertebral artery is dominant. There is mural calcification in the V4 segment of the left vertebral artery just proximal to the posterior inferior cerebellar artery takeoff with an area of moderate stenosis. Vertebral arteries are otherwise patent   without focal stenosis. There is streak artifact from dental amalgam which partially obscures oral and perioral soft tissues. Given this caveat, mucosal surfaces of the aerodigestive tract are symmetric without focal nodular thickening or visualized mass. No cervical   lymphadenopathy is identified.  The parotid, The parotid, submandibular and thyroid glands are unremarkable. The visualized portions of the lungs are clear. There are moderate degenerative changes of the cervical spine. Impression  CTA head:  1. Mural calcifications in the cavernous and clinoid segments of the internal carotid arteries with a focal area of moderate stenosis in the right cavernous segment and focal area of mild to moderate stenosis in the left clinoid segment. 2. Long segment of mild to moderate stenosis of the basilar artery. 3. Focal area of moderate stenosis in the P1 segment and focal area of severe stenosis in the P2 segment of the right posterior cerebral artery. 4. Focal area of moderate to severe stenosis in the right superior cerebellar artery. CTA NECK:  1. Mild mural plaque at the carotid bifurcations without hemodynamically significant stenosis by NASCET criteria. 2. Focal area of moderate stenosis in the V4 segment of the dominant left vertebral artery. **This report has been created using voice recognition software. It may contain minor errors which are inherent in voice recognition technology. **    Final report electronically signed by Dr. Jazzy Fuchs MD on 12/24/2019 1:23 PM     Results for orders placed during the hospital encounter of 12/24/19   MRI BRAIN WO CONTRAST    Narrative PROCEDURE: MRI BRAIN WO CONTRAST    INDICATION:  RIght side weakness. Symptoms starting this morning. COMPARISON: CT head from the same date and MR brain dated 7/12/2017. TECHNIQUE: Multiplanar and multiple spin echo MRI images were obtained of the brain without contrast.    FINDINGS:  There is stable moderate volume loss. There is a small to moderate-sized area of restricted diffusion in the left hemipons with minimal hyperintense T2 signal in a portion of the lesion. No other focal areas of restricted diffusion are present.  There are   moderate patchy areas of T2/FLAIR prolongation in the periventricular, subcortical and deep white matter elsewhere, grossly stable compared to prior exam. No intra or extra-axial mass is identified. The major vascular flow voids appear patent. Orbits are unremarkable. There is minimal mucosal thickening in the left maxillary sinus. Mastoid air cells are clear. Impression    1. 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 2. Moderate chronic microvascular angiopathy superimposed on volume loss, grossly similar to prior exam.    Findings were discussed with Dr. Wilfred Erickson via telephone at the time of interpretation. **This report has been created using voice recognition software. It may contain minor errors which are inherent in voice recognition technology. **    Final report electronically signed by Dr. Genoveva Nuñez MD on 12/24/2019 11:17 AM     Reviewed   Results for orders placed during the hospital encounter of 12/24/19   CT HEAD WO CONTRAST    Narrative PROCEDURE: CT HEAD WO CONTRAST    CLINICAL INFORMATION: Recent TPA infusion    TECHNIQUE: CT scan of the head was performed from the vertex to the skull base without use of intravenous contrast. Axial images as well as coronal and sagittal reconstructions were obtained. All CT scans at this facility use dose modulation, iterative reconstruction, and/or weight-based dosing when appropriate to reduce radiation dose to as low as reasonably achievable. COMPARISON: CT head 12/24/2019    FINDINGS: There is no mass effect, midline shift or acute hemorrhage. Ventricles and sulci are prominent. Diffuse periventricular white matter hypoattenuation is again noted. There is hypoattenuation of the bilateral basal ganglia are likely secondary to   prior lacunar infarcts. Cerebrovascular calcifications are noted. Visualized orbits, paranasal sinuses and mastoid air cells are unremarkable. Impression 1. No mass effect or acute hemorrhage.   2. Chronic periventricular small vessel ischemic changes and cerebral antiplatelets for 90 days  2. Lipid-lowering medication  3. Modify risk factors for stroke (blood pressure, cholesterol, diabetes, smoking cessation etc.. Control)   4. Physical therapy  5. Occupational Therapy  6. Speech therapy  7. DVT prophylaxis  8. Cardiac echo reviewed, audiology consultation input appreciated  9. Nephrology input appreciated. 10. Discussed with primary service. 6. All family questions were answered.      Rachael Rubio MD, 12/29/2019 12:50 PM

## 2019-12-29 NOTE — PROGRESS NOTES
Hospitalist note    Family members have a bunch of requests  - They would like a troponin done today. They want an EKG today. Echo later in the week. No cath at this time only if consented by wife.     Electronically signed by Luis Hawk MD on 12/29/2019 at 1:39 PM

## 2019-12-30 NOTE — PROGRESS NOTES
texture modification. Patient seen and reports that his appetite has not been great. Patient reports ~20# weight loss in ~1 year. Patient is not able to say why he lost weight. Discussed ONS option and patient agreeable to try magic cup. Labs: potassium: 3.4, POC: 138, Hgb A1C: 5.3. Meds: humalog.   · Wound Type: None  · Current Nutrition Therapies:  · Oral Diet Orders: Dysphagia Minced and Moist (Dysphagia 2), Mildly Thick, No Straws   · Oral Diet intake: 26-50%, %  · Oral Nutrition Supplement (ONS) Orders: Frozen Oral Supplement(magic cup TID)  · ONS intake: Unable to assess(new order)  · Anthropometric Measures:  · Ht: 6' (182.9 cm)   · Current Body Wt: 150 lb (68 kg)(12/28/19, bedscale, no edema)  · Admission Body Wt: 161 lb (73 kg)(12/24/19, actual)  · Usual Body Wt: 157 lb (71.2 kg)(EMR on 6/11/19 and 5/6/19)  · % Weight Change:  ,  patient reports ~20# loss in 1 year (12% of body weight), EMR shows a 7# loss in 6 months (4% of body weight)  · Ideal Body Wt: 178 lb (80.7 kg)  · BMI Classification: BMI 18.5 - 24.9 Normal Weight(20.5)    Nutrition Interventions:   Start ONS(Diet as per SLP recommendations)  Continued Inpatient Monitoring, Speech Therapy, Education Not Indicated    Nutrition Evaluation:   · Evaluation: Goals set   · Goals: Patient will safely consume 75% or greater of meals and ONS during LOS    · Monitoring: Nutrition Progression, Meal Intake, Supplement Intake, Diet Tolerance, Pertinent Labs, Chewing/Swallowing, Weight      Electronically signed by Wilda Mohan RD, LD on 12/30/19 at 3:14 PM    Contact Number: [phone]

## 2019-12-30 NOTE — PROGRESS NOTES
Heparin Consult  Lab Results   Component Value Date    APTT 74.5 12/30/2019     Lab Results   Component Value Date    HGB 11.9 12/30/2019    HCT 34.7 12/30/2019     12/30/2019       Current Rate: 19 units/kg/hr    Plan:    Rate: continue at 19 units/kg/hr  Next aPTT: 1200

## 2019-12-30 NOTE — PROGRESS NOTES
[address]  Occupational Therapy  Daily Note  Time:   Time In: 1110  Time Out: 3215  Timed Code Treatment Minutes: 25 Minutes  Minutes: 25          Date: 2019  Patient Name: Demetrice Beckham,   Gender: male      Room: -15/015-A  MRN: [id number]  : 1940  (78 y.o.)  Referring Practitioner: LYNDON HEDRICK CNP  Diagnosis: acute CVA  Additional Pertinent Hx: Per ER note on 19:  78 y.o. male who presents to the Emergency Department via EMS for the evaluation of a CVA. The patient was found on the floor sitting up around 0630 this morning by his wife, who heard him fall. She reports a drooping mouth on the right side, slurred speech, and right sided weakness when she found him at 0630. The patient's last known well was 22:00 last night. The patient is unable to walk because his left leg is weak, and the patient had to be lifted into a chair after he was found on the floor. 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 Restrictions/Precautions:  Restrictions/Precautions: General Precautions, Fall Risk  Position Activity Restriction  Other position/activity restrictions: pt pushes to right, R romain    SUBJECTIVE: Pt reclined in bedside chair upon arrival, requesting to get back to bed due to fatigue. PAIN: Denies    COGNITION: Slow Processing, Decreased Problem Solving and expressive aphasia    ADL:   Upper Extremity Dressing: Maximum Assistance. donning gown; required assist to thread BUEs and pull up to shoulders; assist to tie  Lower Extremity Dressing: Dependent. donning brief  Toileting: total assist for hygiene task completion as pt slightly incont of bm upon standing from chair; pt required moderate assist X1 and CGA X1 for standing balance while hygiene completed. BALANCE:  Sitting Balance:  Minimal Assistance, with verbal cues  While seated at edge of chair, on sera stedy seat, and at EOB.  Pt demoes R lateral lean, able to initiate correcting with cues although difficulty maintaining midline throughout  Standing Balance: Moderate Assistance, X 2, with verbal cues . In sera stedy. Pt tolerated standing ~30 seconds while hygiene task completed. Slight buckling of R knee noted although blocked by sera stedy. Pt required support through RUE throughout to ensure proper positioning, poor grasp on sera stedy bar    BED MOBILITY:  Rolling to Left: Moderate Assistance, X 1 completed for repositioning of linens and adjustment of brief  Rolling to Right: Minimal Assistance completed for repositioning of linens and adjustment of brief  Sit to Supine: Moderate Assistance, X 2      TRANSFERS:  Sit to Stand:  Minimal Assistance, Moderate Assistance, X 2. from bedside chair; required assist to support RUE throughout; moderate cues for overall technique/safety  Stand to Sit: Minimal Assistance, Moderate Assistance, X 2. for controlled descent to EOB; continued to require cues to maintain midline   Pt required mod-max cues throughout for RUE awareness during transfers/bed mobility    FUNCTIONAL MOBILITY:  Not appropriate/safe to attempt at this time. ASSESSMENT:     Activity Tolerance:  Patient tolerance of  treatment: fair. Discharge Recommendations: IP Rehab, Continue to assess pending progress  Equipment Recommendations:  Other: Monitor pending progress  Plan: Times per week: 6x  Current Treatment Recommendations: Balance Training, Self-Care / ADL, ROM, Strengthening, Functional Mobility Training, Endurance Training, Neuromuscular Re-education, Safety Education & Training    Patient Education  Patient Education: ADL's and transfer training, RUE hemibody awareness    Goals  Short term goals  Time Frame for Short term goals:  2 weeks  Short term goal 1: Complete various sit-stand t/fs using FELISHA STEDY with 0>mod A x 2 for increased indep with eventual BSC t/fs  Short term goal 2: Complete 5-7 min EOB ADL task with 0>mod A for sitting balance & min

## 2019-12-30 NOTE — PROGRESS NOTES
Heparin Consult  Lab Results   Component Value Date    APTT 89.8 12/30/2019     Lab Results   Component Value Date    HGB 11.9 12/30/2019    HCT 34.7 12/30/2019     12/30/2019       Current Rate: 19 units/kg/hr    Plan:    Rate: continue at 19 units/kg/hr until 1805 and stop

## 2019-12-30 NOTE — PROGRESS NOTES
atorvastatin  80 mg Oral Nightly    escitalopram  20 mg Oral Daily    finasteride  5 mg Oral Daily    pantoprazole  40 mg Oral QAM AC     PRN Meds: glucose, dextrose, glucagon (rDNA), dextrose, acetaminophen, perflutren lipid microspheres, sodium chloride flush, magnesium hydroxide, ondansetron    Ins and outs:      Intake/Output Summary (Last 24 hours) at 12/30/2019 1117  Last data filed at 12/30/2019 1000  Gross per 24 hour   Intake 1511.8 ml   Output 900 ml   Net 611.8 ml       Physical Examination     /62   Pulse 66   Temp 97.9 °F (36.6 °C) (Oral)   Resp 18   Ht 6' (1.829 m)   Wt 150 lb 9.6 oz (68.3 kg)   SpO2 99%   BMI 20.43 kg/m²     General appearance: Lying in bed. Responsive to questions. Alert  HEENT: Extraocular motion intact. Neck: Supple. Trachea midline  Respiratory:  Decreased breath sounds at bilateral lung lobes otherwise CTA. Cardiovascular: RRR with normal S1/S2 without murmurs, rubs or gallops. Abdomen: Soft, non-tender, non-distended with normal bowel sounds. Musculoskeletal: RUE weakness  Neurologic: RUE weakness. Psychiatric: Lying in bed answers questions appropriately  Vascular: Dorsalis pedis palpable bilaterally. Radial pulses palpable bilaterally. Cool extremities improved  Skin:  No visible rashes or lesions. Labs     Recent Labs     12/28/19  1655 12/29/19  0725 12/30/19  0356   WBC 8.5 8.9 8.8   HGB 12.2* 12.6* 11.9*   HCT 35.2* 36.1* 34.7*    124* 111*     Recent Labs     12/28/19  0918 12/29/19  0725 12/30/19  0356   * 149* 139   K 3.7 3.4* 3.4*   * 112* 108   CO2 15* 23 21*   BUN 37* 30* 23*   CREATININE 3.0* 1.2 1.0   CALCIUM 9.0 9.0 8.4*     No results for input(s): AST, ALT, BILIDIR, BILITOT, ALKPHOS in the last 72 hours. No results for input(s): INR in the last 72 hours. No results for input(s): Emmanuel Everardo in the last 72 hours.     Urinalysis:      Lab Results   Component Value Date    NITRU NEGATIVE 12/28/2019    WBCUA 0-2 12/28/2019    WBCUA 0-5 06/27/2017    BACTERIA NONE SEEN 12/28/2019    RBCUA 3-5 12/28/2019    BLOODU MODERATE 12/28/2019    SPECGRAV 1.020 12/28/2019    GLUCOSEU NEGATIVE 07/04/2017       Diagnostic imaging/procedures       Cta Head W Wo Contrast    Result Date: 12/24/2019  PROCEDURE: CTA HEAD W WO CONTRAST, CTA NECK W WO CONTRAST CLINICAL INFORMATION: Stroke . COMPARISON: CT head and MR brain from the same date. TECHNIQUE: Helical CT from the aortic arch through the head in arterial phase during intravenous administration of 80 mL Isovue-370 injected in the right forearm with multiplanar reformatted images to include volumetric maximum intensity projection sequences. FINDINGS: CTA HEAD: There are mural calcifications in the cavernous and clinoid segments of the internal carotid arteries with an area of moderate stenosis in the right cavernous segment and the area of mild to moderate stenosis in the left clinoid segment. Intracranial segments of internal carotid arteries are otherwise patent without flow-limiting stenosis or visualized aneurysm. The bilateral middle cerebral and anterior cerebral arteries are patent without focal abnormality identified. There is long segment mild to moderate stenosis of the basilar artery. There is a focal area of moderate stenosis at the P1 segment of the right posterior cerebral artery and a focal area of severe stenosis at the P2 segment. The left posterior cerebral artery appears patent. There is a focal area of moderate to severe stenosis in the right superior cerebellar artery. The left superior cerebral artery appears patent. The left inferior cerebellar artery is patent. The right inferior cerebral artery is not well visualized. The anterior inferior cerebral arteries are not visualized. CTA NECK: There is a typical 3 vessel arch. The brachiocephalic artery is patent. The proximal left subclavian artery is widely patent.  There are areas of mild stenosis more distally in the left subclavian artery. The common carotid arteries are patent without focal stenosis. There is mild calcified and noncalcified mural plaque at the carotid bifurcations without hemodynamically significant stenosis by NASCET criteria. Cervical segments of the internal carotid arteries are patent without focal stenosis. The left vertebral artery is dominant. There is mural calcification in the V4 segment of the left vertebral artery just proximal to the posterior inferior cerebellar artery takeoff with an area of moderate stenosis. Vertebral arteries are otherwise patent without focal stenosis. There is streak artifact from dental amalgam which partially obscures oral and perioral soft tissues. Given this caveat, mucosal surfaces of the aerodigestive tract are symmetric without focal nodular thickening or visualized mass. No cervical lymphadenopathy is identified. The parotid, submandibular and thyroid glands are unremarkable. The visualized portions of the lungs are clear. There are moderate degenerative changes of the cervical spine. CTA head: 1. Mural calcifications in the cavernous and clinoid segments of the internal carotid arteries with a focal area of moderate stenosis in the right cavernous segment and focal area of mild to moderate stenosis in the left clinoid segment. 2. Long segment of mild to moderate stenosis of the basilar artery. 3. Focal area of moderate stenosis in the P1 segment and focal area of severe stenosis in the P2 segment of the right posterior cerebral artery. 4. Focal area of moderate to severe stenosis in the right superior cerebellar artery. CTA NECK: 1. Mild mural plaque at the carotid bifurcations without hemodynamically significant stenosis by NASCET criteria. 2. Focal area of moderate stenosis in the V4 segment of the dominant left vertebral artery. **This report has been created using voice recognition software.  It may contain minor errors which are inherent in voice recognition technology. ** Final report electronically signed by Dr. Ade Rosario MD on 12/24/2019 1:23 PM    Ct Head Wo Contrast    Result Date: 12/25/2019  PROCEDURE: CT HEAD WO CONTRAST CLINICAL INFORMATION: Recent TPA infusion TECHNIQUE: CT scan of the head was performed from the vertex to the skull base without use of intravenous contrast. Axial images as well as coronal and sagittal reconstructions were obtained. All CT scans at this facility use dose modulation, iterative reconstruction, and/or weight-based dosing when appropriate to reduce radiation dose to as low as reasonably achievable. COMPARISON: CT head 12/24/2019 FINDINGS: There is no mass effect, midline shift or acute hemorrhage. Ventricles and sulci are prominent. Diffuse periventricular white matter hypoattenuation is again noted. There is hypoattenuation of the bilateral basal ganglia are likely secondary to  prior lacunar infarcts. Cerebrovascular calcifications are noted. Visualized orbits, paranasal sinuses and mastoid air cells are unremarkable. 1. No mass effect or acute hemorrhage. 2. Chronic periventricular small vessel ischemic changes and cerebral atrophy. **This report has been created using voice recognition software. It may contain minor errors which are inherent in voice recognition technology. ** Final report electronically signed by Dr. Bertha Dai on 12/25/2019 6:32 PM    Ct Head Wo Contrast    Result Date: 12/24/2019  PROCEDURE: CT HEAD WO CONTRAST CLINICAL INFORMATION: CVA with worsening neuro deficits. COMPARISON: No prior study. TECHNIQUE: Noncontrast 5 mm axial images were obtained through the brain. All CT scans at this facility use dose modulation, iterative reconstruction, and/or weight-based dosing when appropriate to reduce radiation dose to as low as reasonably achievable. FINDINGS: No acute intracranial findings. Mild generalized volume loss and small vessel ischemic changes.  Old lacunar infarctions in the basal ganglia. Vascular calcifications. Ventricles are within normal limits for age. No acute hemorrhage or midline shift. Paranasal sinuses are clear. Mastoid air cells are patent. Skull: Unremarkable. Soft tissues: Unremarkable. No acute intracranial findings. **This report has been created using voice recognition software. It may contain minor errors which are inherent in voice recognition technology. ** Final report electronically signed by Dr. Jyoti Saravia on 12/24/2019 5:17 PM    Ct Head Wo Contrast (code Stroke)    Result Date: 12/24/2019  PROCEDURE: CT HEAD WO CONTRAST CLINICAL INFORMATION: stroke like symptoms . COMPARISON: 7/2/2017 TECHNIQUE: 2-D multiplanar noncontrast CT brain All CT scans at this facility use dose modulation, iterative reconstruction, and/or weight-based dosing when appropriate to reduce radiation dose to as low as reasonably achievable. FINDINGS: Generalized cerebral volume loss. Moderate severity chronic small vessel ischemic disease changes. No acute hemorrhage. No infarction identified. No extra-axial fluid collection. Ventricles are normal. No midline shift or mass effect. Calvarium is intact and visualized paranasal sinuses and mastoid air cells are clear. Dense calcific atherosclerosis of the vertebral and basilar artery and intracranial carotid arteries     No acute process. **This report has been created using voice recognition software. It may contain minor errors which are inherent in voice recognition technology. ** Final report electronically signed by Dr. Geeta Moon on 12/24/2019 9:58 AM    Cta Neck W Wo Contrast    Result Date: 12/24/2019  PROCEDURE: CTA HEAD W WO CONTRAST, CTA NECK W WO CONTRAST CLINICAL INFORMATION: Stroke . COMPARISON: CT head and MR brain from the same date.  TECHNIQUE: Helical CT from the aortic arch through the head in arterial phase during intravenous administration of 80 mL Isovue-370 injected in the right forearm with multiplanar reformatted images to include volumetric maximum intensity projection sequences. FINDINGS: CTA HEAD: There are mural calcifications in the cavernous and clinoid segments of the internal carotid arteries with an area of moderate stenosis in the right cavernous segment and the area of mild to moderate stenosis in the left clinoid segment. Intracranial segments of internal carotid arteries are otherwise patent without flow-limiting stenosis or visualized aneurysm. The bilateral middle cerebral and anterior cerebral arteries are patent without focal abnormality identified. There is long segment mild to moderate stenosis of the basilar artery. There is a focal area of moderate stenosis at the P1 segment of the right posterior cerebral artery and a focal area of severe stenosis at the P2 segment. The left posterior cerebral artery appears patent. There is a focal area of moderate to severe stenosis in the right superior cerebellar artery. The left superior cerebral artery appears patent. The left inferior cerebellar artery is patent. The right inferior cerebral artery is not well visualized. The anterior inferior cerebral arteries are not visualized. CTA NECK: There is a typical 3 vessel arch. The brachiocephalic artery is patent. The proximal left subclavian artery is widely patent. There are areas of mild stenosis more distally in the left subclavian artery. The common carotid arteries are patent without focal stenosis. There is mild calcified and noncalcified mural plaque at the carotid bifurcations without hemodynamically significant stenosis by NASCET criteria. Cervical segments of the internal carotid arteries are patent without focal stenosis. The left vertebral artery is dominant. There is mural calcification in the V4 segment of the left vertebral artery just proximal to the posterior inferior cerebellar artery takeoff with an area of moderate stenosis. Vertebral arteries are otherwise patent without focal stenosis.  There is none RIGHT KIDNEY - 9.6 x 4.7 x 3.9 cm Resistive Index - 0.66 Cortical Thickness - 1.1 cm LEFT KIDNEY - 11.9 x 6.8 x 5.2 cm Resistive Index - 0.73 Cortical Thickness - 1.5 cm URINARY BLADDER-there is a Solis catheter in the bladder. There is no obvious bladder wall thickening or mass. There is possible debris in the dependent portion of the bladder versus artifact related to the Solis catheter balloon. Prostate: Prostate is enlarged measuring 5.2 x 3.9 x 3.8 cm with a volume of 41 cc. Gallbladder: Gallbladder is mildly distended and contains multiple gallstones. No hydronephrosis. Echogenic kidneys consistent with nonspecific chronic medical renal disease. 2 cm cyst upper pole right kidney. Elevated left renal resistive index. Enlarged prostate. Mildly distended gallbladder containing multiple stones. Possible debris in the bladder. **This report has been created using voice recognition software. It may contain minor errors which are inherent in voice recognition technology. ** Final report electronically signed by Dr. Cristy Salomon on 12/29/2019 1:58 AM    Mri Brain Wo Contrast    Result Date: 12/24/2019  PROCEDURE: MRI BRAIN WO CONTRAST INDICATION:  RIght side weakness. Symptoms starting this morning. COMPARISON: CT head from the same date and MR brain dated 7/12/2017. TECHNIQUE: Multiplanar and multiple spin echo MRI images were obtained of the brain without contrast. FINDINGS: There is stable moderate volume loss. There is a small to moderate-sized area of restricted diffusion in the left hemipons with minimal hyperintense T2 signal in a portion of the lesion. No other focal areas of restricted diffusion are present. There are  moderate patchy areas of T2/FLAIR prolongation in the periventricular, subcortical and deep white matter elsewhere, grossly stable compared to prior exam. No intra or extra-axial mass is identified. The major vascular flow voids appear patent. Orbits are unremarkable.  There is minimal mucosal thickening in the left maxillary sinus. Mastoid air cells are clear. 1. 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 2. Moderate chronic microvascular angiopathy superimposed on volume loss, grossly similar to prior exam. Findings were discussed with Dr. Sandro Sheth via telephone at the time of interpretation. **This report has been created using voice recognition software. It may contain minor errors which are inherent in voice recognition technology. ** Final report electronically signed by Dr. Dafne Venegas MD on 12/24/2019 11:17 AM    Fl Modified Barium Swallow W Video    Result Date: 12/26/2019  PROCEDURE: FL MODIFIED BARIUM SWALLOW W VIDEO CLINICAL INFORMATION: Dysphasia TECHNIQUE: Fluoroscopy was provided for modified barium swallowing study performed by speech therapy. With the patient in the lateral projection, swallowing mechanism was evaluated using barium of various consistencies. The radiologist was available for the entire examination. 45 clips were obtained. Total fluoroscopy time 3.52 minutes. Speech therapist: Catia Connor COMPARISON: None. FINDINGS: Oral, pharyngeal and esophageal structures appear normal. There is laryngeal penetration of nectar thick and thin barium with aspiration of thin barium. 1. Laryngeal penetration of nectar thick and thin barium with aspiration of thin barium. 2. Additional recommendations from the speech therapist will follow. **This report has been created using voice recognition software. It may contain minor errors which are inherent in voice recognition technology. ** Final report electronically signed by Dr. Mi Stephens on 12/26/2019 12:20 PM        DVT prophylaxis: [x] Lovenox                                 [] SCDs                                 [] SQ Heparin                                 [] Encourage ambulation           [] Already on Anticoagulation     Disposition:    [x] Inpatient rehab if accepted versus outside nursing facility       [] TCU       [] Rehab       [] Psych       [] SNF       [] [facility]       [] Other-

## 2019-12-30 NOTE — FLOWSHEET NOTE
Hermilo Seo is a 66-year-old  (48 years) who is recovering from an acute CVA. He was approachable and receptive while sitting up in his chair with his breakfast tray, as well as his wife present to clean him up. He expressed \"doing okay,\" and improving following his incident. He expressed \"no needs\" for additional spiritual care at this time, but was grateful for the encounter and ministry provided. Hermilo Seo is sustained through support from his wife, 6 children (4 live in the area), as well as other family members. His purpose and meaning moving forward comes from his family unit. He is part of the [employer] tradition, but his wife stated he has never attended a Faith in their years of marriage (she attends [employer]). He is coping with his situation during recovery, and is hopeful for continued recovery and the ability to return home some following a positive outcome.  had prayer with the patient and his wife prior to departing. Chaplains remain available for further emotional and spiritual support as needed. 12/30/19 1000   Encounter Summary   Services provided to: Patient and family together   Referral/Consult From: [facility]; Children;Family members   Place of [facility] None   Continue Visiting Yes  (12/30/2019)   Complexity of Encounter Moderate   Length of Encounter 15 minutes   Spiritual/Hinduism   Type Spiritual support   Assessment Approachable;Coping; Hopeful   Intervention Active listening;Explored feelings, thoughts, concerns; Discussed illness/injury and it's impact; Discussed belief system/M[employer]onism practices/kristine;Sustaining presence/ Ministry of presence;Nurtured hope;Prayer   Outcome Receptive; Expressed feelings/needs/concerns;Engaged in conversation;Expressed gratitude

## 2019-12-30 NOTE — PROGRESS NOTES
[address] [id number]  INPATIENT SPEECH THERAPY  [facility] NEUROSCIENCES 4A  DAILY NOTE    TIME   SLP Individual Minutes  Time In: 3234  Time Out: 1230  Minutes: 25  Timed Code Treatment Minutes: 0 Minutes       Date: 2019  Patient Name: Nicolas Georges      CSN: [id number]   : 1940  (78 y.o.)  Gender: male   Referring Physician: Leeta Mortimer, APRN - CNP  Diagnosis: Acute CVA  Secondary Diagnosis: Dysphagia; dysarthria; cognitive-linguistic deficits   Precautions: Fall risk, aspiration precautions   Current Diet: Nectar/mildly thick liquids, minced and most solids  Swallowing Strategies: Standard Universal Swallow Precautions  Date of Last MBS: Not Applicable    Pain:  No pain reported. Subjective:  Pt was upright, alert, and completing treatment session with OT upon arrival. The pt was receptive to completing a ST session at this time. No family present. Short-Term Goals:  SHORT TERM GOAL #1:  Goal 1: Pt will tolerate a minced/moist diet and mildly thick liquids (no straws) without overt s/s of aspiration to meet nutritional/hydration measures safely. INTERVENTIONS:Completed trials of nectar via cup 4/4 cup drinks completed with pt in control, SUP for single drinks, no anterior loss, good oral clearance, and no overt s/s of aspiration/penetration. Completed minced & moist trials, the pt presented with slightly prolonged oral phase, contributed to environmental distractions, and pt focusing on the right side of the oral cavity to clear effectively--noted eventual completion with no pocketing and no overt s/s of aspiration/penetration. Although pt is tolerating current diet/liquid level, recommended remain on this diet without upgrade d/t concerns for fatigue and decreased nutritional intake.      SHORT TERM GOAL #2:  Goal 2: Pt will complete labial/lingual/pharyngeal strengthening, coordionation, ROM exercises, DDK rates, rote speech tasks with SOS strategies and tongue twisters x10 for Assessment/Plan: Patient progressing toward established goals. Continues to require skilled care of licensed speech pathologist to progress toward achievement of established goals and plan of care. .     Plan for Next Session: Pharyngeal strengthening exercises, dysarthria, and cognitive linguistic skills. Yesenia Jacobson MA., OIQ-ROK

## 2019-12-30 NOTE — PROGRESS NOTES
[facility]  INPATIENT PHYSICAL THERAPY  DAILY NOTE  [facility] NEUROSCIENCES 4A - 4A-15/015-A      Time In: 8277  Time Out: 7469  Timed Code Treatment Minutes: 41 Minutes  Minutes: 41          Date: 2019  Patient Name: Constancia Sandifer,  Gender:  male        MRN: [id number]  : 1940  (78 y.o.)     Referring Practitioner: Devante Boss CNP  Diagnosis: acute CVA  Additional Pertinent Hx: admit with above diagnosis, left pontine stroke, right hemiparesis, dysphagia, dysarthria, did receive TPA     Prior Level of Function:  Lives With: Spouse  Type of Home: House  Home Layout: One level  Home Access: Stairs to enter with rails  Entrance Stairs - Number of Steps: 2 with grab bar  Home Equipment: Quad cane(used cane intermittently, family wanted pt to use all of the time)        ADL Assistance: Independent  Ambulation Assistance: Independent  Transfer Assistance: Independent  Additional Comments: No family present at time of OT eval. Per PT eval Pt was indep with mobility with cane sometimes, Pt reports indep with ADLs. Restrictions/Precautions:  Restrictions/Precautions: General Precautions, Fall Risk  Position Activity Restriction  Other position/activity restrictions: pt pushes to right, R romain    SUBJECTIVE: nursing ok'd therapy, pt cooperative and motivated he can be a little impulsive at times noted good carryover with cues to support his right UE     PAIN: 0/10:       OBJECTIVE:  Bed Mobility:  Rolling to Left: Moderate Assistance, had pt supporting his right UE to roll and he needed assist to get right LE into hooklying position    Supine to Sit: Moderate Assistance, pt used his left LE to assist right LE over edge of bed and needed mod assist at trunk he was still supporting his right UE   Scooting: Maximum Assistance, to edge of bed while needing assist for sitting balance as well     Transfers:  Sit to Stand:  Moderate Assistance, of one and CGA of another, pt pullign to standing using puma steady with hand over hand at right UE and assist for midline   Stand to Sit:Moderate Assistance, and CGA of another   Bed to chair using puma steady     Balance:  pt sat edge of bed for > 15 min with right UE in wbing at side pt needed CGA to min assist for static sitting with cues for midline as he did lean to the right with decreased awareness however would come back to midline, pt completed dynamic balance activity with left UE while wbing at right UE with input at right elbow pt was impulsive at times and did require mod to max assist to come back to midline at times when reaching to the right of midline and needed cues to find midline before attempting to reach again  Static standing balance in puma steady with mod assist of one and CGA of another pts right knee was blocked with the puma steady and he needed manual assist for erect trunk and hip extension cues for midline as he does tend to lean to the right   Exercise:  Patient was guided in 1 set(s) 10 reps of exercise to both lower extremities. ankle pumps noted at right no active ankle df/pf followed with heelcord stretch as he was tight, hip flexion/ext with assist at right he was able to get almost 1/4 of the range but fatigued easy, hooklying hip abd/add with cues for technique as he fatiuged noted decreased control at right LE, lower trunk rotations, bridges with faciliation at hips and to hold LEs in midline, short arc qauds was able to feel contraction but unable to lift right heel off bed, while standing in puma steady pt also completed graded lifts and squats with mod assist .  Exercises were completed for increased independence with functional mobility. Functional Outcome Measures: Completed  AM-PAC Inpatient Mobility without Stair Climbing Raw Score : 9  AM-PAC Inpatient without Stair Climbing T-Scale Score : 32.44    ASSESSMENT:  Assessment: Patient progressing toward established goals.  and pt tolerated session well, he cont to demonstrate decreased right romain body strength and midline awareness but tolerated increased standing activity this date, pt would greatly benefit from cont skilled therapy to work on strength, balance, endurance, and increased independence and safety with functional mobility prior to return home   Activity Tolerance:  Patient tolerance of  treatment: good. Equipment Recommendations: Other: cont to assess needs  Discharge Recommendations:    Continue to assess pending progress, IP Rehab, Patient would benefit from continued therapy after discharge    Plan: Times per week: 6x N  Times per day: Daily  Specific instructions for Next Treatment: therex, bed mobility, sitting balance to improve midline, transfers, pregait activity, PT To assess gait    Patient Education  Patient Education: Plan of Care, right UE support, midline awarness     Goals:  Patient goals : get stronger  Short term goals  Time Frame for Short term goals: by discharge  Short term goal 1: bed mobility with modA  Short term goal 2: sit to std, maintaining midline, with Anne to get in/out of chairs  Short term goal 3: std pivot transfer with modA to get bed to/from chair safely  Short term goal 4: tolerate >30 seconds std pregait activity with RUE supported and </=modA to demonstrate improved balance/strength for progression with amb  Short term goal 5: PT to assess gait  Long term goals  Time Frame for Long term goals : no LTGs set secondary to short ELOS    Following session, patient left in safe position with all fall risk precautions in place.

## 2019-12-30 NOTE — PROGRESS NOTES
Renal Progress Note    Assessment and Plan:    1. Acute kidney injury resolving  2. Diabetes mellitus type 2  3. Hypokalemia  4. Cerebrovascular accident with right sided residual  5. Deconditioning  6. Hyponatremia resolving  7.   Metabolic acidosis improved  PLAN:   Labs reviewed  Medications reviewed  Potassium replacement therapy  Labs in the morning  Continue intravenous fluid for now  We will follow tomorrow and if his serum creatinine remains normal we will sign off at that time  Discussed with the staff    Patient Active Problem List:     Uncontrolled hypertension     Dysphagia     Acute urinary retention     Benign prostatic hyperplasia with lower urinary tract symptoms     Erosive esophagitis     Acute gastritis without hemorrhage     Duodenitis     Microscopic hematuria     Severe malnutrition (HCC)     Type 2 diabetes mellitus treated without insulin (HCC)     Anxiety disorder     Non-recurrent inguinal hernia of right side with obstruction and without gangrene     Lipoma of left shoulder     Inguinal hernia of left side without obstruction or gangrene     S/P right inguinal hernia repair     S/P left inguinal hernia repair     Acute CVA (cerebrovascular accident) ([address]     Right hemiparesis ([address]     Dysarthria     Hypokalemia     Chronic depression     Abnormal EKG     Elevated troponin     Acute kidney injury ([address]     Leukocytosis     Hypernatremia     Thrombocytopenia (HCC)      Subjective:   Admit Date: 12/24/2019    Interval History:   Seen for acute kidney injury and hypokalemia  Awake and alert  No complaint  Updated by the staff  No issues  Stable blood pressure  Good urine output      Medications:   Scheduled Meds:   metoprolol succinate  25 mg Oral Daily    insulin lispro  0-12 Units Subcutaneous TID WC    insulin lispro  0-6 Units Subcutaneous Nightly    [Held by provider] losartan  100 mg Oral Daily    And    [Held by provider] hydrochlorothiazide  25 mg Oral Daily    aspirin  81

## 2019-12-30 NOTE — PROGRESS NOTES
Cardiology Progress Note      Patient:  Zeferino Godinez  YOB: 1940  MRN: [id number]   Acct: [de-identified]  [facility] Date:  12/24/2019  Primary Cardiologist: none  Seen by Dr. Kathi Keys     Per prior cardiology consult note-    REASON FOR CONSULT:   \" abnormal EKG with evidence for ischemia \"     CHIEF COMPLAINT:    Dysarthria  hemiplegia     HISTORY OF PRESENT ILLNESS:    Zeferino Godinez is a 78year old male patient with past medical history that includes DM, Dyslipidemia and hypertension. The patient was admitted to the hospital ON 12/24/2019 with speech abnormality and right sided weakness. He was diagnosed with CVA, acute, given tPA. Initailly, patient was treated in ICU, then transferred to floor. The patient is also being treated for DYAN, urinary retention, uncontrolled hypertension and leukocytosis. Cardiology was consulted for elevated troponin, abnormal TTE. Troponin on 12/24/2019 was <0.01. Troponin today is 0.43. Creatinine today is 3 (was 0.0 on 12/26/2019). EKG revealed SR, TWI in inferior and anterolateral leads. Patient denies chest pain, shortness of breath, dyspnea on exertion, orthopnea, paroxysmal nocturnal dyspnea, palpitations, dizziness, syncope, recent weight gain or leg swelling. MRI of the brain revealed a left hemipons acute infarct.      Subjective (Events in last 24 hours):     Pt sitting in chair - residual RT sided weakness - occasional aphasia but A/o x3    He denies any chest pains or SOB or dizziness or palpitations     VSS  DYAN- resolved     Waiting on rehab referal       Objective:   /76   Pulse 64   Temp 98.1 °F (36.7 °C) (Oral)   Resp 18   Ht 6' (1.829 m)   Wt 150 lb 9.6 oz (68.3 kg)   SpO2 100%   BMI 20.43 kg/m²        TELEMETRY:SR no ectopy    Physical Exam:  General Appearance: alert and oriented to person, place and time, in no acute distress   occ aphasia   Cardiovascular: normal rate, regular rhythm, normal S1 and S2, no murmurs, rubs, clicks, or gallops, normal range with no   evidence for mitral stenosis. There was no evidence of mitral   regurgitation. Aortic Valve   The aortic valve was trileaflet with normal thickness and cuspal   separation. DOPPLER: Transaortic velocity was within the normal range with   no evidence of aortic stenosis. Mild aortic regurgitation is noted. Tricuspid Valve   The tricuspid valve structure was normal with normal leaflet separation. DOPPLER: There was no evidence of tricuspid stenosis. Mild tricuspid regurgitation visualized. Pulmonic Valve   The pulmonic valve leaflets exhibited normal thickness, no calcification,   and normal cuspal separation. DOPPLER: The transpulmonic velocity was   within the normal range with no evidence for regurgitation. Left Atrium   The left atrium is Mildly dilated. Left Ventricle   Left Ventricular size is Moderately increased . Normal left ventricular wall thickness. There was severe global hypokinesis of the left ventricle with some   sparing of basal wall motion suggest possible stress induced   cardiomyopathy. Systolic function was severely reduced. Ejection fraction is visually estimated at 25%. Doppler parameters were consistent with abnormal left ventricular   relaxation (grade 1 diastolic dysfunction). Right Atrium   Right atrial size was normal.      Right Ventricle   The right ventricular size was normal with normal systolic function and   wall thickness. Pericardial Effusion   The pericardium was normal in appearance with no evidence of a pericardial   effusion. Pleural Effusion   No evidence of pleural effusion. Aorta / Great Vessels   -Aortic root dimension within normal limits.   -The Pulmonary artery is within normal limits. -IVC size is within normal limits with normal respiratory phasic changes. Lab Data:    Cardiac Enzymes:  No results for input(s): CKTOTAL, CKMB, CKMBINDEX, TROPONINI in the last 72 hours.     CBC: Lab Results   Component Value Date    WBC 8.8 12/30/2019    RBC 3.67 12/30/2019    HGB 11.9 12/30/2019    HCT 34.7 12/30/2019     12/30/2019       CMP:    Lab Results   Component Value Date     12/30/2019    K 3.4 12/30/2019     12/30/2019    CO2 21 12/30/2019    BUN 23 12/30/2019    CREATININE 1.0 12/30/2019    AGRATIO 1.7 11/04/2019    LABGLOM 72 12/30/2019    GLUCOSE 129 12/30/2019    GLUCOSE 122 11/04/2019    CALCIUM 8.4 12/30/2019       Hepatic Function Panel:    Lab Results   Component Value Date    ALKPHOS 70 12/26/2019    ALT 18 12/26/2019    AST 23 12/26/2019    PROT 5.9 12/26/2019    BILITOT 1.0 12/26/2019    BILIDIR <0.2 07/13/2017    LABALBU 3.6 12/26/2019       Magnesium:    Lab Results   Component Value Date    MG 2.0 12/30/2019       PT/INR:  No results found for: PROTIME, INR    HgBA1c:    Lab Results   Component Value Date    LABA1C 5.3 12/26/2019       FLP:    Lab Results   Component Value Date    TRIG 107 12/26/2019    HDL 33 12/26/2019    HDL 30 03/22/2012    LDLCALC 76 12/26/2019    LDLDIRECT 111 11/04/2019       TSH:    Lab Results   Component Value Date    TSH 2.240 07/13/2017         Assessment:    Acute infarct in the left hemipons     Elevated trop in the setting of DYAN - 0.4 / 0.3  No chest pain - no Hx CAD - + risk factors for CAD     abnormal EKG - Q waves all leads - likley secondary to acute CVA     Pt has multiple risk factors for CAD    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 [place] from CVA    HTN  DM II  Hx GERD      Plan:  · Continue treatment for CVA  · Pt has no anginal c/o  · Needs LV for dilated CDMP  · Needs cath as OP after CVA recovery   · CDMP meds - ASA / statin / BB - no ACE / ARB at present with marginal BP - he needs this down the line for CDMP   · Event or LINQ for Arrhythmia evaluation  · Limited echo tomorrow - follow          Electronically signed by RYLEY Navarrete - CNP on 12/30/2019 at 9:23 AM

## 2019-12-31 NOTE — PROGRESS NOTES
[address] [address]  INPATIENT PHYSICAL THERAPY  DAILY NOTE  [facility] 4A - 4A-15/015-A      Time In: 4442  Time Out: 1457  Timed Code Treatment Minutes: 48 Minutes  Minutes: 53          Date: 2019  Patient Name: Key Duran,  Gender:  male        MRN: [id number]  : 1940  (78 y.o.)     Referring Practitioner: Zach Flanagan CNP  Diagnosis: acute CVA  Additional Pertinent Hx: admit with above diagnosis, left pontine stroke, right hemiparesis, dysphagia, dysarthria, did receive TPA     Prior Level of Function:  Lives With: Spouse  Type of Home: House  Home Layout: One level  Home Access: Stairs to enter with rails  Entrance Stairs - Number of Steps: 2 with grab bar  Home Equipment: Quad cane(used cane intermittently, family wanted pt to use all of the time)        ADL Assistance: Independent  Ambulation Assistance: Independent  Transfer Assistance: Independent  Additional Comments: No family present at time of OT eval. Per PT eval Pt was indep with mobility with cane sometimes, Pt reports indep with ADLs.      Restrictions/Precautions:  Restrictions/Precautions: General Precautions, Fall Risk  Position Activity Restriction  Other position/activity restrictions: pt pushes to right, R romain    SUBJECTIVE: nursing ok'd therapy, pt in bed just finished with lunch and agreeable and motivated for therapy, pt demonstrated good carryover with cues for support of right UE and recalled this from previous session, noted some flexor tone in right LE this date and decreased coord in left vs as compared to yesterday pt reported that his legs do get a little restless at home prior to this     PAIN: 0/10:       OBJECTIVE:  Bed Mobility:  Rolling to Left: Minimal Assistance, once he was positioned in hooklying position, did have pt support his right UE he needed tactile cues at his shoulders    Rolling to Right: Moderate Assistance, tactile cues at shoulders   Supine to Sit: Moderate Assistance, pt needed assist >30 seconds std pregait activity with RUE supported and </=modA to demonstrate improved balance/strength for progression with amb  Short term goal 5: PT to assess gait  Long term goals  Time Frame for Long term goals : no LTGs set secondary to short ELOS    Following session, patient left in safe position with all fall risk precautions in place.

## 2019-12-31 NOTE — PROGRESS NOTES
[facility]  Acute Inpatient Rehab Preadmission Assessment    Patient Name: aMry Daigle        MRN: [id number]    : 1940  (78 y.o.)  Gender: male     Admitted from:[address]  Initial Assessment    Date of admission to the hospital: 2019  9:44 AM  Date patient eligible for admission:2019    Primary Diagnosis: Acute CVA (cerebrovascular accident) [facility]) [I63.9]       Did patient have surgery?  no    Physicians: Aicha Bryant MD, [place], Kelly Wilcox for clinical complications/co-morbidities:   Past Medical History:   Diagnosis Date    Anxiety     Depression     Diabetes mellitus ([facility]     GERD (gastroesophageal reflux disease)     Hyperglycemia     Hypertension        Financial Information  Primary insurance: Medicare    Secondary Insurance:  Commercial: Company: [employer], Contact Information: see face sheet    Has the patient had two or more falls in the past year or any fall with injury in the past year? yes    Did the patient have major surgery during the 100 days prior to admission? no    Precautions:   falls, skin and aspirations  Restrictions/Precautions: General Precautions, Fall Risk  Other position/activity restrictions: pt pushes to right, R romain    Isolation Precautions: None       Physiatrist: Dr. Yair Redding    Patients Occupation: Retired  Reviewed Lab and Diagnostic reports from Current Admission: Yes    Patients Prior Functional  Level: Prior Function  ADL Assistance: Independent  Ambulation Assistance: Independent  Transfer Assistance: Independent  Additional Comments: No family present at time of OT eval. Per PT eval Pt was indep with mobility with cane sometimes, Pt reports indep with ADLs. Current functional status for upper extremity ADLs:   Upper Extremity Dressing: Maximum Assistance.   donning gown; required assist to thread BUEs and pull up to shoulders; assist to tie  Current functional status for lower extremity ADLs: Lower Extremity Dressing: Dependent. donning brief  Current functional status for bed, chair, wheelchair transfers:  Sit to Stand: Moderate Assistance. from EOB to [facility]   Stand to Sit: Moderate Assistance. to chair from [address] [place] [address]     Current functional status for toilet transfers: Toileting: total assist for hygiene task completion as pt slightly incont of bm upon standing from chair; pt required moderate assist X1 and CGA X1 for standing balance while hygiene completed.     Current functional status for locomotion:   Not appropriate/safe to attempt at this time.      Current functional status for bladder management: danielle    Current functional status for bowel management:Total Assistance    Current functional status for comprehension: Moderate assistance    Current functional status for expression: Minimal assistance    Current functional status for social interaction: Modified independence    Current functional status for problem solving: Moderate assistance    Current functional status for memory: Minimal contact assistance    Expected level of Improvement in Self-Care:  Supervision    Expected level of Improvement in Sphincter Control:  Supervision    Expected level of Improvement in Transfers: Modified independence    Expected level of Improvement in Locomotion:  Modified independence    Expected level of Improvement in Communication and Social Cognition: Modified independence    Expected length of time to achieve that level of improvement: 4 weeks    Current rehab issues: ADL dysfunction,bladder management,bowel management,carry over of therapy techniques, discharge planning, disease and co-morbidity management, gait/mobility dysfunction, medication management, nutrition and hydration management,Ongoing assessment of safety, Pain management, Patient and family education, Prevention of secondary complications, Skin Integrity, ,cognitive impairment, communication impairment.     Required therapy: Physical Therapy, Occupational Therapy and Speech Therapy 3 hours per day, 5-6 days per week. Recreational Therapy 1 hour per week. Expected Discharge Destination: Home    Expected Post Discharge Treatments: Home Care    Other information relevant to the care needs: danielle due to retention    Acute Inpatient Rehabilitation Disclosure Statement provided to patient. Patient verbalized understanding. I have reviewed and concur with the findings and results of the pre-admission screening assessment completed by the Inpatient Rehabilitation Admissions Coordinator.     Krystal Olivares MD

## 2019-12-31 NOTE — PROGRESS NOTES
Renal Progress Note  [facility]    Patient :  Juan David Current; 78 y.o. MRN# [id number]  Location:  4A-15/015-A  Attending:  Jake Guillermo MD  Admit Date:  12/24/2019   Hospital Day: 7      Subjective:     Nephrology is following the patient for DYAN. Patient seen and examined. Family at bedside. No complaints. Plans for rehab floor on Thursday. Outpatient Medications:     Medications Prior to Admission: losartan-hydrochlorothiazide (HYZAAR) 100-25 MG per tablet, Take 0.5 tablets by mouth daily  potassium chloride (KLOR-CON M20) 20 MEQ extended release tablet, Take 1 tablet by mouth daily  rivastigmine (EXELON) 9.5 MG/24HR, Place 1 patch onto the skin daily  esomeprazole (NEXIUM) 40 MG delayed release capsule, Take 40 mg by mouth every other day  escitalopram (LEXAPRO) 20 MG tablet, Take 1 tablet by mouth daily  verapamil (CALAN SR) 240 MG extended release tablet, Take 1 tablet by mouth daily  docusate sodium (COLACE) 100 MG capsule, Take 100 mg by mouth as needed for Constipation  ibuprofen (ADVIL;MOTRIN) 200 MG tablet, Take 200 mg by mouth as needed for Pain  tamsulosin (FLOMAX) 0.4 MG capsule, Take 0.4 mg by mouth nightly Per   Glucose Blood (BLOOD GLUCOSE TEST STRIPS) STRP, One Touch Verio Test Strips Test QD or as directed --DX E11.9  Lancets MISC, One Touch Verio Lancets  DX E11.9 Test QD or as directed  vitamin D3 (CHOLECALCIFEROL) 400 UNITS TABS tablet, Take 400 Units by mouth daily  Ascorbic Acid (VITAMIN C) 500 MG tablet, Take 500 mg by mouth daily  finasteride (PROSCAR) 5 MG tablet, Take 5 mg by mouth daily   aspirin 81 MG tablet, Take 81 mg by mouth daily. therapeutic multivitamin-minerals (THERAGRAN-M) tablet, Take 1 tablet by mouth daily.       Current Medications:     Scheduled Meds:    metoprolol succinate  25 mg Oral Daily    insulin lispro  0-12 Units Subcutaneous TID WC    insulin lispro  0-6 Units Subcutaneous Nightly    [Held by provider] losartan  100 mg input(s): PHOS in the last 72 hours. Magnesium:    Recent Labs     12/29/19  0725 12/30/19  0356   MG 1.9 2.0     Albumin:  No results for input(s): LABALBU in the last 72 hours. BNP:    No results found for: BNP  DYAN:    No results found for: DYAN  SPEP:  Lab Results   Component Value Date    PROT 5.9 12/26/2019     UPEP:   No results found for: LABPE  C3:   No results found for: C3  C4:   No results found for: C4  MPO ANCA:   No results found for: MPO  PR3 ANCA:   No results found for: PR3  Anti-GBM:   No results found for: GBMABIGG  Hep BsAg:       No results found for: HEPBSAG  Hep C AB:        No results found for: HEPCAB    Urinalysis/Chemistries:      Lab Results   Component Value Date    NITRU NEGATIVE 12/28/2019    COLORU YELLOW 12/28/2019    PHUR 5.5 12/28/2019    LABCAST NONE SEEN 12/28/2019    LABCAST NONE SEEN 12/28/2019    WBCUA 0-2 12/28/2019    WBCUA 0-5 06/27/2017    RBCUA 3-5 12/28/2019    MUCUS Present 06/27/2017    YEAST NONE SEEN 12/28/2019    BACTERIA NONE SEEN 12/28/2019    SPECGRAV 1.020 12/28/2019    LEUKOCYTESUR NEGATIVE 12/28/2019    LEUKOCYTESUR NEGATIVE 07/13/2017    UROBILINOGEN 0.2 12/28/2019    BILIRUBINUR NEGATIVE 12/28/2019    BLOODU MODERATE 12/28/2019    GLUCOSEU NEGATIVE 07/04/2017    KETUA TRACE 12/28/2019     Urine Sodium:   No results found for: WAYNE  Urine Potassium:  No results found for: KUR  Urine Chloride:  No results found for: CLUR  Urine Osmolarity: No results found for: OSMOU  Urine Protein:   No components found for: TOTALPROTEIN, URINE   Urine Creatinine:   No results found for: LABCREA  Urine Eosinophils:  No components found for: UEOS        Impression and Plan:  1. DYAN: resolved  2. Hypokalemia: resolved  3. CVA  4. Cardiomyopathy with low EF  5. HTN: Bps stable  6. DM    Stable from renal standpoint. No changes. Please don't hesitate to call with any questions.   Electronically signed by Otilio Dillon DO on 12/31/2019 at 1:41 PM

## 2019-12-31 NOTE — PROGRESS NOTES
[facility]  INPATIENT SPEECH THERAPY  [facility] NEUROSCIENCES 4A  DAILY NOTE    TIME   SLP Individual Minutes  Time In: 0940  Time Out: 1000  Minutes: 20  Timed Code Treatment Minutes: 0 Minutes       Date: 2019  Patient Name: Juan Antonio Ballesteros      CSN: [id number]   : 1940  (78 y.o.)  Gender: male   Referring Physician: RYLEY Talbert CNP  Diagnosis: Acute CVA  Secondary Diagnosis: Dysphagia; dysarthria; cognitive-linguistic deficits   Precautions: Fall risk, aspiration precautions   Current Diet: Nectar/mildly thick liquids, minced and most solids  Swallowing Strategies: Standard Universal Swallow Precautions  Date of Last MBS: Not Applicable    Pain:  No pain reported. Subjective:  Pt was upright, alert, with breakfast tray present upon arrival. The pt's daughter was also present and participating in skilled ST for the duration of the session. Short-Term Goals:  SHORT TERM GOAL #1:  Goal 1: Pt will tolerate a minced/moist diet and mildly thick liquids (no straws) without overt s/s of aspiration to meet nutritional/hydration measures safely. INTERVENTIONS: Completed meal treatment session as tray was present upon arrival. The had scrambled eggs, magic cup, and puree solids on his tray + nectar/mildly thick liquids. Pt with adequate positioning upon arrival; peer pt report, RN assisted with positioning d/t weakness and decreased bed mobility. Pt was taking small bites, while he was noted to have inadequate mastication and oral clearance prior to taking another bite. Pt required 5-6 verbal cues throughout the 20 minutes dysphagia treatment session to complete a lingual sweep, alternate, and complete a liquid wash. The pt responds well to verbal cues, and attempted strategies with every verbal reminder from [facility] Kimberly Moncada. Throughout the meal, pt's daughter was present, stating the family has also been providing verbal reminders for a lingual sweep.  ST noted coughing 1x within 20 minutes,

## 2019-12-31 NOTE — PROGRESS NOTES
[address]  Occupational Therapy  Daily Note  Time:    Time In: 8311  Time Out: 0827  Timed Code Treatment Minutes: 30 Minutes  Minutes: 30          Date: 2019  Patient Name: Askhat Alex,   Gender: male      Room: -15/015-A  MRN: [id number]  : 1940  (78 y.o.)  Referring Practitioner: LYNDON HEDRICK CNP  Diagnosis: acute CVA  Additional Pertinent Hx: Per ER note on 19:  78 y.o. male who presents to the Emergency Department via EMS for the evaluation of a CVA. The patient was found on the floor sitting up around 0630 this morning by his wife, who heard him fall. She reports a drooping mouth on the right side, slurred speech, and right sided weakness when she found him at 0630. The patient's last known well was 22:00 last night. The patient is unable to walk because his left leg is weak, and the patient had to be lifted into a chair after he was found on the floor. 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 Restrictions/Precautions:  Restrictions/Precautions: General Precautions, Fall Risk  Position Activity Restriction  Other position/activity restrictions: pt pushes to right, R romain    SUBJECTIVE: Pt pleasant and cooperative. Resting in bed and agreeable to OT treatment. Pt restless and pulling at telemetry lines and noted to be pulling on bed rail in attempt to get up without assistance. PAIN: 0/10: pt denied pain    COGNITION: delayed processing, poor safety and insight, decreased problem solving and awareness of deficits, inattention    ADL:   No ADL's completed this session. ADLs completed prior to arrival    BALANCE:  Sitting Balance:  Minimal Assistance. Seated EOB with R side lean. Standing Balance:  Moderate Assistance in [address] steady with max cues for safety and positioning due to R side lean  Pt requires max cues for posture and awareness of R side lean    BED MOBILITY:  Supine to Sit: Maximum Assistance pt attempting to assist, however, not aware of R side during mobility    TRANSFERS:  Sit to Stand: Moderate Assistance. from EOB to felisha stedy   Stand to Sit: Moderate Assistance. to chair from [address]:  Pt tolerated PROM to R UE in all planes as well as R UE WB while seated EOB with max assist to support UE in WB position. Exs completed to maintain joint integrity and facilitate muscle activation for eventual return of functional use    ASSESSMENT:     Activity Tolerance:  Patient tolerance of  treatment: fair. Pt limited by cognitive deficits      Discharge Recommendations: IP Rehab, Continue to assess pending progress  Equipment Recommendations: Other: Monitor pending progress  Plan: Times per week: 6x  Current Treatment Recommendations: Balance Training, Self-Care / ADL, ROM, Strengthening, Functional Mobility Training, Endurance Training, Neuromuscular Re-education, Safety Education & Training    Patient Education  Patient Education: role of OT, balance, R UE ROM and WB    Goals  Short term goals  Time Frame for Short term goals:  2 weeks  Short term goal 1: Complete various sit-stand t/fs using FELISHA STEDY with 0>mod A x 2 for increased indep with eventual BSC t/fs  Short term goal 2: Complete 5-7 min EOB ADL task with 0>mod A for sitting balance & min vcs for midline posture  Short term goal 3: Attend to R hemibody with min vcs for increased safety with returning to ADLs  Short term goal 4: Tolerate further assessment of functional t/fs by OTR when appropriate  Long term goals  Time Frame for Long term goals : No LTG set d/t short ELOS    Following session, patient left in safe position with all fall risk precautions in place.

## 2019-12-31 NOTE — DISCHARGE SUMMARY
12/31: patient having loose stools. No on laxatives, but did not have a bowel movement for several days prior. Continue to monitor. Further studies depending on whether it persists  16. Moderate protein calorie malnutrition: Per dietitian      Discharge Medications      Brenda, [address] Medication Instructions HXX:[id number]    Printed on:12/31/19 1115   Medication Information                      Ascorbic Acid (VITAMIN C) 500 MG tablet  Take 500 mg by mouth daily             aspirin 81 MG tablet  Take 81 mg by mouth daily. docusate sodium (COLACE) 100 MG capsule  Take 100 mg by mouth as needed for Constipation             escitalopram (LEXAPRO) 20 MG tablet  Take 1 tablet by mouth daily             esomeprazole (NEXIUM) 40 MG delayed release capsule  Take 40 mg by mouth every other day             finasteride (PROSCAR) 5 MG tablet  Take 5 mg by mouth daily              Glucose Blood (BLOOD GLUCOSE TEST STRIPS) STRP  One Touch Verio Test Strips Test QD or as directed --DX E11.9             ibuprofen (ADVIL;MOTRIN) 200 MG tablet  Take 200 mg by mouth as needed for Pain             Lancets MISC  One Touch Verio Lancets  DX E11.9 Test QD or as directed             losartan-hydrochlorothiazide (HYZAAR) 100-25 MG per tablet  Take 0.5 tablets by mouth daily             potassium chloride (KLOR-CON M20) 20 MEQ extended release tablet  Take 1 tablet by mouth daily             rivastigmine (EXELON) 9.5 MG/24HR  Place 1 patch onto the skin daily             tamsulosin (FLOMAX) 0.4 MG capsule  Take 0.4 mg by mouth nightly Per              therapeutic multivitamin-minerals (THERAGRAN-M) tablet  Take 1 tablet by mouth daily.                verapamil (CALAN SR) 240 MG extended release tablet  Take 1 tablet by mouth daily             vitamin D3 (CHOLECALCIFEROL) 400 UNITS TABS tablet  Take 400 Units by mouth daily                       Physical Examination     Vitals:  Vitals:    12/31/19 0019 12/31/19 0431 12/31/19 0431 12/31/19 0745   BP: 132/68  114/71 138/67   Pulse: 64 74 74 64   Resp: 16  16 18   Temp: 98 °F (36.7 °C)  98 °F (36.7 °C) 98.1 °F (36.7 °C)   TempSrc: Oral  Oral Oral   SpO2: 95%  97%    Weight:       Height:           Weight: Weight: 150 lb 9.6 oz (68.3 kg)     24 hour intake/output:    Intake/Output Summary (Last 24 hours) at 12/31/2019 1116  Last data filed at 12/31/2019 0431  Gross per 24 hour   Intake 593.04 ml   Output 1100 ml   Net -506.96 ml     General appearance: Lying in bed. Responsive to questions. Alert  HEENT: Extraocular motion intact. Oral mucosa dry  Neck: Supple. Trachea midline  Respiratory:  Decreased breath sounds at bilateral lung lobes otherwise CTA. Cardiovascular: RRR with normal S1/S2 without murmurs, rubs or gallops. Abdomen: Soft, non-tender, non-distended with normal bowel sounds. Musculoskeletal: RUE weakness  Neurologic: RUE weakness. Psychiatric: Lying in bed answers questions appropriately  Vascular: Dorsalis pedis palpable bilaterally. Radial pulses palpable bilaterally. Cool extremities improved. No peripheral edema  Skin:  No visible rashes or lesions. Labs     Labs: For convenience and continuity at follow-up the following most recent labs are provided:      CBC:    Lab Results   Component Value Date    WBC 10.7 12/31/2019    HGB 13.2 12/31/2019    HCT 38.3 12/31/2019     12/31/2019       Renal:    Lab Results   Component Value Date     12/31/2019    K 3.9 12/31/2019     12/31/2019    CO2 21 12/31/2019    BUN 20 12/31/2019    CREATININE 1.0 12/31/2019    CALCIUM 9.3 12/31/2019         Radiology     Radiology:      Cta Head W Wo Contrast    Result Date: 12/24/2019  PROCEDURE: CTA HEAD W WO CONTRAST, CTA NECK W WO CONTRAST CLINICAL INFORMATION: Stroke . COMPARISON: CT head and MR brain from the same date.  TECHNIQUE: Helical CT from the aortic arch through the head in arterial phase during intravenous administration of 80 mL Isovue-370 injected in the right forearm with multiplanar reformatted images to include volumetric maximum intensity projection sequences. FINDINGS: CTA HEAD: There are mural calcifications in the cavernous and clinoid segments of the internal carotid arteries with an area of moderate stenosis in the right cavernous segment and the area of mild to moderate stenosis in the left clinoid segment. Intracranial segments of internal carotid arteries are otherwise patent without flow-limiting stenosis or visualized aneurysm. The bilateral middle cerebral and anterior cerebral arteries are patent without focal abnormality identified. There is long segment mild to moderate stenosis of the basilar artery. There is a focal area of moderate stenosis at the P1 segment of the right posterior cerebral artery and a focal area of severe stenosis at the P2 segment. The left posterior cerebral artery appears patent. There is a focal area of moderate to severe stenosis in the right superior cerebellar artery. The left superior cerebral artery appears patent. The left inferior cerebellar artery is patent. The right inferior cerebral artery is not well visualized. The anterior inferior cerebral arteries are not visualized. CTA NECK: There is a typical 3 vessel arch. The brachiocephalic artery is patent. The proximal left subclavian artery is widely patent. There are areas of mild stenosis more distally in the left subclavian artery. The common carotid arteries are patent without focal stenosis. There is mild calcified and noncalcified mural plaque at the carotid bifurcations without hemodynamically significant stenosis by NASCET criteria. Cervical segments of the internal carotid arteries are patent without focal stenosis. The left vertebral artery is dominant. There is mural calcification in the V4 segment of the left vertebral artery just proximal to the posterior inferior cerebellar artery takeoff with an area of moderate stenosis.  Vertebral arteries are otherwise patent without focal stenosis. There is streak artifact from dental amalgam which partially obscures oral and perioral soft tissues. Given this caveat, mucosal surfaces of the aerodigestive tract are symmetric without focal nodular thickening or visualized mass. No cervical lymphadenopathy is identified. The parotid, submandibular and thyroid glands are unremarkable. The visualized portions of the lungs are clear. There are moderate degenerative changes of the cervical spine. CTA head: 1. Mural calcifications in the cavernous and clinoid segments of the internal carotid arteries with a focal area of moderate stenosis in the right cavernous segment and focal area of mild to moderate stenosis in the left clinoid segment. 2. Long segment of mild to moderate stenosis of the basilar artery. 3. Focal area of moderate stenosis in the P1 segment and focal area of severe stenosis in the P2 segment of the right posterior cerebral artery. 4. Focal area of moderate to severe stenosis in the right superior cerebellar artery. CTA NECK: 1. Mild mural plaque at the carotid bifurcations without hemodynamically significant stenosis by NASCET criteria. 2. Focal area of moderate stenosis in the V4 segment of the dominant left vertebral artery. **This report has been created using voice recognition software. It may contain minor errors which are inherent in voice recognition technology. ** Final report electronically signed by Dr. Iglesia Clayton MD on 12/24/2019 1:23 PM    Ct Head Wo Contrast    Result Date: 12/25/2019  PROCEDURE: CT HEAD WO CONTRAST CLINICAL INFORMATION: Recent TPA infusion TECHNIQUE: CT scan of the head was performed from the vertex to the skull base without use of intravenous contrast. Axial images as well as coronal and sagittal reconstructions were obtained.  All CT scans at this facility use dose modulation, iterative reconstruction, and/or weight-based dosing when appropriate to carotid arteries with a focal area of moderate stenosis in the right cavernous segment and focal area of mild to moderate stenosis in the left clinoid segment. 2. Long segment of mild to moderate stenosis of the basilar artery. 3. Focal area of moderate stenosis in the P1 segment and focal area of severe stenosis in the P2 segment of the right posterior cerebral artery. 4. Focal area of moderate to severe stenosis in the right superior cerebellar artery. CTA NECK: 1. Mild mural plaque at the carotid bifurcations without hemodynamically significant stenosis by NASCET criteria. 2. Focal area of moderate stenosis in the V4 segment of the dominant left vertebral artery. **This report has been created using voice recognition software. It may contain minor errors which are inherent in voice recognition technology. ** Final report electronically signed by Dr. Luiza Morelos MD on 12/24/2019 1:23 PM    Us Renal Complete    Result Date: 12/29/2019  PROCEDURE: US RENAL COMPLETE CLINICAL INFORMATION: acute kidney injury. COMPARISON: No prior study. TECHNIQUE:Real-time and color flow ultrasound of the kidneys and bladder were performed. FINDINGS: Kidneys: Morphology: Normal size and shape. The kidneys are echogenic consistent with nonspecific chronic medical renal disease. Mass/cyst: There is a 2 x 1.5 x 1.9 cm simple cyst of the posterior upper pole the right kidney. Hydronephrosis: none Calculi: none RIGHT KIDNEY - 9.6 x 4.7 x 3.9 cm Resistive Index - 0.66 Cortical Thickness - 1.1 cm LEFT KIDNEY - 11.9 x 6.8 x 5.2 cm Resistive Index - 0.73 Cortical Thickness - 1.5 cm URINARY BLADDER-there is a Solis catheter in the bladder. There is no obvious bladder wall thickening or mass. There is possible debris in the dependent portion of the bladder versus artifact related to the Solis catheter balloon. Prostate: Prostate is enlarged measuring 5.2 x 3.9 x 3.8 cm with a volume of 41 cc.  Gallbladder: Gallbladder is mildly distended [id number]  [phone]    In 1 week      Panfilo Linares, APRN - CNP  [address] [facility]  [phone]      1/20/2020 Monday 1 pm [address] at [facility]         Code status at time of discharge     Full Code       Time Spent on discharge is more than 48 minutes in the examination, evaluation, counseling and review of medications and discharge plan. Signed: Thank you John Ansari MD for the opportunity to be involved in this patient's care.     Electronically signed by Darnell Wadsworth MD on 12/31/2019 at 11:16 AM

## 2019-12-31 NOTE — PROGRESS NOTES
oriented to person, place and time, in no acute distress   occ aphasia   Cardiovascular: normal rate, regular rhythm, normal S1 and S2, no murmurs, rubs, clicks, or gallops, distal pulses intact,   Pulmonary/Chest: clear to auscultation bilaterally- no wheezes, rales or rhonchi, normal air movement, no respiratory distress  Abdomen: soft, non-tender, non-distended, normal bowel sounds, no masses Extremities: no cyanosis, clubbing or edema, pulses present    Skin: warm and dry, no rash or erythema  Head: normocephalic and atraumatic   Musculoskeletal: normal range of motion, no joint swelling, deformity or tenderness  Neurological: alert, oriented, normal speech, no focal findings or movement disorder noted    Medications:    metoprolol succinate  25 mg Oral Daily    insulin lispro  0-12 Units Subcutaneous TID WC    insulin lispro  0-6 Units Subcutaneous Nightly    [Held by provider] losartan  100 mg Oral Daily    And    [Held by provider] hydrochlorothiazide  25 mg Oral Daily    aspirin  81 mg Oral Daily    clopidogrel  75 mg Oral Daily    sodium chloride flush  10 mL Intravenous 2 times per day    sodium chloride  50 mL Intravenous Once    atorvastatin  80 mg Oral Nightly    escitalopram  20 mg Oral Daily    finasteride  5 mg Oral Daily    pantoprazole  40 mg Oral QAM AC      dextrose       glucose, 15 g, PRN  dextrose, 12.5 g, PRN  glucagon (rDNA), 1 mg, PRN  dextrose, 100 mL/hr, PRN  acetaminophen, 650 mg, Q4H PRN  perflutren lipid microspheres, 2 mL, ONCE PRN  sodium chloride flush, 10 mL, PRN  magnesium hydroxide, 30 mL, Daily PRN  ondansetron, 4 mg, Q6H PRN        Diagnostics:  EKG:     Cant pull EKG from epic    Echo:    Electronically signed by Angeles Howe MD (Interpreting   physician) on 12/31/2019 at 12:48 PM   ----------------------------------------------------------------      Findings      Mitral Valve   The mitral valve structure was normal with normal leaflet separation.       Aortic Valve   The aortic valve was trileaflet with normal thickness and cuspal   separation. Tricuspid Valve   The tricuspid valve structure was normal with normal leaflet separation. Pulmonic Valve   The pulmonic valve leaflets exhibited normal thickness, no calcification,   and normal cuspal separation. Left Atrium   The left atrium is Mildly dilated. Left Ventricle   Left Ventricular size is Moderately increased . Normal left ventricular wall thickness. There was severe global hypokinesis of the left ventricle with some mild   sparing of the basal wall motion. Systolic function was severely reduced. Ejection fraction is visually estimated at 25%. Right Atrium   Right atrial size was normal.      Right Ventricle   The right ventricular size was normal with normal systolic function and   wall thickness. Pericardial Effusion   The pericardium was normal in appearance with no evidence of a pericardial   effusion. Pleural Effusion   No evidence of pleural effusion. Aorta / Great Vessels   -Aortic root dimension within normal limits.   -The Pulmonary artery is within normal limits. -IVC size is within normal limits with normal respiratory phasic changes. Electronically signed by Beau Erwin MD (Interpreting   physician) on 12/27/2019 at 06:58 PM   ----------------------------------------------------------------      Findings      Mitral Valve   The mitral valve structure was normal with normal leaflet separation. DOPPLER: The transmitral velocity was within the normal range with no   evidence for mitral stenosis. There was no evidence of mitral   regurgitation. Aortic Valve   The aortic valve was trileaflet with normal thickness and cuspal   separation. DOPPLER: Transaortic velocity was within the normal range with   no evidence of aortic stenosis. Mild aortic regurgitation is noted.       Tricuspid Valve   The tricuspid valve structure was normal with 12/31/2019    GLUCOSE 122 11/04/2019    CALCIUM 9.3 12/31/2019       Hepatic Function Panel:    Lab Results   Component Value Date    ALKPHOS 70 12/26/2019    ALT 18 12/26/2019    AST 23 12/26/2019    PROT 5.9 12/26/2019    BILITOT 1.0 12/26/2019    BILIDIR <0.2 07/13/2017    LABALBU 3.6 12/26/2019       Magnesium:    Lab Results   Component Value Date    MG 2.0 12/30/2019       PT/INR:  No results found for: PROTIME, INR    HgBA1c:    Lab Results   Component Value Date    LABA1C 5.3 12/26/2019       FLP:    Lab Results   Component Value Date    TRIG 107 12/26/2019    HDL 33 12/26/2019    HDL 30 03/22/2012    LDLCALC 76 12/26/2019    LDLDIRECT 111 11/04/2019       TSH:    Lab Results   Component Value Date    TSH 2.240 07/13/2017         Assessment:    Acute infarct in the left hemipons     Elevated trop in the setting of DYAN - 0.4 / 0.3  No chest pain - no Hx CAD - + risk factors for CAD     abnormal EKG - Q waves all leads - likley secondary to acute CVA     Pt has multiple risk factors for CAD    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 Takutsabu from CVA   ?  ischemic     HTN  DM II  Hx GERD      Plan:  · Continue treatment for CVA  · Pt has no anginal c/o  · Needs LV for dilated CDMP  · Needs cath as OP after CVA recovery   · CDMP meds - ASA / statin / BB - no ACE / ARB at present with marginal BP - he needs this down the line for CDMP   · Event or LINQ for Arrhythmia evaluation  · Can go to [facility] after LV placed - we will follow as OP after CVA recovery          Electronically signed by RYLEY High CNP on 12/31/2019 at 1:20 PM

## 2019-12-31 NOTE — PROGRESS NOTES
Plan admission to rehab on Thursday, 1/2 per Dr Simone Randle. Needs to have life vest in place prior to admission if it is needed.

## 2019-12-31 NOTE — PROGRESS NOTES
Hospitalist Progress Note    Patient:  Astrid Castillo  YOB: 1940  MRN: [id number]   PCP: Makenzie Tavarez MD         Acct: [de-identified]  Unit/Bed: [address] [place] [address]    Date of Admission: 12/24/2019      ASSESSMENT     1. 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 Neurology following on aspirin, Plavix, atorvastatin. PT/OT/ST. 2. Abnormal EKG with elevated troponin: Echo showed EF of 25% with severe global hypokinesis of the left ventricle with some sparing of the basal wall suggesting possible stress induced cardiomyopathy. Concern for NSTEMI vs Takotsubo cardiomyopathy. Cardiology consulted and patient placed on heparin drip starting 12/28 to 12/30, also on metoprolol. Heart failure consult placed. Lipid panel unremarkable started on atorvastatin. Plan for cath as outpatient. Family declined cath inpatient. Repeat Echo on 12/31 shows similar findings. Cardiology to decide on Life Vest.   3. DYAN, improved: Likely multifactorial related to combination of cardiorenal syndrome, contrast administration and obstructive uropathy. .  Has urinary retention and Solis catheter in place. UA does not show signs of a UTI. Nephrology consulted patient started on bicarb drip on 12/28 and now improved. Renal US suggestive of intrinsic kidney disease and also showed enlarged prostate and possible debris in bladder- urine culture unremarkable so far. Urology consulted- may not have coverage and patient needs to be seen as outpatient  4. Acute urinary retention: Solis catheter in place. Urology consulted- will see as outpatient at this point as no Urologist currently in hospital  5. Unspecified leukocytosis: Resolved. Likely inflammatory related to #2. Continue to monitor. Blood cultures if patient has a fever  6. Oropharyngeal dysphagia: Barium swallow showed laryngeal penetration and aspiration. ST following. On dysphagia diet  7.  Uncontrolled HTN: on losartan, hydrochlorothiazide, verapamil. Per cardiology verapamil discontinued and patient placed on metoprolol. Better controlled. Continue to monitor Continue to hold hydrochlorothiazide and losartan. Hypernatremia: s/p D5W. Improved Nephrology following  8. Hypokalemia: continue to monitor. Replace prn  9. BPH: finasteride  10. Chronic depression: escitalopram  11. Thrombocytopenia: improved. 12. Incidental finding of distended gallbladder with gallstones on renal US  13. ?DM Type 2: HgbA1C <6.5: patient denies history of diabetes. May have been prediabetic in the past  14. Loose stools: Noted on 12/31:  Patient did not take stool softeners or laxatives. Continue to monitor. 15. Moderate protein calorie malnutrition: Per dietitian    PLAN     1. Plan to discharge to inpatient rehab on Thursday  2. Question as to whether patient will need Life Vest. Cardiology to decide    Anticipated Discharge in : 2 days. Code Status: Full Code    Electronically signed by Luis Hawk MD on 12/31/2019 at 5:20 PM      Chief Complaint     Right sided weakness    SUBJECTIVE     The patient is a 78 y.o. male w/ a PMH of hypertension, GERD depression and anxiety  With last known normal at 220 on 12//23 who was found sitting on the floor around 0630 on 12/24/2019 by his wife who heard him fall. She reported drooling out of the right side of his mouth along with slurred speech and right sided weakness. He was evaluated by Dr. Jo Spencer in the ED and tPA was given. He was initially admitted to the ICU and I assumed care on 12/27/2019.    -  Overnight patient, had 4 large bowel movements. Prior to that he had very small ones. Denies abdominal pain, nausea, vomiting, CP or SOB.     OBJECTIVE     Medications:  Reviewed    Infusion Medications    dextrose       Scheduled Medications    metoprolol succinate  25 mg Oral Daily    insulin lispro  0-12 Units Subcutaneous TID WC    insulin lispro  0-6 Units Subcutaneous Nightly    BILIDIR, BILITOT, ALKPHOS in the last 72 hours. No results for input(s): INR in the last 72 hours. No results for input(s): [place] in the last 72 hours. Urinalysis:      Lab Results   Component Value Date    NITRU NEGATIVE 12/28/2019    WBCUA 0-2 12/28/2019    WBCUA 0-5 06/27/2017    BACTERIA NONE SEEN 12/28/2019    RBCUA 3-5 12/28/2019    BLOODU MODERATE 12/28/2019    SPECGRAV 1.020 12/28/2019    GLUCOSEU NEGATIVE 07/04/2017       Diagnostic imaging/procedures       Cta Head W Wo Contrast    Result Date: 12/24/2019  PROCEDURE: CTA HEAD W WO CONTRAST, CTA NECK W WO CONTRAST CLINICAL INFORMATION: Stroke . COMPARISON: CT head and MR brain from the same date. TECHNIQUE: Helical CT from the aortic arch through the head in arterial phase during intravenous administration of 80 mL Isovue-370 injected in the right forearm with multiplanar reformatted images to include volumetric maximum intensity projection sequences. FINDINGS: CTA HEAD: There are mural calcifications in the cavernous and clinoid segments of the internal carotid arteries with an area of moderate stenosis in the right cavernous segment and the area of mild to moderate stenosis in the left clinoid segment. Intracranial segments of internal carotid arteries are otherwise patent without flow-limiting stenosis or visualized aneurysm. The bilateral middle cerebral and anterior cerebral arteries are patent without focal abnormality identified. There is long segment mild to moderate stenosis of the basilar artery. There is a focal area of moderate stenosis at the P1 segment of the right posterior cerebral artery and a focal area of severe stenosis at the P2 segment. The left posterior cerebral artery appears patent. There is a focal area of moderate to severe stenosis in the right superior cerebellar artery. The left superior cerebral artery appears patent. The left inferior cerebellar artery is patent.  The right inferior cerebral artery is not well visualized. The anterior inferior cerebral arteries are not visualized. CTA NECK: There is a typical 3 vessel arch. The brachiocephalic artery is patent. The proximal left subclavian artery is widely patent. There are areas of mild stenosis more distally in the left subclavian artery. The common carotid arteries are patent without focal stenosis. There is mild calcified and noncalcified mural plaque at the carotid bifurcations without hemodynamically significant stenosis by NASCET criteria. Cervical segments of the internal carotid arteries are patent without focal stenosis. The left vertebral artery is dominant. There is mural calcification in the V4 segment of the left vertebral artery just proximal to the posterior inferior cerebellar artery takeoff with an area of moderate stenosis. Vertebral arteries are otherwise patent without focal stenosis. There is streak artifact from dental amalgam which partially obscures oral and perioral soft tissues. Given this caveat, mucosal surfaces of the aerodigestive tract are symmetric without focal nodular thickening or visualized mass. No cervical lymphadenopathy is identified. The parotid, submandibular and thyroid glands are unremarkable. The visualized portions of the lungs are clear. There are moderate degenerative changes of the cervical spine. CTA head: 1. Mural calcifications in the cavernous and clinoid segments of the internal carotid arteries with a focal area of moderate stenosis in the right cavernous segment and focal area of mild to moderate stenosis in the left clinoid segment. 2. Long segment of mild to moderate stenosis of the basilar artery. 3. Focal area of moderate stenosis in the P1 segment and focal area of severe stenosis in the P2 segment of the right posterior cerebral artery. 4. Focal area of moderate to severe stenosis in the right superior cerebellar artery. CTA NECK: 1.  Mild mural plaque at the carotid bifurcations without hemodynamically significant stenosis by NASCET criteria. 2. Focal area of moderate stenosis in the V4 segment of the dominant left vertebral artery. **This report has been created using voice recognition software. It may contain minor errors which are inherent in voice recognition technology. ** Final report electronically signed by Dr. Josep Villeda MD on 12/24/2019 1:23 PM    Ct Head Wo Contrast    Result Date: 12/25/2019  PROCEDURE: CT HEAD WO CONTRAST CLINICAL INFORMATION: Recent TPA infusion TECHNIQUE: CT scan of the head was performed from the vertex to the skull base without use of intravenous contrast. Axial images as well as coronal and sagittal reconstructions were obtained. All CT scans at this facility use dose modulation, iterative reconstruction, and/or weight-based dosing when appropriate to reduce radiation dose to as low as reasonably achievable. COMPARISON: CT head 12/24/2019 FINDINGS: There is no mass effect, midline shift or acute hemorrhage. Ventricles and sulci are prominent. Diffuse periventricular white matter hypoattenuation is again noted. There is hypoattenuation of the bilateral basal ganglia are likely secondary to  prior lacunar infarcts. Cerebrovascular calcifications are noted. Visualized orbits, paranasal sinuses and mastoid air cells are unremarkable. 1. No mass effect or acute hemorrhage. 2. Chronic periventricular small vessel ischemic changes and cerebral atrophy. **This report has been created using voice recognition software. It may contain minor errors which are inherent in voice recognition technology. ** Final report electronically signed by Dr. Ean Royal on 12/25/2019 6:32 PM    Ct Head Wo Contrast    Result Date: 12/24/2019  PROCEDURE: CT HEAD WO CONTRAST CLINICAL INFORMATION: CVA with worsening neuro deficits. COMPARISON: No prior study. TECHNIQUE: Noncontrast 5 mm axial images were obtained through the brain.  All CT scans at this facility use dose CONTRAST CLINICAL INFORMATION: Stroke . COMPARISON: CT head and MR brain from the same date. TECHNIQUE: Helical CT from the aortic arch through the head in arterial phase during intravenous administration of 80 mL Isovue-370 injected in the right forearm with multiplanar reformatted images to include volumetric maximum intensity projection sequences. FINDINGS: CTA HEAD: There are mural calcifications in the cavernous and clinoid segments of the internal carotid arteries with an area of moderate stenosis in the right cavernous segment and the area of mild to moderate stenosis in the left clinoid segment. Intracranial segments of internal carotid arteries are otherwise patent without flow-limiting stenosis or visualized aneurysm. The bilateral middle cerebral and anterior cerebral arteries are patent without focal abnormality identified. There is long segment mild to moderate stenosis of the basilar artery. There is a focal area of moderate stenosis at the P1 segment of the right posterior cerebral artery and a focal area of severe stenosis at the P2 segment. The left posterior cerebral artery appears patent. There is a focal area of moderate to severe stenosis in the right superior cerebellar artery. The left superior cerebral artery appears patent. The left inferior cerebellar artery is patent. The right inferior cerebral artery is not well visualized. The anterior inferior cerebral arteries are not visualized. CTA NECK: There is a typical 3 vessel arch. The brachiocephalic artery is patent. The proximal left subclavian artery is widely patent. There are areas of mild stenosis more distally in the left subclavian artery. The common carotid arteries are patent without focal stenosis. There is mild calcified and noncalcified mural plaque at the carotid bifurcations without hemodynamically significant stenosis by NASCET criteria. Cervical segments of the internal carotid arteries are patent without focal stenosis.  The left vertebral artery is dominant. There is mural calcification in the V4 segment of the left vertebral artery just proximal to the posterior inferior cerebellar artery takeoff with an area of moderate stenosis. Vertebral arteries are otherwise patent without focal stenosis. There is streak artifact from dental amalgam which partially obscures oral and perioral soft tissues. Given this caveat, mucosal surfaces of the aerodigestive tract are symmetric without focal nodular thickening or visualized mass. No cervical lymphadenopathy is identified. The parotid, submandibular and thyroid glands are unremarkable. The visualized portions of the lungs are clear. There are moderate degenerative changes of the cervical spine. CTA head: 1. Mural calcifications in the cavernous and clinoid segments of the internal carotid arteries with a focal area of moderate stenosis in the right cavernous segment and focal area of mild to moderate stenosis in the left clinoid segment. 2. Long segment of mild to moderate stenosis of the basilar artery. 3. Focal area of moderate stenosis in the P1 segment and focal area of severe stenosis in the P2 segment of the right posterior cerebral artery. 4. Focal area of moderate to severe stenosis in the right superior cerebellar artery. CTA NECK: 1. Mild mural plaque at the carotid bifurcations without hemodynamically significant stenosis by NASCET criteria. 2. Focal area of moderate stenosis in the V4 segment of the dominant left vertebral artery. **This report has been created using voice recognition software. It may contain minor errors which are inherent in voice recognition technology. ** Final report electronically signed by Dr. Gabriel Gill MD on 12/24/2019 1:23 PM    Us Renal Complete    Result Date: 12/29/2019  PROCEDURE: US RENAL COMPLETE CLINICAL INFORMATION: acute kidney injury. COMPARISON: No prior study.  TECHNIQUE:Real-time and color flow ultrasound of the kidneys and are  moderate patchy areas of T2/FLAIR prolongation in the periventricular, subcortical and deep white matter elsewhere, grossly stable compared to prior exam. No intra or extra-axial mass is identified. The major vascular flow voids appear patent. Orbits are unremarkable. There is minimal mucosal thickening in the left maxillary sinus. Mastoid air cells are clear. 1. 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 2. Moderate chronic microvascular angiopathy superimposed on volume loss, grossly similar to prior exam. Findings were discussed with Dr. Bipin Oakes via telephone at the time of interpretation. **This report has been created using voice recognition software. It may contain minor errors which are inherent in voice recognition technology. ** Final report electronically signed by Dr. Luiza Morelos MD on 12/24/2019 11:17 AM    Fl Modified Barium Swallow W Video    Result Date: 12/26/2019  PROCEDURE: FL MODIFIED BARIUM SWALLOW W VIDEO CLINICAL INFORMATION: Dysphasia TECHNIQUE: Fluoroscopy was provided for modified barium swallowing study performed by speech therapy. With the patient in the lateral projection, swallowing mechanism was evaluated using barium of various consistencies. The radiologist was available for the entire examination. 45 clips were obtained. Total fluoroscopy time 3.52 minutes. Speech therapist: Hans Fuchs COMPARISON: None. FINDINGS: Oral, pharyngeal and esophageal structures appear normal. There is laryngeal penetration of nectar thick and thin barium with aspiration of thin barium. 1. Laryngeal penetration of nectar thick and thin barium with aspiration of thin barium. 2. Additional recommendations from the speech therapist will follow. **This report has been created using voice recognition software. It may contain minor errors which are inherent in voice recognition technology. ** Final report electronically signed by Dr. Opal Castro on 12/26/2019 12:20 PM      DVT prophylaxis: [x]

## 2019-12-31 NOTE — PROGRESS NOTES
touch.  Abdomen is soft intact bowel sounds. ROS:    Cardiac: no chest pain. No palpitations. Renal : no flank pain, no hematuria  Skin: no rash    Medications:     metoprolol succinate  25 mg Oral Daily    insulin lispro  0-12 Units Subcutaneous TID WC    insulin lispro  0-6 Units Subcutaneous Nightly    [Held by provider] losartan  100 mg Oral Daily    And    [Held by provider] hydrochlorothiazide  25 mg Oral Daily    aspirin  81 mg Oral Daily    clopidogrel  75 mg Oral Daily    sodium chloride flush  10 mL Intravenous 2 times per day    sodium chloride  50 mL Intravenous Once    atorvastatin  80 mg Oral Nightly    escitalopram  20 mg Oral Daily    finasteride  5 mg Oral Daily    pantoprazole  40 mg Oral QAM AC       Data:   CBC:   Recent Labs     12/28/19  1655 12/29/19  0725 12/30/19  0356   WBC 8.5 8.9 8.8   HGB 12.2* 12.6* 11.9*    124* 111*     BMP:    Recent Labs     12/28/19  0918 12/29/19  0725 12/30/19  0356   * 149* 139   K 3.7 3.4* 3.4*   * 112* 108   CO2 15* 23 21*   BUN 37* 30* 23*   CREATININE 3.0* 1.2 1.0   GLUCOSE 133* 154* 129*     Reviewed   Results for orders placed during the hospital encounter of 12/24/19   CTA HEAD W WO CONTRAST    Narrative PROCEDURE: CTA HEAD W WO CONTRAST, CTA NECK W WO CONTRAST    CLINICAL INFORMATION: Stroke . COMPARISON: CT head and MR brain from the same date. TECHNIQUE: Helical CT from the aortic arch through the head in arterial phase during intravenous administration of 80 mL Isovue-370 injected in the right forearm with multiplanar reformatted images to include volumetric maximum intensity projection   sequences. FINDINGS:     CTA HEAD:  There are mural calcifications in the cavernous and clinoid segments of the internal carotid arteries with an area of moderate stenosis in the right cavernous segment and the area of mild to moderate stenosis in the left clinoid segment.  Intracranial   segments of internal carotid internal carotid arteries are otherwise patent without flow-limiting stenosis or visualized aneurysm. The bilateral middle cerebral and anterior cerebral arteries are patent without focal abnormality identified. There is long segment mild to   moderate stenosis of the basilar artery. There is a focal area of moderate stenosis at the P1 segment of the right posterior cerebral artery and a focal area of severe stenosis at the P2 segment. The left posterior cerebral artery appears patent. There   is a focal area of moderate to severe stenosis in the right superior cerebellar artery. The left superior cerebral artery appears patent. The left inferior cerebellar artery is patent. The right inferior cerebral artery is not well visualized. The   anterior inferior cerebral arteries are not visualized. CTA NECK:  There is a typical 3 vessel arch. The brachiocephalic artery is patent. The proximal left subclavian artery is widely patent. There are areas of mild stenosis more distally in the left subclavian artery. The common carotid arteries are patent without   focal stenosis. There is mild calcified and noncalcified mural plaque at the carotid bifurcations without hemodynamically significant stenosis by NASCET criteria. Cervical segments of the internal carotid arteries are patent without focal stenosis. The   left vertebral artery is dominant. There is mural calcification in the V4 segment of the left vertebral artery just proximal to the posterior inferior cerebellar artery takeoff with an area of moderate stenosis. Vertebral arteries are otherwise patent   without focal stenosis. There is streak artifact from dental amalgam which partially obscures oral and perioral soft tissues. Given this caveat, mucosal surfaces of the aerodigestive tract are symmetric without focal nodular thickening or visualized mass. No cervical   lymphadenopathy is identified. The parotid, submandibular and thyroid glands are unremarkable. The visualized portions of the lungs are clear. There are moderate degenerative changes of the cervical spine. Impression  CTA head:  1. Mural calcifications in the cavernous and clinoid segments of the internal carotid arteries with a focal area of moderate stenosis in the right cavernous segment and focal area of mild to moderate stenosis in the left clinoid segment. 2. Long segment of mild to moderate stenosis of the basilar artery. 3. Focal area of moderate stenosis in the P1 segment and focal area of severe stenosis in the P2 segment of the right posterior cerebral artery. 4. Focal area of moderate to severe stenosis in the right superior cerebellar artery. CTA NECK:  1. Mild mural plaque at the carotid bifurcations without hemodynamically significant stenosis by NASCET criteria. 2. Focal area of moderate stenosis in the V4 segment of the dominant left vertebral artery. **This report has been created using voice recognition software. It may contain minor errors which are inherent in voice recognition technology. **    Final report electronically signed by Dr. Chelsea Benson MD on 12/24/2019 1:23 PM     Results for orders placed during the hospital encounter of 12/24/19   MRI BRAIN WO CONTRAST    Narrative PROCEDURE: MRI BRAIN WO CONTRAST    INDICATION:  RIght side weakness. Symptoms starting this morning. COMPARISON: CT head from the same date and MR brain dated 7/12/2017. TECHNIQUE: Multiplanar and multiple spin echo MRI images were obtained of the brain without contrast.    FINDINGS:  There is stable moderate volume loss. There is a small to moderate-sized area of restricted diffusion in the left hemipons with minimal hyperintense T2 signal in a portion of the lesion. No other focal areas of restricted diffusion are present.  There are   moderate patchy areas of T2/FLAIR prolongation in the periventricular, subcortical and deep white matter elsewhere, grossly stable compared to therapy  5. Occupational Therapy  6. Speech therapy  7. DVT prophylaxis  8. Cardiac echo reviewed, audiology consultation input appreciated  9. Nephrology input appreciated. 8. Patient's wife and son were present today.   Discussed with the patient and the family members     Mason Schroeder MD, 12/30/2019 8:22 PM

## 2019-12-31 NOTE — PROGRESS NOTES
This RN text Denis Collier CNP with cardiology and Dr. José Esposito with neurology if it was ok from their perspective standpoints if the patient could be discharge to inpatient rehab. Awaiting response. Denis Collier CNP with cardiology states to wait on the limited echo results as the patient may need a Life Vest.  This RN updated the  of this and she thought the patient's family refused the life vest earlier, but this RN will ask them if it is warranted will they want him to have it. This RN was updated that patient needed to be up to inpatient rehab by 1600 today or he had to wait until Thursday. This RN then updated the family of this and they are agreeable if the patient needs a life vest, then they would like him to have it. This RN text messaged Dr. Yaneli Hanson at [phone] and asked him if he could read patient's echocardiogram from this morning. Awaiting response. Dr. José Esposito called back and states it is ok to send patient to inpatient rehab and to keep him on his aspirin and plavix.

## 2019-12-31 NOTE — DISCHARGE INSTR - COC
Dysphagia R13.10    Acute urinary retention R33.8    Benign prostatic hyperplasia with lower urinary tract symptoms N40.1    Erosive esophagitis K22.10    Acute gastritis without hemorrhage K29.00    Duodenitis K29.80    Microscopic hematuria R31.29    Severe malnutrition (HCC) E43    Type 2 diabetes mellitus treated without insulin (HCC) E11.9    Anxiety disorder F41.9    Non-recurrent inguinal hernia of right side with obstruction and without gangrene K40.30    Lipoma of left shoulder D17.22    Inguinal hernia of left side without obstruction or gangrene K40.90    S/P right inguinal hernia repair Z98.890, Z87.19    S/P left inguinal hernia repair Z98.890, Z87.19    Acute CVA (cerebrovascular accident) ([address] I63.9    Right hemiparesis ([facility]) G81.91    Dysarthria R47.1    Hypokalemia E87.6    Chronic depression F32.9    Abnormal EKG R94.31    Elevated troponin R79.89    Acute kidney injury (HCC) N17.9    Leukocytosis D72.829    Hypernatremia E87.0    Thrombocytopenia (HCC) D69.6       Isolation/Infection:   Isolation          No Isolation        Patient Infection Status     None to display          Nurse Assessment:  Last Vital Signs: /67   Pulse 64   Temp 98.1 °F (36.7 °C) (Oral)   Resp 18   Ht 6' (1.829 m)   Wt 150 lb 9.6 oz (68.3 kg)   SpO2 97%   BMI 20.43 kg/m²     Last documented pain score (0-10 scale): Pain Level: 0  Last Weight:   Wt Readings from Last 1 Encounters:   12/28/19 150 lb 9.6 oz (68.3 kg)     Mental Status:  {IP PT MENTAL STATUS:20030}    IV Access:  { ANTOLIN IV ACCESS:686105194}    Nursing Mobility/ADLs:  Walking   {[facility] DME UCQY:656999698}  Transfer  {[facility] DME PXIK:250483233}  Bathing  {[facility] DME BCWY:762649536}  Dressing  {[facility] DME KKMU:547166877}  Toileting  {[facility] DME SSIA:477096228}  Feeding  {[facility] DME ZFBR:333890399}  Med Admin  {[facility] DME SPZF:330207313}  Med Delivery   {[facility] ANTOLIN MED Delivery:511185691}    Wound Care Documentation and Therapy:  Incision 11/01/18 Abdomen Left (Active)   Number of days: 425        Elimination:  Continence:   · Bowel: {YES / XP:66801}  · Bladder: {YES / FE:89608}  Urinary Catheter: {Urinary Catheter:181171692}   Colostomy/Ileostomy/Ileal Conduit: {YES / BP:85647}       Date of Last BM: ***    Intake/Output Summary (Last 24 hours) at 2019 0948  Last data filed at 2019 0431  Gross per 24 hour   Intake 833.04 ml   Output 1100 ml   Net -266.96 ml     I/O last 3 completed shifts:   In: 981 [P.O.:360; I.V.:473]  Out: 80 [Urine:1100]    Safety Concerns:     [address] [employer] Safety Concerns:198312250}    Impairments/Disabilities:      [address] [employer] Impairments/Disabilities:075752129}    Nutrition Therapy:  Current Nutrition Therapy:   [employer] Diet List:188697039}    Routes of Feeding: {[facility] DME Other Feedings:732324551}  Liquids: {Slp liquid thickness:95367}  Daily Fluid Restriction: {[facility] DME Yes amt example:338702403}  Last Modified Barium Swallow with Video (Video Swallowing Test): {Done Not Done EQYN:505727589}    Treatments at the Time of Hospital Discharge:   Respiratory Treatments: ***  Oxygen Therapy:  {Therapy; copd oxygen:45939}  Ventilator:    { CC Vent QWCH:826605260}    Rehab Therapies: {THERAPEUTIC INTERVENTION:7524935105}  Weight Bearing Status/Restrictions: [address] [employer]  Weight Bearin}  Other Medical Equipment (for information only, NOT a DME order):  {EQUIPMENT:408502900}  Other Treatments: ***    Patient's personal belongings (please select all that are sent with patient):  {[facility] DME Belongings:444270435}    RN SIGNATURE:  {Esignature:242295899}    CASE MANAGEMENT/SOCIAL WORK SECTION    Inpatient Status Date: ***    Readmission Risk Assessment Score:  Readmission Risk              Risk of Unplanned Readmission:        10           Discharging to Facility/ Agency   · Name:   · Address:  · Phone:  · Fax:    Dialysis Facility (if applicable)   · Name:  · Address:  · Dialysis Schedule:  · Phone:  · Fax:    /

## 2019-12-31 NOTE — CARE COORDINATION
12/31/19, 2:43 PM    DISCHARGE ON GOING [address] day: 7  Location: -15/015-A Reason for admit: Acute CVA (cerebrovascular accident) [facility]) [I63.9]   Procedure: ECHO  Summary   Left Ventricular size is Moderately increased . Normal left ventricular wall thickness. There was severe global hypokinesis of the left ventricle with some mild   sparing of the basal wall motion. Systolic function was severely reduced. Ejection fraction is visually estimated at 25%. The left atrium is Mildly dilated. Treatment Plan of Care: Life Vest ordered, PT/OT following, Physiatry following, diabetes management. Barriers to Discharge: Life Vest  PCP: Sol Nguyen MD  Readmission Risk Score: 10%  Patient Goals/Plan/Treatment Preferences: Pt will be able to go to IP Rehab on Thursday. Needs to have Life Vest in place.
refill/ meds to beds program?  Yes  Type of Home Care Services:  None  Patient expects to be discharged to: Inpatient rehab  Expected Discharge date:  12/28/19  Follow Up Appointment: Best Day/ Time: Monday AM    Patient Goals/Plan/Treatment Preferences: Pt from home with wife. He has a quad cane at home. PT/OT evals pending. Spoke with wife and son today; plan is for inpatient rehab if patient qualifies. Transportation/Food Security/Housekeeping Addressed:  No issues identified.  Evaluation: not at this time.

## 2020-01-01 ENCOUNTER — APPOINTMENT (OUTPATIENT)
Dept: GENERAL RADIOLOGY | Age: 80
End: 2020-01-01
Payer: MEDICARE

## 2020-01-01 ENCOUNTER — CARE COORDINATION (OUTPATIENT)
Dept: CASE MANAGEMENT | Age: 80
End: 2020-01-01

## 2020-01-01 ENCOUNTER — HOSPITAL ENCOUNTER (INPATIENT)
Age: 80
LOS: 22 days | Discharge: NURSING, REHAB OR LONG-TERM CARE | DRG: 056 | End: 2020-01-24
Attending: PHYSICAL MEDICINE & REHABILITATION | Admitting: PHYSICAL MEDICINE & REHABILITATION
Payer: MEDICARE

## 2020-01-01 ENCOUNTER — TELEPHONE (OUTPATIENT)
Dept: FAMILY MEDICINE CLINIC | Age: 80
End: 2020-01-01

## 2020-01-01 ENCOUNTER — HOSPITAL ENCOUNTER (EMERGENCY)
Age: 80
End: 2020-04-24
Attending: EMERGENCY MEDICINE
Payer: MEDICARE

## 2020-01-01 ENCOUNTER — OFFICE VISIT (OUTPATIENT)
Dept: PHYSICAL MEDICINE AND REHAB | Age: 80
End: 2020-01-01
Payer: MEDICARE

## 2020-01-01 ENCOUNTER — TELEPHONE (OUTPATIENT)
Dept: NEUROLOGY | Age: 80
End: 2020-01-01

## 2020-01-01 ENCOUNTER — TELEPHONE (OUTPATIENT)
Dept: CARDIOLOGY CLINIC | Age: 80
End: 2020-01-01

## 2020-01-01 VITALS
TEMPERATURE: 97.6 F | WEIGHT: 152.2 LBS | OXYGEN SATURATION: 98 % | SYSTOLIC BLOOD PRESSURE: 130 MMHG | DIASTOLIC BLOOD PRESSURE: 68 MMHG | HEART RATE: 55 BPM | HEIGHT: 72 IN | BODY MASS INDEX: 20.62 KG/M2 | RESPIRATION RATE: 14 BRPM

## 2020-01-01 VITALS
SYSTOLIC BLOOD PRESSURE: 92 MMHG | RESPIRATION RATE: 14 BRPM | HEART RATE: 46 BPM | OXYGEN SATURATION: 100 % | DIASTOLIC BLOOD PRESSURE: 77 MMHG | TEMPERATURE: 103.7 F

## 2020-01-01 VITALS
SYSTOLIC BLOOD PRESSURE: 130 MMHG | DIASTOLIC BLOOD PRESSURE: 64 MMHG | WEIGHT: 152.6 LBS | RESPIRATION RATE: 20 BRPM | HEIGHT: 72 IN | BODY MASS INDEX: 20.67 KG/M2 | TEMPERATURE: 97.3 F | HEART RATE: 63 BPM | OXYGEN SATURATION: 97 %

## 2020-01-01 VITALS
SYSTOLIC BLOOD PRESSURE: 138 MMHG | HEART RATE: 70 BPM | HEIGHT: 72 IN | DIASTOLIC BLOOD PRESSURE: 70 MMHG | WEIGHT: 155 LBS | BODY MASS INDEX: 20.99 KG/M2

## 2020-01-01 LAB
ANION GAP SERPL CALCULATED.3IONS-SCNC: 10 MEQ/L (ref 8–16)
ANION GAP SERPL CALCULATED.3IONS-SCNC: 10 MEQ/L (ref 8–16)
ANION GAP SERPL CALCULATED.3IONS-SCNC: 11 MEQ/L (ref 8–16)
ANION GAP SERPL CALCULATED.3IONS-SCNC: 12 MEQ/L (ref 8–16)
ANION GAP SERPL CALCULATED.3IONS-SCNC: 13 MEQ/L (ref 8–16)
ANION GAP SERPL CALCULATED.3IONS-SCNC: 9 MEQ/L (ref 8–16)
BACTERIA: ABNORMAL /HPF
BACTERIA: ABNORMAL /HPF
BASOPHILS # BLD: 0.1 %
BASOPHILS # BLD: 0.1 %
BASOPHILS # BLD: 0.2 %
BASOPHILS # BLD: 0.2 %
BASOPHILS ABSOLUTE: 0 THOU/MM3 (ref 0–0.1)
BILIRUBIN URINE: NEGATIVE
BILIRUBIN URINE: NEGATIVE
BLOOD, URINE: ABNORMAL
BLOOD, URINE: NEGATIVE
BUN BLDV-MCNC: 23 MG/DL (ref 7–22)
BUN BLDV-MCNC: 24 MG/DL (ref 7–22)
BUN BLDV-MCNC: 24 MG/DL (ref 7–22)
BUN BLDV-MCNC: 25 MG/DL (ref 7–22)
BUN BLDV-MCNC: 27 MG/DL (ref 7–22)
BUN BLDV-MCNC: 28 MG/DL (ref 7–22)
BUN BLDV-MCNC: 29 MG/DL (ref 7–22)
BUN BLDV-MCNC: 35 MG/DL (ref 7–22)
CALCIUM SERPL-MCNC: 8.6 MG/DL (ref 8.5–10.5)
CALCIUM SERPL-MCNC: 8.7 MG/DL (ref 8.5–10.5)
CALCIUM SERPL-MCNC: 9 MG/DL (ref 8.5–10.5)
CALCIUM SERPL-MCNC: 9.1 MG/DL (ref 8.5–10.5)
CALCIUM SERPL-MCNC: 9.2 MG/DL (ref 8.5–10.5)
CALCIUM SERPL-MCNC: 9.3 MG/DL (ref 8.5–10.5)
CASTS 2: ABNORMAL /LPF
CASTS 2: ABNORMAL /LPF
CASTS UA: ABNORMAL /LPF
CASTS UA: ABNORMAL /LPF
CHARACTER, URINE: ABNORMAL
CHARACTER, URINE: ABNORMAL
CHLORIDE BLD-SCNC: 105 MEQ/L (ref 98–111)
CHLORIDE BLD-SCNC: 107 MEQ/L (ref 98–111)
CHLORIDE BLD-SCNC: 107 MEQ/L (ref 98–111)
CHLORIDE BLD-SCNC: 109 MEQ/L (ref 98–111)
CHLORIDE BLD-SCNC: 110 MEQ/L (ref 98–111)
CHLORIDE BLD-SCNC: 110 MEQ/L (ref 98–111)
CHLORIDE BLD-SCNC: 111 MEQ/L (ref 98–111)
CHLORIDE BLD-SCNC: 112 MEQ/L (ref 98–111)
CO2: 21 MEQ/L (ref 23–33)
CO2: 21 MEQ/L (ref 23–33)
CO2: 22 MEQ/L (ref 23–33)
CO2: 22 MEQ/L (ref 23–33)
CO2: 23 MEQ/L (ref 23–33)
CO2: 24 MEQ/L (ref 23–33)
CO2: 25 MEQ/L (ref 23–33)
CO2: 25 MEQ/L (ref 23–33)
CO2: 26 MEQ/L (ref 23–33)
CO2: 26 MEQ/L (ref 23–33)
COLOR: ABNORMAL
COLOR: ABNORMAL
CREAT SERPL-MCNC: 0.9 MG/DL (ref 0.4–1.2)
CREAT SERPL-MCNC: 1 MG/DL (ref 0.4–1.2)
CRYSTALS, UA: ABNORMAL
CRYSTALS, UA: ABNORMAL
EOSINOPHIL # BLD: 4.6 %
EOSINOPHIL # BLD: 5.1 %
EOSINOPHIL # BLD: 5.6 %
EOSINOPHIL # BLD: 7.1 %
EOSINOPHILS ABSOLUTE: 0.4 THOU/MM3 (ref 0–0.4)
EOSINOPHILS ABSOLUTE: 0.4 THOU/MM3 (ref 0–0.4)
EOSINOPHILS ABSOLUTE: 0.5 THOU/MM3 (ref 0–0.4)
EOSINOPHILS ABSOLUTE: 0.6 THOU/MM3 (ref 0–0.4)
EPITHELIAL CELLS, UA: ABNORMAL /HPF
EPITHELIAL CELLS, UA: ABNORMAL /HPF
ERYTHROCYTE [DISTWIDTH] IN BLOOD BY AUTOMATED COUNT: 11.9 % (ref 11.5–14.5)
ERYTHROCYTE [DISTWIDTH] IN BLOOD BY AUTOMATED COUNT: 12 % (ref 11.5–14.5)
ERYTHROCYTE [DISTWIDTH] IN BLOOD BY AUTOMATED COUNT: 12 % (ref 11.5–14.5)
ERYTHROCYTE [DISTWIDTH] IN BLOOD BY AUTOMATED COUNT: 12.5 % (ref 11.5–14.5)
ERYTHROCYTE [DISTWIDTH] IN BLOOD BY AUTOMATED COUNT: 12.9 % (ref 11.5–14.5)
ERYTHROCYTE [DISTWIDTH] IN BLOOD BY AUTOMATED COUNT: 13.3 % (ref 11.5–14.5)
ERYTHROCYTE [DISTWIDTH] IN BLOOD BY AUTOMATED COUNT: 40.4 FL (ref 35–45)
ERYTHROCYTE [DISTWIDTH] IN BLOOD BY AUTOMATED COUNT: 40.5 FL (ref 35–45)
ERYTHROCYTE [DISTWIDTH] IN BLOOD BY AUTOMATED COUNT: 41.6 FL (ref 35–45)
ERYTHROCYTE [DISTWIDTH] IN BLOOD BY AUTOMATED COUNT: 42.5 FL (ref 35–45)
ERYTHROCYTE [DISTWIDTH] IN BLOOD BY AUTOMATED COUNT: 46.9 FL (ref 35–45)
ERYTHROCYTE [DISTWIDTH] IN BLOOD BY AUTOMATED COUNT: 49.9 FL (ref 35–45)
GFR SERPL CREATININE-BSD FRML MDRD: 72 ML/MIN/1.73M2
GFR SERPL CREATININE-BSD FRML MDRD: 81 ML/MIN/1.73M2
GLUCOSE BLD-MCNC: 106 MG/DL (ref 70–108)
GLUCOSE BLD-MCNC: 109 MG/DL (ref 70–108)
GLUCOSE BLD-MCNC: 111 MG/DL (ref 70–108)
GLUCOSE BLD-MCNC: 120 MG/DL (ref 70–108)
GLUCOSE BLD-MCNC: 122 MG/DL (ref 70–108)
GLUCOSE BLD-MCNC: 124 MG/DL (ref 70–108)
GLUCOSE BLD-MCNC: 127 MG/DL (ref 70–108)
GLUCOSE BLD-MCNC: 128 MG/DL (ref 70–108)
GLUCOSE BLD-MCNC: 129 MG/DL (ref 70–108)
GLUCOSE BLD-MCNC: 129 MG/DL (ref 70–108)
GLUCOSE BLD-MCNC: 131 MG/DL (ref 70–108)
GLUCOSE BLD-MCNC: 131 MG/DL (ref 70–108)
GLUCOSE BLD-MCNC: 132 MG/DL (ref 70–108)
GLUCOSE BLD-MCNC: 135 MG/DL (ref 70–108)
GLUCOSE BLD-MCNC: 136 MG/DL (ref 70–108)
GLUCOSE BLD-MCNC: 137 MG/DL (ref 70–108)
GLUCOSE BLD-MCNC: 139 MG/DL (ref 70–108)
GLUCOSE BLD-MCNC: 140 MG/DL (ref 70–108)
GLUCOSE BLD-MCNC: 140 MG/DL (ref 70–108)
GLUCOSE BLD-MCNC: 142 MG/DL (ref 70–108)
GLUCOSE BLD-MCNC: 142 MG/DL (ref 70–108)
GLUCOSE BLD-MCNC: 144 MG/DL (ref 70–108)
GLUCOSE BLD-MCNC: 148 MG/DL (ref 70–108)
GLUCOSE BLD-MCNC: 149 MG/DL (ref 70–108)
GLUCOSE BLD-MCNC: 150 MG/DL (ref 70–108)
GLUCOSE BLD-MCNC: 151 MG/DL (ref 70–108)
GLUCOSE BLD-MCNC: 153 MG/DL (ref 70–108)
GLUCOSE BLD-MCNC: 156 MG/DL (ref 70–108)
GLUCOSE BLD-MCNC: 157 MG/DL (ref 70–108)
GLUCOSE BLD-MCNC: 157 MG/DL (ref 70–108)
GLUCOSE BLD-MCNC: 158 MG/DL (ref 70–108)
GLUCOSE BLD-MCNC: 158 MG/DL (ref 70–108)
GLUCOSE BLD-MCNC: 159 MG/DL (ref 70–108)
GLUCOSE BLD-MCNC: 160 MG/DL (ref 70–108)
GLUCOSE BLD-MCNC: 161 MG/DL (ref 70–108)
GLUCOSE BLD-MCNC: 163 MG/DL (ref 70–108)
GLUCOSE BLD-MCNC: 164 MG/DL (ref 70–108)
GLUCOSE BLD-MCNC: 165 MG/DL (ref 70–108)
GLUCOSE BLD-MCNC: 166 MG/DL (ref 70–108)
GLUCOSE BLD-MCNC: 168 MG/DL (ref 70–108)
GLUCOSE BLD-MCNC: 169 MG/DL (ref 70–108)
GLUCOSE BLD-MCNC: 170 MG/DL (ref 70–108)
GLUCOSE BLD-MCNC: 173 MG/DL (ref 70–108)
GLUCOSE BLD-MCNC: 173 MG/DL (ref 70–108)
GLUCOSE BLD-MCNC: 174 MG/DL (ref 70–108)
GLUCOSE BLD-MCNC: 175 MG/DL (ref 70–108)
GLUCOSE BLD-MCNC: 176 MG/DL (ref 70–108)
GLUCOSE BLD-MCNC: 176 MG/DL (ref 70–108)
GLUCOSE BLD-MCNC: 179 MG/DL (ref 70–108)
GLUCOSE BLD-MCNC: 183 MG/DL (ref 70–108)
GLUCOSE BLD-MCNC: 185 MG/DL (ref 70–108)
GLUCOSE BLD-MCNC: 187 MG/DL (ref 70–108)
GLUCOSE BLD-MCNC: 187 MG/DL (ref 70–108)
GLUCOSE BLD-MCNC: 188 MG/DL (ref 70–108)
GLUCOSE BLD-MCNC: 189 MG/DL (ref 70–108)
GLUCOSE BLD-MCNC: 190 MG/DL (ref 70–108)
GLUCOSE BLD-MCNC: 192 MG/DL (ref 70–108)
GLUCOSE BLD-MCNC: 194 MG/DL (ref 70–108)
GLUCOSE BLD-MCNC: 195 MG/DL (ref 70–108)
GLUCOSE BLD-MCNC: 200 MG/DL (ref 70–108)
GLUCOSE BLD-MCNC: 203 MG/DL (ref 70–108)
GLUCOSE BLD-MCNC: 203 MG/DL (ref 70–108)
GLUCOSE BLD-MCNC: 207 MG/DL (ref 70–108)
GLUCOSE BLD-MCNC: 209 MG/DL (ref 70–108)
GLUCOSE BLD-MCNC: 210 MG/DL (ref 70–108)
GLUCOSE BLD-MCNC: 211 MG/DL (ref 70–108)
GLUCOSE BLD-MCNC: 216 MG/DL (ref 70–108)
GLUCOSE BLD-MCNC: 219 MG/DL (ref 70–108)
GLUCOSE BLD-MCNC: 221 MG/DL (ref 70–108)
GLUCOSE BLD-MCNC: 224 MG/DL (ref 70–108)
GLUCOSE BLD-MCNC: 225 MG/DL (ref 70–108)
GLUCOSE BLD-MCNC: 225 MG/DL (ref 70–108)
GLUCOSE BLD-MCNC: 243 MG/DL (ref 70–108)
GLUCOSE BLD-MCNC: 244 MG/DL (ref 70–108)
GLUCOSE BLD-MCNC: 246 MG/DL (ref 70–108)
GLUCOSE BLD-MCNC: 249 MG/DL (ref 70–108)
GLUCOSE BLD-MCNC: 270 MG/DL (ref 70–108)
GLUCOSE BLD-MCNC: 285 MG/DL (ref 70–108)
GLUCOSE BLD-MCNC: 87 MG/DL (ref 70–108)
GLUCOSE URINE: NEGATIVE MG/DL
GLUCOSE URINE: NEGATIVE MG/DL
HCT VFR BLD CALC: 32.7 % (ref 42–52)
HCT VFR BLD CALC: 32.9 % (ref 42–52)
HCT VFR BLD CALC: 33.6 % (ref 42–52)
HCT VFR BLD CALC: 33.8 % (ref 42–52)
HCT VFR BLD CALC: 35.7 % (ref 42–52)
HCT VFR BLD CALC: 36.6 % (ref 42–52)
HEMOGLOBIN: 10.9 GM/DL (ref 14–18)
HEMOGLOBIN: 11 GM/DL (ref 14–18)
HEMOGLOBIN: 11.2 GM/DL (ref 14–18)
HEMOGLOBIN: 11.5 GM/DL (ref 14–18)
IMMATURE GRANS (ABS): 0.02 THOU/MM3 (ref 0–0.07)
IMMATURE GRANS (ABS): 0.02 THOU/MM3 (ref 0–0.07)
IMMATURE GRANS (ABS): 0.03 THOU/MM3 (ref 0–0.07)
IMMATURE GRANS (ABS): 0.03 THOU/MM3 (ref 0–0.07)
IMMATURE GRANULOCYTES: 0.2 %
IMMATURE GRANULOCYTES: 0.2 %
IMMATURE GRANULOCYTES: 0.3 %
IMMATURE GRANULOCYTES: 0.3 %
KETONES, URINE: ABNORMAL
KETONES, URINE: NEGATIVE
LEUKOCYTE ESTERASE, URINE: ABNORMAL
LEUKOCYTE ESTERASE, URINE: ABNORMAL
LYMPHOCYTES # BLD: 17.7 %
LYMPHOCYTES # BLD: 17.8 %
LYMPHOCYTES # BLD: 18 %
LYMPHOCYTES # BLD: 18.1 %
LYMPHOCYTES ABSOLUTE: 1.5 THOU/MM3 (ref 1–4.8)
LYMPHOCYTES ABSOLUTE: 1.6 THOU/MM3 (ref 1–4.8)
LYMPHOCYTES ABSOLUTE: 1.7 THOU/MM3 (ref 1–4.8)
LYMPHOCYTES ABSOLUTE: 1.7 THOU/MM3 (ref 1–4.8)
MCH RBC QN AUTO: 31.5 PG (ref 26–33)
MCH RBC QN AUTO: 31.6 PG (ref 26–33)
MCH RBC QN AUTO: 31.7 PG (ref 26–33)
MCH RBC QN AUTO: 32.2 PG (ref 26–33)
MCHC RBC AUTO-ENTMCNC: 30.6 GM/DL (ref 32.2–35.5)
MCHC RBC AUTO-ENTMCNC: 32.2 GM/DL (ref 32.2–35.5)
MCHC RBC AUTO-ENTMCNC: 33.1 GM/DL (ref 32.2–35.5)
MCHC RBC AUTO-ENTMCNC: 33.6 GM/DL (ref 32.2–35.5)
MCHC RBC AUTO-ENTMCNC: 34 GM/DL (ref 32.2–35.5)
MCHC RBC AUTO-ENTMCNC: 34.2 GM/DL (ref 32.2–35.5)
MCV RBC AUTO: 100 FL (ref 80–94)
MCV RBC AUTO: 103.7 FL (ref 80–94)
MCV RBC AUTO: 92.6 FL (ref 80–94)
MCV RBC AUTO: 92.6 FL (ref 80–94)
MCV RBC AUTO: 94.2 FL (ref 80–94)
MCV RBC AUTO: 95.4 FL (ref 80–94)
MISCELLANEOUS 2: ABNORMAL
MISCELLANEOUS 2: ABNORMAL
MONOCYTES # BLD: 6.8 %
MONOCYTES # BLD: 6.8 %
MONOCYTES # BLD: 6.9 %
MONOCYTES # BLD: 7.1 %
MONOCYTES ABSOLUTE: 0.6 THOU/MM3 (ref 0.4–1.3)
MONOCYTES ABSOLUTE: 0.7 THOU/MM3 (ref 0.4–1.3)
NITRITE, URINE: NEGATIVE
NITRITE, URINE: POSITIVE
NUCLEATED RED BLOOD CELLS: 0 /100 WBC
ORGANISM: ABNORMAL
ORGANISM: ABNORMAL
PH UA: 5 (ref 5–9)
PH UA: 5 (ref 5–9)
PLATELET # BLD: 136 THOU/MM3 (ref 130–400)
PLATELET # BLD: 143 THOU/MM3 (ref 130–400)
PLATELET # BLD: 154 THOU/MM3 (ref 130–400)
PLATELET # BLD: 156 THOU/MM3 (ref 130–400)
PLATELET # BLD: 162 THOU/MM3 (ref 130–400)
PLATELET # BLD: 187 THOU/MM3 (ref 130–400)
PMV BLD AUTO: 11.3 FL (ref 9.4–12.4)
PMV BLD AUTO: 11.6 FL (ref 9.4–12.4)
PMV BLD AUTO: 11.6 FL (ref 9.4–12.4)
PMV BLD AUTO: 12.1 FL (ref 9.4–12.4)
PMV BLD AUTO: 12.4 FL (ref 9.4–12.4)
PMV BLD AUTO: 12.6 FL (ref 9.4–12.4)
POTASSIUM SERPL-SCNC: 3.8 MEQ/L (ref 3.5–5.2)
POTASSIUM SERPL-SCNC: 3.9 MEQ/L (ref 3.5–5.2)
POTASSIUM SERPL-SCNC: 4 MEQ/L (ref 3.5–5.2)
POTASSIUM SERPL-SCNC: 4.1 MEQ/L (ref 3.5–5.2)
POTASSIUM SERPL-SCNC: 4.1 MEQ/L (ref 3.5–5.2)
POTASSIUM SERPL-SCNC: 4.3 MEQ/L (ref 3.5–5.2)
PROTEIN UA: 100
PROTEIN UA: 30
RBC # BLD: 3.45 MILL/MM3 (ref 4.7–6.1)
RBC # BLD: 3.47 MILL/MM3 (ref 4.7–6.1)
RBC # BLD: 3.53 MILL/MM3 (ref 4.7–6.1)
RBC # BLD: 3.57 MILL/MM3 (ref 4.7–6.1)
RBC # BLD: 3.63 MILL/MM3 (ref 4.7–6.1)
RBC # BLD: 3.65 MILL/MM3 (ref 4.7–6.1)
RBC URINE: ABNORMAL /HPF
RBC URINE: ABNORMAL /HPF
REASON FOR REJECTION: NORMAL
REJECTED TEST: NORMAL
RENAL EPITHELIAL, UA: ABNORMAL
RENAL EPITHELIAL, UA: ABNORMAL
SEG NEUTROPHILS: 67.9 %
SEG NEUTROPHILS: 69.1 %
SEG NEUTROPHILS: 69.6 %
SEG NEUTROPHILS: 70.2 %
SEGMENTED NEUTROPHILS ABSOLUTE COUNT: 5.9 THOU/MM3 (ref 1.8–7.7)
SEGMENTED NEUTROPHILS ABSOLUTE COUNT: 6.1 THOU/MM3 (ref 1.8–7.7)
SEGMENTED NEUTROPHILS ABSOLUTE COUNT: 6.4 THOU/MM3 (ref 1.8–7.7)
SEGMENTED NEUTROPHILS ABSOLUTE COUNT: 6.8 THOU/MM3 (ref 1.8–7.7)
SODIUM BLD-SCNC: 141 MEQ/L (ref 135–145)
SODIUM BLD-SCNC: 141 MEQ/L (ref 135–145)
SODIUM BLD-SCNC: 142 MEQ/L (ref 135–145)
SODIUM BLD-SCNC: 142 MEQ/L (ref 135–145)
SODIUM BLD-SCNC: 143 MEQ/L (ref 135–145)
SODIUM BLD-SCNC: 143 MEQ/L (ref 135–145)
SODIUM BLD-SCNC: 144 MEQ/L (ref 135–145)
SODIUM BLD-SCNC: 146 MEQ/L (ref 135–145)
SODIUM BLD-SCNC: 147 MEQ/L (ref 135–145)
SODIUM BLD-SCNC: 148 MEQ/L (ref 135–145)
SPECIFIC GRAVITY, URINE: 1.02 (ref 1–1.03)
SPECIFIC GRAVITY, URINE: 1.02 (ref 1–1.03)
URINE CULTURE REFLEX: ABNORMAL
URINE CULTURE REFLEX: ABNORMAL
UROBILINOGEN, URINE: 1 EU/DL (ref 0–1)
UROBILINOGEN, URINE: 1 EU/DL (ref 0–1)
WBC # BLD: 8.5 THOU/MM3 (ref 4.8–10.8)
WBC # BLD: 8.8 THOU/MM3 (ref 4.8–10.8)
WBC # BLD: 9 THOU/MM3 (ref 4.8–10.8)
WBC # BLD: 9.2 THOU/MM3 (ref 4.8–10.8)
WBC # BLD: 9.7 THOU/MM3 (ref 4.8–10.8)
WBC # BLD: 9.7 THOU/MM3 (ref 4.8–10.8)
WBC UA: > 200 /HPF
WBC UA: ABNORMAL /HPF
YEAST: ABNORMAL
YEAST: ABNORMAL

## 2020-01-01 PROCEDURE — 1180000000 HC REHAB R&B

## 2020-01-01 PROCEDURE — 97530 THERAPEUTIC ACTIVITIES: CPT

## 2020-01-01 PROCEDURE — 92526 ORAL FUNCTION THERAPY: CPT

## 2020-01-01 PROCEDURE — 6370000000 HC RX 637 (ALT 250 FOR IP): Performed by: INTERNAL MEDICINE

## 2020-01-01 PROCEDURE — 2709999900 HC NON-CHARGEABLE SUPPLY

## 2020-01-01 PROCEDURE — 99232 SBSQ HOSP IP/OBS MODERATE 35: CPT | Performed by: INTERNAL MEDICINE

## 2020-01-01 PROCEDURE — 97129 THER IVNTJ 1ST 15 MIN: CPT

## 2020-01-01 PROCEDURE — 6370000000 HC RX 637 (ALT 250 FOR IP): Performed by: PHYSICAL MEDICINE & REHABILITATION

## 2020-01-01 PROCEDURE — 92507 TX SP LANG VOICE COMM INDIV: CPT

## 2020-01-01 PROCEDURE — G8427 DOCREV CUR MEDS BY ELIG CLIN: HCPCS | Performed by: PHYSICAL MEDICINE & REHABILITATION

## 2020-01-01 PROCEDURE — 99232 SBSQ HOSP IP/OBS MODERATE 35: CPT | Performed by: NURSE PRACTITIONER

## 2020-01-01 PROCEDURE — 97535 SELF CARE MNGMENT TRAINING: CPT

## 2020-01-01 PROCEDURE — 87086 URINE CULTURE/COLONY COUNT: CPT

## 2020-01-01 PROCEDURE — 82948 REAGENT STRIP/BLOOD GLUCOSE: CPT

## 2020-01-01 PROCEDURE — 97130 THER IVNTJ EA ADDL 15 MIN: CPT

## 2020-01-01 PROCEDURE — 6370000000 HC RX 637 (ALT 250 FOR IP): Performed by: NURSE PRACTITIONER

## 2020-01-01 PROCEDURE — 97110 THERAPEUTIC EXERCISES: CPT

## 2020-01-01 PROCEDURE — 97129 THER IVNTJ 1ST 15 MIN: CPT | Performed by: SPEECH-LANGUAGE PATHOLOGIST

## 2020-01-01 PROCEDURE — 99232 SBSQ HOSP IP/OBS MODERATE 35: CPT | Performed by: PHYSICAL MEDICINE & REHABILITATION

## 2020-01-01 PROCEDURE — 80048 BASIC METABOLIC PNL TOTAL CA: CPT

## 2020-01-01 PROCEDURE — 99231 SBSQ HOSP IP/OBS SF/LOW 25: CPT | Performed by: INTERNAL MEDICINE

## 2020-01-01 PROCEDURE — 6370000000 HC RX 637 (ALT 250 FOR IP): Performed by: PHYSICIAN ASSISTANT

## 2020-01-01 PROCEDURE — 97112 NEUROMUSCULAR REEDUCATION: CPT

## 2020-01-01 PROCEDURE — 85025 COMPLETE CBC W/AUTO DIFF WBC: CPT

## 2020-01-01 PROCEDURE — 99223 1ST HOSP IP/OBS HIGH 75: CPT | Performed by: PHYSICAL MEDICINE & REHABILITATION

## 2020-01-01 PROCEDURE — 97542 WHEELCHAIR MNGMENT TRAINING: CPT

## 2020-01-01 PROCEDURE — G8482 FLU IMMUNIZE ORDER/ADMIN: HCPCS | Performed by: PHYSICAL MEDICINE & REHABILITATION

## 2020-01-01 PROCEDURE — 36415 COLL VENOUS BLD VENIPUNCTURE: CPT

## 2020-01-01 PROCEDURE — 87077 CULTURE AEROBIC IDENTIFY: CPT

## 2020-01-01 PROCEDURE — 1111F DSCHRG MED/CURRENT MED MERGE: CPT | Performed by: FAMILY MEDICINE

## 2020-01-01 PROCEDURE — 99213 OFFICE O/P EST LOW 20 MIN: CPT | Performed by: PHYSICAL MEDICINE & REHABILITATION

## 2020-01-01 PROCEDURE — 94760 N-INVAS EAR/PLS OXIMETRY 1: CPT

## 2020-01-01 PROCEDURE — 81001 URINALYSIS AUTO W/SCOPE: CPT

## 2020-01-01 PROCEDURE — 92523 SPEECH SOUND LANG COMPREHEN: CPT

## 2020-01-01 PROCEDURE — 94761 N-INVAS EAR/PLS OXIMETRY MLT: CPT

## 2020-01-01 PROCEDURE — 6360000002 HC RX W HCPCS: Performed by: EMERGENCY MEDICINE

## 2020-01-01 PROCEDURE — 2580000003 HC RX 258: Performed by: PHYSICIAN ASSISTANT

## 2020-01-01 PROCEDURE — 92610 EVALUATE SWALLOWING FUNCTION: CPT

## 2020-01-01 PROCEDURE — 92526 ORAL FUNCTION THERAPY: CPT | Performed by: SPEECH-LANGUAGE PATHOLOGIST

## 2020-01-01 PROCEDURE — 97163 PT EVAL HIGH COMPLEX 45 MIN: CPT

## 2020-01-01 PROCEDURE — 1036F TOBACCO NON-USER: CPT | Performed by: PHYSICAL MEDICINE & REHABILITATION

## 2020-01-01 PROCEDURE — G8420 CALC BMI NORM PARAMETERS: HCPCS | Performed by: PHYSICAL MEDICINE & REHABILITATION

## 2020-01-01 PROCEDURE — 96375 TX/PRO/DX INJ NEW DRUG ADDON: CPT

## 2020-01-01 PROCEDURE — 96374 THER/PROPH/DIAG INJ IV PUSH: CPT

## 2020-01-01 PROCEDURE — L3806 WHFO W/JOINT(S) CUSTOM FAB: HCPCS

## 2020-01-01 PROCEDURE — 6370000000 HC RX 637 (ALT 250 FOR IP): Performed by: PSYCHIATRY & NEUROLOGY

## 2020-01-01 PROCEDURE — APPSS30 APP SPLIT SHARED TIME 16-30 MINUTES: Performed by: NURSE PRACTITIONER

## 2020-01-01 PROCEDURE — 99239 HOSP IP/OBS DSCHRG MGMT >30: CPT | Performed by: PHYSICAL MEDICINE & REHABILITATION

## 2020-01-01 PROCEDURE — 87186 SC STD MICRODIL/AGAR DIL: CPT

## 2020-01-01 PROCEDURE — 1123F ACP DISCUSS/DSCN MKR DOCD: CPT | Performed by: PHYSICAL MEDICINE & REHABILITATION

## 2020-01-01 PROCEDURE — 97167 OT EVAL HIGH COMPLEX 60 MIN: CPT

## 2020-01-01 PROCEDURE — 85027 COMPLETE CBC AUTOMATED: CPT

## 2020-01-01 PROCEDURE — 99284 EMERGENCY DEPT VISIT MOD MDM: CPT

## 2020-01-01 PROCEDURE — 51701 INSERT BLADDER CATHETER: CPT

## 2020-01-01 PROCEDURE — 2700000000 HC OXYGEN THERAPY PER DAY

## 2020-01-01 PROCEDURE — 31500 INSERT EMERGENCY AIRWAY: CPT

## 2020-01-01 PROCEDURE — 4040F PNEUMOC VAC/ADMIN/RCVD: CPT | Performed by: PHYSICAL MEDICINE & REHABILITATION

## 2020-01-01 PROCEDURE — 97124 MASSAGE THERAPY: CPT

## 2020-01-01 PROCEDURE — 2060000000 HC ICU INTERMEDIATE R&B

## 2020-01-01 PROCEDURE — 99221 1ST HOSP IP/OBS SF/LOW 40: CPT | Performed by: INTERNAL MEDICINE

## 2020-01-01 RX ORDER — METHYLPHENIDATE HYDROCHLORIDE 5 MG/1
5 TABLET ORAL 2 TIMES DAILY WITH MEALS
Qty: 60 TABLET | Refills: 0 | Status: SHIPPED | DISCHARGE
Start: 2020-01-01 | End: 2020-01-01

## 2020-01-01 RX ORDER — VITAMIN B COMPLEX
400 TABLET ORAL DAILY
Status: DISCONTINUED | OUTPATIENT
Start: 2020-01-01 | End: 2020-01-01 | Stop reason: HOSPADM

## 2020-01-01 RX ORDER — NITROFURANTOIN 25; 75 MG/1; MG/1
100 CAPSULE ORAL EVERY 12 HOURS SCHEDULED
Status: COMPLETED | OUTPATIENT
Start: 2020-01-01 | End: 2020-01-01

## 2020-01-01 RX ORDER — ROPINIROLE 0.5 MG/1
0.5 TABLET, FILM COATED ORAL NIGHTLY
Qty: 15 TABLET | Refills: 0 | DISCHARGE
Start: 2020-01-01

## 2020-01-01 RX ORDER — CLOPIDOGREL BISULFATE 75 MG/1
75 TABLET ORAL DAILY
Qty: 30 TABLET | Refills: 0 | DISCHARGE
Start: 2020-01-01

## 2020-01-01 RX ORDER — ATORVASTATIN CALCIUM 80 MG/1
80 TABLET, FILM COATED ORAL NIGHTLY
Qty: 30 TABLET | Refills: 0 | DISCHARGE
Start: 2020-01-01 | End: 2020-01-01

## 2020-01-01 RX ORDER — ASCORBIC ACID 500 MG
500 TABLET ORAL DAILY
Status: DISCONTINUED | OUTPATIENT
Start: 2020-01-01 | End: 2020-01-01 | Stop reason: HOSPADM

## 2020-01-01 RX ORDER — LISINOPRIL 10 MG/1
10 TABLET ORAL DAILY
Status: DISCONTINUED | OUTPATIENT
Start: 2020-01-01 | End: 2020-01-01 | Stop reason: HOSPADM

## 2020-01-01 RX ORDER — SODIUM BICARBONATE 650 MG/1
650 TABLET ORAL 2 TIMES DAILY
Status: DISCONTINUED | OUTPATIENT
Start: 2020-01-01 | End: 2020-01-01

## 2020-01-01 RX ORDER — ASPIRIN 81 MG/1
81 TABLET, CHEWABLE ORAL DAILY
Status: DISCONTINUED | OUTPATIENT
Start: 2020-01-01 | End: 2020-01-01 | Stop reason: HOSPADM

## 2020-01-01 RX ORDER — ONDANSETRON 4 MG/1
4 TABLET, FILM COATED ORAL EVERY 8 HOURS PRN
Status: DISCONTINUED | OUTPATIENT
Start: 2020-01-01 | End: 2020-01-01 | Stop reason: HOSPADM

## 2020-01-01 RX ORDER — ATORVASTATIN CALCIUM 80 MG/1
80 TABLET, FILM COATED ORAL NIGHTLY
Status: DISCONTINUED | OUTPATIENT
Start: 2020-01-01 | End: 2020-01-01 | Stop reason: HOSPADM

## 2020-01-01 RX ORDER — ROPINIROLE 0.5 MG/1
0.5 TABLET, FILM COATED ORAL NIGHTLY
Status: DISCONTINUED | OUTPATIENT
Start: 2020-01-01 | End: 2020-01-01 | Stop reason: HOSPADM

## 2020-01-01 RX ORDER — SODIUM BICARBONATE 650 MG/1
1300 TABLET ORAL 2 TIMES DAILY
Status: DISCONTINUED | OUTPATIENT
Start: 2020-01-01 | End: 2020-01-01

## 2020-01-01 RX ORDER — PRAMIPEXOLE DIHYDROCHLORIDE 0.25 MG/1
0.25 TABLET ORAL NIGHTLY
Status: DISCONTINUED | OUTPATIENT
Start: 2020-01-01 | End: 2020-01-01

## 2020-01-01 RX ORDER — ASPIRIN 81 MG/1
81 TABLET, CHEWABLE ORAL DAILY
Qty: 30 TABLET | Refills: 3 | DISCHARGE
Start: 2020-01-01 | End: 2020-01-01 | Stop reason: ALTCHOICE

## 2020-01-01 RX ORDER — M-VIT,TX,IRON,MINS/CALC/FOLIC 27MG-0.4MG
1 TABLET ORAL DAILY
Status: DISCONTINUED | OUTPATIENT
Start: 2020-01-01 | End: 2020-01-01 | Stop reason: HOSPADM

## 2020-01-01 RX ORDER — M-VIT,TX,IRON,MINS/CALC/FOLIC 27MG-0.4MG
1 TABLET ORAL DAILY
Qty: 30 TABLET | Refills: 0 | DISCHARGE
Start: 2020-01-01

## 2020-01-01 RX ORDER — LISINOPRIL 10 MG/1
10 TABLET ORAL DAILY
Qty: 30 TABLET | Refills: 0 | DISCHARGE
Start: 2020-01-01

## 2020-01-01 RX ORDER — METOPROLOL SUCCINATE 50 MG/1
50 TABLET, EXTENDED RELEASE ORAL DAILY
Qty: 30 TABLET | Refills: 0 | DISCHARGE
Start: 2020-01-01

## 2020-01-01 RX ORDER — SODIUM CHLORIDE 9 MG/ML
INJECTION, SOLUTION INTRAVENOUS CONTINUOUS
Status: DISCONTINUED | OUTPATIENT
Start: 2020-01-01 | End: 2020-04-25 | Stop reason: HOSPADM

## 2020-01-01 RX ORDER — FINASTERIDE 5 MG/1
5 TABLET, FILM COATED ORAL DAILY
Qty: 30 TABLET | Refills: 0 | DISCHARGE
Start: 2020-01-01

## 2020-01-01 RX ORDER — LISINOPRIL 5 MG/1
5 TABLET ORAL DAILY
Status: DISCONTINUED | OUTPATIENT
Start: 2020-01-01 | End: 2020-01-01

## 2020-01-01 RX ORDER — METOPROLOL SUCCINATE 50 MG/1
50 TABLET, EXTENDED RELEASE ORAL DAILY
Status: DISCONTINUED | OUTPATIENT
Start: 2020-01-01 | End: 2020-01-01 | Stop reason: HOSPADM

## 2020-01-01 RX ORDER — METOPROLOL SUCCINATE 25 MG/1
25 TABLET, EXTENDED RELEASE ORAL DAILY
Status: DISCONTINUED | OUTPATIENT
Start: 2020-01-01 | End: 2020-01-01

## 2020-01-01 RX ORDER — ONDANSETRON 4 MG/1
4 TABLET, FILM COATED ORAL EVERY 8 HOURS PRN
Qty: 9 TABLET | Refills: 0 | Status: SHIPPED | OUTPATIENT
Start: 2020-01-01 | End: 2020-04-27

## 2020-01-01 RX ORDER — METHYLPHENIDATE HYDROCHLORIDE 5 MG/1
5 TABLET ORAL 2 TIMES DAILY WITH MEALS
Status: DISCONTINUED | OUTPATIENT
Start: 2020-01-01 | End: 2020-01-01 | Stop reason: HOSPADM

## 2020-01-01 RX ORDER — PANTOPRAZOLE SODIUM 40 MG/1
40 TABLET, DELAYED RELEASE ORAL
Qty: 30 TABLET | Refills: 0 | DISCHARGE
Start: 2020-01-01

## 2020-01-01 RX ORDER — BACLOFEN 10 MG/1
10 TABLET ORAL 3 TIMES DAILY
Qty: 90 TABLET | Refills: 0 | DISCHARGE
Start: 2020-01-01 | End: 2020-01-01

## 2020-01-01 RX ORDER — ATROPINE SULFATE 0.1 MG/ML
INJECTION INTRAVENOUS DAILY PRN
Status: COMPLETED | OUTPATIENT
Start: 2020-01-01 | End: 2020-01-01

## 2020-01-01 RX ORDER — ACETAMINOPHEN 325 MG/1
650 TABLET ORAL EVERY 4 HOURS PRN
Status: DISCONTINUED | OUTPATIENT
Start: 2020-01-01 | End: 2020-01-01 | Stop reason: HOSPADM

## 2020-01-01 RX ORDER — FINASTERIDE 5 MG/1
5 TABLET, FILM COATED ORAL DAILY
Status: DISCONTINUED | OUTPATIENT
Start: 2020-01-01 | End: 2020-01-01 | Stop reason: HOSPADM

## 2020-01-01 RX ORDER — CLOPIDOGREL BISULFATE 75 MG/1
75 TABLET ORAL DAILY
Status: DISCONTINUED | OUTPATIENT
Start: 2020-01-01 | End: 2020-01-01 | Stop reason: HOSPADM

## 2020-01-01 RX ORDER — ESCITALOPRAM OXALATE 20 MG/1
20 TABLET ORAL DAILY
Status: DISCONTINUED | OUTPATIENT
Start: 2020-01-01 | End: 2020-01-01 | Stop reason: HOSPADM

## 2020-01-01 RX ORDER — NICOTINE POLACRILEX 4 MG
15 LOZENGE BUCCAL PRN
Qty: 45 G | Refills: 1 | DISCHARGE
Start: 2020-01-01 | End: 2020-01-01

## 2020-01-01 RX ORDER — AMIODARONE HYDROCHLORIDE 50 MG/ML
INJECTION, SOLUTION INTRAVENOUS DAILY PRN
Status: COMPLETED | OUTPATIENT
Start: 2020-01-01 | End: 2020-01-01

## 2020-01-01 RX ORDER — NICOTINE POLACRILEX 4 MG
15 LOZENGE BUCCAL PRN
Status: DISCONTINUED | OUTPATIENT
Start: 2020-01-01 | End: 2020-01-01 | Stop reason: HOSPADM

## 2020-01-01 RX ORDER — RIVASTIGMINE 4.6 MG/24H
2 PATCH, EXTENDED RELEASE TRANSDERMAL DAILY
Status: DISCONTINUED | OUTPATIENT
Start: 2020-01-01 | End: 2020-01-01

## 2020-01-01 RX ORDER — BACLOFEN 10 MG/1
10 TABLET ORAL 3 TIMES DAILY
Status: DISCONTINUED | OUTPATIENT
Start: 2020-01-01 | End: 2020-01-01 | Stop reason: HOSPADM

## 2020-01-01 RX ORDER — DRONABINOL 2.5 MG/1
2.5 CAPSULE ORAL 2 TIMES DAILY
Status: DISCONTINUED | OUTPATIENT
Start: 2020-01-01 | End: 2020-01-01

## 2020-01-01 RX ORDER — ROPINIROLE 0.25 MG/1
0.25 TABLET, FILM COATED ORAL NIGHTLY
Status: DISCONTINUED | OUTPATIENT
Start: 2020-01-01 | End: 2020-01-01

## 2020-01-01 RX ORDER — PANTOPRAZOLE SODIUM 40 MG/1
40 TABLET, DELAYED RELEASE ORAL
Status: DISCONTINUED | OUTPATIENT
Start: 2020-01-01 | End: 2020-01-01 | Stop reason: HOSPADM

## 2020-01-01 RX ADMIN — ESCITALOPRAM 20 MG: 20 TABLET, FILM COATED ORAL at 07:56

## 2020-01-01 RX ADMIN — ESCITALOPRAM 20 MG: 20 TABLET, FILM COATED ORAL at 08:29

## 2020-01-01 RX ADMIN — BACLOFEN 10 MG: 10 TABLET ORAL at 08:31

## 2020-01-01 RX ADMIN — ESCITALOPRAM 20 MG: 20 TABLET, FILM COATED ORAL at 07:58

## 2020-01-01 RX ADMIN — MULTIPLE VITAMINS W/ MINERALS TAB 1 TABLET: TAB at 07:58

## 2020-01-01 RX ADMIN — INSULIN LISPRO 2 UNITS: 100 INJECTION, SOLUTION INTRAVENOUS; SUBCUTANEOUS at 17:20

## 2020-01-01 RX ADMIN — METOPROLOL SUCCINATE 25 MG: 25 TABLET, FILM COATED, EXTENDED RELEASE ORAL at 10:22

## 2020-01-01 RX ADMIN — PANTOPRAZOLE SODIUM 40 MG: 40 TABLET, DELAYED RELEASE ORAL at 05:13

## 2020-01-01 RX ADMIN — BACLOFEN 10 MG: 10 TABLET ORAL at 21:38

## 2020-01-01 RX ADMIN — PANTOPRAZOLE SODIUM 40 MG: 40 TABLET, DELAYED RELEASE ORAL at 06:47

## 2020-01-01 RX ADMIN — ATORVASTATIN CALCIUM 80 MG: 80 TABLET, FILM COATED ORAL at 19:49

## 2020-01-01 RX ADMIN — BACLOFEN 10 MG: 10 TABLET ORAL at 14:59

## 2020-01-01 RX ADMIN — EPINEPHRINE 1 MG: 0.1 INJECTION, SOLUTION ENDOTRACHEAL; INTRACARDIAC; INTRAVENOUS at 19:31

## 2020-01-01 RX ADMIN — BACLOFEN 10 MG: 10 TABLET ORAL at 12:54

## 2020-01-01 RX ADMIN — LISINOPRIL 10 MG: 10 TABLET ORAL at 08:24

## 2020-01-01 RX ADMIN — PANTOPRAZOLE SODIUM 40 MG: 40 TABLET, DELAYED RELEASE ORAL at 05:37

## 2020-01-01 RX ADMIN — LISINOPRIL 10 MG: 10 TABLET ORAL at 09:16

## 2020-01-01 RX ADMIN — BACLOFEN 10 MG: 10 TABLET ORAL at 08:30

## 2020-01-01 RX ADMIN — Medication 500 MG: at 08:30

## 2020-01-01 RX ADMIN — ROPINIROLE HYDROCHLORIDE 0.25 MG: 0.25 TABLET, FILM COATED ORAL at 23:26

## 2020-01-01 RX ADMIN — PANTOPRAZOLE SODIUM 40 MG: 40 TABLET, DELAYED RELEASE ORAL at 05:45

## 2020-01-01 RX ADMIN — METOPROLOL SUCCINATE 25 MG: 25 TABLET, FILM COATED, EXTENDED RELEASE ORAL at 10:05

## 2020-01-01 RX ADMIN — METOPROLOL SUCCINATE 25 MG: 25 TABLET, FILM COATED, EXTENDED RELEASE ORAL at 09:38

## 2020-01-01 RX ADMIN — NITROFURANTOIN MONOHYDRATE/MACROCRYSTALLINE 100 MG: 25; 75 CAPSULE ORAL at 09:08

## 2020-01-01 RX ADMIN — BACLOFEN 10 MG: 10 TABLET ORAL at 09:08

## 2020-01-01 RX ADMIN — INSULIN LISPRO 2 UNITS: 100 INJECTION, SOLUTION INTRAVENOUS; SUBCUTANEOUS at 17:40

## 2020-01-01 RX ADMIN — METOPROLOL SUCCINATE 25 MG: 25 TABLET, FILM COATED, EXTENDED RELEASE ORAL at 08:58

## 2020-01-01 RX ADMIN — PANTOPRAZOLE SODIUM 40 MG: 40 TABLET, DELAYED RELEASE ORAL at 06:28

## 2020-01-01 RX ADMIN — SODIUM BICARBONATE 1300 MG: 650 TABLET ORAL at 09:02

## 2020-01-01 RX ADMIN — CLOPIDOGREL BISULFATE 75 MG: 75 TABLET ORAL at 10:05

## 2020-01-01 RX ADMIN — ATORVASTATIN CALCIUM 80 MG: 80 TABLET, FILM COATED ORAL at 20:57

## 2020-01-01 RX ADMIN — METHYLPHENIDATE HYDROCHLORIDE 5 MG: 5 TABLET ORAL at 12:45

## 2020-01-01 RX ADMIN — INSULIN LISPRO 2 UNITS: 100 INJECTION, SOLUTION INTRAVENOUS; SUBCUTANEOUS at 09:02

## 2020-01-01 RX ADMIN — ROPINIROLE HYDROCHLORIDE 0.5 MG: 0.5 TABLET, FILM COATED ORAL at 20:45

## 2020-01-01 RX ADMIN — CLOPIDOGREL BISULFATE 75 MG: 75 TABLET ORAL at 08:29

## 2020-01-01 RX ADMIN — INSULIN LISPRO 4 UNITS: 100 INJECTION, SOLUTION INTRAVENOUS; SUBCUTANEOUS at 12:22

## 2020-01-01 RX ADMIN — INSULIN LISPRO 2 UNITS: 100 INJECTION, SOLUTION INTRAVENOUS; SUBCUTANEOUS at 13:02

## 2020-01-01 RX ADMIN — ROPINIROLE HYDROCHLORIDE 0.5 MG: 0.5 TABLET, FILM COATED ORAL at 22:21

## 2020-01-01 RX ADMIN — ASPIRIN 81 MG 81 MG: 81 TABLET ORAL at 08:29

## 2020-01-01 RX ADMIN — METHYLPHENIDATE HYDROCHLORIDE 5 MG: 5 TABLET ORAL at 08:24

## 2020-01-01 RX ADMIN — SODIUM BICARBONATE 1300 MG: 650 TABLET ORAL at 08:30

## 2020-01-01 RX ADMIN — NITROFURANTOIN MONOHYDRATE/MACROCRYSTALLINE 100 MG: 25; 75 CAPSULE ORAL at 20:45

## 2020-01-01 RX ADMIN — ROPINIROLE HYDROCHLORIDE 0.5 MG: 0.5 TABLET, FILM COATED ORAL at 19:50

## 2020-01-01 RX ADMIN — METHYLPHENIDATE HYDROCHLORIDE 5 MG: 5 TABLET ORAL at 11:12

## 2020-01-01 RX ADMIN — EPINEPHRINE 1 MG: 0.1 INJECTION, SOLUTION ENDOTRACHEAL; INTRACARDIAC; INTRAVENOUS at 19:28

## 2020-01-01 RX ADMIN — Medication 500 MG: at 09:15

## 2020-01-01 RX ADMIN — BACLOFEN 10 MG: 10 TABLET ORAL at 20:35

## 2020-01-01 RX ADMIN — MULTIPLE VITAMINS W/ MINERALS TAB 1 TABLET: TAB at 08:58

## 2020-01-01 RX ADMIN — VITAMIN D, TAB 1000IU (100/BT) 500 UNITS: 25 TAB at 08:57

## 2020-01-01 RX ADMIN — DRONABINOL 2.5 MG: 2.5 CAPSULE ORAL at 21:44

## 2020-01-01 RX ADMIN — ESCITALOPRAM 20 MG: 20 TABLET, FILM COATED ORAL at 08:58

## 2020-01-01 RX ADMIN — Medication 500 MG: at 09:02

## 2020-01-01 RX ADMIN — INSULIN LISPRO 2 UNITS: 100 INJECTION, SOLUTION INTRAVENOUS; SUBCUTANEOUS at 17:48

## 2020-01-01 RX ADMIN — LISINOPRIL 5 MG: 5 TABLET ORAL at 09:42

## 2020-01-01 RX ADMIN — NITROFURANTOIN MONOHYDRATE/MACROCRYSTALLINE 100 MG: 25; 75 CAPSULE ORAL at 19:59

## 2020-01-01 RX ADMIN — DRONABINOL 2.5 MG: 2.5 CAPSULE ORAL at 08:31

## 2020-01-01 RX ADMIN — CLOPIDOGREL BISULFATE 75 MG: 75 TABLET ORAL at 07:56

## 2020-01-01 RX ADMIN — BACLOFEN 10 MG: 10 TABLET ORAL at 13:35

## 2020-01-01 RX ADMIN — ATORVASTATIN CALCIUM 80 MG: 80 TABLET, FILM COATED ORAL at 21:31

## 2020-01-01 RX ADMIN — CLOPIDOGREL BISULFATE 75 MG: 75 TABLET ORAL at 09:42

## 2020-01-01 RX ADMIN — INSULIN LISPRO 2 UNITS: 100 INJECTION, SOLUTION INTRAVENOUS; SUBCUTANEOUS at 08:52

## 2020-01-01 RX ADMIN — ATORVASTATIN CALCIUM 80 MG: 80 TABLET, FILM COATED ORAL at 20:35

## 2020-01-01 RX ADMIN — MULTIPLE VITAMINS W/ MINERALS TAB 1 TABLET: TAB at 08:24

## 2020-01-01 RX ADMIN — PANTOPRAZOLE SODIUM 40 MG: 40 TABLET, DELAYED RELEASE ORAL at 07:09

## 2020-01-01 RX ADMIN — METOPROLOL SUCCINATE 50 MG: 50 TABLET, EXTENDED RELEASE ORAL at 11:07

## 2020-01-01 RX ADMIN — BACLOFEN 10 MG: 10 TABLET ORAL at 22:20

## 2020-01-01 RX ADMIN — ASPIRIN 81 MG 81 MG: 81 TABLET ORAL at 09:42

## 2020-01-01 RX ADMIN — Medication 500 MG: at 08:38

## 2020-01-01 RX ADMIN — BACLOFEN 10 MG: 10 TABLET ORAL at 13:23

## 2020-01-01 RX ADMIN — CLOPIDOGREL BISULFATE 75 MG: 75 TABLET ORAL at 09:09

## 2020-01-01 RX ADMIN — INSULIN LISPRO 2 UNITS: 100 INJECTION, SOLUTION INTRAVENOUS; SUBCUTANEOUS at 08:31

## 2020-01-01 RX ADMIN — ROPINIROLE HYDROCHLORIDE 0.25 MG: 0.25 TABLET, FILM COATED ORAL at 21:31

## 2020-01-01 RX ADMIN — INSULIN LISPRO 2 UNITS: 100 INJECTION, SOLUTION INTRAVENOUS; SUBCUTANEOUS at 17:33

## 2020-01-01 RX ADMIN — MULTIPLE VITAMINS W/ MINERALS TAB 1 TABLET: TAB at 07:56

## 2020-01-01 RX ADMIN — MULTIPLE VITAMINS W/ MINERALS TAB 1 TABLET: TAB at 09:02

## 2020-01-01 RX ADMIN — BACLOFEN 10 MG: 10 TABLET ORAL at 22:42

## 2020-01-01 RX ADMIN — Medication 500 MG: at 08:31

## 2020-01-01 RX ADMIN — METHYLPHENIDATE HYDROCHLORIDE 5 MG: 5 TABLET ORAL at 07:25

## 2020-01-01 RX ADMIN — Medication 500 MG: at 07:56

## 2020-01-01 RX ADMIN — ROPINIROLE HYDROCHLORIDE 0.5 MG: 0.5 TABLET, FILM COATED ORAL at 22:42

## 2020-01-01 RX ADMIN — ESCITALOPRAM 20 MG: 20 TABLET, FILM COATED ORAL at 07:59

## 2020-01-01 RX ADMIN — Medication 500 MG: at 10:18

## 2020-01-01 RX ADMIN — VITAMIN D, TAB 1000IU (100/BT) 500 UNITS: 25 TAB at 07:59

## 2020-01-01 RX ADMIN — ASPIRIN 81 MG 81 MG: 81 TABLET ORAL at 09:08

## 2020-01-01 RX ADMIN — PANTOPRAZOLE SODIUM 40 MG: 40 TABLET, DELAYED RELEASE ORAL at 05:19

## 2020-01-01 RX ADMIN — SODIUM BICARBONATE 650 MG: 650 TABLET ORAL at 08:48

## 2020-01-01 RX ADMIN — ATORVASTATIN CALCIUM 80 MG: 80 TABLET, FILM COATED ORAL at 22:22

## 2020-01-01 RX ADMIN — MULTIPLE VITAMINS W/ MINERALS TAB 1 TABLET: TAB at 08:30

## 2020-01-01 RX ADMIN — INSULIN LISPRO 2 UNITS: 100 INJECTION, SOLUTION INTRAVENOUS; SUBCUTANEOUS at 16:40

## 2020-01-01 RX ADMIN — ROPINIROLE HYDROCHLORIDE 0.5 MG: 0.5 TABLET, FILM COATED ORAL at 19:59

## 2020-01-01 RX ADMIN — INSULIN LISPRO 2 UNITS: 100 INJECTION, SOLUTION INTRAVENOUS; SUBCUTANEOUS at 16:57

## 2020-01-01 RX ADMIN — ASPIRIN 81 MG 81 MG: 81 TABLET ORAL at 11:06

## 2020-01-01 RX ADMIN — INSULIN LISPRO 2 UNITS: 100 INJECTION, SOLUTION INTRAVENOUS; SUBCUTANEOUS at 08:30

## 2020-01-01 RX ADMIN — CLOPIDOGREL BISULFATE 75 MG: 75 TABLET ORAL at 08:00

## 2020-01-01 RX ADMIN — ACETAMINOPHEN 650 MG: 325 TABLET ORAL at 05:21

## 2020-01-01 RX ADMIN — BACLOFEN 10 MG: 10 TABLET ORAL at 08:37

## 2020-01-01 RX ADMIN — BACLOFEN 10 MG: 10 TABLET ORAL at 13:49

## 2020-01-01 RX ADMIN — ATORVASTATIN CALCIUM 80 MG: 80 TABLET, FILM COATED ORAL at 22:43

## 2020-01-01 RX ADMIN — MULTIPLE VITAMINS W/ MINERALS TAB 1 TABLET: TAB at 08:32

## 2020-01-01 RX ADMIN — CLOPIDOGREL BISULFATE 75 MG: 75 TABLET ORAL at 09:00

## 2020-01-01 RX ADMIN — INSULIN LISPRO 4 UNITS: 100 INJECTION, SOLUTION INTRAVENOUS; SUBCUTANEOUS at 18:36

## 2020-01-01 RX ADMIN — FINASTERIDE 5 MG: 5 TABLET, FILM COATED ORAL at 09:15

## 2020-01-01 RX ADMIN — SODIUM BICARBONATE 650 MG: 650 TABLET ORAL at 07:56

## 2020-01-01 RX ADMIN — ESCITALOPRAM 20 MG: 20 TABLET, FILM COATED ORAL at 11:06

## 2020-01-01 RX ADMIN — CLOPIDOGREL BISULFATE 75 MG: 75 TABLET ORAL at 08:24

## 2020-01-01 RX ADMIN — MULTIPLE VITAMINS W/ MINERALS TAB 1 TABLET: TAB at 08:51

## 2020-01-01 RX ADMIN — ASPIRIN 81 MG 81 MG: 81 TABLET ORAL at 07:58

## 2020-01-01 RX ADMIN — METOPROLOL SUCCINATE 25 MG: 25 TABLET, FILM COATED, EXTENDED RELEASE ORAL at 07:58

## 2020-01-01 RX ADMIN — BACLOFEN 10 MG: 10 TABLET ORAL at 08:51

## 2020-01-01 RX ADMIN — MULTIPLE VITAMINS W/ MINERALS TAB 1 TABLET: TAB at 08:31

## 2020-01-01 RX ADMIN — ROPINIROLE HYDROCHLORIDE 0.25 MG: 0.25 TABLET, FILM COATED ORAL at 22:39

## 2020-01-01 RX ADMIN — METOPROLOL SUCCINATE 50 MG: 50 TABLET, EXTENDED RELEASE ORAL at 08:36

## 2020-01-01 RX ADMIN — VITAMIN D, TAB 1000IU (100/BT) 500 UNITS: 25 TAB at 08:31

## 2020-01-01 RX ADMIN — INSULIN LISPRO 1 UNITS: 100 INJECTION, SOLUTION INTRAVENOUS; SUBCUTANEOUS at 22:43

## 2020-01-01 RX ADMIN — FINASTERIDE 5 MG: 5 TABLET, FILM COATED ORAL at 08:29

## 2020-01-01 RX ADMIN — DRONABINOL 2.5 MG: 2.5 CAPSULE ORAL at 08:29

## 2020-01-01 RX ADMIN — ASPIRIN 81 MG 81 MG: 81 TABLET ORAL at 08:37

## 2020-01-01 RX ADMIN — ASPIRIN 81 MG 81 MG: 81 TABLET ORAL at 10:05

## 2020-01-01 RX ADMIN — ACETAMINOPHEN 650 MG: 325 TABLET ORAL at 06:08

## 2020-01-01 RX ADMIN — CLOPIDOGREL BISULFATE 75 MG: 75 TABLET ORAL at 08:22

## 2020-01-01 RX ADMIN — EPINEPHRINE 1 MG: 0.1 INJECTION, SOLUTION ENDOTRACHEAL; INTRACARDIAC; INTRAVENOUS at 19:52

## 2020-01-01 RX ADMIN — ESCITALOPRAM 20 MG: 20 TABLET, FILM COATED ORAL at 10:05

## 2020-01-01 RX ADMIN — BACLOFEN 10 MG: 10 TABLET ORAL at 19:49

## 2020-01-01 RX ADMIN — INSULIN LISPRO 2 UNITS: 100 INJECTION, SOLUTION INTRAVENOUS; SUBCUTANEOUS at 17:32

## 2020-01-01 RX ADMIN — ROPINIROLE HYDROCHLORIDE 0.5 MG: 0.5 TABLET, FILM COATED ORAL at 21:01

## 2020-01-01 RX ADMIN — ROPINIROLE HYDROCHLORIDE 0.5 MG: 0.5 TABLET, FILM COATED ORAL at 21:45

## 2020-01-01 RX ADMIN — ASPIRIN 81 MG 81 MG: 81 TABLET ORAL at 09:16

## 2020-01-01 RX ADMIN — ATORVASTATIN CALCIUM 80 MG: 80 TABLET, FILM COATED ORAL at 23:26

## 2020-01-01 RX ADMIN — BACLOFEN 10 MG: 10 TABLET ORAL at 22:45

## 2020-01-01 RX ADMIN — INSULIN LISPRO 1 UNITS: 100 INJECTION, SOLUTION INTRAVENOUS; SUBCUTANEOUS at 21:53

## 2020-01-01 RX ADMIN — ASPIRIN 81 MG 81 MG: 81 TABLET ORAL at 08:22

## 2020-01-01 RX ADMIN — ONDANSETRON HYDROCHLORIDE 4 MG: 4 TABLET, FILM COATED ORAL at 18:44

## 2020-01-01 RX ADMIN — INSULIN LISPRO 2 UNITS: 100 INJECTION, SOLUTION INTRAVENOUS; SUBCUTANEOUS at 09:30

## 2020-01-01 RX ADMIN — CLOPIDOGREL BISULFATE 75 MG: 75 TABLET ORAL at 10:21

## 2020-01-01 RX ADMIN — VITAMIN D, TAB 1000IU (100/BT) 500 UNITS: 25 TAB at 09:39

## 2020-01-01 RX ADMIN — ATORVASTATIN CALCIUM 80 MG: 80 TABLET, FILM COATED ORAL at 22:37

## 2020-01-01 RX ADMIN — INSULIN LISPRO 2 UNITS: 100 INJECTION, SOLUTION INTRAVENOUS; SUBCUTANEOUS at 09:16

## 2020-01-01 RX ADMIN — METOPROLOL SUCCINATE 25 MG: 25 TABLET, FILM COATED, EXTENDED RELEASE ORAL at 09:02

## 2020-01-01 RX ADMIN — NITROFURANTOIN MONOHYDRATE/MACROCRYSTALLINE 100 MG: 25; 75 CAPSULE ORAL at 08:31

## 2020-01-01 RX ADMIN — ESCITALOPRAM 20 MG: 20 TABLET, FILM COATED ORAL at 08:38

## 2020-01-01 RX ADMIN — NITROFURANTOIN MONOHYDRATE/MACROCRYSTALLINE 100 MG: 25; 75 CAPSULE ORAL at 08:24

## 2020-01-01 RX ADMIN — ROPINIROLE HYDROCHLORIDE 0.5 MG: 0.5 TABLET, FILM COATED ORAL at 21:02

## 2020-01-01 RX ADMIN — INSULIN LISPRO 2 UNITS: 100 INJECTION, SOLUTION INTRAVENOUS; SUBCUTANEOUS at 20:35

## 2020-01-01 RX ADMIN — FINASTERIDE 5 MG: 5 TABLET, FILM COATED ORAL at 08:51

## 2020-01-01 RX ADMIN — Medication 500 MG: at 08:32

## 2020-01-01 RX ADMIN — ATORVASTATIN CALCIUM 80 MG: 80 TABLET, FILM COATED ORAL at 20:51

## 2020-01-01 RX ADMIN — METOPROLOL SUCCINATE 25 MG: 25 TABLET, FILM COATED, EXTENDED RELEASE ORAL at 09:06

## 2020-01-01 RX ADMIN — INSULIN LISPRO 2 UNITS: 100 INJECTION, SOLUTION INTRAVENOUS; SUBCUTANEOUS at 13:34

## 2020-01-01 RX ADMIN — MULTIPLE VITAMINS W/ MINERALS TAB 1 TABLET: TAB at 09:39

## 2020-01-01 RX ADMIN — BACLOFEN 10 MG: 10 TABLET ORAL at 13:15

## 2020-01-01 RX ADMIN — BACLOFEN 10 MG: 10 TABLET ORAL at 21:41

## 2020-01-01 RX ADMIN — METHYLPHENIDATE HYDROCHLORIDE 5 MG: 5 TABLET ORAL at 12:57

## 2020-01-01 RX ADMIN — ATORVASTATIN CALCIUM 80 MG: 80 TABLET, FILM COATED ORAL at 21:41

## 2020-01-01 RX ADMIN — Medication 500 MG: at 08:56

## 2020-01-01 RX ADMIN — METHYLPHENIDATE HYDROCHLORIDE 5 MG: 5 TABLET ORAL at 12:09

## 2020-01-01 RX ADMIN — METHYLPHENIDATE HYDROCHLORIDE 5 MG: 5 TABLET ORAL at 13:36

## 2020-01-01 RX ADMIN — METHYLPHENIDATE HYDROCHLORIDE 5 MG: 5 TABLET ORAL at 09:15

## 2020-01-01 RX ADMIN — INSULIN LISPRO 2 UNITS: 100 INJECTION, SOLUTION INTRAVENOUS; SUBCUTANEOUS at 17:16

## 2020-01-01 RX ADMIN — BACLOFEN 10 MG: 10 TABLET ORAL at 10:18

## 2020-01-01 RX ADMIN — VITAMIN D, TAB 1000IU (100/BT) 500 UNITS: 25 TAB at 10:24

## 2020-01-01 RX ADMIN — CLOPIDOGREL BISULFATE 75 MG: 75 TABLET ORAL at 09:15

## 2020-01-01 RX ADMIN — INSULIN LISPRO 4 UNITS: 100 INJECTION, SOLUTION INTRAVENOUS; SUBCUTANEOUS at 17:59

## 2020-01-01 RX ADMIN — INSULIN LISPRO 1 UNITS: 100 INJECTION, SOLUTION INTRAVENOUS; SUBCUTANEOUS at 21:32

## 2020-01-01 RX ADMIN — INSULIN LISPRO 2 UNITS: 100 INJECTION, SOLUTION INTRAVENOUS; SUBCUTANEOUS at 12:54

## 2020-01-01 RX ADMIN — ATORVASTATIN CALCIUM 80 MG: 80 TABLET, FILM COATED ORAL at 19:59

## 2020-01-01 RX ADMIN — ATORVASTATIN CALCIUM 80 MG: 80 TABLET, FILM COATED ORAL at 20:46

## 2020-01-01 RX ADMIN — MULTIPLE VITAMINS W/ MINERALS TAB 1 TABLET: TAB at 11:06

## 2020-01-01 RX ADMIN — INSULIN LISPRO 4 UNITS: 100 INJECTION, SOLUTION INTRAVENOUS; SUBCUTANEOUS at 12:14

## 2020-01-01 RX ADMIN — FINASTERIDE 5 MG: 5 TABLET, FILM COATED ORAL at 08:48

## 2020-01-01 RX ADMIN — PANTOPRAZOLE SODIUM 40 MG: 40 TABLET, DELAYED RELEASE ORAL at 06:09

## 2020-01-01 RX ADMIN — ROPINIROLE HYDROCHLORIDE 0.5 MG: 0.5 TABLET, FILM COATED ORAL at 20:57

## 2020-01-01 RX ADMIN — CLOPIDOGREL BISULFATE 75 MG: 75 TABLET ORAL at 08:30

## 2020-01-01 RX ADMIN — FINASTERIDE 5 MG: 5 TABLET, FILM COATED ORAL at 08:32

## 2020-01-01 RX ADMIN — INSULIN LISPRO 2 UNITS: 100 INJECTION, SOLUTION INTRAVENOUS; SUBCUTANEOUS at 18:29

## 2020-01-01 RX ADMIN — FINASTERIDE 5 MG: 5 TABLET, FILM COATED ORAL at 08:24

## 2020-01-01 RX ADMIN — FINASTERIDE 5 MG: 5 TABLET, FILM COATED ORAL at 09:06

## 2020-01-01 RX ADMIN — ATORVASTATIN CALCIUM 80 MG: 80 TABLET, FILM COATED ORAL at 21:02

## 2020-01-01 RX ADMIN — BACLOFEN 10 MG: 10 TABLET ORAL at 20:45

## 2020-01-01 RX ADMIN — LISINOPRIL 10 MG: 10 TABLET ORAL at 08:51

## 2020-01-01 RX ADMIN — METHYLPHENIDATE HYDROCHLORIDE 5 MG: 5 TABLET ORAL at 09:08

## 2020-01-01 RX ADMIN — BACLOFEN 10 MG: 10 TABLET ORAL at 13:26

## 2020-01-01 RX ADMIN — FINASTERIDE 5 MG: 5 TABLET, FILM COATED ORAL at 08:00

## 2020-01-01 RX ADMIN — METHYLPHENIDATE HYDROCHLORIDE 5 MG: 5 TABLET ORAL at 11:06

## 2020-01-01 RX ADMIN — BACLOFEN 10 MG: 10 TABLET ORAL at 13:02

## 2020-01-01 RX ADMIN — PANTOPRAZOLE SODIUM 40 MG: 40 TABLET, DELAYED RELEASE ORAL at 05:58

## 2020-01-01 RX ADMIN — INSULIN LISPRO 1 UNITS: 100 INJECTION, SOLUTION INTRAVENOUS; SUBCUTANEOUS at 20:51

## 2020-01-01 RX ADMIN — FINASTERIDE 5 MG: 5 TABLET, FILM COATED ORAL at 08:58

## 2020-01-01 RX ADMIN — BACLOFEN 10 MG: 10 TABLET ORAL at 20:46

## 2020-01-01 RX ADMIN — MULTIPLE VITAMINS W/ MINERALS TAB 1 TABLET: TAB at 07:59

## 2020-01-01 RX ADMIN — ASPIRIN 81 MG 81 MG: 81 TABLET ORAL at 08:24

## 2020-01-01 RX ADMIN — INSULIN LISPRO 2 UNITS: 100 INJECTION, SOLUTION INTRAVENOUS; SUBCUTANEOUS at 17:22

## 2020-01-01 RX ADMIN — INSULIN LISPRO 4 UNITS: 100 INJECTION, SOLUTION INTRAVENOUS; SUBCUTANEOUS at 08:47

## 2020-01-01 RX ADMIN — ESCITALOPRAM 20 MG: 20 TABLET, FILM COATED ORAL at 09:02

## 2020-01-01 RX ADMIN — Medication 500 MG: at 09:07

## 2020-01-01 RX ADMIN — PANTOPRAZOLE SODIUM 40 MG: 40 TABLET, DELAYED RELEASE ORAL at 06:10

## 2020-01-01 RX ADMIN — MULTIPLE VITAMINS W/ MINERALS TAB 1 TABLET: TAB at 10:18

## 2020-01-01 RX ADMIN — Medication 500 MG: at 13:03

## 2020-01-01 RX ADMIN — VITAMIN D, TAB 1000IU (100/BT) 500 UNITS: 25 TAB at 08:51

## 2020-01-01 RX ADMIN — INSULIN LISPRO 2 UNITS: 100 INJECTION, SOLUTION INTRAVENOUS; SUBCUTANEOUS at 21:04

## 2020-01-01 RX ADMIN — FINASTERIDE 5 MG: 5 TABLET, FILM COATED ORAL at 08:30

## 2020-01-01 RX ADMIN — FINASTERIDE 5 MG: 5 TABLET, FILM COATED ORAL at 09:38

## 2020-01-01 RX ADMIN — VITAMIN D, TAB 1000IU (100/BT) 500 UNITS: 25 TAB at 08:30

## 2020-01-01 RX ADMIN — INSULIN LISPRO 4 UNITS: 100 INJECTION, SOLUTION INTRAVENOUS; SUBCUTANEOUS at 11:16

## 2020-01-01 RX ADMIN — DRONABINOL 2.5 MG: 2.5 CAPSULE ORAL at 21:38

## 2020-01-01 RX ADMIN — ASPIRIN 81 MG 81 MG: 81 TABLET ORAL at 11:51

## 2020-01-01 RX ADMIN — BACLOFEN 10 MG: 10 TABLET ORAL at 13:01

## 2020-01-01 RX ADMIN — BACLOFEN 10 MG: 10 TABLET ORAL at 21:44

## 2020-01-01 RX ADMIN — SODIUM BICARBONATE 650 MG: 650 TABLET ORAL at 22:39

## 2020-01-01 RX ADMIN — CLOPIDOGREL BISULFATE 75 MG: 75 TABLET ORAL at 08:31

## 2020-01-01 RX ADMIN — FINASTERIDE 5 MG: 5 TABLET, FILM COATED ORAL at 08:37

## 2020-01-01 RX ADMIN — LISINOPRIL 5 MG: 5 TABLET ORAL at 08:56

## 2020-01-01 RX ADMIN — ATORVASTATIN CALCIUM 80 MG: 80 TABLET, FILM COATED ORAL at 20:45

## 2020-01-01 RX ADMIN — BACLOFEN 10 MG: 10 TABLET ORAL at 12:59

## 2020-01-01 RX ADMIN — PANTOPRAZOLE SODIUM 40 MG: 40 TABLET, DELAYED RELEASE ORAL at 06:06

## 2020-01-01 RX ADMIN — BACLOFEN 10 MG: 10 TABLET ORAL at 13:17

## 2020-01-01 RX ADMIN — LISINOPRIL 10 MG: 10 TABLET ORAL at 08:22

## 2020-01-01 RX ADMIN — FINASTERIDE 5 MG: 5 TABLET, FILM COATED ORAL at 08:31

## 2020-01-01 RX ADMIN — SODIUM BICARBONATE 650 MG: 650 TABLET ORAL at 21:31

## 2020-01-01 RX ADMIN — ROPINIROLE HYDROCHLORIDE 0.5 MG: 0.5 TABLET, FILM COATED ORAL at 21:03

## 2020-01-01 RX ADMIN — INSULIN LISPRO 6 UNITS: 100 INJECTION, SOLUTION INTRAVENOUS; SUBCUTANEOUS at 12:34

## 2020-01-01 RX ADMIN — PANTOPRAZOLE SODIUM 40 MG: 40 TABLET, DELAYED RELEASE ORAL at 05:21

## 2020-01-01 RX ADMIN — BACLOFEN 10 MG: 10 TABLET ORAL at 09:06

## 2020-01-01 RX ADMIN — BACLOFEN 10 MG: 10 TABLET ORAL at 21:03

## 2020-01-01 RX ADMIN — NITROFURANTOIN MONOHYDRATE/MACROCRYSTALLINE 100 MG: 25; 75 CAPSULE ORAL at 08:22

## 2020-01-01 RX ADMIN — BACLOFEN 10 MG: 10 TABLET ORAL at 13:11

## 2020-01-01 RX ADMIN — INSULIN LISPRO 2 UNITS: 100 INJECTION, SOLUTION INTRAVENOUS; SUBCUTANEOUS at 21:32

## 2020-01-01 RX ADMIN — SODIUM BICARBONATE 650 MG: 650 TABLET ORAL at 22:21

## 2020-01-01 RX ADMIN — DRONABINOL 2.5 MG: 2.5 CAPSULE ORAL at 14:59

## 2020-01-01 RX ADMIN — INSULIN LISPRO 1 UNITS: 100 INJECTION, SOLUTION INTRAVENOUS; SUBCUTANEOUS at 20:49

## 2020-01-01 RX ADMIN — METOPROLOL SUCCINATE 25 MG: 25 TABLET, FILM COATED, EXTENDED RELEASE ORAL at 07:56

## 2020-01-01 RX ADMIN — ESCITALOPRAM 20 MG: 20 TABLET, FILM COATED ORAL at 09:06

## 2020-01-01 RX ADMIN — INSULIN LISPRO 6 UNITS: 100 INJECTION, SOLUTION INTRAVENOUS; SUBCUTANEOUS at 17:34

## 2020-01-01 RX ADMIN — DRONABINOL 2.5 MG: 2.5 CAPSULE ORAL at 19:49

## 2020-01-01 RX ADMIN — CLOPIDOGREL BISULFATE 75 MG: 75 TABLET ORAL at 09:40

## 2020-01-01 RX ADMIN — BACLOFEN 10 MG: 10 TABLET ORAL at 16:40

## 2020-01-01 RX ADMIN — INSULIN LISPRO 4 UNITS: 100 INJECTION, SOLUTION INTRAVENOUS; SUBCUTANEOUS at 11:59

## 2020-01-01 RX ADMIN — METHYLPHENIDATE HYDROCHLORIDE 5 MG: 5 TABLET ORAL at 08:35

## 2020-01-01 RX ADMIN — VITAMIN D, TAB 1000IU (100/BT) 500 UNITS: 25 TAB at 09:32

## 2020-01-01 RX ADMIN — PANTOPRAZOLE SODIUM 40 MG: 40 TABLET, DELAYED RELEASE ORAL at 05:55

## 2020-01-01 RX ADMIN — Medication 500 MG: at 07:58

## 2020-01-01 RX ADMIN — VITAMIN D, TAB 1000IU (100/BT) 500 UNITS: 25 TAB at 08:48

## 2020-01-01 RX ADMIN — METHYLPHENIDATE HYDROCHLORIDE 5 MG: 5 TABLET ORAL at 08:43

## 2020-01-01 RX ADMIN — BACLOFEN 10 MG: 10 TABLET ORAL at 21:02

## 2020-01-01 RX ADMIN — INSULIN LISPRO 1 UNITS: 100 INJECTION, SOLUTION INTRAVENOUS; SUBCUTANEOUS at 21:41

## 2020-01-01 RX ADMIN — DRONABINOL 2.5 MG: 2.5 CAPSULE ORAL at 09:07

## 2020-01-01 RX ADMIN — ESCITALOPRAM 20 MG: 20 TABLET, FILM COATED ORAL at 08:23

## 2020-01-01 RX ADMIN — INSULIN LISPRO 2 UNITS: 100 INJECTION, SOLUTION INTRAVENOUS; SUBCUTANEOUS at 12:29

## 2020-01-01 RX ADMIN — ROPINIROLE HYDROCHLORIDE 0.5 MG: 0.5 TABLET, FILM COATED ORAL at 21:38

## 2020-01-01 RX ADMIN — ROPINIROLE HYDROCHLORIDE 0.5 MG: 0.5 TABLET, FILM COATED ORAL at 20:35

## 2020-01-01 RX ADMIN — DRONABINOL 2.5 MG: 2.5 CAPSULE ORAL at 09:00

## 2020-01-01 RX ADMIN — PANTOPRAZOLE SODIUM 40 MG: 40 TABLET, DELAYED RELEASE ORAL at 07:43

## 2020-01-01 RX ADMIN — VITAMIN D, TAB 1000IU (100/BT) 500 UNITS: 25 TAB at 09:15

## 2020-01-01 RX ADMIN — PANTOPRAZOLE SODIUM 40 MG: 40 TABLET, DELAYED RELEASE ORAL at 05:31

## 2020-01-01 RX ADMIN — BACLOFEN 10 MG: 10 TABLET ORAL at 21:23

## 2020-01-01 RX ADMIN — MULTIPLE VITAMINS W/ MINERALS TAB 1 TABLET: TAB at 13:03

## 2020-01-01 RX ADMIN — FINASTERIDE 5 MG: 5 TABLET, FILM COATED ORAL at 10:05

## 2020-01-01 RX ADMIN — ASPIRIN 81 MG 81 MG: 81 TABLET ORAL at 08:31

## 2020-01-01 RX ADMIN — ROPINIROLE HYDROCHLORIDE 0.5 MG: 0.5 TABLET, FILM COATED ORAL at 22:35

## 2020-01-01 RX ADMIN — EPINEPHRINE 1 MG: 0.1 INJECTION, SOLUTION ENDOTRACHEAL; INTRACARDIAC; INTRAVENOUS at 19:37

## 2020-01-01 RX ADMIN — LISINOPRIL 10 MG: 10 TABLET ORAL at 09:32

## 2020-01-01 RX ADMIN — PANTOPRAZOLE SODIUM 40 MG: 40 TABLET, DELAYED RELEASE ORAL at 05:36

## 2020-01-01 RX ADMIN — ESCITALOPRAM 20 MG: 20 TABLET, FILM COATED ORAL at 08:32

## 2020-01-01 RX ADMIN — DRONABINOL 2.5 MG: 2.5 CAPSULE ORAL at 21:41

## 2020-01-01 RX ADMIN — PANTOPRAZOLE SODIUM 40 MG: 40 TABLET, DELAYED RELEASE ORAL at 05:46

## 2020-01-01 RX ADMIN — CLOPIDOGREL BISULFATE 75 MG: 75 TABLET ORAL at 08:51

## 2020-01-01 RX ADMIN — BACLOFEN 10 MG: 10 TABLET ORAL at 19:59

## 2020-01-01 RX ADMIN — Medication 10 ML: at 20:51

## 2020-01-01 RX ADMIN — FINASTERIDE 5 MG: 5 TABLET, FILM COATED ORAL at 09:08

## 2020-01-01 RX ADMIN — METHYLPHENIDATE HYDROCHLORIDE 5 MG: 5 TABLET ORAL at 11:08

## 2020-01-01 RX ADMIN — INSULIN LISPRO 2 UNITS: 100 INJECTION, SOLUTION INTRAVENOUS; SUBCUTANEOUS at 12:57

## 2020-01-01 RX ADMIN — INSULIN LISPRO 2 UNITS: 100 INJECTION, SOLUTION INTRAVENOUS; SUBCUTANEOUS at 17:50

## 2020-01-01 RX ADMIN — METHYLPHENIDATE HYDROCHLORIDE 5 MG: 5 TABLET ORAL at 05:31

## 2020-01-01 RX ADMIN — LISINOPRIL 5 MG: 5 TABLET ORAL at 10:22

## 2020-01-01 RX ADMIN — Medication 500 MG: at 09:32

## 2020-01-01 RX ADMIN — PANTOPRAZOLE SODIUM 40 MG: 40 TABLET, DELAYED RELEASE ORAL at 06:02

## 2020-01-01 RX ADMIN — ESCITALOPRAM 20 MG: 20 TABLET, FILM COATED ORAL at 08:30

## 2020-01-01 RX ADMIN — NITROFURANTOIN MONOHYDRATE/MACROCRYSTALLINE 100 MG: 25; 75 CAPSULE ORAL at 09:32

## 2020-01-01 RX ADMIN — ESCITALOPRAM 20 MG: 20 TABLET, FILM COATED ORAL at 09:32

## 2020-01-01 RX ADMIN — ESCITALOPRAM 20 MG: 20 TABLET, FILM COATED ORAL at 08:48

## 2020-01-01 RX ADMIN — LISINOPRIL 10 MG: 10 TABLET ORAL at 09:06

## 2020-01-01 RX ADMIN — EPINEPHRINE 1 MG: 0.1 INJECTION, SOLUTION ENDOTRACHEAL; INTRACARDIAC; INTRAVENOUS at 19:34

## 2020-01-01 RX ADMIN — NITROFURANTOIN MONOHYDRATE/MACROCRYSTALLINE 100 MG: 25; 75 CAPSULE ORAL at 09:15

## 2020-01-01 RX ADMIN — ATORVASTATIN CALCIUM 80 MG: 80 TABLET, FILM COATED ORAL at 21:38

## 2020-01-01 RX ADMIN — MULTIPLE VITAMINS W/ MINERALS TAB 1 TABLET: TAB at 08:38

## 2020-01-01 RX ADMIN — Medication 500 MG: at 08:48

## 2020-01-01 RX ADMIN — MULTIPLE VITAMINS W/ MINERALS TAB 1 TABLET: TAB at 09:15

## 2020-01-01 RX ADMIN — INSULIN LISPRO 2 UNITS: 100 INJECTION, SOLUTION INTRAVENOUS; SUBCUTANEOUS at 12:09

## 2020-01-01 RX ADMIN — FINASTERIDE 5 MG: 5 TABLET, FILM COATED ORAL at 09:02

## 2020-01-01 RX ADMIN — METOPROLOL SUCCINATE 25 MG: 25 TABLET, FILM COATED, EXTENDED RELEASE ORAL at 08:30

## 2020-01-01 RX ADMIN — CLOPIDOGREL BISULFATE 75 MG: 75 TABLET ORAL at 08:35

## 2020-01-01 RX ADMIN — AMIODARONE HYDROCHLORIDE 300 MG: 50 INJECTION, SOLUTION INTRAVENOUS at 19:54

## 2020-01-01 RX ADMIN — INSULIN LISPRO 1 UNITS: 100 INJECTION, SOLUTION INTRAVENOUS; SUBCUTANEOUS at 21:42

## 2020-01-01 RX ADMIN — VITAMIN D, TAB 1000IU (100/BT) 500 UNITS: 25 TAB at 07:56

## 2020-01-01 RX ADMIN — METHYLPHENIDATE HYDROCHLORIDE 5 MG: 5 TABLET ORAL at 13:24

## 2020-01-01 RX ADMIN — ROPINIROLE HYDROCHLORIDE 0.25 MG: 0.25 TABLET, FILM COATED ORAL at 22:21

## 2020-01-01 RX ADMIN — METHYLPHENIDATE HYDROCHLORIDE 5 MG: 5 TABLET ORAL at 08:22

## 2020-01-01 RX ADMIN — PANTOPRAZOLE SODIUM 40 MG: 40 TABLET, DELAYED RELEASE ORAL at 05:42

## 2020-01-01 RX ADMIN — FINASTERIDE 5 MG: 5 TABLET, FILM COATED ORAL at 09:09

## 2020-01-01 RX ADMIN — INSULIN LISPRO 2 UNITS: 100 INJECTION, SOLUTION INTRAVENOUS; SUBCUTANEOUS at 17:28

## 2020-01-01 RX ADMIN — DRONABINOL 2.5 MG: 2.5 CAPSULE ORAL at 20:46

## 2020-01-01 RX ADMIN — ESCITALOPRAM 20 MG: 20 TABLET, FILM COATED ORAL at 09:08

## 2020-01-01 RX ADMIN — BACLOFEN 10 MG: 10 TABLET ORAL at 11:07

## 2020-01-01 RX ADMIN — INSULIN LISPRO 2 UNITS: 100 INJECTION, SOLUTION INTRAVENOUS; SUBCUTANEOUS at 08:42

## 2020-01-01 RX ADMIN — ATORVASTATIN CALCIUM 80 MG: 80 TABLET, FILM COATED ORAL at 21:03

## 2020-01-01 RX ADMIN — ROPINIROLE HYDROCHLORIDE 0.5 MG: 0.5 TABLET, FILM COATED ORAL at 22:37

## 2020-01-01 RX ADMIN — ACETAMINOPHEN 650 MG: 325 TABLET ORAL at 10:18

## 2020-01-01 RX ADMIN — ATORVASTATIN CALCIUM 80 MG: 80 TABLET, FILM COATED ORAL at 21:44

## 2020-01-01 RX ADMIN — ESCITALOPRAM 20 MG: 20 TABLET, FILM COATED ORAL at 08:24

## 2020-01-01 RX ADMIN — METOPROLOL SUCCINATE 50 MG: 50 TABLET, EXTENDED RELEASE ORAL at 09:06

## 2020-01-01 RX ADMIN — METHYLPHENIDATE HYDROCHLORIDE 5 MG: 5 TABLET ORAL at 12:34

## 2020-01-01 RX ADMIN — METHYLPHENIDATE HYDROCHLORIDE 5 MG: 5 TABLET ORAL at 06:02

## 2020-01-01 RX ADMIN — INSULIN LISPRO 2 UNITS: 100 INJECTION, SOLUTION INTRAVENOUS; SUBCUTANEOUS at 13:23

## 2020-01-01 RX ADMIN — DRONABINOL 2.5 MG: 2.5 CAPSULE ORAL at 09:37

## 2020-01-01 RX ADMIN — CLOPIDOGREL BISULFATE 75 MG: 75 TABLET ORAL at 11:05

## 2020-01-01 RX ADMIN — ASPIRIN 81 MG 81 MG: 81 TABLET ORAL at 08:56

## 2020-01-01 RX ADMIN — SODIUM BICARBONATE 650 MG: 650 TABLET ORAL at 08:56

## 2020-01-01 RX ADMIN — VITAMIN D, TAB 1000IU (100/BT) 500 UNITS: 25 TAB at 08:37

## 2020-01-01 RX ADMIN — ATORVASTATIN CALCIUM 80 MG: 80 TABLET, FILM COATED ORAL at 22:39

## 2020-01-01 RX ADMIN — INSULIN LISPRO 1 UNITS: 100 INJECTION, SOLUTION INTRAVENOUS; SUBCUTANEOUS at 22:50

## 2020-01-01 RX ADMIN — INSULIN LISPRO 2 UNITS: 100 INJECTION, SOLUTION INTRAVENOUS; SUBCUTANEOUS at 09:13

## 2020-01-01 RX ADMIN — ESCITALOPRAM 20 MG: 20 TABLET, FILM COATED ORAL at 09:09

## 2020-01-01 RX ADMIN — CLOPIDOGREL BISULFATE 75 MG: 75 TABLET ORAL at 08:56

## 2020-01-01 RX ADMIN — CLOPIDOGREL BISULFATE 75 MG: 75 TABLET ORAL at 09:06

## 2020-01-01 RX ADMIN — INSULIN LISPRO 4 UNITS: 100 INJECTION, SOLUTION INTRAVENOUS; SUBCUTANEOUS at 12:45

## 2020-01-01 RX ADMIN — Medication 500 MG: at 11:06

## 2020-01-01 RX ADMIN — DRONABINOL 2.5 MG: 2.5 CAPSULE ORAL at 22:37

## 2020-01-01 RX ADMIN — VITAMIN D, TAB 1000IU (100/BT) 500 UNITS: 25 TAB at 11:06

## 2020-01-01 RX ADMIN — MULTIPLE VITAMINS W/ MINERALS TAB 1 TABLET: TAB at 08:23

## 2020-01-01 RX ADMIN — VITAMIN D, TAB 1000IU (100/BT) 500 UNITS: 25 TAB at 09:08

## 2020-01-01 RX ADMIN — INSULIN LISPRO 4 UNITS: 100 INJECTION, SOLUTION INTRAVENOUS; SUBCUTANEOUS at 17:50

## 2020-01-01 RX ADMIN — METHYLPHENIDATE HYDROCHLORIDE 5 MG: 5 TABLET ORAL at 11:44

## 2020-01-01 RX ADMIN — METOPROLOL SUCCINATE 50 MG: 50 TABLET, EXTENDED RELEASE ORAL at 08:30

## 2020-01-01 RX ADMIN — ESCITALOPRAM 20 MG: 20 TABLET, FILM COATED ORAL at 09:16

## 2020-01-01 RX ADMIN — INSULIN LISPRO 4 UNITS: 100 INJECTION, SOLUTION INTRAVENOUS; SUBCUTANEOUS at 13:02

## 2020-01-01 RX ADMIN — ASPIRIN 81 MG 81 MG: 81 TABLET ORAL at 10:21

## 2020-01-01 RX ADMIN — BACLOFEN 10 MG: 10 TABLET ORAL at 09:32

## 2020-01-01 RX ADMIN — SODIUM BICARBONATE 1300 MG: 650 TABLET ORAL at 19:50

## 2020-01-01 RX ADMIN — PANTOPRAZOLE SODIUM 40 MG: 40 TABLET, DELAYED RELEASE ORAL at 05:26

## 2020-01-01 RX ADMIN — VITAMIN D, TAB 1000IU (100/BT) 500 UNITS: 25 TAB at 08:24

## 2020-01-01 RX ADMIN — Medication 500 MG: at 08:24

## 2020-01-01 RX ADMIN — ASPIRIN 81 MG 81 MG: 81 TABLET ORAL at 08:35

## 2020-01-01 RX ADMIN — BACLOFEN 10 MG: 10 TABLET ORAL at 08:32

## 2020-01-01 RX ADMIN — METOPROLOL SUCCINATE 50 MG: 50 TABLET, EXTENDED RELEASE ORAL at 09:16

## 2020-01-01 RX ADMIN — METOPROLOL SUCCINATE 25 MG: 25 TABLET, FILM COATED, EXTENDED RELEASE ORAL at 09:09

## 2020-01-01 RX ADMIN — INSULIN LISPRO 2 UNITS: 100 INJECTION, SOLUTION INTRAVENOUS; SUBCUTANEOUS at 09:44

## 2020-01-01 RX ADMIN — INSULIN LISPRO 2 UNITS: 100 INJECTION, SOLUTION INTRAVENOUS; SUBCUTANEOUS at 10:44

## 2020-01-01 RX ADMIN — VITAMIN D, TAB 1000IU (100/BT) 500 UNITS: 25 TAB at 09:01

## 2020-01-01 RX ADMIN — LISINOPRIL 10 MG: 10 TABLET ORAL at 11:07

## 2020-01-01 RX ADMIN — METOPROLOL SUCCINATE 25 MG: 25 TABLET, FILM COATED, EXTENDED RELEASE ORAL at 08:31

## 2020-01-01 RX ADMIN — NITROFURANTOIN MONOHYDRATE/MACROCRYSTALLINE 100 MG: 25; 75 CAPSULE ORAL at 22:35

## 2020-01-01 RX ADMIN — INSULIN LISPRO 1 UNITS: 100 INJECTION, SOLUTION INTRAVENOUS; SUBCUTANEOUS at 21:04

## 2020-01-01 RX ADMIN — ESCITALOPRAM 20 MG: 20 TABLET, FILM COATED ORAL at 10:21

## 2020-01-01 RX ADMIN — ESCITALOPRAM 20 MG: 20 TABLET, FILM COATED ORAL at 08:51

## 2020-01-01 RX ADMIN — INSULIN LISPRO 2 UNITS: 100 INJECTION, SOLUTION INTRAVENOUS; SUBCUTANEOUS at 13:37

## 2020-01-01 RX ADMIN — Medication 10 ML: at 10:05

## 2020-01-01 RX ADMIN — BACLOFEN 10 MG: 10 TABLET ORAL at 21:31

## 2020-01-01 RX ADMIN — SODIUM BICARBONATE 1300 MG: 650 TABLET ORAL at 21:44

## 2020-01-01 RX ADMIN — NITROFURANTOIN MONOHYDRATE/MACROCRYSTALLINE 100 MG: 25; 75 CAPSULE ORAL at 21:01

## 2020-01-01 RX ADMIN — Medication 500 MG: at 08:51

## 2020-01-01 RX ADMIN — BACLOFEN 10 MG: 10 TABLET ORAL at 08:23

## 2020-01-01 RX ADMIN — FINASTERIDE 5 MG: 5 TABLET, FILM COATED ORAL at 11:06

## 2020-01-01 RX ADMIN — Medication 500 MG: at 08:23

## 2020-01-01 RX ADMIN — ROPINIROLE HYDROCHLORIDE 0.5 MG: 0.5 TABLET, FILM COATED ORAL at 21:41

## 2020-01-01 RX ADMIN — INSULIN LISPRO 1 UNITS: 100 INJECTION, SOLUTION INTRAVENOUS; SUBCUTANEOUS at 21:35

## 2020-01-01 RX ADMIN — PANTOPRAZOLE SODIUM 40 MG: 40 TABLET, DELAYED RELEASE ORAL at 06:13

## 2020-01-01 RX ADMIN — MULTIPLE VITAMINS W/ MINERALS TAB 1 TABLET: TAB at 09:32

## 2020-01-01 RX ADMIN — Medication 10 ML: at 09:09

## 2020-01-01 RX ADMIN — LISINOPRIL 5 MG: 5 TABLET ORAL at 08:32

## 2020-01-01 RX ADMIN — MULTIPLE VITAMINS W/ MINERALS TAB 1 TABLET: TAB at 08:48

## 2020-01-01 RX ADMIN — ASPIRIN 81 MG 81 MG: 81 TABLET ORAL at 09:41

## 2020-01-01 RX ADMIN — INSULIN LISPRO 2 UNITS: 100 INJECTION, SOLUTION INTRAVENOUS; SUBCUTANEOUS at 12:23

## 2020-01-01 RX ADMIN — Medication 500 MG: at 07:59

## 2020-01-01 RX ADMIN — METOPROLOL SUCCINATE 50 MG: 50 TABLET, EXTENDED RELEASE ORAL at 08:24

## 2020-01-01 RX ADMIN — METOPROLOL SUCCINATE 25 MG: 25 TABLET, FILM COATED, EXTENDED RELEASE ORAL at 08:32

## 2020-01-01 RX ADMIN — ATORVASTATIN CALCIUM 80 MG: 80 TABLET, FILM COATED ORAL at 22:20

## 2020-01-01 RX ADMIN — INSULIN LISPRO 1 UNITS: 100 INJECTION, SOLUTION INTRAVENOUS; SUBCUTANEOUS at 22:35

## 2020-01-01 RX ADMIN — BACLOFEN 10 MG: 10 TABLET ORAL at 08:56

## 2020-01-01 RX ADMIN — NITROFURANTOIN MONOHYDRATE/MACROCRYSTALLINE 100 MG: 25; 75 CAPSULE ORAL at 09:07

## 2020-01-01 RX ADMIN — BACLOFEN 10 MG: 10 TABLET ORAL at 09:16

## 2020-01-01 RX ADMIN — VITAMIN D, TAB 1000IU (100/BT) 500 UNITS: 25 TAB at 08:23

## 2020-01-01 RX ADMIN — VITAMIN D, TAB 1000IU (100/BT) 500 UNITS: 25 TAB at 13:03

## 2020-01-01 RX ADMIN — ESCITALOPRAM 20 MG: 20 TABLET, FILM COATED ORAL at 08:31

## 2020-01-01 RX ADMIN — VITAMIN D, TAB 1000IU (100/BT) 500 UNITS: 25 TAB at 08:32

## 2020-01-01 RX ADMIN — ATROPINE SULFATE 0.5 MG: 0.1 INJECTION, SOLUTION ENDOTRACHEAL; INTRAMUSCULAR; INTRAVENOUS; SUBCUTANEOUS at 19:38

## 2020-01-01 RX ADMIN — INSULIN LISPRO 1 UNITS: 100 INJECTION, SOLUTION INTRAVENOUS; SUBCUTANEOUS at 22:54

## 2020-01-01 RX ADMIN — INSULIN LISPRO 2 UNITS: 100 INJECTION, SOLUTION INTRAVENOUS; SUBCUTANEOUS at 07:57

## 2020-01-01 RX ADMIN — ASPIRIN 81 MG 81 MG: 81 TABLET ORAL at 07:56

## 2020-01-01 RX ADMIN — BACLOFEN 10 MG: 10 TABLET ORAL at 20:58

## 2020-01-01 RX ADMIN — LISINOPRIL 10 MG: 10 TABLET ORAL at 08:30

## 2020-01-01 RX ADMIN — METOPROLOL SUCCINATE 50 MG: 50 TABLET, EXTENDED RELEASE ORAL at 09:32

## 2020-01-01 RX ADMIN — INSULIN LISPRO 2 UNITS: 100 INJECTION, SOLUTION INTRAVENOUS; SUBCUTANEOUS at 17:02

## 2020-01-01 RX ADMIN — ROPINIROLE HYDROCHLORIDE 0.5 MG: 0.5 TABLET, FILM COATED ORAL at 20:46

## 2020-01-01 RX ADMIN — MULTIPLE VITAMINS W/ MINERALS TAB 1 TABLET: TAB at 09:08

## 2020-01-01 RX ADMIN — FINASTERIDE 5 MG: 5 TABLET, FILM COATED ORAL at 09:31

## 2020-01-01 RX ADMIN — INSULIN LISPRO 1 UNITS: 100 INJECTION, SOLUTION INTRAVENOUS; SUBCUTANEOUS at 20:00

## 2020-01-01 RX ADMIN — DRONABINOL 2.5 MG: 2.5 CAPSULE ORAL at 20:57

## 2020-01-01 RX ADMIN — ASPIRIN 81 MG 81 MG: 81 TABLET ORAL at 07:57

## 2020-01-01 RX ADMIN — METOPROLOL SUCCINATE 50 MG: 50 TABLET, EXTENDED RELEASE ORAL at 08:51

## 2020-01-01 RX ADMIN — Medication 500 MG: at 09:08

## 2020-01-01 RX ADMIN — LISINOPRIL 10 MG: 10 TABLET ORAL at 08:37

## 2020-01-01 RX ADMIN — NITROFURANTOIN MONOHYDRATE/MACROCRYSTALLINE 100 MG: 25; 75 CAPSULE ORAL at 21:40

## 2020-01-01 RX ADMIN — BACLOFEN 10 MG: 10 TABLET ORAL at 09:02

## 2020-01-01 RX ADMIN — INSULIN LISPRO 2 UNITS: 100 INJECTION, SOLUTION INTRAVENOUS; SUBCUTANEOUS at 12:10

## 2020-01-01 RX ADMIN — CLOPIDOGREL BISULFATE 75 MG: 75 TABLET ORAL at 09:08

## 2020-01-01 RX ADMIN — NITROFURANTOIN MONOHYDRATE/MACROCRYSTALLINE 100 MG: 25; 75 CAPSULE ORAL at 17:42

## 2020-01-01 RX ADMIN — MULTIPLE VITAMINS W/ MINERALS TAB 1 TABLET: TAB at 09:07

## 2020-01-01 RX ADMIN — METHYLPHENIDATE HYDROCHLORIDE 5 MG: 5 TABLET ORAL at 12:38

## 2020-01-01 RX ADMIN — VITAMIN D, TAB 1000IU (100/BT) 500 UNITS: 25 TAB at 09:07

## 2020-01-01 RX ADMIN — BACLOFEN 10 MG: 10 TABLET ORAL at 16:27

## 2020-01-01 RX ADMIN — VITAMIN D, TAB 1000IU (100/BT) 500 UNITS: 25 TAB at 07:58

## 2020-01-01 RX ADMIN — FINASTERIDE 5 MG: 5 TABLET, FILM COATED ORAL at 10:22

## 2020-01-01 RX ADMIN — ACETAMINOPHEN 650 MG: 325 TABLET ORAL at 20:51

## 2020-01-01 RX ADMIN — ASPIRIN 81 MG 81 MG: 81 TABLET ORAL at 09:09

## 2020-01-01 RX ADMIN — Medication 500 MG: at 09:42

## 2020-01-01 RX ADMIN — FINASTERIDE 5 MG: 5 TABLET, FILM COATED ORAL at 07:58

## 2020-01-01 RX ADMIN — ESCITALOPRAM 20 MG: 20 TABLET, FILM COATED ORAL at 09:38

## 2020-01-01 RX ADMIN — INSULIN LISPRO 2 UNITS: 100 INJECTION, SOLUTION INTRAVENOUS; SUBCUTANEOUS at 19:51

## 2020-01-01 RX ADMIN — CLOPIDOGREL BISULFATE 75 MG: 75 TABLET ORAL at 11:51

## 2020-01-01 RX ADMIN — METHYLPHENIDATE HYDROCHLORIDE 5 MG: 5 TABLET ORAL at 12:51

## 2020-01-01 RX ADMIN — ACETAMINOPHEN 650 MG: 325 TABLET ORAL at 11:06

## 2020-01-01 RX ADMIN — LISINOPRIL 5 MG: 5 TABLET ORAL at 13:01

## 2020-01-01 RX ADMIN — CLOPIDOGREL BISULFATE 75 MG: 75 TABLET ORAL at 07:57

## 2020-01-01 RX ADMIN — SODIUM BICARBONATE 650 MG: 650 TABLET ORAL at 07:58

## 2020-01-01 RX ADMIN — ATORVASTATIN CALCIUM 80 MG: 80 TABLET, FILM COATED ORAL at 22:35

## 2020-01-01 RX ADMIN — METOPROLOL SUCCINATE 25 MG: 25 TABLET, FILM COATED, EXTENDED RELEASE ORAL at 08:23

## 2020-01-01 RX ADMIN — BACLOFEN 10 MG: 10 TABLET ORAL at 09:40

## 2020-01-01 RX ADMIN — BACLOFEN 10 MG: 10 TABLET ORAL at 08:24

## 2020-01-01 RX ADMIN — INSULIN LISPRO 2 UNITS: 100 INJECTION, SOLUTION INTRAVENOUS; SUBCUTANEOUS at 08:01

## 2020-01-01 RX ADMIN — ACETAMINOPHEN 650 MG: 325 TABLET ORAL at 06:01

## 2020-01-01 RX ADMIN — ASPIRIN 81 MG 81 MG: 81 TABLET ORAL at 09:05

## 2020-01-01 RX ADMIN — INSULIN LISPRO 2 UNITS: 100 INJECTION, SOLUTION INTRAVENOUS; SUBCUTANEOUS at 08:29

## 2020-01-01 RX ADMIN — DRONABINOL 2.5 MG: 2.5 CAPSULE ORAL at 10:20

## 2020-01-01 RX ADMIN — INSULIN LISPRO 2 UNITS: 100 INJECTION, SOLUTION INTRAVENOUS; SUBCUTANEOUS at 22:24

## 2020-01-01 RX ADMIN — EPINEPHRINE 1 MG: 0.1 INJECTION, SOLUTION ENDOTRACHEAL; INTRACARDIAC; INTRAVENOUS at 19:40

## 2020-01-01 RX ADMIN — LISINOPRIL 10 MG: 10 TABLET ORAL at 08:31

## 2020-01-01 RX ADMIN — BACLOFEN 10 MG: 10 TABLET ORAL at 22:35

## 2020-01-01 RX ADMIN — ASPIRIN 81 MG 81 MG: 81 TABLET ORAL at 08:51

## 2020-01-01 RX ADMIN — INSULIN LISPRO 2 UNITS: 100 INJECTION, SOLUTION INTRAVENOUS; SUBCUTANEOUS at 09:12

## 2020-01-01 RX ADMIN — BACLOFEN 10 MG: 10 TABLET ORAL at 13:27

## 2020-01-01 RX ADMIN — METOPROLOL SUCCINATE 25 MG: 25 TABLET, FILM COATED, EXTENDED RELEASE ORAL at 08:48

## 2020-01-01 RX ADMIN — METOPROLOL SUCCINATE 25 MG: 25 TABLET, FILM COATED, EXTENDED RELEASE ORAL at 08:29

## 2020-01-01 RX ADMIN — ATORVASTATIN CALCIUM 80 MG: 80 TABLET, FILM COATED ORAL at 21:01

## 2020-01-01 RX ADMIN — FINASTERIDE 5 MG: 5 TABLET, FILM COATED ORAL at 07:56

## 2020-01-01 RX ADMIN — FINASTERIDE 5 MG: 5 TABLET, FILM COATED ORAL at 08:23

## 2020-01-01 RX ADMIN — LISINOPRIL 10 MG: 10 TABLET ORAL at 09:08

## 2020-01-01 RX ADMIN — ASPIRIN 81 MG 81 MG: 81 TABLET ORAL at 09:00

## 2020-01-01 RX ADMIN — NITROFURANTOIN MONOHYDRATE/MACROCRYSTALLINE 100 MG: 25; 75 CAPSULE ORAL at 22:42

## 2020-01-01 ASSESSMENT — PAIN SCALES - GENERAL
PAINLEVEL_OUTOF10: 0
PAINLEVEL_OUTOF10: 3
PAINLEVEL_OUTOF10: 0
PAINLEVEL_OUTOF10: 2
PAINLEVEL_OUTOF10: 0
PAINLEVEL_OUTOF10: 2
PAINLEVEL_OUTOF10: 0
PAINLEVEL_OUTOF10: 5
PAINLEVEL_OUTOF10: 0
PAINLEVEL_OUTOF10: 10
PAINLEVEL_OUTOF10: 0
PAINLEVEL_OUTOF10: 0

## 2020-01-01 ASSESSMENT — PAIN SCALES - WONG BAKER

## 2020-01-01 ASSESSMENT — PAIN - FUNCTIONAL ASSESSMENT
PAIN_FUNCTIONAL_ASSESSMENT: ACTIVITIES ARE NOT PREVENTED

## 2020-01-01 ASSESSMENT — PAIN DESCRIPTION - PROGRESSION
CLINICAL_PROGRESSION: NOT CHANGED

## 2020-01-01 ASSESSMENT — PAIN DESCRIPTION - LOCATION
LOCATION: SHOULDER
LOCATION: SHOULDER
LOCATION: NECK
LOCATION: SHOULDER

## 2020-01-01 ASSESSMENT — PAIN DESCRIPTION - ORIENTATION
ORIENTATION: RIGHT;LEFT
ORIENTATION: RIGHT
ORIENTATION: RIGHT

## 2020-01-01 ASSESSMENT — ENCOUNTER SYMPTOMS
EYES NEGATIVE: 1
RESPIRATORY NEGATIVE: 1

## 2020-01-01 ASSESSMENT — PULMONARY FUNCTION TESTS: PIF_VALUE: 20

## 2020-01-01 ASSESSMENT — PAIN DESCRIPTION - PAIN TYPE
TYPE: ACUTE PAIN

## 2020-01-01 ASSESSMENT — PAIN DESCRIPTION - ONSET: ONSET: GRADUAL

## 2020-01-01 NOTE — PROGRESS NOTES
atorvastatin  80 mg Oral Nightly    escitalopram  20 mg Oral Daily    finasteride  5 mg Oral Daily    pantoprazole  40 mg Oral QAM AC     PRN Meds: glucose, dextrose, glucagon (rDNA), dextrose, acetaminophen, perflutren lipid microspheres, sodium chloride flush, magnesium hydroxide, ondansetron    Ins and outs:      Intake/Output Summary (Last 24 hours) at 1/1/2020 1547  Last data filed at 1/1/2020 1512  Gross per 24 hour   Intake --   Output 900 ml   Net -900 ml       Physical Examination     /66   Pulse 59   Temp 97.9 °F (36.6 °C) (Axillary)   Resp 16   Ht 6' (1.829 m)   Wt 150 lb 9.6 oz (68.3 kg)   SpO2 99%   BMI 20.43 kg/m²     General appearance: Lying in bed. Responsive to questions. Alert  HEENT: Extraocular motion intact. Neck: Supple. Trachea midline  Respiratory:  Decreased breath sounds at bilateral lung lobes otherwise CTA. Cardiovascular: RRR with normal S1/S2 without murmurs, rubs or gallops. Abdomen: Soft, non-tender, non-distended with normal bowel sounds. Musculoskeletal: RUE weakness  Neurologic: RUE weakness. Psychiatric: Lying in bed answers questions appropriately  Vascular: Dorsalis pedis palpable bilaterally. Radial pulses palpable bilaterally. Skin:  No visible rashes or lesions. Labs     Recent Labs     12/30/19  0356 12/31/19  0601 01/01/20  0334   WBC 8.8 10.7 9.7   HGB 11.9* 13.2* 11.5*   HCT 34.7* 38.3* 33.6*   * 143 143     Recent Labs     12/30/19  0356 12/31/19  0601 01/01/20  0334    142 142   K 3.4* 3.9 4.1    109 111   CO2 21* 21* 21*   BUN 23* 20 24*   CREATININE 1.0 1.0 1.0   CALCIUM 8.4* 9.3 8.7     No results for input(s): AST, ALT, BILIDIR, BILITOT, ALKPHOS in the last 72 hours. No results for input(s): INR in the last 72 hours. No results for input(s): Murleen Iba in the last 72 hours.     Urinalysis:      Lab Results   Component Value Date    NITRU NEGATIVE 12/28/2019    WBCUA 0-2 12/28/2019    WBCUA 0-5 06/27/2017 carotid arteries are patent without focal stenosis. There is mild calcified and noncalcified mural plaque at the carotid bifurcations without hemodynamically significant stenosis by NASCET criteria. Cervical segments of the internal carotid arteries are patent without focal stenosis. The left vertebral artery is dominant. There is mural calcification in the V4 segment of the left vertebral artery just proximal to the posterior inferior cerebellar artery takeoff with an area of moderate stenosis. Vertebral arteries are otherwise patent without focal stenosis. There is streak artifact from dental amalgam which partially obscures oral and perioral soft tissues. Given this caveat, mucosal surfaces of the aerodigestive tract are symmetric without focal nodular thickening or visualized mass. No cervical lymphadenopathy is identified. The parotid, submandibular and thyroid glands are unremarkable. The visualized portions of the lungs are clear. There are moderate degenerative changes of the cervical spine. CTA head: 1. Mural calcifications in the cavernous and clinoid segments of the internal carotid arteries with a focal area of moderate stenosis in the right cavernous segment and focal area of mild to moderate stenosis in the left clinoid segment. 2. Long segment of mild to moderate stenosis of the basilar artery. 3. Focal area of moderate stenosis in the P1 segment and focal area of severe stenosis in the P2 segment of the right posterior cerebral artery. 4. Focal area of moderate to severe stenosis in the right superior cerebellar artery. CTA NECK: 1. Mild mural plaque at the carotid bifurcations without hemodynamically significant stenosis by NASCET criteria. 2. Focal area of moderate stenosis in the V4 segment of the dominant left vertebral artery. **This report has been created using voice recognition software. It may contain minor errors which are inherent in voice recognition technology. ** Final report calcifications. Ventricles are within normal limits for age. No acute hemorrhage or midline shift. Paranasal sinuses are clear. Mastoid air cells are patent. Skull: Unremarkable. Soft tissues: Unremarkable. No acute intracranial findings. **This report has been created using voice recognition software. It may contain minor errors which are inherent in voice recognition technology. ** Final report electronically signed by Dr. Christine Johnson on 12/24/2019 5:17 PM    Ct Head Wo Contrast (code Stroke)    Result Date: 12/24/2019  PROCEDURE: CT HEAD WO CONTRAST CLINICAL INFORMATION: stroke like symptoms . COMPARISON: 7/2/2017 TECHNIQUE: 2-D multiplanar noncontrast CT brain All CT scans at this facility use dose modulation, iterative reconstruction, and/or weight-based dosing when appropriate to reduce radiation dose to as low as reasonably achievable. FINDINGS: Generalized cerebral volume loss. Moderate severity chronic small vessel ischemic disease changes. No acute hemorrhage. No infarction identified. No extra-axial fluid collection. Ventricles are normal. No midline shift or mass effect. Calvarium is intact and visualized paranasal sinuses and mastoid air cells are clear. Dense calcific atherosclerosis of the vertebral and basilar artery and intracranial carotid arteries     No acute process. **This report has been created using voice recognition software. It may contain minor errors which are inherent in voice recognition technology. ** Final report electronically signed by Dr. Alda Guerra on 12/24/2019 9:58 AM    Cta Neck W Wo Contrast    Result Date: 12/24/2019  PROCEDURE: CTA HEAD W WO CONTRAST, CTA NECK W WO CONTRAST CLINICAL INFORMATION: Stroke . COMPARISON: CT head and MR brain from the same date.  TECHNIQUE: Helical CT from the aortic arch through the head in arterial phase during intravenous administration of 80 mL Isovue-370 injected in the right forearm with multiplanar reformatted images to include volumetric maximum intensity projection sequences. FINDINGS: CTA HEAD: There are mural calcifications in the cavernous and clinoid segments of the internal carotid arteries with an area of moderate stenosis in the right cavernous segment and the area of mild to moderate stenosis in the left clinoid segment. Intracranial segments of internal carotid arteries are otherwise patent without flow-limiting stenosis or visualized aneurysm. The bilateral middle cerebral and anterior cerebral arteries are patent without focal abnormality identified. There is long segment mild to moderate stenosis of the basilar artery. There is a focal area of moderate stenosis at the P1 segment of the right posterior cerebral artery and a focal area of severe stenosis at the P2 segment. The left posterior cerebral artery appears patent. There is a focal area of moderate to severe stenosis in the right superior cerebellar artery. The left superior cerebral artery appears patent. The left inferior cerebellar artery is patent. The right inferior cerebral artery is not well visualized. The anterior inferior cerebral arteries are not visualized. CTA NECK: There is a typical 3 vessel arch. The brachiocephalic artery is patent. The proximal left subclavian artery is widely patent. There are areas of mild stenosis more distally in the left subclavian artery. The common carotid arteries are patent without focal stenosis. There is mild calcified and noncalcified mural plaque at the carotid bifurcations without hemodynamically significant stenosis by NASCET criteria. Cervical segments of the internal carotid arteries are patent without focal stenosis. The left vertebral artery is dominant. There is mural calcification in the V4 segment of the left vertebral artery just proximal to the posterior inferior cerebellar artery takeoff with an area of moderate stenosis. Vertebral arteries are otherwise patent without focal stenosis.  There is streak artifact from dental amalgam which partially obscures oral and perioral soft tissues. Given this caveat, mucosal surfaces of the aerodigestive tract are symmetric without focal nodular thickening or visualized mass. No cervical lymphadenopathy is identified. The parotid, submandibular and thyroid glands are unremarkable. The visualized portions of the lungs are clear. There are moderate degenerative changes of the cervical spine. CTA head: 1. Mural calcifications in the cavernous and clinoid segments of the internal carotid arteries with a focal area of moderate stenosis in the right cavernous segment and focal area of mild to moderate stenosis in the left clinoid segment. 2. Long segment of mild to moderate stenosis of the basilar artery. 3. Focal area of moderate stenosis in the P1 segment and focal area of severe stenosis in the P2 segment of the right posterior cerebral artery. 4. Focal area of moderate to severe stenosis in the right superior cerebellar artery. CTA NECK: 1. Mild mural plaque at the carotid bifurcations without hemodynamically significant stenosis by NASCET criteria. 2. Focal area of moderate stenosis in the V4 segment of the dominant left vertebral artery. **This report has been created using voice recognition software. It may contain minor errors which are inherent in voice recognition technology. ** Final report electronically signed by Dr. Katelin Norton MD on 12/24/2019 1:23 PM    Us Renal Complete    Result Date: 12/29/2019  PROCEDURE: US RENAL COMPLETE CLINICAL INFORMATION: acute kidney injury. COMPARISON: No prior study. TECHNIQUE:Real-time and color flow ultrasound of the kidneys and bladder were performed. FINDINGS: Kidneys: Morphology: Normal size and shape. The kidneys are echogenic consistent with nonspecific chronic medical renal disease. Mass/cyst: There is a 2 x 1.5 x 1.9 cm simple cyst of the posterior upper pole the right kidney.  Hydronephrosis: none Calculi: none RIGHT KIDNEY

## 2020-01-02 PROBLEM — I63.9 ACUTE CEREBROVASCULAR ACCIDENT (CVA) (HCC): Status: ACTIVE | Noted: 2020-01-01

## 2020-01-02 NOTE — PROGRESS NOTES
Admitted to the Inpatient Rehabilitation Unit via w/c. Patient was then oriented to room and unit. Education provided on the rehabilitation routine: three hours of therapy five days per week and dining room for lunch. Admitting medication orders compared with acute stay medications; home medication list reviewed with patient/family. Medication issues identified no  Medication issue: n/a  If yes, physician notified n/a. Bladder and Bowel Function Assessment:  1. Prior history of bladder problems:no  2. Number of pads used per day: no  3. Frequency of night time voidin-2  4. Fluid intake volume and pattern: 4-6 cups  5. Last BM:   6. Prior history of bowel problems:no     Incontinence      Frequent diarrhea      Constipation      Hemorrhoids      Diverticulitis      Bowel Surgery       Care plan was created with patient's input and goals were agreed upon. Admission folder provided with education regarding patients diagnoses, fall prevention, skin care, and M in the box. \"Data Collection Information Summary for Patients in Inpatient Rehabilitation Facilities\" and \"Privacy Act Statement - Health Care Records\" provided. Please refer to the admission navigator for further information.

## 2020-01-02 NOTE — PROGRESS NOTES
[address] [address]  Acute Inpatient Rehab Preadmission Assessment    Patient Name: Mell Meadows        MRN: [id number]    : 1940  (78 y.o.)  Gender: male     Admitted from:[address]  Initial Assessment    Date of admission to the hospital: 2019  9:44 AM  Date patient eligible for admission:2020    Primary Diagnosis: Acute CVA (cerebrovascular accident) [facility]) [I63.9]       Did patient have surgery?  no    Physicians: Tereso Irizarry MD,     Risk for clinical complications/co-morbidities:   Past Medical History:   Diagnosis Date    Anxiety     Depression     Diabetes mellitus ([facility] [address]     GERD (gastroesophageal reflux disease)     Hyperglycemia     Hypertension        Financial Information  Primary insurance: Medicare    Secondary Insurance:  Commercial: Company: ., Contact Information: . Has the patient had two or more falls in the past year or any fall with injury in the past year? no    Did the patient have major surgery during the 100 days prior to admission? no    Precautions:   falls, infections and skin  Restrictions/Precautions: General Precautions, Fall Risk  Other position/activity restrictions: pt pushes to right, R romain    Isolation Precautions: None       Physiatrist: Dr. Omi Cowart    Patients Occupation: Retired  Reviewed Lab and Diagnostic reports from Current Admission: Yes    Patients Prior Functional  Level: Prior Function  ADL Assistance: Independent  Ambulation Assistance: Independent  Transfer Assistance: Independent  Additional Comments: No family present at time of OT eval. Per PT eval Pt was indep with mobility with cane sometimes, Pt reports indep with ADLs. Current functional status for upper extremity ADLs:   Moderate assistance  Current functional status for lower extremity ADLs:   Dependent  Current functional status for bed, chair, wheelchair transfers: Sit to Stand:  Moderate Assistance, of one and CGA of another did have UEs on gait/mobility dysfunction, medication management, nutrition and hydration management,Ongoing assessment of safety, Pain management, Patient and family education, Prevention of secondary complications, Skin Integrity, ,cognitive impairment, communication impairment. Required therapy: Physical Therapy, Occupational Therapy and Speech Therapy 3 hours per day, 5-6 days per week. Recreational Therapy 1 hour per week. Expected Discharge Destination: Home    Expected Post Discharge Treatments: Out Patient    Other information relevant to the care needs:     Acute Inpatient Rehabilitation Disclosure Statement provided to patient. Patient verbalized understanding. I have reviewed and concur with the findings and results of the pre-admission screening assessment completed by the Inpatient Rehabilitation Admissions Coordinator.     Jose Francisco Bolden MD

## 2020-01-02 NOTE — PROGRESS NOTES
[facility]  INPATIENT PHYSICAL THERAPY  DAILY NOTE  [facility] NEUROSCIENCES 4A - 4A-15/015-A      Time In: 825  Time Out: 4948  Timed Code Treatment Minutes: 44 Minutes  Minutes: 39          Date: 2020  Patient Name: Francisco Javier Castrejon,  Gender:  male        MRN: [id number]  : 1940  (78 y.o.)     Referring Practitioner: Dawit Cage CNP  Diagnosis: acute CVA  Additional Pertinent Hx: admit with above diagnosis, left pontine stroke, right hemiparesis, dysphagia, dysarthria, did receive TPA     Prior Level of Function:  Lives With: Spouse  Type of Home: House  Home Layout: One level  Home Access: Stairs to enter with rails  Entrance Stairs - Number of Steps: 2 with grab bar  Home Equipment: Quad cane(used cane intermittently, family wanted pt to use all of the time)        ADL Assistance: Independent  Ambulation Assistance: Independent  Transfer Assistance: Independent  Additional Comments: No family present at time of OT eval. Per PT eval Pt was indep with mobility with cane sometimes, Pt reports indep with ADLs.      Restrictions/Precautions:  Restrictions/Precautions: General Precautions, Fall Risk  Position Activity Restriction  Other position/activity restrictions: pt pushes to right, R romain    SUBJECTIVE: nursing ok'd therapy, pt was groggy this am he kept his eyes closed during ex and he offered little conversation he needed encouragement to participate and get up to chair this am    PAIN: 0/10: but did grimace one time when I was supporting his UE upto puma steady bar he reported that his arm was tingling     OBJECTIVE:  Bed Mobility:  Rolling to Left: Maximum Assistance, to complete roll pt initiated supporting his right UE but let go during the roll    Supine to Sit: Maximum Assistance, at LEs pt lacking initiation to bring even his left LE over edge of bed he also needed max assist at trunk and max assist for initial sitting balance   Scooting: Maximum Assistance, to edge of bed poor midline Outcome Measures: Completed  AM-PAC Inpatient Mobility without Stair Climbing Raw Score : 9  AM-PAC Inpatient without Stair Climbing T-Scale Score : 32.44    ASSESSMENT:  Assessment: pt more groggy this date and lacking initiation of activity, he also demonstrated increased right lean with sitting and standing this date, he cont to demonstrate generalized weakness throughout more so in right romain body, he also required 2 persons with mobility and transfers he would greatly benefit from cont skilled therapy   Activity Tolerance:  Patient tolerance of  treatment: fair. Equipment Recommendations: Other: cont to assess needs  Discharge Recommendations:    IP Rehab    Plan: Times per week: 6x N  Times per day: Daily  Specific instructions for Next Treatment: therex, bed mobility, sitting balance to improve midline, transfers, pregait activity, PT To assess gait    Patient Education  Patient Education: Plan of Care, midline awareness and right UE support     Goals:  Patient goals : get stronger  Short term goals  Time Frame for Short term goals: by discharge  Short term goal 1: bed mobility with modA  Short term goal 2: sit to std, maintaining midline, with Anne to get in/out of chairs  Short term goal 3: std pivot transfer with modA to get bed to/from chair safely  Short term goal 4: tolerate >30 seconds std pregait activity with RUE supported and </=modA to demonstrate improved balance/strength for progression with amb  Short term goal 5: PT to assess gait  Long term goals  Time Frame for Long term goals : no LTGs set secondary to short ELOS    Following session, patient left in safe position with all fall risk precautions in place.

## 2020-01-02 NOTE — PROGRESS NOTES
Life vest maint. . Supervised during lunch and ate slow with poor appetite. Encouraged fluids. Rested after with eyes closed.

## 2020-01-02 NOTE — PROGRESS NOTES
Nephrology Progress Note    Patient - Alamo Carlene   MRN -  [id number]   Acct # - [de-identified]      - 1940    78 y.o. Admit Date: 2019  Hospital Day: 9  Location: [facility]  Date of evaluation -  2020    Subjective:   CC: fall, slurred speech  Denies shortness of breath on Room air   BP Range: Systolic (22YQE), JGU:902 , Min:118 , ZZW:304      Diastolic (63OTJ), IVJ:33, Min:60, Max:75    Objective:   VITALS:  /75   Pulse 58   Temp 98 °F (36.7 °C) (Oral)   Resp 16   Ht 6' (1.829 m)   Wt 152 lb 3.2 oz (69 kg)   SpO2 98%   BMI 20.64 kg/m²    Patient Vitals for the past 24 hrs:   BP Temp Temp src Pulse Resp SpO2 Weight   20 0323 118/75 98 °F (36.7 °C) Oral 58 16 98 % 152 lb 3.2 oz (69 kg)   20 2324 135/64 98.4 °F (36.9 °C) Oral 64 -- 98 % --   20 2045 124/60 99.7 °F (37.6 °C) Oral 67 14 94 % --   20 1513 128/66 97.9 °F (36.6 °C) Axillary 59 16 99 % --   20 1117 121/68 97.9 °F (36.6 °C) Oral 69 18 100 % --   20 0846 (!) 142/70 97.6 °F (36.4 °C) Oral 59 18 -- --            Intake/Output Summary (Last 24 hours) at 2020 0829  Last data filed at 2020 0323  Gross per 24 hour   Intake 130 ml   Output 900 ml   Net -770 ml       Admission weight: 161 lb 9.6 oz (73.3 kg)  Patient Vitals for the past 96 hrs (Last 3 readings):   Weight   20 0323 152 lb 3.2 oz (69 kg)       EXAM:  CONSTITUTIONAL:  No acute distress. Pleasant  HEENT:  Head is normocephalic, Extraocular movement intact. Neck is supple. Voice is clear. CARDIOVASCULAR:  S1, S2  regular rate and rhythm. RESPIRATORY: Clear to ausculation bilaterally. Equal breath sounds. No wheezes. No shortness of breath noted at rest.  ABDOMEN: soft, non tender  NEUROLOGICAL: Patient is alert and oriented to person, place, and time. Recent and remote memory intact. Thought is coherant. SKIN: no rash, No significant bruises on exposed surfaces  MUSCULOSKELETAL: Movement is coordinated Lt side. Rt hemiparesis  EXTREMITIES: Distal lower extremity temp is warm, No lower extremity edema. PSYCHIATRIC: mood and affect appropriate. Medications:   Med reviewed  Scheduled Meds:   metoprolol succinate  25 mg Oral Daily    insulin lispro  0-12 Units Subcutaneous TID WC    insulin lispro  0-6 Units Subcutaneous Nightly    [Held by provider] losartan  100 mg Oral Daily    And    [Held by provider] hydrochlorothiazide  25 mg Oral Daily    aspirin  81 mg Oral Daily    clopidogrel  75 mg Oral Daily    sodium chloride flush  10 mL Intravenous 2 times per day    sodium chloride  50 mL Intravenous Once    atorvastatin  80 mg Oral Nightly    escitalopram  20 mg Oral Daily    finasteride  5 mg Oral Daily    pantoprazole  40 mg Oral QAM AC     PRN Meds glucose, dextrose, glucagon (rDNA), dextrose, acetaminophen, perflutren lipid microspheres, sodium chloride flush, magnesium hydroxide, ondansetron   Labs:   Labs reviewed  Recent Labs     12/31/19  0601 01/01/20  0334 01/02/20  0346    142 141   K 3.9 4.1 3.9    111 110   CO2 21* 21* 22*   BUN 20 24* 27*   CREATININE 1.0 1.0 1.0   LABGLOM 72* 72* 72*   GLUCOSE 152* 124* 127*   CALCIUM 9.3 8.7 8.6     Recent Labs     12/31/19  0601 01/01/20  0334 01/02/20  0346   WBC 10.7 9.7 8.8   RBC 4.09* 3.63* 3.45*   HGB 13.2* 11.5* 10.9*   HCT 38.3* 33.6* 32.9*   MCV 93.6 92.6 95.4*   MCH 32.3 31.7 31.6    143 136       ASSESSMENT:  1. Acute Kidney Injury resolved  2. HFrEF 25%  3. Essential Hypertension. Losartan HCTZ on hold. BP at control  4. Diabetes Mellitus Type II  5. Acute CVA  6.  Urinary retention, danielle.     Ok to transfer to Rehab from Renal aspect  Will Sign off    Principal Problem:    Left pontine stroke [facility])  Active Problems:    Uncontrolled hypertension    Dysphagia    Acute urinary retention    Type 2 diabetes mellitus treated without insulin (HCC)    Right hemiparesis (HCC)    Dysarthria    Hypokalemia    Chronic depression    Abnormal EKG    Elevated troponin    Acute kidney injury ([facility] [address]    Leukocytosis    Hypernatremia    Thrombocytopenia (HCC)    Loose stools    Moderate protein-calorie malnutrition (HCC)  Resolved Problems:    * No resolved hospital problems.  *       Case Discussed with Dr. Monty Rivas, APRN - CNP 8:29 AM 1/2/2020

## 2020-01-03 PROBLEM — E44.0 MODERATE MALNUTRITION (HCC): Status: ACTIVE | Noted: 2020-01-01

## 2020-01-03 NOTE — H&P
[address] [place] [address]                              HISTORY AND PHYSICAL    PATIENT NAME: Eryn Prakash                       :        1940  MED REC NO:   [id number]                           ROOM:       [facility]  ACCOUNT NO:   [de-identified]                           ADMIT DATE: 2020  PROVIDER:     Chandrika Gross MD    DATE OF THIS EVALUATION:  2020    TIME OF THIS EVALUATION:  02:40 p.m. REASON FOR ADMISSION:  This patient is being admitted for comprehensive  inpatient rehabilitation to maximize his functional and cognitive  abilities. HISTORY OF PRESENT ILLNESS:  This is a 80-year-old, right-handed, white,  male who was initially admitted to the [address] [place] [address] on  2019 due to a fall which occurred inside of his home. Shortly  after the patient's fall, he was noted to have an acute onset of slurred  speech along with right-sided weakness. Once the patient was evaluated  in the emergency room here at the [address] [place] [address], it was  determined that the patient would benefit from TPA which was given. An  MRI of the brain performed on 2019 showed an acute infarct located  in the left romain-darin. On 2019, the patient had a modified barium  swallow evaluation performed which demonstrated aspiration with thin  liquids. Therefore, the patient was started on a dysphagia diet,  consisting of minced and moist food with nectar-thick liquids. The  patient also is not to use a straw. During his initial hospitalization, the patient was also evaluated by  Cardiology. The patient's cardiac status demonstrated that he did have  significant overall cardiac disease that would benefit from the patient  wearing a LifeVest.  A LifeVest was placed on 2019. This LifeVest  remains in place currently.     As of 2020, the patient was felt to be medically stable and was  transferred to the inpatient rehabilitation unit to begin his  comprehensive inpatient rehabilitation program.  The patient currently  reports having no head, trunk, or limb pain. He also reported having no  trunk or limb numbness. He has had no recent urinary bladder or bowel  difficulties. The patient's family was at his bedside during this  evaluation. All of the patient's family's questions were answered. The  patient's family and the patient had no acute concerns at this time. PAST MEDICAL HISTORY:  The patient's past medical history includes a  significant psychological history including a history of severe major  depression along with anxiety. The patient was hospitalized in a mental  hospital in 2017 due to a psychotic episode. Once the patient was  medically treated at this psychiatric hospital in [facility], he  reportedly has done well with no significant residual symptoms of  psychosis. His overall major depression also reportedly has  significantly improved. His past medical history also includes a  history of a benign prostatic hypertrophy, anemia, gastroesophageal  reflux disease, and hypertension. PAST SURGICAL HISTORY:  The patient has had bilateral inguinal hernia  surgical repair performed in 2018. He has had no other major surgeries. ALLERGIES:  He has no known drug allergies. MEDICATIONS ON ADMISSION:  The patient's admission medications are in  his admission orders. HABITS:  When the patient was much younger, he did occasionally smoke a  cigar. However, he has never been a regular smoker of cigars or  cigarettes and has used no other form of tobacco.  He has had no recent  history of alcohol use. DIET:  As mentioned previously, the patient is on a dysphagia diet,  consisting of minced and moist food with nectar-thick liquids. He is  not to use a straw. SOCIAL HISTORY:  The patient is  and lives with his wife in a  one-level home with two steps to enter.   He has six He reports having no significant hearing loss and no history of  tinnitus. NOSE:  He has had no recent epistaxis, drainage, or change in his sense  of smell. TEETH:  He has required no recent oral care. THROAT:  The patient does currently have dysphagia. NECK:  He has had no recent cervical tenderness, edema, or glandular  problems. CARDIAC:  He has had a recent cardiac workup which did show significant  cardiac disease including an ejection fraction estimated to be at 25%. The patient was felt to be a good candidate for a cardiac LifeVest which  is currently in place. VASCULAR:  He has had no recent difficulties with lower limb edema or  with recurrent ulcers of his hands or feet. RESPIRATORY:  He has had no recent shortness of breath, wheezing, or  chronic cough. GASTROINTESTINAL:  He has had no recent nausea, emesis, or bowel  incontinence. UROGENITAL:  He has had no recent frequency, urgency, dysuria, or  urinary bladder incontinence. MUSCULOSKELETAL:  He currently has no significant osteoarthritis related  pain and has had no prior history of gout. NEUROLOGICAL:  As mentioned in the history of present illness, the  patient was recently diagnosed with a cerebrovascular accident with an  acute infarct being located in the left romain-darin as noted on a brain  MRI performed on 12/24/2019. The patient does have residual speech and  cognitive deficits from this cerebrovascular accident. He also has  significant right-sided weakness. ENDOCRINE:  The patient does have a known history of a benign prostatic  hypertrophy. He has had no recent polydipsia, polyuria, or heat/cold  intolerance. HEMATOLOGICAL:  He has had no recent difficulties with bruising or  bleeding easily. PSYCHOLOGICAL:  As mentioned previously, the patient does have an  extensive past medical history of psychological issues.   Please see the  past medical history for a more detailed conversation regarding his  previous psychological issues. PHYSICAL EXAMINATION:  VITAL SIGNS:  At the time of this history and physical, temperature  98.7, blood pressure 123/71, pulse 68, respirations 16, oxygen  saturation on room air 98%. Admission height 6 feet, admission weight  152 pounds. GENERAL APPEARANCE:  He was noted to be well developed, but  undernourished. He was found to be awake, alert, and in no acute  distress. His affect was felt to be mildly flat. His safety awareness  was felt to be fair. SKIN:  Noted to be intact throughout. LYMPHATIC:  He was noted to have no cervical lymphadenopathy  bilaterally. HEAD:  Normocephalic without signs of trauma. FACE:  He was noted to have a moderate amount of right facial drooping. EYES:  Pupils were equal, round and reactive to light with intact  extraocular muscles bilaterally. EARS:  His hearing appeared to be intact bilaterally with respect to  conversational speech. NOSE:  His nares were patent without drainage or discharge. THROAT:  Clear without erythema or exudate. HEART:  S1 and S2 with a regular rate and rhythm without murmur, gallop  or rub. LUNGS:  Clear to auscultation bilaterally without rales, rhonchi or  wheezes. ABDOMEN:  Positive bowel sounds in all four quadrants. His abdomen was  noted to be soft, nontender, without masses. MUSCULOSKELETAL:  Gait:  I did not walk with the patient at the present  time. Neck was supple, nontender without masses. Upper limb muscle strength  showed the left upper limb strength to be within normal limits including  an intact left hand grasp. Right upper limb muscle strength was absent  throughout. There was no significant increase in muscle tone throughout  the entire right upper limb. Lower limb muscle strength showed the left  lower limb strength to be within normal limits throughout. Right lower  limb muscle strength showed no functional movement at the right hip or  right leg.   He was noted to have trace movement at the right ankle with  respect to plantar flexion. He was also noted to have trace movement of  the right extensor hallucis longus muscle. He had no significant  functional movement with respect to right ankle dorsiflexion. Overall,  he was noted to have no significant bilateral lower limb edema,  erythema, or tenderness to palpation. PULSES:  His radial pulses were equal and strong bilaterally. NEUROLOGICAL:  Cranial nerves II through XII were grossly intact except  for cranial nerve VII was involved with a moderate right facial drooping  and also cranial nerve XI is involved with respect to no functional  right shoulder shrug. Sensation to light touch appeared to be intact in  both lower limbs throughout. Upper limb deep tendon reflexes showed the  bilateral biceps reflex to be 2+. The rest of the right and left upper  limb deep tendon reflexes were absent. Lower limb deep tendon reflexes  were absent at the knee and ankle bilaterally. With respect to the  patient's cognition, his recent and remote memory appeared to be at  least moderately impaired. He was also noted to have severe expressive  aphasia and mild-to-moderate receptive aphasia. IMPRESSION:  1. Status post cerebrovascular accident with an acute infarct being  located in the left romain-darin as noted on a brain MRI performed on  12/24/2019.  2.  Gait dysfunction. 3.  Deficits with respect to his activities of daily living. 4.  Significant speech and cognitive deficits. 5.  Current history of dysphagia. 6.  Current history of right upper and lower limb weakness. 7.  Current history of significant cardiac disease including an  estimated ejection fraction of 25%. The patient currently has a cardiac  LifeVest in place. 8.  History of severe major depression along with anxiety. 9.  History of a psychotic episode occurring in 2017, requiring a mental  hospitalization in [facility]. 10.  History of benign prostatic hypertrophy.   11.  History of anemia. 12.  History of gastroesophageal reflux disease. 13.  History of hypertension. 14.  History of bilateral inguinal surgical repair performed in 2018. RECOMMENDATIONS:  1. The patient has been admitted for comprehensive inpatient  rehabilitation to maximize his functional and cognitive abilities. His  inpatient rehabilitation program will consist of physical therapy,  occupational therapy, speech therapy, recreational therapy, case  management, rehabilitation nursing, and rehabilitation physician  management. He will receive at least 3 hours of daily therapy, totaling  at least 15 hours of weekly therapy. His overall prognosis is good. He  should make significant functional and cognitive gains throughout his  inpatient rehabilitation admission. 2.  Physical Therapy has been consulted to evaluate and improve his  overall gait, strength, transfers, mobility of the lower limbs, balance,  and range of motion. 3.  Occupational Therapy has been consulted to evaluate and improve his  overall grooming, hygiene, dressing abilities, bathing abilities, and  overall activities of daily living. They will recommend any needed  assistive devices. They will instruct the patient in energy  conservation techniques and will evaluate and improve his overall safety  awareness while performing all of his functional tasks. 4.  Recreational Therapy has been consulted and will evaluate and treat  the patient as needed. 5.  Case Management has been consulted and will aid with coordination of  equipment ordering and deal with any family issues and act as a liaison  with his insurance company. They will also continue with his current  discharge planning. 6.  Speech Therapy has been consulted to evaluate and improve his  overall speech, cognition, and with respect to his current dysphasia. 7.  A nutritionist will coordinate his caloric needs. 8.  His current medications will be maintained and monitored.   9.  An

## 2020-01-03 NOTE — PROGRESS NOTES
engaged patient in RUE supportive response reaching toward the L and overhead with LUE reaching for target with R scap and trace deltoid palpated. Progressed to R shoulder flexion/extension with Total/ heavy max assist needed to slide R hand forward and backward on thigh. Completed on body for increased sensory input. ASSESSMENT:  Assessment: Pt demo significant R hemibody weakness, decreased balance, & decreased cognition for ADLs at [facility] s/p CVA. Continued OT recommended to educate Pt on safety & adaptative strategies as well as increase balance, strength, & safety awareness for returning to ADLs. Activity Tolerance:  Patient tolerance of  treatment: good. Discharge Recommendations: 24 hour supervision or assist, Continue to assess pending progress  Equipment Recommendations: Other: Monitor pending progress  Plan: Times per week: 5x/wk for 90 min and 1x/wk for 30 min   Current Treatment Recommendations: Balance Training, Self-Care / ADL, ROM, Strengthening, Functional Mobility Training, Endurance Training, Neuromuscular Re-education, Safety Education & Training, Patient/Caregiver Education & Training, Wheelchair Mobility Training, Home Management Training    Patient Education  Patient Education: Role of OT, Plan of Care and  Goals      Goals  Short term goals  Time Frame for Short term goals: 1 week   Short term goal 1: Pt will complete sit to stand transfers with mod assist of 1 with min vcs or RUE support to improve indep with LB clothing mgt   Short term goal 2: Pt will complete 5-7 min EOB ADL task with SBA for sitting balance & min vcs for midline posture to improve balance needed for ADLs. Short term goal 3: Pt will don UB clothing with SBA and mod vcs for romain dressing technique to improve indep with self care. Short term goal 4: Pt will tolerate standing >2 minutes in midline with CGA to improve midline awareness for completion of stand pivots to [facility].    Long term goals  Time Frame for Long term goals : 3-4 weeks   Long term goal 1: Pt will don LB clothing with CGA and min vcs for adaptive technique to ipmrove indep with self care. Long term goal 2: pt will complete stand pivot transfer to [facility] with CGA to improve indep with toileting. Long term goal 3: Pt will demonstrate 3/5 R elbow flexion to improve indep with donning a shirt. Following session, patient left in safe position with all fall risk precautions in place.

## 2020-01-03 NOTE — PROGRESS NOTES
surface, 2 trials. Pt supporting RT hand with LT    Balance:  Static Sitting Balance:  varied b/w CGA to mod assist.  max cues. mirror used for visual cues at times. Static Standing Balance: mod to max assist with pt supporting Rt hand with LT.  numerous trials with pt standing approx 2-3min per trial with pericare and clothing management due to incontinence. EXERCISES:  The following exercises were completed to improve functional mobility: graded lifts from elevate mat. 1st attempted with pt placing katey hands on stool in front. Too much pushing from LUE noted. Graded lift without use of stool showed improved midline. Max assist with all trials. ASSESSMENT:  Assessment: Patient progressing toward established goals. Activity Tolerance:  Patient tolerance of  treatment: good. Equipment Recommendations:Equipment Needed: Yes(continue to assess. Pt has q. cane)  Discharge Recommendations:  Continue to assess pending progress, Patient would benefit from continued therapy after discharge    Plan: Times per week: 5x/ wk 90 min, 1x/ wk 30 min  Current Treatment Recommendations: Strengthening, Transfer Training, Endurance Training, ROM, Balance Training, Wheelchair Mobility Training, Neuromuscular Re-education, Patient/Caregiver Education & Training, Equipment Evaluation, Education, & procurement, Gait Training, Home Exercise Program, Functional Mobility Training, Safety Education & Training    Patient Education  Patient Education: Transfers    Goals:  Patient goals : get walking  Short term goals  Time Frame for Short term goals: 1 wk  Short term goal 1: Pt to roll to either direction, SBA with cues for Rt hemibody position only, for position change in bed. Short term goal 2: Pt to go supine <->sit, +1 mod assist at trunk up and with RLE going to supine, to get in/out of bed. Short term goal 3: Pt to perform sit/pivot transfer, supporting Rt hand with LT, +1 min assist to get in/out of w/c.   Short term goal 4: Pt to get up/down from various seated surfaces, +1 mod assist to progress to pre gait standing with RW support. Short term goal 5: Pt to stand with RW, maintain midline trunk, with stepping forward back with ea LE, mod assist with RLE  Short term goal 6: Pt to propel w/c >= 150 ft, SBA, for indoor mobility  Long term goals  Time Frame for Long term goals : 4 wks  Long term goal 1: Pt to go supine <->sit, +1 stand by assist , to get in/out of bed. Long term goal 2: Pt to get up/down from various seated surfaces, +1 CGA to get up to walk. Long term goal 3: Pt to perform stand/pivot transfer with use of RW +1 min assist to get in/out of w/c. Long term goal 4: Pt to walk with RW >= 50 ft, CGA to progress to home mobility  Long term goal 5: Pt to get in/out of car, +1 min assist for transportation needs. Following session, patient left in safe position with all fall risk precautions in place.

## 2020-01-03 NOTE — CONSULTS
Nephrology Consult Note  Patient's Name: eBlkis Abad  2:55 PM  1/3/2020    Nephrologist: Simone Castro    Reason for Consult: Recent acute kidney injury. Metabolic acidosis. Requesting Physician:  Mahendra Ramirez MD  PCP: Josse Morrison MD    Chief Complaint: None  Assessment  1. Acute kidney injury resolved  2. Metabolic acidosis of unclear etiology but suspect recent episodes of hypotension with subsequent tissue hypoxia  3. Cerebrovascular accident with right-sided residual  4. Hypertension primary  5. Deconditioned state   6. Normocytic anemia  7. Diabetes mellitus type 2    Plan    1. I discussed my thoughts with the patient. 2. He appeared to have understood. 3. Labs reviewed. 4. Medications reviewed. 5. We will add sodium bicarbonate 650 mg twice daily orally  6. Labs on Monday 6 January 2020   7. Will follow      History Obtained From: Staff, medical record on my personal knowledge of the patient  History of Present Ilness:    Belkis Abad is a 78 y.o. male with history of hypertension, diabetes mellitus, gastroesophageal reflux disease, mental depression among other things who was originally admitted to [facility] after he had presented to the emergency department with a slow speech and right-sided weakness. Possibility of a cerebrovascular accident was considered. MRI confirmed acute nonhemorrhagic cerebrovascular accident in the left romain-darin. He was considered to be a candidate for TPA which was successfully done. He had acute kidney injury and I was asked to see him at that time as well. He had low blood pressure. He was given intravenous fluid. He was stabilized and transferred to the rehabilitation well-developed elderly gentleman in no distress. Unit. I was consulted to see him again for metabolic acidosis and recent acute kidney injury.     Past Medical History:   Diagnosis Date    Anxiety     Depression     Diabetes mellitus (HCC)     GERD (gastroesophageal reflux disease)     Hyperglycemia     Hypertension        Past Surgical History:   Procedure Laterality Date    ENDOSCOPY, COLON, DIAGNOSTIC      OTHER SURGICAL HISTORY  1970    right thumb surgery     MT REPAIR ING HERNIA,5+Y/O,REDUCIBL Right 8/23/2018    RIGHT INGUINAL HERNIA REPAIR WITH MESH performed by Connor Delgado MD at [address] HERNIA,5+Y/O,REDUCIBL Left 11/1/2018    LEFT HERNIA INGUINAL REPAIR WITH MESH performed by Connor Delgado MD at [facility] [address]  2017    EGD       Family History   Problem Relation Age of Onset    Alzheimer's Disease Mother     Stroke Father     Heart Attack Brother     Colon Cancer Neg Hx         reports that he has never smoked. He has never used smokeless tobacco. He reports that he does not drink alcohol or use drugs. Allergies:  Patient has no known allergies. Current Medications:    atorvastatin (LIPITOR) tablet 80 mg, Nightly  acetaminophen (TYLENOL) tablet 650 mg, Q4H PRN  aspirin chewable tablet 81 mg, Daily  clopidogrel (PLAVIX) tablet 75 mg, Daily  escitalopram (LEXAPRO) tablet 20 mg, Daily  finasteride (PROSCAR) tablet 5 mg, Daily  glucagon (rDNA) injection 1 mg, PRN  glucose (GLUTOSE) 40 % oral gel 15 g, PRN  insulin lispro (HUMALOG) injection vial 0-12 Units, TID WC  insulin lispro (HUMALOG) injection vial 0-6 Units, Nightly  metoprolol succinate (TOPROL XL) extended release tablet 25 mg, Daily  pantoprazole (PROTONIX) tablet 40 mg, QAM AC  therapeutic multivitamin-minerals 1 tablet, Daily  vitamin C (ASCORBIC ACID) tablet 500 mg, Daily  Vitamin D (CHOLECALCIFEROL) tablet 500 Units, Daily  ondansetron (ZOFRAN) tablet 4 mg, Q8H PRN  rOPINIRole (REQUIP) tablet 0.25 mg, Nightly        Review of Systems:   Pertinent positives stated above in HPI. All other systems were reviewed and were negative.   ROS:Constitutional: negative  Eyes: negative  Ears, nose, mouth, throat, and face: negative  Respiratory: negative  Cardiovascular: negative  Gastrointestinal: negative  Genitourinary:negative  Integument/breast: negative  Hematologic/lymphatic: negative  Musculoskeletal:positive for arthralgias and stiff joints  Neurological: positive for coordination problems, gait problems, memory problems, speech problems and weakness  Behavioral/Psych: negative  Endocrine: negative  Allergic/Immunologic: negative    Physical exam:   Constitutional: Well-developed elderly gentleman in no distress. Vitals:   Vitals:    01/03/20 1411   BP:    Pulse:    Resp:    Temp:    SpO2: 97%       Skin: no rash, turgor is normal.  Heent: Pupils are reactive to light. Throat is clear. Oral mucosa is moist.  Neck: no bruits or jvd noted  Cardiovascular:  S1, S2 without murmur or rubs  Respiratory: Clear to auscultation  Abdomen:  +bs, soft, nontender. No palpable mass. No abdominal bruit. Ext: No lower extremity edema  Psychiatric: mood and affect appropriate  Musculoskeletal:  Intact  CNS:Very awake and very alert. Normal speech. Right-sided weakness well appreciated. Data:   Labs:  Lab Results   Component Value Date     01/03/2020     01/02/2020     01/01/2020    K 3.9 01/03/2020    K 3.9 01/02/2020    K 4.1 01/01/2020     01/03/2020    CO2 21 (L) 01/03/2020    CO2 22 (L) 01/02/2020    CO2 21 (L) 01/01/2020    CREATININE 1.0 01/03/2020    CREATININE 1.0 01/02/2020    CREATININE 1.0 01/01/2020    BUN 25 (H) 01/03/2020    BUN 27 (H) 01/02/2020    BUN 24 (H) 01/01/2020    GLUCOSE 122 (H) 01/03/2020    GLUCOSE 127 (H) 01/02/2020    GLUCOSE 124 (H) 01/01/2020    WBC 9.7 01/03/2020    WBC 8.8 01/02/2020    WBC 9.7 01/01/2020    HGB 11.5 (L) 01/03/2020    HGB 10.9 (L) 01/02/2020    HGB 11.5 (L) 01/01/2020    HCT 33.8 (L) 01/03/2020    HCT 32.9 (L) 01/02/2020    HCT 33.6 (L) 01/01/2020    MCV 92.6 01/03/2020     01/03/2020     {Labs reviewed    Imaging:  CXR results:         Thank you  Sosa Connell MD for allowing us to participate in care of Kandace Davalos       Electronically signed by Meggan Romero MD on 1/3/2020 at 2:55 PM

## 2020-01-03 NOTE — PLAN OF CARE
Individualized Plan of [facility] Inpatient Rehabilitation Unit    Rehabilitation physician: Dr. Mary Betancourt Date: 1/2/2020     Rehabilitation Diagnosis: CVA (cerebral vascular accident) [facility]) [I63.9]  Acute cerebrovascular accident (CVA) ([facility] [address] [I63.9]     Rehabilitation impairments: self care, mobility, motor dysfunction, bowel/bladder management, safety, cognitive function and communication    Factors facilitating achievement of predicted outcomes: Family support, Motivated, Cooperative and Pleasant  Barriers to the achievement of predicted outcomes: Confusion, Cognitive deficit, Impulsivity, Limited safety awareness, Depression, Communication deficit, Decreased endurance, Upper extremity weakness and Lower extremity weakness    Patient Goals: Improve independence with mobility, Improvement of mobility at a wheelchair level, Increase overall strength and endurance, Increase balance, Increase endurance, Increase independence with activities of daily living, Improve cognition, Increase self-awareness, Increase safety awareness, Increase community integration, Increase socialization, Functional communication with caregivers, Integrate appropriate pain management plan, Assure adequate nutritional option for discharge, Continence of bowel and bladder and Provide appropriate patient and family education      NURSING:  Nursing goals for Vicci Current while on the rehabilitation unit will include:  Continence of bowel and bladder, Adequate number of bowel movements, Urinate with no urinary retention >300ml in bladder, Complete bladder protocol with danielle removal, Maintain O2 SATs at an acceptable level during stay, Effective pain management while on the rehabilitation unit, Establish adequate pain control plan for discharge, Absence of skin breakdown while on the rehabilitation unit, Improved skin integrity via assessments including wound measurements, Avoidance of any hospital acquired infections, No signs/symptoms of infection at the wound site, Freedom from injury during hospitalization and Complete education with patient/family with understanding demonstrated regarding disease process and resultant impairment     In order to achieve these goals, nursing interventions may include bowel/bladder training, education for medical assistive devices, medication education, O2 saturation management, energy conservation, stress management techniques, fall prevention, alarms protocol, seating and positioning, skin/wound care, pressure relief instruction, dressing changes, infection protection, DVT prophylaxis, assistance with safe transfers , and/or assistance with bathroom activities and hygiene. PHYSICAL THERAPY:  Goals:        Short term goals  Time Frame for Short term goals: 1 wk  Short term goal 1: Pt to roll to either direction, SBA with cues for Rt hemibody position only, for position change in bed. Short term goal 2: Pt to go supine <->sit, +1 mod assist at trunk up and with RLE going to supine, to get in/out of bed. Short term goal 3: Pt to perform sit/pivot transfer, supporting Rt hand with LT, +1 min assist to get in/out of w/c. Short term goal 4: Pt to get up/down from various seated surfaces, +1 mod assist to progress to pre gait standing with RW support. Short term goal 5: Pt to stand with RW, maintain midline trunk, with stepping forward back with ea LE, mod assist with RLE  Short term goal 6: Pt to propel w/c >= 150 ft, SBA, for indoor mobility  Long term goals  Time Frame for Long term goals : 4 wks  Long term goal 1: Pt to go supine <->sit, +1 stand by assist , to get in/out of bed. Long term goal 2: Pt to get up/down from various seated surfaces, +1 CGA to get up to walk. Long term goal 3: Pt to perform stand/pivot transfer with use of RW +1 min assist to get in/out of w/c.    Long term goal 4: Pt to walk with RW >= 50 ft, CGA to progress to home mobility  Long term goal 5: Short-term Goals: 1 week   Goal 1: Pt will tolerate a minced/moist diet and mildly thick liquids (no straws) and advanced solids with ST only (hx of silent aspiration with thin) without overt s/s of aspiration to meet nutritional/hydration measures safely. Goal 2: Pt will complete labial/lingual/pharyngeal strengthening, coordionation, ROM exercises, DDK rates, rote speech tasks with SOS strategies and tongue twisters x10 for improved timeliness of oral bolus formation/transit, maximized speech intelligibility and improved airway protection. Goal 3: Pt will complete immediate/delayed/working memory tasks with 70% accuracy, min cues for improved retention of newly learned medical information. Goal 4: Pt will complete problem solving, executive functioning and attention tasks (i.e. time/money management, scheduling, complex auditory/reading comprehension, etc) with 70% accuracy, mod cues for improved ADL/IADL management and safety awareness/insight. Goal 5: Pt will complete complex naming, verbal sequencing/description and sentence formulation/repetition tasks with 70% accuracy, mod cues for improved expression of basic/complex thoughts. Timeframe for Short-term Goals: 1 week     Plan of Care: Pt to be seen by speech therapy services 30 minutes per day Monday through Friday     Anticipated interventions may include speech/language/communication therapy, cognitive training, group therapy, education, and/or dysphagia therapy based on the above goals. CASE MANAGEMENT:  Goals:   Assist patient/family with discharge planning, patient/family counseling,  and coordination with insurance during the inpatient rehabilitation stay. Other members of the multidisciplinary rehabilitation team that will be involved in the patient's plan of care include recreational therapy, dietary, respiratory therapy, and neuropsychology.     Medical issues being managed closely and that require 24 hour availability of a physician:  Swallowing precautions, Bowel/Bladder function, Infection protection, DVT prophylaxis, Fall precautions, Fluid/Electrolyte balance, Nutritional status, Respiratory needs, Anemia and History of heart disease                                           Physician anticipated functional outcomes: Improved independence with functional measures   Estimated length of stay for this admission: 4 weeks. Medical Prognosis: Good  Anticipated disposition: Home. The potential to achieve the above medical and rehabilitative goals is excellent. This plan of care has been developed with the assistance and input of the multidisciplinary rehabilitation team.  The plan was reviewed with the patient. The patient has had the opportunity to provide input to the therapy team.    I have reviewed this Individualized Plan of Care and agree with its contents. Above documentation has been expanded, modified, adjusted to reflect the findings of my evaluations and goals for the patient.     Physician:  Rene Alcantara MD

## 2020-01-03 NOTE — PROGRESS NOTES
[address] [address]  Recreational Therapy  Evaluation  Inpatient Rehabilitation Unit      Time Spent with Patient: 15 minutes    Date:  1/3/2020       Patient Name: Belkis Abad      MRN: [id number]       YOB: 1940 (78 y.o.)       Gender: male  Diagnosis: CVA   Referring Practitioner: Dr. Lito Sanchez     RESTRICTIONS/PRECAUTIONS:  Restrictions/Precautions: General Precautions, Fall Risk, Modified Diet  Vision: Impaired  Hearing: Within functional limits    PAIN: 0    SUBJECTIVE:  Pt lives with wife-they have 6 children and 23 grandchildren    VISION:  Glasses to read     HEARING: Hearing aides but does not wear them     LEISURE INTERESTS:   Chart indicates pt sits in his recliner at home for the most part and watches tv-he did ask for some crossword puzzles and he was watching golf on tv-states he use to like to golf-he likes to play euchre and states he would play with peers during his stay-states he does not get out of the house much at [address]:    Decreased endurance and Upper extremity weakness      Patient Education  New Education Provided: Importance of Leisure, RT Plan of Care    Plan:  Continue to follow patient through this admission  Include patient in appropriate groups  See patient individually    Electronically signed by: NELLIE Hong  Date: 1/3/2020

## 2020-01-03 NOTE — PROGRESS NOTES
Hoarse/breathy with reduced breath support and presence of frequent aphonic breaks    Sensation Not Tested    Cough Not Tested      PATIENT WAS EVALUATED USING:  Puree, hard solid, thin liquids by cup, mildly thick liquids by cup     ORAL PHASE:  Impaired:  Impaired AP Movement, Impaired Mastication and Reduced Bolus Formation    PHARYNGEAL PHASE:  Impaired:  Decreased Hyolaryngeal Elevation and Suspected Pharyngeal Residue    SIGNS AND SYMPTOMS OF LARYNGEAL PENETRATION / ASPIRATION:  No signs/symptoms of aspiration evident in this evaluation, but cannot rule out silent aspiration. IMPRESSIONS: Pt presents with moderate oral and mild-moderate pharyngeal dysphagia evidenced by the above skilled level findings. Pt demonstration of slow, uncoordinated bolus breakdown, formation and AP transit resulting in decreased bolus formation of hard solid trials which was NOT cleared s/p use of liquid wash with mild-moderate oral stasis remaining. Required extra time to achieve adequate clearance. No overt pocketing present. Pt with decreased laryngeal elevation upon manual palpation and concerns for pharyngeal stasis given presence of spontaneous multiple swallows. No overt s/s of aspiration s/p trials of hard solid, puree, thin liquids and mildly thick liquids; HOWEVER, MBS completed on 12/26 with demonstration of SILENT aspiration of thin by cup. Given hx of silent aspiration, pt inappropriate for completion of advanced liquid trials. Recommend pharyngeal strengthening with plans for repeat MBS as early as 1/9 to further assess pharyngeal swallow function and determine appropriateness to progress liquid consumption. At this time pt to resume minced/moist diet and mildly thick liquids with restriction on use of straw. Will require intermittent supervision with occasional impulsivity noted with large bites/sips during PO intake this date.      RECOMMENDATIONS:     Modified Barium Swallow: Not indicated    DIET RECOMMENDATIONS: GOALS:  Long-term Goals  Timeframe for Long-term Goals: 3 weeks   Goal 1: Pt will tolerate a soft/bite sized diet and thin liquids without overt s/s of aspiration to meet nutritional/hydration measures safely. Goal 2: Pt will improve [id number] skills to a supervision level of function for maximized expression of complex thoughts, independent completion of ADL/IADL responsibilities and appropriate transition to home setting with wife post discharge.        Hans Fuchs M.S. Belkis Hagan 1/3/2020

## 2020-01-03 NOTE — PROGRESS NOTES
[facility]AP[facility]  EVALUATION  [facility] - [id number]    Time In: 1130  Time Out: 1235  Timed Code Treatment Minutes: 40 Minutes  Minutes: 65          Date: 1/3/2020  Patient Name: Mary Daigle,  Gender:  male        MRN: [id number]  : 1940  (78 y.o.)      Referring Practitioner: Dr. María Pisano  Diagnosis: acute CVA  Additional Pertinent Hx: Pt admitted [facility] ED s/p fall. MRI+ for infarct Lt hemipons. Cardiology deemed need for life vest (2019),  Hx:  depression , anxiety     Restrictions/Precautions:  Restrictions/Precautions: General Precautions, Fall Risk, Modified Diet  Position Activity Restriction  Other position/activity restrictions: pt pushes to right, R romain, Rt neglect, poor sensation Rt hemibody    Subjective:  Chart Reviewed: Yes  Patient assessed for rehabilitation services?: Yes  Subjective: Pt seated in w/c with LLE on bed, Rt arm down alongside the w/c. Pt unaware of the unsafe position and unaware of location of RUE. Pt was pleasant and cooperative throughout the session.     General:  Overall Orientation Status: Within Functional Limits  Follows Commands: Within Functional Limits    Vision: Impaired  Vision Exceptions: Wears glasses at all times    Hearing: Within functional limits         Pain:   .  Pain Assessment  Pain Level: 0       Social/Functional History:    Lives With: Spouse  Type of Home: House  Home Layout: One level, Work area in basement  Home Access: Stairs to enter with rails  Entrance Stairs - Number of Steps: 2 with grab bar(Rt)  Home Equipment: Quad cane(used intermittently)     Bathroom Shower/Tub: Walk-in shower  Bathroom Toilet: Handicap height  Bathroom Equipment: Built-in shower seat       ADL Assistance: Independent     Ambulation Assistance: Independent  Transfer Assistance: Independent    Active : No  Occupation: Retired  Additional Comments: Pt reports he completed his own self care and mobility with intermittent use of a quad cane. Pt states wife completed housekeeping, meal prep and finances. OBJECTIVE:  Range of Motion:  Right Lower Extremity: Impaired - ankle df lacking 5 degrees to neutral.  Others WFL  Left Lower Extremity: Impaired - ankle df to neutral.  Others WFL    Strength:  Right Lower Extremity: Impaired - hip flexion 2-, ABD 2+, ext 2-, knee ext 1, ankle 0/5  Left Lower Extremity: WFL   increased flexor tone noted RLE with flexor withdrawal reflex  Balance:  Static Sitting Balance:  Maximum Assistance to gain upright and midline. Once gained, verbal cue, mirror used for visual cues and CGA to mod assist to maintain midline. Tends to push to Rt and post retraction Rt shoulder girdle  Dynamic Sitting Balance: Maximum Assistance to min assist, reaching within base. Static Standing Balance: Maximum Assistance with RW support. Mirror for visual cues. Varied to mod +1, CGA +   Dynamic Standing Balance: Maximum Assistance +1, min +1 with RW support, stepping with LLE forward/back. Rt knee buckling, hip retraction, lean/push to RT, forward trunk flexion. Improved with cues and manual assist, but not maintained. Bed Mobility:  Rolling to Left: Maximum Assistance to position Rt hemibody and assist RUE across midline. Rolling to Right: Maximum Assistance to position RUE,  Mod with RLE, then CGA   Supine to Sit: Maximum Assistance at trunk to elevate and to clear RLE coming up from Rt side. Once up, max assist to position RLE, scoot hips forward. Sit to Supine: Maximum Assistance to guide trunk to RT side and lift RLE onto mat. x2 trials. Transfers:  Sit to Stand: Maximum Assistance +1, min +1  Stand to Sit:Maximum Assistance +1, mod +1. Tends to push to Rt, having difficulty disengaging LLE knee/hip ext  Sit Pivot:Maximum Assistance to either direction. Pt supporting Rt hand with Lt    Ambulation:  Not tested.   Non ambulatory at this time  Wheelchair Mobility:  Moderate Assistance to initiate and navigate the 1st turn,  Progressed to CGA in open bravo, CGA to min with cues for turning  Extremities Used: Left Lower Extremity  Type of Chair:Manual  Surface: Level Tile  Distance: 165 ft  Quality: Slow Velocity and Decreased Fluidity     EXERCISES:  The following exercises were completed to improve functional mobility: supine- passive katey heel cord stretch 3x30 sec holds followed by manually assisted df/pf RLE, manually assisted/resisted RT hip ABD, PNF D1 x10 reps. Bridges with LUE across chest, manual cues, low trunk rotation with BLEs on therapy ball and LUE across chest with min assist to control speed and maintain RLE on ball. Functional Outcome Measures: Not completed       ASSESSMENT:  Activity Tolerance:  Patient tolerance of  treatment: good. Treatment Initiated: Treatment and education initiated within context of evaluation. Evaluation time included review of current medical information, gathering information related to past medical, social and functional history, completion of standardized testing, formal and informal observation of tasks, assessment of data and development of plan of care and goals. Treatment time included skilled education and facilitation of tasks to increase safety and independence with functional mobility for improved independence and quality of life. Assessment: Body structures, Functions, Activity limitations: Decreased functional mobility , Decreased endurance, Decreased ROM, Decreased strength, Decreased balance, Decreased coordination, Decreased sensation, Decreased safe awareness, Decreased posture  Assessment: Pt demonstrates a decrease in baseline by way of bed mobility, transfers and gait due to Rt hemiparesis, decreased sensation and coordination Rt hemibody, decreased balance resulting in need for max assist with functional mobility tasks.   Pt will benefit from skilled PT to advance in the areas of deficit

## 2020-01-03 NOTE — PLAN OF CARE
Problem: DISCHARGE BARRIERS  Goal: Patient's continuum of care needs are met  Note:   [facility]  Physical Medicine Case Management Assessment    [x] Inpatient Rehabilitation Unit  [] Transitional Care Unit    Patient Name: Ildefonso Burgos        MRN: [id number]    : 1940  (78 y.o.)  Gender: male     Date of Admission: 2020     Family/Social/Home Environment: Prior to CVA and current hospitalization, patient was independent with personal care, but required encouragement from spouse, Briseyda Abad, to complete ADL's and personal care. Patient lives with spouse, Briseyda Abad, to 48 years. Patient and spouse, Briseyda Abad, have six children and twenty three grandchildren. Daughter, Rambo Matias, lives in [place]. Daughter, Rudi Ibrahim, lives in [place]. Patient's four other children Lindsay Cantu, Bailey Devi, Gallo Solorzano, and Imtiaz Villa) live in [facility], but all work full time. Patient's spouse, Briseyda Abad, indicates patient was very sedentary prior to CVA and would spend a majority of the day in the recliner watching television. Patient would require encouragement from spouse, Briseyda Abad, to complete bathing, dressing, and brushing his teeth. Patient would walk to the mailbox daily, wash dishes, and occasionally run the vacuum. Patient would occasionally ambulate with quad cane. Patient with history of depression and psychosis in 2017 with hospitalization. Patient then followed up with psychiatrist in [place] and after a year and a half was discharged. Patient rarely will leave the house (only for doctor appointments). Patient has not driven since . Spouse, Briseyda Abad, completes driving, errands, housekeeping, laundry, meal preparation, managing finances, and managing patient's medications. Patient indicates checking blood sugar once to twice a day at home. Patient's spouse, Briseyda Abad, will be primary support at discharge. SW encouraged spouse, Briseyda Abad, to be present during as may therapy sessions as possible during inpatient rehab stay to prepare for transition home.

## 2020-01-03 NOTE — PROGRESS NOTES
signs/symptoms of aspiration evident in this evaluation, but cannot rule out silent aspiration.     IMPRESSIONS: Pt presents with moderate oral and mild-moderate pharyngeal dysphagia evidenced by the above skilled level findings. Pt demonstration of slow, uncoordinated bolus breakdown, formation and AP transit resulting in decreased bolus formation of hard solid trials which was NOT cleared s/p use of liquid wash with mild-moderate oral stasis remaining. Required extra time to achieve adequate clearance. No overt pocketing present. Pt with decreased laryngeal elevation upon manual palpation and concerns for pharyngeal stasis given presence of spontaneous multiple swallows. No overt s/s of aspiration s/p trials of hard solid, puree, thin liquids and mildly thick liquids; HOWEVER, MBS completed on 12/26 with demonstration of SILENT aspiration of thin by cup. Given hx of silent aspiration, pt inappropriate for completion of advanced liquid trials. Recommend pharyngeal strengthening with plans for repeat MBS as early as 1/9 to further assess pharyngeal swallow function and determine appropriateness to progress liquid consumption. At this time pt to resume minced/moist diet and mildly thick liquids with restriction on use of straw. Will require intermittent supervision with occasional impulsivity noted with large bites/sips during PO intake this date. RECOMMENDATIONS:              Modified Barium Swallow:   Not indicated     DIET RECOMMENDATIONS:  Minced/moist and mildly thick liquids      STRATEGIES: Full Upright Position, Small Bite/Sip, No Straw, Multiple Swallow, Pulmonary Monitoring, Oral Care after all Meals, Supervision, Medication in Applesauce, Alternate Solids and Liquids, Limit Distractions and Monitor for Fatigue                 RECOMMENDATIONS/ASSESSMENT:  DIAGNOSTIC IMPRESSIONS:  Pt presents with at least mild cognitive deficits evidenced by derived/adjusted MOCA score of 18/25.  This is improved from acute care stay in which pt scored a 11/25 on 12/27. Deficits found within the domains of immediate/delayed recall, verbal reasoning, thought organization, decreased high level attention and math computation. Moderate expressive language deficits evidenced by impaired lexical retrieval, mental flexibility, verbal sequencing/description, short utterance length with reduced syntax and impaired sentence repetition. Pt with mild receptive language deficits evidenced by increasing difficulty following complex verbal commands, need for extra time d/t slowed mental processing and heavy reliance on repetitions. Pt reports hx of hearing aids. Denies need for hearing aids at this time. Pt with moderate dysarthria characterized by blended word boundaries, imprecise articulatory precision and omission of word endings. Moderate dysphonia evidenced by poor level of breath support, presence of aphonic breaks and hoarse/breathy vocal quality. Pt would highly benefit from ongoing ST intervention in order to progress [id number] functioning to a supervision level for safe, functional return to home setting and demonstration of independent ADL/IADL completion upon discharge. Rehabilitation Potential: excellent     Comprehension: 4 - Patient understands basic needs 75-90%+ of the time  Expression: 3 - Expresses basic ideas/needs 50-74% of the time  Social Interaction: 4 - Patient appropriate 75-90%+ of the time  Problem Solving: 3 - Patient solves simple/routine tasks 50%-74%  Memory: 3 - Patient remembers 50%-74% of the time.       Review of Systems:  CONSTITUTIONAL:  negative  RESPIRATORY:  negative  CARDIOVASCULAR:  negative  GASTROINTESTINAL:  negative  GENITOURINARY:  positive for a Solis catheter in place. MUSCULOSKELETAL:  positive for  decreased range of motion and muscle weakness.   NEUROLOGICAL:  positive for memory problems, speech problems, coordination problems, gait problems and Chloride 110 98 - 111 meq/L    CO2 21 (L) 23 - 33 meq/L    Glucose 122 (H) 70 - 108 mg/dL    BUN 25 (H) 7 - 22 mg/dL    CREATININE 1.0 0.4 - 1.2 mg/dL    Calcium 9.1 8.5 - 10.5 mg/dL   Anion Gap    Collection Time: 01/03/20  6:54 AM   Result Value Ref Range    Anion Gap 12.0 8.0 - 16.0 meq/L   Glomerular Filtration Rate, Estimated    Collection Time: 01/03/20  6:54 AM   Result Value Ref Range    Est, Glom Filt Rate 72 (A) ml/min/1.73m2   CBC Auto Differential    Collection Time: 01/03/20  6:55 AM   Result Value Ref Range    WBC 9.7 4.8 - 10.8 thou/mm3    RBC 3.65 (L) 4.70 - 6.10 mill/mm3    Hemoglobin 11.5 (L) 14.0 - 18.0 gm/dl    Hematocrit 33.8 (L) 42.0 - 52.0 %    MCV 92.6 80.0 - 94.0 fL    MCH 31.5 26.0 - 33.0 pg    MCHC 34.0 32.2 - 35.5 gm/dl    RDW-CV 11.9 11.5 - 14.5 %    RDW-SD 40.4 35.0 - 45.0 fL    Platelets 653 520 - 616 thou/mm3    MPV 12.4 9.4 - 12.4 fL    Seg Neutrophils 70.2 %    Lymphocytes 17.8 %    Monocytes 7.1 %    Eosinophils 4.6 %    Basophils 0.1 %    Immature Granulocytes 0.2 %    Segs Absolute 6.8 1.8 - 7.7 thou/mm3    Lymphocytes Absolute 1.7 1.0 - 4.8 thou/mm3    Monocytes Absolute 0.7 0.4 - 1.3 thou/mm3    Eosinophils Absolute 0.4 0.0 - 0.4 thou/mm3    Basophils Absolute 0.0 0.0 - 0.1 thou/mm3    Immature Grans (Abs) 0.02 0.00 - 0.07 thou/mm3    nRBC 0 /100 wbc   POCT glucose    Collection Time: 01/03/20  7:51 AM   Result Value Ref Range    POC Glucose 149 (H) 70 - 108 mg/dl   POCT glucose    Collection Time: 01/03/20 12:41 PM   Result Value Ref Range    POC Glucose 122 (H) 70 - 108 mg/dl       Impression:  1. Status post cerebrovascular accident with an acute infarct being  located in the left romain-darin as noted on a brain MRI performed on  12/24/2019.  2.  Gait dysfunction. 3.  Deficits with respect to his activities of daily living. 4.  Significant speech and cognitive deficits. 5.  Current history of dysphagia. 6.  Current history of right upper and lower limb weakness.   7. Current history of significant cardiac disease including an  estimated ejection fraction of 25%. The patient currently has a cardiac  LifeVest in place. 8.  History of severe major depression along with anxiety. 9.  History of a psychotic episode occurring in 2017, requiring a mental  hospitalization in [facility]. 10.  History of benign prostatic hypertrophy. 11.  History of anemia. 12.  History of gastroesophageal reflux disease. 13.  History of hypertension. 14.  History of bilateral inguinal surgical repair performed in 2018  Plan:  · He is to continue with his current inpatient rehab. program.  · A Rehab. team conference will be held on 01/07/20. His discharge goal will be established at that time. · Labs drawn on 01/03/20 were reviewed. All of his labs remain stable. Greater than 50% of the 30 minutes was spent with the patient on counseling and with respect to coordinating his current inpatient rehab. care.     Missed Therapy Time:  · None    Michelle Blanco MD

## 2020-01-03 NOTE — PROGRESS NOTES
[facility]  EVALUATION    Time:    Time In: 0900  Time Out: 1000  Timed Code Treatment Minutes: 39 Minutes  Minutes: 60    Date: 1/3/2020  Patient Name: Zeferino Godinez,   Gender: male      MRN: [id number]  : 1940  (78 y.o.)  Referring Practitioner: Dr. Yunior Gallegos   Diagnosis: CVA   Additional Pertinent Hx: Per ER note on 19:  78 y.o. male who presents to the Emergency Department via EMS for the evaluation of a CVA. The patient was found on the floor sitting up around 0630 this morning by his wife, who heard him fall. She reports a drooping mouth on the right side, slurred speech, and right sided weakness when she found him at 0630. The patient's last known well was 22:00 last night. The patient is unable to walk because his left leg is weak, and the patient had to be lifted into a chair after he was found on the floor. 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 Restrictions/Precautions:  Restrictions/Precautions: General Precautions, Fall Risk, Modified Diet  Position Activity Restriction  Other position/activity restrictions: pt pushes to right, R romain, Rt neglect, poor sensation Rt hemibody    Subjective  Chart Reviewed: Yes, Orders, Progress Notes, History and Physical  Patient assessed for rehabilitation services?: Yes    Subjective: Pt supine in bed, agreeable to OT.      Pain:  Pain Assessment  Patient Currently in Pain: Denies    Social/Functional History:  Lives With: Spouse  Type of Home: House  Home Layout: One level, Work area in basement  Home Access: Stairs to enter with rails  Entrance Stairs - Number of Steps: 2 with grab bar(Rt)  Home Equipment: Quad cane(used intermittently)   Bathroom Shower/Tub: Walk-in shower  Bathroom Toilet: Handicap height  Bathroom Equipment: Built-in shower seat    ADL Assistance: Independent  Ambulation Assistance: Independent  Transfer Assistance: instances of LEs buckling. Recommend puma stedy or sit pivot transfer. Upper Extremity Assessment:   RUE AROM : (Pt 2-/5 scap elevation. No shoulder AROM )    Sensation  Overall Sensation Status: (decreased proproception RUE, reports numbness )  RUE Tone: Hypotonic  LUE Tone: Normotonic  Coordination and Movement description: Fine motor impairments, Gross motor impairments, Right UE    Activity Tolerance: Patient limited by fatigue, Treatment limited secondary to decreased cognition    Assessment:  Assessment: Pt demo significant R hemibody weakness, decreased balance, & decreased cognition for ADLs at [facility] s/p CVA. Continued OT recommended to educate Pt on safety & adaptative strategies as well as increase balance, strength, & safety awareness for returning to ADLs. Performance deficits / Impairments: Decreased functional mobility , Decreased ADL status, Decreased endurance, Decreased balance, Decreased safe awareness, Decreased strength, Decreased ROM, Decreased sensation, Decreased cognition, Decreased high-level IADLs  Prognosis: Fair  Safety Devices in place: Yes  Type of devices: All fall risk precautions in place, Gait belt, Call light within reach, Nurse notified, Patient at risk for falls    Treatment Initiated: Treatment and education initiated within context of evaluation. Evaluation time included review of current medical information, gathering information related to past medical, social and functional history, completion of standardized testing, formal and informal observation of tasks, assessment of data and development of plan of care and goals. Treatment time included skilled education and facilitation of tasks to increase safety and independence with ADL's for improved functional independence and quality of life.     Discharge Recommendations:  24 hour supervision or assist, Continue to assess pending progress    Patient Education:  OT Education: OT Role, Plan of Care, ADL Adaptive Strategies, Transfer Training, Energy Conservation    Equipment Recommendations: Other: Monitor pending progress    Plan:  Times per week: 5x/wk for 90 min and 1x/wk for 30 min   Current Treatment Recommendations: Balance Training, Self-Care / ADL, ROM, Strengthening, Functional Mobility Training, Endurance Training, Neuromuscular Re-education, Safety Education & Training, Patient/Caregiver Education & Training, Wheelchair Mobility Training, Home Management Training    Goals:  Patient goals : To get better   Short term goals  Time Frame for Short term goals: 1 week   Short term goal 1: Pt will complete sit to stand transfers with mod assist of 1 with min vcs or RUE support to improve indep with LB clothing mgt   Short term goal 2: Pt will complete 5-7 min EOB ADL task with SBA for sitting balance & min vcs for midline posture to improve balance needed for ADLs. Short term goal 3: Pt will don UB clothing with SBA and mod vcs for romain dressing technique to improve indep with self care. Short term goal 4: Pt will tolerate standing >2 minutes in midline with CGA to improve midline awareness for completion of stand pivots to [facility]. Long term goals  Time Frame for Long term goals : 3-4 weeks   Long term goal 1: Pt will don LB clothing with CGA and min vcs for adaptive technique to [place] indep with self care. Long term goal 2: pt will complete stand pivot transfer to [facility] with CGA to improve indep with toileting. Long term goal 3: Pt will demonstrate 3/5 R elbow flexion to improve indep with donning a shirt. Following session, patient left in safe position with all fall risk precautions in place.

## 2020-01-04 NOTE — PROGRESS NOTES
[address] [facility] THERAPY  [facility]  DAILY NOTE    TIME   SLP Individual Minutes  Time In: 1003  Time Out: 1033  Minutes: 30  Timed Code Treatment Minutes: 15 Minutes   Dysphagia tx- 15 minutes  Cognitive tx- 15 minutes    Date: 2020  Patient Name: Arnoldo Garcia      CSN: [id number]   : 1940  (78 y.o.)  Gender: male   Referring Physician:  Dr. Roman Morrison   Diagnosis: CVA  Secondary Diagnosis: Dysarthria, Dysphagia, Cognitive-Linguistic Deficits   Precautions: High Fall Risk, Aspiration precautions   Current Diet: Minced and Moist with Mildly Thick Liquids  Swallowing Strategies: Full Upright Position, Small Bite/Sip, No Straw, Multiple Swallow, Pulmonary Monitoring, Oral Care after all Meals, Supervision, Medication in Applesauce, Alternate Solids and Liquids, Limit Distractions and Monitor for Fatigue    Date of Last MBS: 19    Pain:  No pain reported. Subjective:  Patient seen sitting upright in wheelchair, alert and cooperative. No family present at the time of the session. Short-Term Goals:  SHORT TERM GOAL #1:  Goal 1: Pt will tolerate a minced/moist diet and mildly thick liquids (no straws) and advanced solids with ST only (hx of silent aspiration with thin) without overt s/s of aspiration to meet nutritional/hydration measures safely. INTERVENTIONS:Completed trials of mildly thick liquids via cup x5 without overt s/s of aspiration, however patient does have history of silent aspiration, so continued close monitoring of pulmonary functioning is warranted. Nurse, Renae, reporting lung sounds are cleared by diminished. SHORT TERM GOAL #2:  Goal 2: Pt will complete labial/lingual/pharyngeal strengthening, coordionation, ROM exercises, DDK rates, rote speech tasks with SOS strategies and tongue twisters x10 for improved timeliness of oral bolus formation/transit, maximized speech intelligibility and improved airway protection. INTERVENTIONS: Completed the following exercises with instruction on proper technique, and rationale for completion:   -Alternating labial protrusion and retraction- 2 sets of x10- Right sided weakness evident   -Alternating oral opening with labial protrusion- 2 sets of x10- good success, however did require visual model from therapist for motor sequencing.   -Lingual Lateralization- 2 sets of x10- Reduced control/coordination of movement noted with the initial 10. Provided verbal feedback regarding performance with instructions to slow rate of movement to improve coordination. Increased success noted with second set of 10 with provision of visual model from therapist.   -Effortful swallow- x10 Increased difficulty noted with completion of spontaneous swallow, with patient relying heavily on use of sips of mildly thick liquids to elicit a swallow trigger. Increased time needed for completion of swallows due to decreased strength and endurance. SHORT TERM GOAL #3:  Goal 3: Pt will complete immediate/delayed/working memory tasks with 70% accuracy, min cues for improved retention of newly learned medical information. INTERVENTIONS: Functional recall: Patient prompted to generate a 4 item grocery list. Provided skilled education regarding compensatory memory strategies to improve retention of this information specifically targeting, rehearsal and association making.   -Immediate recall- 3/4 independently, 1/4 with min cues. -Recall following a 5 minute delay- 3/4 independently, 1/4 with multiple choice cues. *Unable to utilize written reminders as a strategy due to dominant upper extremity weakness. SHORT TERM GOAL #4:  Goal 4: Pt will complete problem solving, executive functioning and attention tasks (i.e. time/money management, scheduling, complex auditory/reading comprehension, etc) with 70% accuracy, mod cues for improved ADL/IADL management and safety awareness/insight.    INTERVENTIONS:Time word

## 2020-01-04 NOTE — PROGRESS NOTES
Activity completed to improve neuromuscular education for patient's improved ability to engage affected R hemibody into ADL tasks. After support response training completed, pt reported incont of bm and assisted with return to room for cleanup. ASSESSMENT:     Activity Tolerance:  Patient tolerance of  treatment: fair. Discharge Recommendations: 24 hour supervision or assist, Continue to assess pending progress  Equipment Recommendations: Other: Monitor pending progress  Plan: Times per week: 5x/wk for 90 min and 1x/wk for 30 min   Current Treatment Recommendations: Balance Training, Self-Care / ADL, ROM, Strengthening, Functional Mobility Training, Endurance Training, Neuromuscular Re-education, Safety Education & Training, Patient/Caregiver Education & Training, Wheelchair Mobility Training, Home Management Training    Patient Education  Patient Education: Plan of Care, ADL's and transfer training, midline orientation, R hemibody awareness    Goals  Short term goals  Time Frame for Short term goals: 1 week   Short term goal 1: Pt will complete sit to stand transfers with mod assist of 1 with min vcs or RUE support to improve indep with LB clothing mgt   Short term goal 2: Pt will complete 5-7 min EOB ADL task with SBA for sitting balance & min vcs for midline posture to improve balance needed for ADLs. Short term goal 3: Pt will don UB clothing with SBA and mod vcs for romain dressing technique to improve indep with self care. Short term goal 4: Pt will tolerate standing >2 minutes in midline with CGA to improve midline awareness for completion of stand pivots to [facility]. Long term goals  Time Frame for Long term goals : 3-4 weeks   Long term goal 1: Pt will don LB clothing with CGA and min vcs for adaptive technique to ipmrove indep with self care. Long term goal 2: pt will complete stand pivot transfer to [facility] with CGA to improve indep with toileting.    Long term goal 3: Pt will demonstrate 3/5

## 2020-01-04 NOTE — PROGRESS NOTES
[address] [address]  INPATIENT PHYSICAL THERAPY  DAILY NOTE  [facility] - [id number]    Time In: 1400  Time Out: 1440  Timed Code Treatment Minutes: 40 Minutes  Minutes: 40          Date: 2020  Patient Name: Cipriano Multani,  Gender:  male        MRN: [id number]  : 1940  (78 y.o.)     Referring Practitioner: Dr. Sosa Li  Diagnosis: acute CVA  Additional Pertinent Hx: Pt admitted [facility] ED s/p fall. MRI+ for infarct Lt hemipons. Cardiology deemed need for life vest (2019),  Hx:  depression , anxiety     Prior Level of Function:  Lives With: Spouse  Type of Home: House  Home Layout: One level, Work area in basement  Home Access: Stairs to enter with rails  Entrance Stairs - Number of Steps: 2 with grab bar(Rt)  Home Equipment: Quad cane(used intermittently)   Bathroom Shower/Tub: Walk-in shower  Bathroom Toilet: Handicap height  Bathroom Equipment: Built-in shower seat    ADL Assistance: Independent  Ambulation Assistance: Independent  Transfer Assistance: Independent  Active : No  Additional Comments: Pt reports he completed his own self care and mobility with intermittent use of a quad cane. Pt states wife completed housekeeping, meal prep and finances. Restrictions/Precautions:  Restrictions/Precautions: General Precautions, Fall Risk, Modified Diet  Position Activity Restriction  Other position/activity restrictions: pt pushes to right, R romain, Rt neglect, poor sensation Rt hemibody    SUBJECTIVE: Pt resting in  in room, family members present and assisted with session. Pt agreeable to session however very fatigued    PAIN: 0/10:     OBJECTIVE:  Bed Mobility:  Rolling to Left: Contact Guard Assistance, Minimal Assistance   Rolling to Right: Contact Guard Assistance, Minimal Assistance   Sit to Supine:  Moderate Assistance, Maximum Assistance     Transfers:  Sit to Stand: Maximum Assistance, X 1, from , puma matute, increased fatigue during second session, requiring heavy assist with trunk and RLE/UE, completed multiple times from [facility] to [address], with verbal cues  Balance:  Static Sitting Balance:  Minimal Assistance, Moderate Assistance  Static Standing Balance: Minimal Assistance, Moderate Assistance    Functional Outcome Measures: Not completed       ASSESSMENT:  Assessment: Patient progressing toward established goals. Activity Tolerance:  Patient tolerance of  treatment: fair. Pt tolerated session with good participation however very fatigued and required increased assist. Bed mobility at end of session for pericare. Equipment Recommendations:Equipment Needed: Yes(continue to assess. Pt has q. cane)  Discharge Recommendations:  Continue to assess pending progress, Patient would benefit from continued therapy after discharge    Plan: Times per week: 5x/ wk 90 min, 1x/ wk 30 min  Current Treatment Recommendations: Strengthening, Transfer Training, Endurance Training, ROM, Balance Training, Wheelchair Mobility Training, Neuromuscular Re-education, Patient/Caregiver Education & Training, Equipment Evaluation, Education, & procurement, Gait Training, Home Exercise Program, Functional Mobility Training, Safety Education & Training    Patient Education  Patient Education: Plan of Care, Family Education, Equipment Education, Transfers    Goals:  Patient goals : get walking  Short term goals  Time Frame for Short term goals: 1 wk  Short term goal 1: Pt to roll to either direction, SBA with cues for Rt hemibody position only, for position change in bed. Short term goal 2: Pt to go supine <->sit, +1 mod assist at trunk up and with RLE going to supine, to get in/out of bed. Short term goal 3: Pt to perform sit/pivot transfer, supporting Rt hand with LT, +1 min assist to get in/out of w/c.   Short term goal 4: Pt to get up/down from various seated surfaces, +1 mod assist to progress to pre gait standing with RW support. Short term goal 5: Pt to stand with RW, maintain midline trunk, with stepping forward back with ea LE, mod assist with RLE  Short term goal 6: Pt to propel w/c >= 150 ft, SBA, for indoor mobility  Long term goals  Time Frame for Long term goals : 4 wks  Long term goal 1: Pt to go supine <->sit, +1 stand by assist , to get in/out of bed. Long term goal 2: Pt to get up/down from various seated surfaces, +1 CGA to get up to walk. Long term goal 3: Pt to perform stand/pivot transfer with use of RW +1 min assist to get in/out of w/c. Long term goal 4: Pt to walk with RW >= 50 ft, CGA to progress to home mobility  Long term goal 5: Pt to get in/out of car, +1 min assist for transportation needs. Following session, patient left in safe position with all fall risk precautions in place.

## 2020-01-04 NOTE — PROGRESS NOTES
Physical Therapy   [facility]  INPATIENT PHYSICAL THERAPY  DAILY NOTE  [address] [facility] - [id number]    Time In: 0900  Time Out: 1000  Timed Code Treatment Minutes: 60 Minutes  Minutes: 60    Date: 2020  Patient Name: Akshat Alex,  Gender:  male        MRN: [id number]  : 1940  (78 y.o.)     Referring Practitioner: Dr. Puma Ramírez  Diagnosis: acute CVA  Additional Pertinent Hx: Pt admitted [facility] ED s/p fall. MRI+ for infarct Lt hemipons. Cardiology deemed need for life vest (2019),  Hx:  depression , anxiety     Prior Level of Function:  Lives With: Spouse  Type of Home: House  Home Layout: One level, Work area in basement  Home Access: Stairs to enter with rails  Entrance Stairs - Number of Steps: 2 with grab bar(Rt)  Home Equipment: Quad cane(used intermittently)   Bathroom Shower/Tub: Walk-in shower  Bathroom Toilet: Handicap height  Bathroom Equipment: Built-in shower seat    ADL Assistance: Independent  Ambulation Assistance: Independent  Transfer Assistance: Independent  Active : No  Additional Comments: Pt reports he completed his own self care and mobility with intermittent use of a quad cane. Pt states wife completed housekeeping, meal prep and finances. Restrictions/Precautions:  Restrictions/Precautions: General Precautions, Fall Risk, Modified Diet  Position Activity Restriction  Other position/activity restrictions: pt pushes to right, R romain, Rt neglect, poor sensation Rt hemibody    SUBJECTIVE: Pt in bed, finishing breakfast upon therapy arrival. Pleasant and agreeable to session.     PAIN: 0/10: denies    OBJECTIVE:  Bed Mobility:  Rolling to Left: Contact Guard Assistance, Minimal Assistance, X 1, with verbal cues , with increased time for completion   Rolling to Right: Contact Guard Assistance, Minimal Assistance, X 1, with verbal cues , with increased time for completion   Supine to Sit: Moderate Assistance, X 1, with verbal Mobility Training, Neuromuscular Re-education, Patient/Caregiver Education & Training, Equipment Evaluation, Education, & procurement, Gait Training, Home Exercise Program, Functional Mobility Training, Safety Education & Training    Patient Education  Patient Education: Plan of Care, [employer] Mobility, Equipment Education, Transfers, Use of Gait Belt, Wheelchair Mobility, Verbal Exercise Instruction    Goals:  Patient goals : get walking  Short term goals  Time Frame for Short term goals: 1 wk  Short term goal 1: Pt to roll to either direction, SBA with cues for Rt hemibody position only, for position change in bed. Short term goal 2: Pt to go supine <->sit, +1 mod assist at trunk up and with RLE going to supine, to get in/out of bed. Short term goal 3: Pt to perform sit/pivot transfer, supporting Rt hand with LT, +1 min assist to get in/out of w/c. Short term goal 4: Pt to get up/down from various seated surfaces, +1 mod assist to progress to pre gait standing with RW support. Short term goal 5: Pt to stand with RW, maintain midline trunk, with stepping forward back with ea LE, mod assist with RLE  Short term goal 6: Pt to propel w/c >= 150 ft, SBA, for indoor mobility  Long term goals  Time Frame for Long term goals : 4 wks  Long term goal 1: Pt to go supine <->sit, +1 stand by assist , to get in/out of bed. Long term goal 2: Pt to get up/down from various seated surfaces, +1 CGA to get up to walk. Long term goal 3: Pt to perform stand/pivot transfer with use of RW +1 min assist to get in/out of w/c. Long term goal 4: Pt to walk with RW >= 50 ft, CGA to progress to home mobility  Long term goal 5: Pt to get in/out of car, +1 min assist for transportation needs. Following session, patient left in safe position with all fall risk precautions in place.

## 2020-01-04 NOTE — PROGRESS NOTES
Daily    finasteride  5 mg Oral Daily    insulin lispro  0-12 Units Subcutaneous TID WC    insulin lispro  0-6 Units Subcutaneous Nightly    metoprolol succinate  25 mg Oral Daily    pantoprazole  40 mg Oral QAM AC    therapeutic multivitamin-minerals  1 tablet Oral Daily    vitamin C  500 mg Oral Daily    Vitamin D  500 Units Oral Daily    rOPINIRole  0.25 mg Oral Nightly     Meds prn: acetaminophen, glucagon (rDNA), glucose, ondansetron       Impression and Plan:  1. Recent DYAN: resolved  - no new labs done today  - check labs in Monday  - clinically stable, and appears euvolemic  - has danielle catheter, per family apparently could not void when he was on acute side- will need voiding trial in 1-2 days as he gets more stronger  - follow voiding/bladder protocol/program    2. Met acidosis: improved/stable with sodium bicarbonate  3. HTN: on Toprol. stable  4. Anemia   5. Chronic systolic cardiomyopathy  6.  Enlarged prostate    D/W patient and RN  D/W family    Christiano Pressley MD  [facility]

## 2020-01-05 NOTE — PROGRESS NOTES
(Fluctuated between CGA and moderate assist. Used mirorr for visual feedback. Pt self correcting once cued to look in mirror. Seated rest break on L elbow prop while OT assisted patient with LB dressing.  )  Standing Balance: Moderate assistance of 1 fluctuating to max A of 1 and min A of another, RLE blocked, cues to scan in mirror to correct midline.  )  Standing Balance  Time: 45 seconds,  30 seconds for LB clothing mgt. Pt leaning right, constant cues to look in mirror to correct midline. Transfers:  Stand Pivot Transfers: Maximum assistance, 2 Person assistance  Sit to stand: Moderate assistance(of 1 and min A of another, pt supporting RUE with L )  Stand to sit: Moderate assistance(of 1 to guide down to bed after LEs buckled )  Transfer Comments: Mirrot used for visual feedback during the transfers. Pt with 2 instances of LEs buckling. Recommend puma stedy or sit pivot transfer. BED MOBILITY:  Supine to Sit: Moderate Assistance for bringing BLEs off side of mat and elevating trunk  Sit to Supine: Moderate Assistance for bringing BLEs onto mat  Scooting: Maximum Assistance advancing his forward        Upper Extremity Assessment:   RUE AROM : (Pt 2-/5 scap elevation. No shoulder AROM )     Sensation  Overall Sensation Status: (decreased proproception RUE, reports numbness )  RUE Tone: Hypotonic  LUE Tone: Normotonic  Coordination and Movement description: Fine motor impairments, Gross motor impairments, Right UE     Activity Tolerance: Patient limited by fatigue, Treatment limited secondary to decreased cognition. Speech:  SPEECH / VOICE:  Speech: Moderate dysarthria characterized by blended word boundaries and omission of word endings. DDK rates completed with poor success d/t poor level of articulatory precision and reduced breath support. Speech intelligibility 60-70% within unknown context in conversation.    Voice: Hoarse/breathy vocal quality with demonstration of thoracic breathing pattern Formation     PHARYNGEAL PHASE:  Impaired:  Decreased Hyolaryngeal Elevation and Suspected Pharyngeal Residue     SIGNS AND SYMPTOMS OF LARYNGEAL PENETRATION / ASPIRATION:  No signs/symptoms of aspiration evident in this evaluation, but cannot rule out silent aspiration.     IMPRESSIONS: Pt presents with moderate oral and mild-moderate pharyngeal dysphagia evidenced by the above skilled level findings. Pt demonstration of slow, uncoordinated bolus breakdown, formation and AP transit resulting in decreased bolus formation of hard solid trials which was NOT cleared s/p use of liquid wash with mild-moderate oral stasis remaining. Required extra time to achieve adequate clearance. No overt pocketing present. Pt with decreased laryngeal elevation upon manual palpation and concerns for pharyngeal stasis given presence of spontaneous multiple swallows. No overt s/s of aspiration s/p trials of hard solid, puree, thin liquids and mildly thick liquids; HOWEVER, MBS completed on 12/26 with demonstration of SILENT aspiration of thin by cup. Given hx of silent aspiration, pt inappropriate for completion of advanced liquid trials. Recommend pharyngeal strengthening with plans for repeat MBS as early as 1/9 to further assess pharyngeal swallow function and determine appropriateness to progress liquid consumption. At this time pt to resume minced/moist diet and mildly thick liquids with restriction on use of straw. Will require intermittent supervision with occasional impulsivity noted with large bites/sips during PO intake this date.     RECOMMENDATIONS:              Modified Barium Swallow:   Not indicated     DIET RECOMMENDATIONS:  Minced/moist and mildly thick liquids      STRATEGIES: Full Upright Position, Small Bite/Sip, No Straw, Multiple Swallow, Pulmonary Monitoring, Oral Care after all Meals, Supervision, Medication in Applesauce, Alternate Solids and Liquids, Limit Distractions and Monitor for Fatigue positive for  decreased range of motion and muscle weakness. NEUROLOGICAL:  positive for memory problems, speech problems, coordination problems, gait problems and weakness  BEHAVIOR/PSYCH:  negative  10 point system review otherwise negative    Objective:  BP (!) 142/68   Pulse 56   Temp 98.1 °F (36.7 °C) (Oral)   Resp 20   Ht 6' (1.829 m)   Wt 150 lb 14.4 oz (68.4 kg)   SpO2 96%   BMI 20.47 kg/m²   He was noted to be awake, alert, and in no acute distress. Orientation: Unable to fully evaluate due to his limited cognition and expressive aphasia. Mood: within normal limits  Affect: calm  General appearance: Patient is well nourished, well developed, well groomed and in no acute distress, appearing stated age, thin. Memory:   abnormal   Attention/Concentration: abnormal   Language:  abnormal - with moderate to severe expressive aphasia and mild to moderate receptive aphasia. HEART:  S1 and S2 with a regular rate and rhythm without murmur, gallop  or rub. LUNGS:  Clear to auscultation bilaterally without rales, rhonchi or  wheezes. ABDOMEN:  Positive bowel sounds in all four quadrants. His abdomen was  noted to be soft, nontender, without masses. Cranial Nerves:  VII: Facial strength: abnormal with respect to a moderate Right-sided facial drooping. XI: Shoulder shrug: no functional Right shoulder shrug. ROM:  abnormal - with respect to the Right uppper and lower limbs. Motor Exam: No acute changes noted with respect to the patient's limb strength. Tone:  normal  Muscle bulk: within normal limits  Sensory:  Sensory intact  Coordination:   abnormal - with respect to his mobility. Deep Tendon Reflexes: No acute changes noted. Skin: warm and dry, no rash or erythema  Edema: None in either lower limb.       Diagnostics:   Recent Results (from the past 24 hour(s))   POCT glucose    Collection Time: 01/04/20  9:30 PM   Result Value Ref Range    POC Glucose 106 70 - 108 mg/dl   POCT glucose    Collection Time: 01/05/20  8:17 AM   Result Value Ref Range    POC Glucose 224 (H) 70 - 108 mg/dl   POCT glucose    Collection Time: 01/05/20 12:11 PM   Result Value Ref Range    POC Glucose 207 (H) 70 - 108 mg/dl   POCT glucose    Collection Time: 01/05/20  5:17 PM   Result Value Ref Range    POC Glucose 185 (H) 70 - 108 mg/dl       Impression:  1. Status post cerebrovascular accident with an acute infarct being  located in the left romain-darin as noted on a brain MRI performed on  12/24/2019.  2.  Gait dysfunction. 3.  Deficits with respect to his activities of daily living. 4.  Significant speech and cognitive deficits. 5.  Current history of dysphagia. 6.  Current history of right upper and lower limb weakness. 7.  Current history of significant cardiac disease including an  estimated ejection fraction of 25%. The patient currently has a cardiac  LifeVest in place. 8.  History of severe major depression along with anxiety. 9.  History of a psychotic episode occurring in 2017, requiring a mental  hospitalization [facility]. 10.  History of benign prostatic hypertrophy. 11.  History of anemia. 12.  History of gastroesophageal reflux disease. 13.  History of hypertension. 14.  History of bilateral inguinal surgical repair performed in 2018  Plan:  · He is to continue with his current inpatient rehab. program.  · A Rehab. team conference will be held on 01/07/20. His discharge goal will be established at that time. · Labs drawn on 01/03/20 were reviewed. All of his labs remain stable. · A BMP will be drawn on 01/06/20. · His recent blood sugars have been stable with a range of 106 - 270. Continue to closely monitor and adjust his medications as needed. Greater than 50% of the 30 minutes was spent with the patient on counseling and with respect to coordinating his current inpatient rehab. care.     Missed Therapy Time:  · None    Aba Gilbert MD

## 2020-01-05 NOTE — PLAN OF CARE
Problem: Falls - Risk of:  Goal: Will remain free from falls  Description  Will remain free from falls  Outcome: Ongoing  Note:   Patient safely transferred stand pivot to chair with extensive 2 assist.  Goal: Absence of physical injury  Description  Absence of physical injury  Outcome: Ongoing  Note:   Pillows used to support patients flaccid arm while patient up to chair      Problem: Risk for Impaired Skin Integrity  Goal: Tissue integrity - skin and mucous membranes  Description  Structural intactness and normal physiological function of skin and  mucous membranes.   Outcome: Ongoing  Intervention: SKIN ASSESSMENT  Note:   Patient skin pink warm and dry     Problem: HEMODYNAMIC STATUS  Goal: Patient has stable vital signs and fluid balance  Outcome: Ongoing  Intervention: POSITION PATIENT FOR MAXIMUM CIRCULATION/CARDIAC OUTPUT  Note:   Patient sitting upright in chair and legs elevated     Problem: IP BLADDER/VOIDING  Goal: STG - Patient demonstrates ability to take care of indwelling catheter  Outcome: Ongoing  Intervention: MONITOR INTAKE AND OUTPUT  Note:   Patient consumed 50% of breakfast and 100% of lunch  Intervention: EDUCATE PATIENT ON APPROPRIATE VOLUMES FOR INTAKE AND OUTPUT  Note:   Patient encouraged to drink 100% of  fluids

## 2020-01-06 NOTE — PROGRESS NOTES
Pt. Awake and up to w/c at 0800. epc had been applied to small open area to coccyx. Pt. Had a small incontinent loose stool at 1030. Lifevest manitn. And battery changed at 1045. [facility] care done wit h am bath.

## 2020-01-06 NOTE — PROGRESS NOTES
[facility] NOTE    Conference Date: 2020  Admit Date:  2020 11:54 AM  Patient Name: Belkis Abad    MRN: [id number]    : 1940  (78 y.o.)  Rehabilitation Admitting Diagnosis:  CVA (cerebral vascular accident) [facility]) [I63.9]  Acute cerebrovascular accident (CVA) [facility]) [I63.9]  Referring Practitioner: Dr. Armida Johnson issues/needs regarding patient and family discharge status: SW met with patient and spouse, Christopher Mead, to introduce self and explain role, complete SW assessment, and initiate discharge planning. Prior to CVA and current hospitalization, but was independent with personal care, but required encouragement from spouse, Christopher Mead, to complete ADL's and personal care. Patient lives with spouse, Christopher Mead, to 48 years. Patient and spouse, Christopher Mead, have six children and twenty three grandchildren. Daughter, Magali Ramirez, lives in [place]. Daughter, Ben Encinas, lives in [place]. Patient's four other children Tura Shoulder, [place], [place], and Leida Lawrence) live in [facility] [place], but all work full time. Patient's spouse, Christopher Mead, indicates patient was very sedentary prior to CVA and would spend a majority of the day in the recliner watching television. Patient would require encouragement from spouse, Christopher Mead, to complete bathing, dressing, and brushing his teeth. Patient would walk to the mailbox daily, wash dishes, and occasionally run the vacuum. Patient would occasionally ambulate with quad cane. Patient with history of depression and psychosis in 2017 with hospitalization. Patient then followed up with psychiatrist in [place] and after a year and a half was discharged. Patient rarely will leave the house (only for doctor appointments). Patient has not driven since . Spouse, Christopher Mead, completes driving, errands, housekeeping, laundry, meal preparation, managing finances, and managing patient's medications.  Patient indicates checking blood sugar once to twice a day at within lexical retrieval, mental flexibility, verbal sequencing/description, short utterance length and reduced syntax within functional communication exchanges and impaired sentence repetition. Mild receptive language deficits evidenced by increased difficulty following complex verbal commands with reliance on extra time d/t slowed mental processing and frequent repetitions needed. Pt admits to presence of hearing aids; however, unavailable within hospital setting. Pt denies need for use of modality at this time. Poor compliance suspected within the home setting. Moderate dysarthria/dysphonia characterized by imprecise articulatory precision, blended word boundaries and omission of word endings d/t lack of breath support. Pt demonstration of hoarse/breathy vocal quality and frequent aphonic breaks. Presence of moderate oral and mild-moderate pharyngeal dysphagia evidenced by demonstration of slow, uncoordinated bolus breakdown, formation and transit of soft/moist solids resulting in mild-moderate oral stasis with HEAVY reliance on liquid/pureed wash to maximize clearance. Intermittent supervision recommended for utilization of skilled swallow strategies. MBS completed on 12/26 with observed SILENT aspiration of thin barium by cup and mildly thick barium by straws. Tolerating a minced/moist diet and mildly thick liquids (restriction on straw usage) with fair success. Concerns for swallow decompensation with recommendations for small/frequent (5x/day) meals. Recommendations for continued pharyngeal strengthening regimen with repeat MBS as early as 1/9 pending trajectory of pt performance. Pt would highly benefit from ongoing ST intervention in order to progress [id number] performance to a supervision level of function for maximized communication of complex thoughts, return to home setting and improved ADL/IADL management post discharge.      OCCUPATIONAL THERAPY  Patient has made steady

## 2020-01-06 NOTE — PROGRESS NOTES
Assistance, Minimal Assistance  Standing Balance: Maximum Assistance, X 1    BED MOBILITY:  Sit to Supine: Maximum Assistance, X 1      TRANSFERS:  Sit to Stand:  Minimal Assistance, Maximum Assistance, X 2. with use of puma matute (only to/from [facility]   Stand to Sit: Maximum Assistance, X 1. to control descend and VCs for R UE attn   Squat Pivot: Minimal Assistance, Moderate Assistance, X 2. w/c <> EOM, w/c > EOB. VCs for R UE attn. Pt was able to direct w/c position and leg rest / lap tray management, requiring cues for brake management and arm rest     ADDITIONAL ACTIVITIES:  Patient completed neuro re-ed task on EOM this date; task was graded to involve sitting in midline x 15 minutes with use of mirror for extrinsic feedback. Pt required min A to CGA at times while sitting EOM. Task graded to involve supportive response training in R UE while L UE was reaching dynamically into ABduction and ER to enforce scap elevation in R scap. 2- noted for scap retraction and pt was able to sustain the [address] position on mat table throughout. Noted when reaching into left field that patient closed left eye, also noted some over shooting when retrieving an object; plan to collaborate with OTR to discuss findings. All neuro re-ed tasks were completed to improve R UE function and midline orientation with sitting balance for pt to self complete ADL routine. ASSESSMENT:  Activity Tolerance:  Patient tolerance of  treatment: good. Assessment: Assessment: Patient has made steady progress on IP Rehab this week. He has progressed to a mod A of 1 to 2 to mod A of 1 and min A of 1 with functional sit pivot transfers. He requires mod A for UB ADLs and total A for LB ADLs. He is limited by his pusher's snydrome, but has shown great progress with this with use of extrinisc feedback. He presents with 1- in scap elevation and retraction, trace noted for biceps.  He is also limited by mild R inattention, balance for both sitting and

## 2020-01-06 NOTE — PROGRESS NOTES
PNF patterns, scap mobilizations     Goals  Short term goals  Time Frame for Short term goals: 1 week   Short term goal 1: Pt will complete sit to stand transfers with mod assist of 1 with min vcs or RUE support to improve indep with LB clothing mgt   Short term goal 2: Pt will complete 5-7 min EOB ADL task with SBA for sitting balance & min vcs for midline posture to improve balance needed for ADLs. Short term goal 3: Pt will don UB clothing with SBA and mod vcs for romain dressing technique to improve indep with self care. Short term goal 4: Pt will tolerate standing >2 minutes in midline with CGA to improve midline awareness for completion of stand pivots to [facility]. Long term goals  Time Frame for Long term goals : 3-4 weeks   Long term goal 1: Pt will don LB clothing with CGA and min vcs for adaptive technique to ipmrove indep with self care. Long term goal 2: pt will complete stand pivot transfer to [facility] with CGA to improve indep with toileting. Long term goal 3: Pt will demonstrate 3/5 R elbow flexion to improve indep with donning a shirt. Following session, patient left in safe position with all fall risk precautions in place.

## 2020-01-06 NOTE — PROGRESS NOTES
requiring mod assist for trunk elevation and then to gain TKE RLE. Once up, pt tends to retract RT hip, lean to Rt. With mirror cues and verbal cues, improved midline noted, though not maintained. Stand to Sit:Moderate Assistance +1, min +1. Pt showing difficulty allowing LLE to flex at hip/knee and to release LUE from walker, tending to push. Sit Pivot: Moderate Assistance +1 to either direction. Pt supporting Rt hand with LT,  Assist for more forward translation of wt over base combined with more dynamic trunk. Pt tends to lean to Rt, but improved alignment noted with intervention. Transfer performed using FELISHA stedy at end of session: +2 mod assist up to stand and then 1 to guide and +1 mod assist to maintain trunk balance to position the device. Balance:  Dynamic Standing Balance: Independent, with RW support, attempted 1 step with LLE forward/back, but incresased Rt hip retraction, lean to RT noted and increased push off of Lt noted. Wheelchair Mobility:  Minimal Assistance  Extremities Used: Left Upper Extremity and Left Lower Extremity  Type of Chair:Manual  Surface: Level Tile  Distance: 150 ft  Quality: Veers Right, Decreased Fluidity and max cues and min assist needed initially for pt to recognize need to use LUE only minimally as veering to RT. Once cued to not use LUE at all, then improved control noted. ASSESSMENT:  Assessment: Patient progressing toward established goals. Activity Tolerance:  Patient tolerance of  treatment: fair. fatigued     Equipment Recommendations:Equipment Needed: Yes(continue to assess.   Pt has q. cane)  Discharge Recommendations:  Continue to assess pending progress, Patient would benefit from continued therapy after discharge    Plan: Times per week: 5x/ wk 90 min, 1x/ wk 30 min  Current Treatment Recommendations: Strengthening, Transfer Training, Endurance Training, ROM, Balance Training, Wheelchair Mobility Training, Neuromuscular Re-education,

## 2020-01-06 NOTE — PROGRESS NOTES
[facility]  INPATIENT PHYSICAL THERAPY  PROGRESS NOTE  [facility]    Time In: 1100  Time Out: 1200  Timed Code Treatment Minutes: 60 Minutes  Minutes: 60          Date: 2020  Patient Name: Jeferson Parada,  Gender:  male        MRN: [id number]  : 1940  (78 y.o.)     Referring Practitioner: Dr. Akiko Kent  Diagnosis: acute CVA  Additional Pertinent Hx: Pt admitted [facility] ED s/p fall. MRI+ for infarct Lt hemipons. Cardiology deemed need for life vest (2019),  Hx:  depression , anxiety     Prior Level of Function:  Lives With: Spouse  Type of Home: House  Home Layout: One level, Work area in basement  Home Access: Stairs to enter with rails  Entrance Stairs - Number of Steps: 2 with grab bar(Rt)  Home Equipment: Quad cane(used intermittently)   Bathroom Shower/Tub: Walk-in shower  Bathroom Toilet: Handicap height  Bathroom Equipment: Built-in shower seat    ADL Assistance: Independent  Ambulation Assistance: Independent  Transfer Assistance: Independent  Active : No  Additional Comments: Pt reports he completed his own self care and mobility with intermittent use of a quad cane. Pt states wife completed housekeeping, meal prep and finances. Restrictions/Precautions:  Restrictions/Precautions: General Precautions, Fall Risk, Modified Diet  Position Activity Restriction  Other position/activity restrictions: pt pushes to right, R romain, Rt neglect, poor sensation Rt hemibody    SUBJECTIVE: Pt asleep in bed. Woke easily, but had initial difficulty keeping alert. Pleasant and cooperative. Pt showing significant flexor withdrawal RLE and increased tone Rt shoulder. PAIN: 0/10:     OBJECTIVE:  Bed Mobility:    Rolling to Right: Moderate Assistance to position RLE into hooklying and to position RUE across abdomen. Once positioned, then min assist to complete rotation with upper trunk to Rt.   Cues and hand over hand guidance to locate edge of bed. Supine to Sit: Moderate Assistance to fully clear LEs and to elevate trunk from Rt side. Once up, mod to max assist to position RUE and to gain midline. Transfers:  sit/pivot:  : Moderate Assistance to either direction, but more resistance noted initial going to the LT. Max cues for upright trunk and then for more forward trunk flexion. Pt supporting Rt hand with LT.  2 scoots ea direction to complete the transfer  Sit <->stand:  Pt supporting Rt hand with LT.  +1 mod, +1 min assist.  Assist for more forward trunk flexion and manual cues for Rt thigh activation. Once up, pt placed LUE around shoulder of another on Lt side. Using mirror for visual cues, pt was able to gain midline more readily and maintain with cues. Progress to shallow squats x6 reps. Mod +2 throughout the maneuver. Balance:  Static Sitting Balance:  Minimal Assistance with pt supporting Rt hand with LT, lean to RT, but corrected with assist and cues. Mod to max cues to maintain midline. Dynamic Sitting Balance: Minimal Assistance with Rt hand in wt bearing on mat and assist with RLE positioning. Reaching with LUE to LT 1-2\" out of bed and then regaining midline. 10 reps. Dynamic Standing Balance: Moderate Assistance +2. See above    EXERCISES:  The following exercises were completed to improve functional mobility: passive Rt scapular mobilization followed by assisted shoulder girdle protraction/retraction x7 reps, PNF D2 x7 reps with cues for pt to follow visually. Bridges with RUE across chest x10 reps, passive Rt heel cord stretch followed by assisted PNF D1 RLE and Rt hip ABD x7 reps ea. Tone inhibition noted after therex  Functional Outcome Measures: Not completed        ASSESSMENT:  Assessment: Patient progressing toward established goals, though not yet meeting any due to short length of stay.   Pt did show flexor withdrawal RLE, combined with overall decreased strength Rt hemibody, poor midline awareness resulting in need for +2 assist and not yet able to be advanced to gait with AD. Pt will continue to benefit from skilled PT to assist with neuro reeducation, strengthening, balance and advancement into more functional standing activities. Activity Tolerance:  Patient tolerance of  treatment: good. Equipment Recommendations:Equipment Needed: Yes(continue to assess. Pt has q. cane)  Discharge Recommendations:  Continue to assess pending progress, Patient would benefit from continued therapy after discharge    Plan: Times per week: 5x/ wk 90 min, 1x/ wk 30 min  Current Treatment Recommendations: Strengthening, Transfer Training, Endurance Training, ROM, Balance Training, Wheelchair Mobility Training, Neuromuscular Re-education, Patient/Caregiver Education & Training, Equipment Evaluation, Education, & procurement, Gait Training, Home Exercise Program, Functional Mobility Training, Safety Education & Training    Patient Education  Patient Education: [employer], Transfers, - Patient Requires Continued Education    Goals:  Patient goals : get walking  Short term goals  Time Frame for Short term goals: 1 wk  Short term goal 1: Pt to roll to either direction, SBA with cues for Rt hemibody position only, for position change in bed. NOT MET, continue  Short term goal 2: Pt to go supine <->sit, +1 mod assist at trunk up and with RLE going to supine, to get in/out of bed. NOT MET, continue  Short term goal 3: Pt to perform sit/pivot transfer, supporting Rt hand with LT, +1 min assist to get in/out of w/c. NOT MET, continue  Short term goal 4: Pt to get up/down from various seated surfaces, +1 mod assist to progress to pre gait standing with RW support. NOT MET, continue  Short term goal 5: Pt to stand with RW, maintain midline trunk, with stepping forward back with ea LE, mod assist with RLE NOT MET, continue  Short term goal 6: Pt to propel w/c >= 150 ft, SBA, for indoor mobility NOT MET,

## 2020-01-06 NOTE — PROGRESS NOTES
Physical Medicine & Rehabilitation   Progress Note    Chief Complaint: S/P [facility]. [facility]. 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 Subjective: Patient reported no current head, trunk, or limb pain. He slept well last evening. His inpatient rehab. therapies are going well. He had no acute concerns at this time. Patient was evaluated today while he was resting in his bed. Rehabilitation:  PT:   Bed Mobility:  Rolling to Left: Contact Guard Assistance, Minimal Assistance   Rolling to Right: Contact Guard Assistance, Minimal Assistance   Sit to Supine: Moderate Assistance, Maximum Assistance. Transfers:  Sit to Stand: Moderate Assistance +1, min+1 up from mat and then up from w/c into FELISHA stedy. Pt requiring mod assist for trunk elevation and then to gain TKE RLE. Once up, pt tends to retract RT hip, lean to Rt. With mirror cues and verbal cues, improved midline noted, though not maintained. Stand to Sit:Moderate Assistance +1, min +1. Pt showing difficulty allowing LLE to flex at hip/knee and to release LUE from walker, tending to push. Sit Pivot: Moderate Assistance +1 to either direction. Pt supporting Rt hand with LT,  Assist for more forward translation of wt over base combined with more dynamic trunk. Pt tends to lean to Rt, but improved alignment noted with intervention. Transfer performed using FELISHA stedy at end of session: +2 mod assist up to stand and then 1 to guide and +1 mod assist to maintain trunk balance to position the device.       Balance:  Dynamic Standing Balance: Independent, with RW support, attempted 1 step with LLE forward/back, but incresased Rt hip retraction, lean to RT noted and increased push off of Lt noted.      Wheelchair Mobility:  Minimal Assistance  Extremities Used: Left Upper Extremity and Left Lower Extremity  Type of Chair:Manual  Surface: Level Tile  Distance: 150 ft  Quality: Veers Right, Decreased Fluidity and max cues and min assist needed initially for pt to recognize need to use LUE only minimally as veering to RT. Once cued to not use LUE at all, then improved control noted.         EXERCISES:  The following exercises were completed to improve functional mobility: graded lifts from elevate mat. 1st attempted with pt placing katey hands on stool in front. Too much pushing from LUE noted. Graded lift without use of stool showed improved midline. Max assist with all trials. O.T:  ADL's:  Feeding: Setup(completed with non dominant hand )  Grooming: Stand by assistance(using non dominant hand with H202 and water )  UE Bathing: Moderate assistance(and min A for sitting balance )  LE Bathing: Dependent/Total(Mod A of 1 for balance and total A of another )  UE Dressing: Moderate assistance(A to thread RUE )  LE Dressing: Maximum Assistance. A of two; to thraed and manage over hips. Max A x 1 for standing balanace in midline with use of puma stedy. Toileting: Maximal Assistance.     Functional Mobility:  Bed mobility  Supine to Sit: Maximum assistance     Functional Mobility  Functional Mobility Comments: Not appropriate at this time  OTR to assess as appropriate      Balance:  Balance  Sitting Balance: (Fluctuated between CGA and moderate assist. Used Serious Parody for visual feedback. Pt self correcting once cued to look in mirror. Seated rest break on L elbow prop while OT assisted patient with LB dressing.  )  Standing Balance: Moderate assistance of 1 fluctuating to max A of 1 and min A of another, RLE blocked, cues to scan in mirror to correct midline.  )  Standing Balance  Time: 45 seconds,  30 seconds for LB clothing mgt. Pt leaning right, constant cues to look in mirror to correct midline.     Transfers:  Sit to Stand:  Minimal Assistance, Maximum Assistance, X 2. with use of puma stedy (only to[facility]from [facility]   Stand to Sit: Maximum Assistance, X 1. to control descend and VCs for R UE attn   Squat Pivot: Minimal Assistance, Moderate Assistance, X 2. w/c <> EOM, w/c > EOB. VCs for R UE attn. Pt was able to direct w/c position and leg rest / lap tray management, requiring cues for brake management and arm rest.    BED MOBILITY:  Supine to Sit: Moderate Assistance for bringing BLEs off side of mat and elevating trunk  Sit to Supine: Moderate Assistance for bringing BLEs onto mat  Scooting: Maximum Assistance advancing his forward        Upper Extremity Assessment:   RUE AROM : (Pt 2-/5 scap elevation. No shoulder AROM )     Sensation  Overall Sensation Status: (decreased proproception RUE, reports numbness )  RUE Tone: Hypotonic  LUE Tone: Normotonic  Coordination and Movement description: Fine motor impairments, Gross motor impairments, Right UE     Activity Tolerance: Patient limited by fatigue, Treatment limited secondary to decreased cognition. Speech:  SPEECH / VOICE:  Speech: Moderate dysarthria characterized by blended word boundaries and omission of word endings. DDK rates completed with poor success d/t poor level of articulatory precision and reduced breath support. Speech intelligibility 60-70% within unknown context in conversation. Voice: Hoarse/breathy vocal quality with demonstration of thoracic breathing pattern and poor level of breath support. Frequent aphonic breaks     LANGUAGE:  Receptive:  2 Step Commands: 2/4 indep, 1/4 repetition  Complex Yes/No Questions: 4/4     Expressive:  Confrontational Naming: 3/3 MOCA  Divergent Naming (abstract): impaired-- see below   Sentence Formulation: impaired-- short utterance length with reduced syntax  Conversational Speech: impaired-- see above   Paraphasias: none noted   Repetition: sentence level 1/2     COGNITION:  Goodridge Cognitive Assessment (MOCA) version 7.1 completed. Pt with adjusted score 18/25. Unable to complete written portion d/t extremity weakness in dominant right hand. Normal is greater than or equal to 26/30.     Orientation: 6/6  Immediate Recall: 2/5-- improved to 4/5 with repetition Short-Term Recall: 4/5 indep, 1/5 FO2  Divergent Naming: x4 indep 1 minute (N=11)   Reasoning: verbal abstraction- 0/2  Sequencing: impaired  Thought Organization: at least mild impairment  Insight: fair   Attention: decreased high level selective attention   Math Computation: 3/5  Executive Functioning: unable to complete d/t extremity weakness     SWALLOWING:  Current Diet: Minced/moist diet and mildly thick liquids   Respiratory Status:   Independent    Skilled dysphagia therapy via direct meal intervention. Pt with multiple trials of scrambled eggs, applesauce, magic cup and mildly thick liquids by cup. Impulsivity with large bolus/liquid intake and fast rate of PO intake intermittently noted. Improved small bolus size and slowed rate of PO intake with skilled level education provided. Pt demonstration of slow/uncoordinated bolus breakdown, formation and transit resulting in mild-moderate oral stasis with heavy reliance on liquid/pureed wash to maximize oral clearance. Pt many times attempting to complete continuous trials despite poor oral clearance achieved. Required max cues for use of alternating liquids/purees in order to enhance clearance. Improved pt carryover with use of strategy as trials progressed. Certainly cannot r/o premature pharyngeal entry of liquid bolus with concerns for decreased bolus control s/p large liquid bolus consumption. Improved control with small, controlled drinks. Pt with presence of continuous, persistent coughing at END of meal trial with consumption of magic cup. Concerns for swallow decompensation. Skilled level education provided to pt re: skilled use of swallow strategies (I.e. starting with minced/moist trials and ending with purees to combat fatigue, focus on small/frequent meals, small bites/sips, slow rate, achievement of oral clearance with liquid/pureed wash, etc). Tray pulled to allow pt to rest given concerns for fatigue.  Consumption of 50% of eggs, 50% of applesauce, 25% of magic cup and ~5 ounces of mildly thick liquids this date. Pt to resume minced/moist diet and mildly thick liquids with CLOSE pulmonary monitoring.        Behavioral Observation: Alert, Oriented and Dysarthric     ORAL MECHANISM EVALUATION:       Facial / Labial Impaired Right sided facial drooping with flaccidity. Decreased labial seal and strength    Lingual Impaired Decreased lingual ROM/strength and coordination. Lingual deviation to right upon protrusion   Dentition WFL Decreased oral hygiene    Velum WFL     Vocal Quality Impaired Hoarse/breathy with reduced breath support and presence of frequent aphonic breaks    Sensation Not Tested     Cough Not Tested       PATIENT WAS EVALUATED USING:  Puree, hard solid, thin liquids by cup, mildly thick liquids by cup      ORAL PHASE:  Impaired:  Impaired AP Movement, Impaired Mastication and Reduced Bolus Formation     PHARYNGEAL PHASE:  Impaired:  Decreased Hyolaryngeal Elevation and Suspected Pharyngeal Residue     SIGNS AND SYMPTOMS OF LARYNGEAL PENETRATION / ASPIRATION:  No signs/symptoms of aspiration evident in this evaluation, but cannot rule out silent aspiration.     IMPRESSIONS: Pt presents with moderate oral and mild-moderate pharyngeal dysphagia evidenced by the above skilled level findings. Pt demonstration of slow, uncoordinated bolus breakdown, formation and AP transit resulting in decreased bolus formation of hard solid trials which was NOT cleared s/p use of liquid wash with mild-moderate oral stasis remaining. Required extra time to achieve adequate clearance. No overt pocketing present. Pt with decreased laryngeal elevation upon manual palpation and concerns for pharyngeal stasis given presence of spontaneous multiple swallows. No overt s/s of aspiration s/p trials of hard solid, puree, thin liquids and mildly thick liquids; HOWEVER, MBS completed on 12/26 with demonstration of SILENT aspiration of thin by cup.  Given hx of silent aspiration, pt inappropriate for completion of advanced liquid trials. Recommend pharyngeal strengthening with plans for repeat MBS as early as 1/9 to further assess pharyngeal swallow function and determine appropriateness to progress liquid consumption. At this time pt to resume minced/moist diet and mildly thick liquids with restriction on use of straw. Will require intermittent supervision with occasional impulsivity noted with large bites/sips during PO intake this date. RECOMMENDATIONS:              Modified Barium Swallow:   Not indicated     DIET RECOMMENDATIONS:  Minced/moist and mildly thick liquids      STRATEGIES: Full Upright Position, Small Bite/Sip, No Straw, Multiple Swallow, Pulmonary Monitoring, Oral Care after all Meals, Supervision, Medication in Applesauce, Alternate Solids and Liquids, Limit Distractions and Monitor for Fatigue                 RECOMMENDATIONS/ASSESSMENT:  DIAGNOSTIC IMPRESSIONS:  Pt presents with at least mild cognitive deficits evidenced by derived/adjusted MOCA score of 18/25. This is improved from acute care stay in which pt scored a 11/25 on 12/27. Deficits found within the domains of immediate/delayed recall, verbal reasoning, thought organization, decreased high level attention and math computation. Moderate expressive language deficits evidenced by impaired lexical retrieval, mental flexibility, verbal sequencing/description, short utterance length with reduced syntax and impaired sentence repetition. Pt with mild receptive language deficits evidenced by increasing difficulty following complex verbal commands, need for extra time d/t slowed mental processing and heavy reliance on repetitions. Pt reports hx of hearing aids. Denies need for hearing aids at this time. Pt with moderate dysarthria characterized by blended word boundaries, imprecise articulatory precision and omission of word endings.  Moderate dysphonia evidenced by poor level of breath support, presence of aphonic breaks and hoarse/breathy vocal quality. Pt would highly benefit from ongoing ST intervention in order to progress [id number] functioning to a supervision level for safe, functional return to home setting and demonstration of independent ADL/IADL completion upon discharge. Rehabilitation Potential: excellent     Comprehension: 4 - Patient understands basic needs 75-90%+ of the time  Expression: 3 - Expresses basic ideas/needs 50-74% of the time  Social Interaction: 4 - Patient appropriate 75-90%+ of the time  Problem Solving: 3 - Patient solves simple/routine tasks 50%-74%  Memory: 3 - Patient remembers 50%-74% of the time.       Review of Systems:  CONSTITUTIONAL:  negative  RESPIRATORY:  negative  CARDIOVASCULAR:  negative  GASTROINTESTINAL:  negative  GENITOURINARY:  positive for a Solis catheter in place. MUSCULOSKELETAL:  positive for  decreased range of motion and muscle weakness. NEUROLOGICAL:  positive for memory problems, speech problems, coordination problems, gait problems and weakness  BEHAVIOR/PSYCH:  negative  10 point system review otherwise negative    Objective:  /64   Pulse 68   Temp 97.9 °F (36.6 °C) (Oral)   Resp 16   Ht 6' (1.829 m)   Wt 150 lb 14.4 oz (68.4 kg)   SpO2 98%   BMI 20.47 kg/m²   He was noted to be awake, alert, and in no acute distress. Orientation: Unable to fully evaluate due to his limited cognition and expressive aphasia. Mood: within normal limits  Affect: calm  General appearance: Patient is well nourished, well developed, well groomed and in no acute distress, appearing stated age, thin. Memory:   abnormal   Attention/Concentration: abnormal   Language:  abnormal - with moderate to severe expressive aphasia and mild to moderate receptive aphasia. HEART:  S1 and S2 with a regular rate and rhythm without murmur, gallop  or rub. LUNGS:  Clear to auscultation bilaterally without rales, rhonchi or  wheezes.   ABDOMEN:

## 2020-01-06 NOTE — PROGRESS NOTES
skilled level education provided. Pt demonstration of slow/uncoordinated bolus breakdown, formation and transit resulting in mild-moderate oral stasis with heavy reliance on liquid/pureed wash to maximize oral clearance. Pt many times attempting to complete continuous trials despite poor oral clearance achieved. Required max cues for use of alternating liquids/purees in order to enhance clearance. Improved pt carryover with use of strategy as trials progressed. Certainly cannot r/o premature pharyngeal entry of liquid bolus with concerns for decreased bolus control s/p large liquid bolus consumption. Improved control with small, controlled drinks. Pt with presence of continuous, persistent coughing at END of meal trial with consumption of magic cup. Concerns for swallow decompensation. Skilled level education provided to pt re: skilled use of swallow strategies (I.e. starting with minced/moist trials and ending with purees to combat fatigue, focus on small/frequent meals, small bites/sips, slow rate, achievement of oral clearance with liquid/pureed wash, etc). Tray pulled to allow pt to rest given concerns for fatigue. Consumption of 50% of eggs, 50% of applesauce, 25% of magic cup and ~5 ounces of mildly thick liquids this date. Pt to resume minced/moist diet and mildly thick liquids with CLOSE pulmonary monitoring. SHORT TERM GOAL #2:  Goal 2: Pt will complete labial/lingual/pharyngeal strengthening, coordionation, ROM exercises, DDK rates, rote speech tasks with SOS strategies and tongue twisters x10 for improved timeliness of oral bolus formation/transit, maximized speech intelligibility and improved airway protection. INTERVENTIONS: Recall of speech strategies: 1/3 indep, 1/3 mod cues, 1/3 max referral to external aid     SHORT TERM GOAL #3:  Goal 3: Pt will complete immediate/delayed/working memory tasks with 70% accuracy, min cues for improved retention of newly learned medical information. INTERVENTIONS: Orientation: indep recall of month, JEFFREY, year, location (x2)  Max external aid for improved recall of date and time   *pt off by time x7+ hours despite prior consumption of breakfast. Poor problem solving noted to determine time with cues provided with continued reliance on external aid     SHORT TERM GOAL #4:  Goal 4: Pt will complete problem solving, executive functioning and attention tasks (i.e. time/money management, scheduling, complex auditory/reading comprehension, etc) with 70% accuracy, mod cues for improved ADL/IADL management and safety awareness/insight. INTERVENTIONS: DNT d/t focus on other goals     SHORT TERM GOAL #5:  Goal 5: Pt will complete complex naming, verbal sequencing/description and sentence formulation/repetition tasks with 70% accuracy, mod cues for improved expression of basic/complex thoughts. INTERVENTIONS: DNT d/t focus on other goals     Long-Term Goals:  Timeframe for Long-term Goals: 3 weeks     LONG TERM GOAL #1:  Goal 1: Pt will tolerate a soft/bite sized diet and thin liquids without overt s/s of aspiration to meet nutritional/hydration measures safely. LONG TERM GOAL #2:  Goal 2: Pt will improve [id number]-[id number]-[id number] skills to a supervision level of function for maximized expression of complex thoughts, independent completion of ADL/IADL responsibilities and appropriate transition to home setting with wife post discharge.        Comprehension: 4 - Patient understands basic needs 75-90%+ of the time  Expression: 3 - Expresses basic ideas/needs 50-74% of the time  Social Interaction: 4 - Patient appropriate 75-90%+ of the time  Problem Solving: 3 - Patient solves simple/routine tasks 50%-74%  Memory: 3 - Patient remembers 50%-74% of the time        EDUCATION:  Learner: Patient  Education:  Reviewed ST goals and Plan of Care and Reviewed recommendations for follow-up  Evaluation of Education: Verbalizes understanding, Needs further instruction and Family not present    ASSESSMENT/PLAN:  Activity Tolerance:  Patient tolerance of  treatment: good. Appropriate participation throughout the session. Assessment/Plan: Patient progressing toward established goals. Continues to require skilled care of licensed speech pathologist to progress toward achievement of established goals and plan of care. .     Plan for Next Session: Implementation of speech strategies, Recall, HIgher level verbal fluency tasks     Chase Uriostegui M.S. CCC-SLP [id number] 1/6/2020

## 2020-01-06 NOTE — PROGRESS NOTES
cues, 1/3 max referral to external aid     Prior session: Completed the following exercises with instruction on proper technique, and rationale for completion:   -Alternating labial protrusion and retraction- 2 sets of x10- Right sided weakness evident   -Alternating oral opening with labial protrusion- 2 sets of x10- good success, however did require visual model from therapist for motor sequencing.   -Lingual Lateralization- 2 sets of x10- Reduced control/coordination of movement noted with the initial 10. Provided verbal feedback regarding performance with instructions to slow rate of movement to improve coordination. Increased success noted with second set of 10 with provision of visual model from therapist.   -Effortful swallow- x10 Increased difficulty noted with completion of spontaneous swallow, with patient relying heavily on use of sips of mildly thick liquids to elicit a swallow trigger. Increased time needed for completion of swallows due to decreased strength and endurance. SHORT TERM GOAL #3:  Goal 3: Pt will complete immediate/delayed/working memory tasks with 70% accuracy, min cues for improved retention of newly learned medical information. GOAL NOT MET- CONTINUE   INTERVENTIONS: DNT d/t focus on other goals     Prior session: Orientation: indep recall of month, JEFFREY, year, location (x2)  Max external aid for improved recall of date and time   *pt off by time x7+ hours despite prior consumption of breakfast. Poor problem solving noted to determine time with cues provided with continued reliance on external aid     Functional recall: Patient prompted to generate a 4 item grocery list. Provided skilled education regarding compensatory memory strategies to improve retention of this information specifically targeting, rehearsal and association making.   -Immediate recall- 3/4 independently, 1/4 with min cues. -Recall following a 5 minute delay- 3/4 independently, 1/4 with multiple choice cues. *Unable to utilize written reminders as a strategy due to dominant upper extremity weakness. SHORT TERM GOAL #4:  Goal 4: Pt will complete problem solving, executive functioning and attention tasks (i.e. time/money management, scheduling, complex auditory/reading comprehension, etc) with 70% accuracy, mod cues for improved ADL/IADL management and safety awareness/insight. GOAL NOT MET- CONTINUE   INTERVENTIONS: Time word problems: 3/9 indep,  self correction, 1/ min cues, 1/ mod cues, 3 max cues   *decreased organization, reading comprehension and basic attention to details with pt giving \"off the wall answers\" in relation to hours vs time and vice versa. Significantly poor task analysis and break up of prompts to figure out mildly complex time management problems with heavy reliance on cueing. *poor use of compensation (I.e. counting on fingers, reference to clock, etc) when answers not immediately realized. Calculating bill/coin amounts (worksheet):  indep, 1/6 min cues, 1/6 min-mod cues   *decreased attention, mathematical computation and working recall   *improved success with further trials     Complex change making: unable to complete  Basic change makin/8 indep, 1/8 mod cues     Selective locating of information on bank statement: 2/5 indep, 1/5 min cues, 1/5 mod cues, 1/5 max cues   *poor selective attention for complex visual     Prior session: Time word problems- 1/5 independently, 2/5 with min cues, 2/5 with mod cues. *Difficutly with sustained attention during the task as patient was distracted by the  taking the trash out. *Decreased working memory noted, with patient requiring multiple repetitions of prompts. *Improved performance noted with assist from therapist to implement task analysis to break tasks into more manageable parts.      SHORT TERM GOAL #5:  Goal 5: Pt will complete complex naming, verbal sequencing/description and sentence language deficits evidenced by impairments within lexical retrieval, mental flexibility, verbal sequencing/description, short utterance length and reduced syntax within functional communication exchanges and impaired sentence repetition. Mild receptive language deficits evidenced by increased difficulty following complex verbal commands with reliance on extra time d/t slowed mental processing and frequent repetitions needed. Pt admits to presence of hearing aids; however, unavailable within hospital setting. Pt denies need for use of modality at this time. Poor compliance suspected within the home setting. Moderate dysarthria/dysphonia characterized by imprecise articulatory precision, blended word boundaries and omission of word endings d/t lack of breath support. Pt demonstration of hoarse/breathy vocal quality and frequent aphonic breaks. Presence of moderate oral and mild-moderate pharyngeal dysphagia evidenced by demonstration of slow, uncoordinated bolus breakdown, formation and transit of soft/moist solids resulting in mild-moderate oral stasis with HEAVY reliance on liquid/pureed wash to maximize clearance. Intermittent supervision recommended for utilization of skilled swallow strategies. MBS completed on 12/26 with observed SILENT aspiration of thin barium by cup and mildly thick barium by straws. Tolerating a minced/moist diet and mildly thick liquids (restriction on straw usage) with fair success. Concerns for swallow decompensation with recommendations for small/frequent (5x/day) meals. Recommendations for continued pharyngeal strengthening regimen with repeat MBS as early as 1/9 pending trajectory of pt performance. Pt would highly benefit from ongoing ST intervention in order to progress [id number] performance to a supervision level of function for maximized communication of complex thoughts, return to home setting and improved ADL/IADL management post discharge.

## 2020-01-07 NOTE — PLAN OF CARE
Problem: Falls - Risk of:  Goal: Will remain free from falls  Description  Will remain free from falls  1/7/2020 1127 by Judit Scanlon LPN  Outcome: Ongoing  1/7/2020 0029 by Arely Hernandez LPN  Outcome: Ongoing  Pt will remain free of falls. Pt is 2 assist with puma matute, R side is flaccid     Problem: Risk for Impaired Skin Integrity  Goal: Tissue integrity - skin and mucous membranes  Description  Structural intactness and normal physiological function of skin and  mucous membranes. 1/7/2020 1127 by Judit Scanlon LPN  Outcome: Ongoing  1/7/2020 0029 by Arely Hernandez LPN  Outcome: Ongoing  Skin assessment every shift. Reposition frequently to prevent addidtional skin breakdown, currently has open area on buttocks use of EPC cream.     Problem: HEMODYNAMIC STATUS  Goal: Patient has stable vital signs and fluid balance  Outcome: Ongoing  Vs stable at this time     Problem: Metabolic:  Goal: Ability to maintain clinical measurements within normal limits will improve  Description  Ability to maintain clinical measurements within normal limits will improve  Outcome: Ongoing  Chems ac/hs with humalog sliding scale per order.

## 2020-01-07 NOTE — PROGRESS NOTES
[address]  Occupational Therapy  Daily Note  Time:   Time In: 1000  Time Out: 1130  Timed Code Treatment Minutes: 90 Minutes  Minutes: 90        Date: 2020  Patient Name: Juan Antonio Ballesteros,   Gender: male      Room: [facility]/056-A  MRN: [id number]  : 1940  (78 y.o.)  Referring Practitioner: Dr. Tommy Beth   Diagnosis: CVA   Additional Pertinent Hx: Per ER note on 19:  78 y.o. male who presents to the Emergency Department via EMS for the evaluation of a CVA. The patient was found on the floor sitting up around 0630 this morning by his wife, who heard him fall. She reports a drooping mouth on the right side, slurred speech, and right sided weakness when she found him at 0630. The patient's last known well was 22:00 last night. The patient is unable to walk because his left leg is weak, and the patient had to be lifted into a chair after he was found on the floor. 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 Restrictions/Precautions:  Restrictions/Precautions: General Precautions, Fall Risk, Modified Diet  Position Activity Restriction  Other position/activity restrictions: pt pushes to right, R romain, Rt neglect, poor sensation Rt hemibody    SUBJECTIVE: Pt seated in w/c, agreeable to OT. Pt presents with fatigue. PAIN: Pt c/o neck pain, notify nursing. Plan to administer heating pad. COGNITION: Decreased Recall, Impaired Memory, Inattention, Decreased Problem Solving, Decreased Safety Awareness, Impaired Attention, Decreased Arousal and Difficulty Following Commands    ADL:   Grooming: with set-up and with verbal cues . Min verbal cues for R sided attention  Bathing: Moderate Assistance. Assist with Bilateral feet, , and bottom  Upper Extremity Dressing: Minimal Assistance. Min A with threading R UE  Lower Extremity Dressing: Moderate Assistance. Mod A with threading BLE  Toileting: Maximum Assistance and X 2. Toilet Transfer: Minimal Assistance, Moderate Assistance and X 2. Mod A of 1 Min of another, W/c <> [facility]  Shower Transfer: Minimal Assistance, Moderate Assistance and X 2. Mod A of 1, min A of another, w/c <> shower bench. BALANCE:  Sitting Balance:  Contact Guard Assistance- CGA consistently but required up to mod A when pushing to the R  Standing Balance: Moderate Assistance, X 1    BED MOBILITY:  Sit to Supine: Maximum Assistance, X 1      TRANSFERS:  Sit to Stand:  Maximum Assistance, X 1. from [facility] to /, for clothing management purpose  Stand to Sit: Maximum Assistance, X 1.    Squat Pivot: Minimal Assistance, Moderate Assistance, X 2. from w/c <>[facility], w/c <> shower bench        ASSESSMENT:  Activity Tolerance:  Patient tolerance of  treatment: fair. Limited by fatigue      Discharge Recommendations: 24 hour supervision or assist, Continue to assess pending progress  Equipment Recommendations: Other: Monitor pending progress  Plan: Times per week: 5x/wk for 90 min and 1x/wk for 30 min   Current Treatment Recommendations: Balance Training, Self-Care / ADL, ROM, Strengthening, Functional Mobility Training, Endurance Training, Neuromuscular Re-education, Safety Education & Training, Patient/Caregiver Education & Training, Wheelchair Mobility Training, Home Management Training    Patient Education  Patient Education: ADL's, Equipment Education and RUE attention    Goals  Short term goals  Time Frame for Short term goals: 1 week   Short term goal 1: Pt will complete sit to stand transfers with mod assist of 1 with min vcs or RUE support to improve indep with LB clothing mgt   Short term goal 2: Pt will complete 5-7 min EOB ADL task with SBA for sitting balance & min vcs for midline posture to improve balance needed for ADLs. Short term goal 3: Pt will don UB clothing with SBA and mod vcs for romain dressing technique to improve indep with self care.    Short term goal 4: Pt will tolerate standing >2 minutes in

## 2020-01-07 NOTE — PROGRESS NOTES
Bern[facility] 60  PHYSICAL THERAPY MISSED TREATMENT NOTE  INPATIENT REHABILITATION    Date: 2020  Patient Name: Vu Gonzales        MRN: [id number]   : 1940  (78 y.o.)  Gender: male   Referring Practitioner: Dr. Osvaldo Miller   Referring Practitioner: Dr. Osvaldo Miller  Diagnosis: CVA   Diagnosis: acute CVA         REASON FOR MISSED TREATMENT:  Attempted patient this am for PT session, patient extremely fatigued and verbalizing with therapist very little when asking about completing therapy session, not opening eyes and only answering \"rest\" when asking about completing exercises in bed. Per RN and ST patient was dependent to get back in bed a short time ago due to increased fatigue  Not appropriate to complete PT session this afternoon. Missed 43 minutes with patient this pm, MD notified. Harmony Sifuentes Sero PTA [id number]

## 2020-01-07 NOTE — PLAN OF CARE
Problem: Falls - Risk of:  Goal: Will remain free from falls  Description  Will remain free from falls  1/7/2020 0029 by Deena Coelho LPN  Outcome: Ongoing  Bed and chair alarm in place and working. 3 side rails elevated when patient in bed. Patient voiced understanding why alarms used and demonstrated knowledge on ability to turn on call light. Problem: Risk for Impaired Skin Integrity  Goal: Tissue integrity - skin and mucous membranes  Description  Structural intactness and normal physiological function of skin and  mucous membranes. 1/7/2020 0029 by Deena Coelho LPN  Outcome: Ongoing  Barrier cream applied to coccyx. Patient encouraged to turn from side to side t/o shift while in bed. Patient voiced undstanding importance of laying on side.

## 2020-01-07 NOTE — FLOWSHEET NOTE
Patient was in and out of wakefulness during visit. 2 sons, a daughter, and the GF of one of the sons were present in the room. They said patient has been rather tired since he arrived in rehab unit; they attribute this to all the therapy. They are hopeful that he will show progress in rehab. Per chart, patient has history of major depression and was hospitalized for it in 2017.         01/06/20 4245   Encounter Summary   Services provided to: Family   Referral/Consult From: [address] [place] [address] Drive; Children   Continue Visiting Yes  (1/6 pt asleep )   Complexity of Encounter Moderate   Length of Encounter 15 minutes   Spiritual/Yarsani   Type Spiritual support   Assessment Approachable   Intervention Active listening;Discussed illness/injury and it's impact   Outcome Comfort;Expressed gratitude;Engaged in conversation

## 2020-01-07 NOTE — PROGRESS NOTES
Physical Medicine & Rehabilitation   Progress Note    Chief Complaint: S/P C. V. 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 Subjective: Patient reported no current head, trunk, or limb pain except for his chronic neck pain which he reported as being mild. He slept well last evening. His inpatient rehab. therapies are going well. Patient was evaluated today while he was resting in his bed. Patient is fatigued in the late morning and in the afternoon. His appetite remains poor. The results of the team conference held on 01/07/20 were discussed with the patient and his wife. All of their questions were answered. He and his wife had no acute concerns. Rehabilitation:  PT:   Bed Mobility:  Rolling to Left: Contact Guard Assistance, Minimal Assistance   Rolling to Right: Contact Guard Assistance, Minimal Assistance   Sit to Supine: Moderate Assistance, Maximum Assistance. Transfers:  Sit to Stand: Moderate Assistance +1, min+1 up from mat and then up from w/c into FELISHA stedy. Pt requiring mod assist for trunk elevation and then to gain TKE RLE. Once up, pt tends to retract RT hip, lean to Rt. With mirror cues and verbal cues, improved midline noted, though not maintained. Stand to Sit:Moderate Assistance +1, min +1. Pt showing difficulty allowing LLE to flex at hip/knee and to release LUE from walker, tending to push. Sit Pivot: Moderate Assistance +1 to either direction. Pt supporting Rt hand with LT,  Assist for more forward translation of wt over base combined with more dynamic trunk. Pt tends to lean to Rt, but improved alignment noted with intervention.   Transfer performed using FELISHA stedy at end of session: +2 mod assist up to stand and then 1 to guide and +1 mod assist to maintain trunk balance to position the device.       Balance:  Static Standing Balance: Minimal Assistance, X 2, with verbal cues , Patient stood in felisha-stedy x multiple trials to work on standing tolerance, midline orientation and upright/erect posture. Required multiple tactile cues and occasional manual cues for erect posture, to achieve midline and TKE on RLE. Stood 1 more trial while PTA completed sailaja-care after using bathroom. Required assist to maintain R hand on bar of puma-stedy. Wheelchair Mobility:  Minimal Assistance  Extremities Used: Left Upper Extremity and Left Lower Extremity  Type of Chair:Manual  Surface: Level Tile  Distance: 150 ft  Quality: Veers Right, Decreased Fluidity and max cues and min assist needed initially for pt to recognize need to use LUE only minimally as veering to RT. Once cued to not use LUE at all, then improved control noted.         EXERCISES:  The following exercises were completed to improve functional mobility: graded lifts from elevate mat. 1st attempted with pt placing katey hands on stool in front. Too much pushing from LUE noted. Graded lift without use of stool showed improved midline. Max assist with all trials. O.T:  ADL's:  Feeding: Setup(completed with non dominant hand )  Grooming: Stand by assistance(using non dominant hand with H202 and water )  UE Bathing: Moderate assistance(and min A for sitting balance )  LE Bathing: Dependent/Total(Mod A of 1 for balance and total A of another )  UE Dressing: Minimal Assistance. Min A with threading R UE  LE Dressing: Moderate Assistance. Mod A with threading BLE  Toileting: Maximal Assistance. Toilet Transfer: Minimal Assistance, Moderate Assistance and X 2. Mod A of 1 Min of another, W/c <> BSC  Shower Transfer: Minimal Assistance, Moderate Assistance and X 2.   Mod A of 1, min A of another, w/c <> shower bench.     Functional Mobility:  Bed mobility  Supine to Sit: Maximum assistance     Functional Mobility  Functional Mobility Comments: Not appropriate at this time  OTR to assess as appropriate      Balance:  Balance  Sitting Balance: (Fluctuated between CGA and moderate assist. Used mirorr for visual computation. Moderate expressive language deficits evidenced by impaired lexical retrieval, mental flexibility, verbal sequencing/description, short utterance length with reduced syntax and impaired sentence repetition. Pt with mild receptive language deficits evidenced by increasing difficulty following complex verbal commands, need for extra time d/t slowed mental processing and heavy reliance on repetitions. Pt reports hx of hearing aids. Denies need for hearing aids at this time. Pt with moderate dysarthria characterized by blended word boundaries, imprecise articulatory precision and omission of word endings. Moderate dysphonia evidenced by poor level of breath support, presence of aphonic breaks and hoarse/breathy vocal quality. Pt would highly benefit from ongoing ST intervention in order to progress [id number]-myelsiiypi-[id number] functioning to a supervision level for safe, functional return to home setting and demonstration of independent ADL/IADL completion upon discharge. Rehabilitation Potential: excellent     Comprehension: 4 - Patient understands basic needs 75-90%+ of the time  Expression: 3 - Expresses basic ideas/needs 50-74% of the time  Social Interaction: 4 - Patient appropriate 75-90%+ of the time  Problem Solving: 3 - Patient solves simple/routine tasks 50%-74%  Memory: 3 - Patient remembers 50%-74% of the time.       Review of Systems:  CONSTITUTIONAL:  negative  RESPIRATORY:  negative  CARDIOVASCULAR:  negative  GASTROINTESTINAL:  negative  GENITOURINARY:  positive for a Solis catheter in place. MUSCULOSKELETAL:  positive for  decreased range of motion and muscle weakness.   NEUROLOGICAL:  positive for memory problems, speech problems, coordination problems, gait problems and weakness  BEHAVIOR/PSYCH:  negative  10 point system review otherwise negative    Objective:  BP (!) 154/69   Pulse 69   Temp 98.1 °F (36.7 °C) (Oral)   Resp 14   Ht 6' (1.829 m)   Wt 154 lb 1.6 oz (69.9 kg) SpO2 96%   BMI 20.90 kg/m²   He was noted to be awake, alert, and in no acute distress. Orientation: Unable to fully evaluate due to his limited cognition and expressive aphasia. Mood: within normal limits  Affect: calm  General appearance: Patient is well nourished, well developed, well groomed and in no acute distress, appearing stated age, thin. Memory:   abnormal   Attention/Concentration: abnormal   Language:  abnormal - with moderate to severe expressive aphasia and mild to moderate receptive aphasia. HEART:  S1 and S2 with a regular rate and rhythm without murmur, gallop  or rub. LUNGS:  Clear to auscultation bilaterally without rales, rhonchi or  wheezes. ABDOMEN:  Positive bowel sounds in all four quadrants. His abdomen was  noted to be soft, nontender, without masses. Cranial Nerves:  VII: Facial strength: abnormal with respect to a moderate Right-sided facial drooping. XI: Shoulder shrug: no functional Right shoulder shrug. ROM:  abnormal - with respect to the Right uppper and lower limbs. Motor Exam: No acute changes noted with respect to the patient's limb strength. Tone:  Increased muscle tone noted at the Right hip and knee. Muscle bulk: within normal limits  Sensory:  Sensory intact  Coordination:   abnormal - with respect to his mobility. Deep Tendon Reflexes: No acute changes noted. Skin: warm and dry, no rash or erythema  Edema: None in either lower limb.       Diagnostics:   Recent Results (from the past 24 hour(s))   POCT glucose    Collection Time: 01/06/20  4:39 PM   Result Value Ref Range    POC Glucose 165 (H) 70 - 108 mg/dl   POCT glucose    Collection Time: 01/06/20  9:26 PM   Result Value Ref Range    POC Glucose 169 (H) 70 - 108 mg/dl   POCT glucose    Collection Time: 01/07/20  7:53 AM   Result Value Ref Range    POC Glucose 158 (H) 70 - 108 mg/dl   POCT glucose    Collection Time: 01/07/20 12:06 PM   Result Value Ref Range    POC Glucose 166 (H) 70 - 108 mg/dl Impression:  1. Status post cerebrovascular accident with an acute infarct being  located in the left romain-darin as noted on a brain MRI performed on  12/24/2019.  2.  Gait dysfunction. 3.  Deficits with respect to his activities of daily living. 4.  Significant speech and cognitive deficits. 5.  Current history of dysphagia. 6.  Current history of right upper and lower limb weakness. 7.  Current history of significant cardiac disease including an  estimated ejection fraction of 25%. The patient currently has a cardiac  LifeVest in place. 8.  History of severe major depression along with anxiety. 9.  History of a psychotic episode occurring in 2017, requiring a mental  hospitalization in [facility]. 10.  History of benign prostatic hypertrophy. 11.  History of anemia. 12.  History of gastroesophageal reflux disease. 13.  History of hypertension. 14.  History of bilateral inguinal surgical repair performed in 2018  15. Current history of anorexia. 16.  Current history of restless leg syndrome. Plan:  · He is to continue with his current inpatient rehab. program.  · A Rehab. team conference was held on 01/07/20. His discharge goal is home with his wife on 01/24/20. · Labs drawn on 01/03/20 were reviewed. All of his labs remain stable. · A BMP was drawn on 01/06/20. All of his labs remain stable. · His recent blood sugars have been stable with a range of 140 - 169. Continue to closely monitor and adjust his medications as needed. · Started the patient on Baclofen 10 mg to be taken 3 times daily. This is to treat his increased muscle tone involving the Right lower limb. · Started the patient on Marinol 2.5 mg to be taken twice daily. This is to treat his anorexia. · Increased his Requip to 0.5 mg to be taken once daily. This is being used to treat his restless leg syndrome.     Greater than 50% of the 30 minutes was spent with the patient and his wife on counseling and with respect to coordinating his current inpatient rehab. Care. The results of the Rehab. team conference held on 01/07/20 were discussed with the patient and his wife. Missed Therapy Time:  · He missed 73 minutes of his rehab. therapies on 01/07/20 due to his fatigue.     Román Biggs MD

## 2020-01-07 NOTE — PLAN OF CARE
Problem: DISCHARGE BARRIERS  Goal: Patient's continuum of care needs are met  Note:   Team conference held Tuesday, 01/07/2020. Recommendations of the team were explained to the patient and spouse, Kiki Mark, by MARCIN Pino, and Dr. Antoine Calles. Team is recommending that patient continue on acute inpatient rehab for two and a half more weeks, with expected discharge date of Friday, 01/24/2020. Prior to discharge, team is recommending family education with spouse, Kiki Mark, and any other family members involved with patient's care at discharge. SW encouraged spouse, Kiki Mark, to be present during therapy sessions in order to assist with education and transition home. Additionally, recommendation for patient and spouse, Kiki Mark, to complete overnight stay in functional apartment. Following discharge, team is recommending twenty four hour care with home health care services for RN/PT/OT/ST/HHA/SW. Spouse, Kiki Mark, indicates additional support available from children. Care plan reviewed with patient and spouse, Kiki Mark. Patient and spouse, Kiki Mark, verbalized understanding of the plan of care and contributed to goal setting. SW to follow and maintain involvement in discharge planning.

## 2020-01-07 NOTE — PROGRESS NOTES
potential emerging carryover for implementation of trained compensatory strategies. Ongoing need for cues r/t \"slow rate\" for intake with pt responding positively to. Nectar/midly thick juice via cup x4 trials completed with concerns maintaining for decreased bolus control s/p large liquid bolus consumption; certainly cannot r/o premature pharyngeal entry. Improved control with small, controlled drinks. With ST present, pt consumed ~50% presented meal without overt s/s aspiration; no concerns for swallow decompensation at date, however, will require further dietary analysis' across meal-spans to ensure continued safe consumption. Recommend continuation of minced and moist diet with nectar/mildly thick liquids with CLOSE pulmonary monitoring. SHORT TERM GOAL #2:  Goal 2: Pt will complete labial/lingual/pharyngeal strengthening, coordionation, ROM exercises, DDK rates, rote speech tasks with SOS strategies and tongue twisters x10 for improved timeliness of oral bolus formation/transit, maximized speech intelligibility and improved airway protection. INTERVENTIONS: DNT d/t focus on additional STGs    SHORT TERM GOAL #3:  Goal 3: Pt will complete immediate/delayed/working memory tasks with 70% accuracy, min cues for improved retention of newly learned medical information. INTERVENTIONS: Orientation: 5/6 independent, 1/6 logical cues for date  Recall of children's names: 4/6 independent, 2/5 min cues (verified by care board)    Functional 4-component medication list generated for retention. Strategies of association and and repetition/rehearsal (x3 trials) utilized to aid in success.   Immediate memory: 3/4 independent, 1/4 min cues  Delay x10 minutes: 1/4 independent, 3/4 min cues  *poor functional carryover noted    SHORT TERM GOAL #4:  Goal 4: Pt will complete problem solving, executive functioning and attention tasks (i.e. time/money management, scheduling, complex auditory/reading comprehension, etc) with

## 2020-01-07 NOTE — PROGRESS NOTES
[address] [place] [address]  INPATIENT PHYSICAL THERAPY  DAILY NOTE  [address] [place] - 7E-56/056-A    Time In: 4805  Time Out: 0950  Timed Code Treatment Minutes: 52 Minutes  Minutes: 47          Date: 2020  Patient Name: Mary Daigle,  Gender:  male        MRN: [id number]  : 1940  (78 y.o.)     Referring Practitioner: Dr. Kari Antonio  Diagnosis: acute CVA  Additional Pertinent Hx: Pt admitted [facility] ED s/p fall. MRI+ for infarct Lt hemipons. Cardiology deemed need for life vest (2019),  Hx:  depression , anxiety     Prior Level of Function:  Lives With: Spouse  Type of Home: House  Home Layout: One level, Work area in basement  Home Access: Stairs to enter with rails  Entrance Stairs - Number of Steps: 2 with grab bar(Rt)  Home Equipment: Quad cane(used intermittently)   Bathroom Shower/Tub: Walk-in shower  Bathroom Toilet: Handicap height  Bathroom Equipment: Built-in shower seat    ADL Assistance: Independent  Ambulation Assistance: Independent  Transfer Assistance: Independent  Active : No  Additional Comments: Pt reports he completed his own self care and mobility with intermittent use of a quad cane. Pt states wife completed housekeeping, meal prep and finances. Restrictions/Precautions:  Restrictions/Precautions: General Precautions, Fall Risk, Modified Diet  Position Activity Restriction  Other position/activity restrictions: pt pushes to right, R romain, Rt neglect, poor sensation Rt hemibody    SUBJECTIVE: Patient in [facility] upon arrival, agreed and cooperative for therapy. Reports fatigue this am but still cooperative overall, quiet throughout treatment. PAIN: Neck pain due to sleeping on it wrong and reports activities being difficult    OBJECTIVE:  Transfers:  Sit to Stand: Moderate Assistance, Maximum Assistance, X 2, using PUMA-STEDY from [facility] and [facility]; Min-Mod. A x1 and Min. A of another from seat of puma-stedy and to maintain R hand on bar.    Stand to Sit:Minimal Assistance, Maximum Assistance, X 2, with verbal cues, from puma-Takepin to [facility] or [facility]; required assist to maintain R hand on bar of [employer] and maintain midline orientation  WC-> Toilet: Dependent x2 using puma-Estrada [employer]     Wheelchair Mobility:  Contact Guard Assistance, Minimal Assistance, X 1, with verbal cues , with increased time for completion  Extremities Used: Left Upper Extremity and Left Lower Extremity  Type of Chair:Manual  Surface: Level Tile  Distance: 80 ft. x1   Quality: Slow Velocity, Short Strokes, Veers Right, Decreased Fluidity and verbal cues for visual scanning to R side to avoid obstacles     Balance:  Static Standing Balance: Minimal Assistance, X 2, with verbal cues , Patient stood in puma-stedy x multiple trials to work on standing tolerance, midline orientation and upright/erect posture. Required multiple tactile cues and occasional manual cues for erect posture, to achieve midline and TKE on RLE. Stood 1 more trial while PTA completed sailaja-care after using bathroom. Required assist to maintain R hand on bar of puma-[employer]. Functional Outcome Measures: Not completed       ASSESSMENT:  Assessment: Patient progressing toward established goals. Activity Tolerance:  Patient tolerance of  treatment: good. Limited due to increased fatigue and decreased endurance. Equipment Recommendations:Equipment Needed: Yes(continue to assess.   Pt has q. cane)  Discharge Recommendations:  Continue to assess pending progress, Patient would benefit from continued therapy after discharge    Plan: Times per week: 5x/ wk 90 min, 1x/ wk 30 min  Current Treatment Recommendations: Strengthening, Transfer Training, Endurance Training, ROM, Balance Training, Wheelchair Mobility Training, Neuromuscular Re-education, Patient/Caregiver Education & Training, Equipment Evaluation, Education, & procurement, Gait Training, Home Exercise Program, Functional Mobility Training, Safety Education &

## 2020-01-08 NOTE — PROGRESS NOTES
[address]  Occupational Therapy  Daily Note  Time:   Time In: 1400  Time Out: 1430  Timed Code Treatment Minutes: 30 Minutes  Minutes: 30    Date: 2020  Patient Name: Mell Meadows,   Gender: male      Room: [facility]56/056-A  MRN: [id number]  : 1940  (78 y.o.)  Referring Practitioner: Dr. Mignon Rico   Diagnosis: CVA   Additional Pertinent Hx: Per ER note on 19:  78 y.o. male who presents to the Emergency Department via EMS for the evaluation of a CVA. The patient was found on the floor sitting up around 0630 this morning by his wife, who heard him fall. She reports a drooping mouth on the right side, slurred speech, and right sided weakness when she found him at 0630. The patient's last known well was 22:00 last night. The patient is unable to walk because his left leg is weak, and the patient had to be lifted into a chair after he was found on the floor. 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 Restrictions/Precautions:  Restrictions/Precautions: General Precautions, Fall Risk, Modified Diet  Position Activity Restriction  Other position/activity restrictions: pt pushes to right, R romain, Rt neglect, poor sensation Rt hemibody    SUBJECTIVE: Patient seated in wheelchair upon arrival agreeable to OT treatment     PAIN: denies/10:     COGNITION: Slow Processing, Decreased Insight, Impaired Memory, Decreased Problem Solving, Decreased Safety Awareness, Decreased Arousal and Difficulty Following Commands    BALANCE:  Sitting Balance: Min A- Mod A at times, right sided pusher, posterior leans at times      TRANSFERS:  Squat Pivot: Moderate Assistance x1, [facility] with verbal cues for safe technique, BLE positioning, romain RUE positioning, max verbal cues for arousal and alertness and eyes open this session. ADDITIONAL ACTIVITIES:  Small 1/3 thumb width sublux noted in R shoulder.  Previous kinesiotape removed fall risk precautions in place.

## 2020-01-08 NOTE — PROGRESS NOTES
[address] [place] [address]  INPATIENT PHYSICAL THERAPY  DAILY NOTE  [address] [facility] - 7E-56/056-A    Time In: 1430  Time Out: 1515  Timed Code Treatment Minutes: 39 Minutes  Minutes: 45     Minute Variance  Variance: 15  Reason: Make up minutes    Date: 2020  Patient Name: Vu Gonzales,  Gender:  male        MRN: [id number]  : 1940  (78 y.o.)     Referring Practitioner: Dr. Tita Soto  Diagnosis: acute CVA  Additional Pertinent Hx: Pt admitted [facility] ED s/p fall. MRI+ for infarct Lt hemipons. Cardiology deemed need for life vest (2019),  Hx:  depression , anxiety     Prior Level of Function:  Lives With: Spouse  Type of Home: House  Home Layout: One level, Work area in basement  Home Access: Stairs to enter with rails  Entrance Stairs - Number of Steps: 2 with grab bar(Rt)  Home Equipment: Quad cane(used intermittently)   Bathroom Shower/Tub: Walk-in shower  Bathroom Toilet: Handicap height  Bathroom Equipment: Built-in shower seat    ADL Assistance: Independent  Ambulation Assistance: Independent  Transfer Assistance: Independent  Active : No  Additional Comments: Pt reports he completed his own self care and mobility with intermittent use of a quad cane. Pt states wife completed housekeeping, meal prep and finances. Restrictions/Precautions:  Restrictions/Precautions: General Precautions, Fall Risk, Modified Diet  Position Activity Restriction  Other position/activity restrictions: pt pushes to right, R romain, Rt neglect, poor sensation Rt hemibody    SUBJECTIVE: Pt seated on gym mat, just completed OT. Mod to max cues needed throughout the session to keep eyes open and attend to the activity. PAIN: 0/10: during session. Pt c/o neck pain once assisted back to bed at end of session. K-pad applied at end of session. OBJECTIVE:  Bed Mobility:  Sit to Supine: Maximum Assistance to guide to RT side and then lift RLE onto bed.   Mod assist to complete lift of LLE onto bed and then mod to max assist to align. Transfers:  Sit Pivot: Moderate Assistance to max assist using sit/pivot to either direction. Pt supporting Rt hand with Lt. Sit <->stand:  Max +2,  from mat up to grocery cart. Max assist to engage Rt hip/knee ext, trunk to midline. Assist to have pt place LT hand on pole overhead. MIrror used for visual cue. Pt then cued to shift to midline. Active wt shift noted, but max cues needed for pt to keep eyes open and attempt to keep more erect trunk. 2 trials. Attempted to have pt take 1 step forward/back with LLE, but then increased push to RT noted with more forward trunk flexion. Balance:  Dynamic Sitting Balance: Minimal Assistance with maintain Rt hand on wt bearing position. Pt reaching with LUE forward and laterally to Lt to  ring. Cues for more erect trunk, more forward trunk flexion and to visually look for the ring. Numerous reps. Pt was able to then regain midline with cues b/w ea reach trial.  Progressed to reaching forward and then attempts at reaching 2-3 \" off of midline to Rt, but loss of balance to Rt then noted. With Lt foot up on 4\" step with the last trials, less push noted. ASSESSMENT:  Assessment: Patient progressing toward established goals. Activity Tolerance:  Patient tolerance of  treatment: fair. Equipment Recommendations:Equipment Needed: Yes(continue to assess.   Pt has q. cane)  Discharge Recommendations:  Continue to assess pending progress, Patient would benefit from continued therapy after discharge    Plan: Times per week: 5x/ wk 90 min, 1x/ wk 30 min  Current Treatment Recommendations: Strengthening, Transfer Training, Endurance Training, ROM, Balance Training, Wheelchair Mobility Training, Neuromuscular Re-education, Patient/Caregiver Education & Training, Equipment Evaluation, Education, & procurement, Gait Training, Home Exercise Program, Functional Mobility Training, Safety Education & Training    Patient Education  Patient Education: Transfers    Goals:  Patient goals : get walking  Short term goals  Time Frame for Short term goals: 1 wk  Short term goal 1: Pt to roll to either direction, SBA with cues for Rt hemibody position only, for position change in bed. Short term goal 2: Pt to go supine <->sit, +1 mod assist at trunk up and with RLE going to supine, to get in/out of bed. Short term goal 3: Pt to perform sit/pivot transfer, supporting Rt hand with LT, +1 min assist to get in/out of w/c. Short term goal 4: Pt to get up/down from various seated surfaces, +1 mod assist to progress to pre gait standing with RW support. Short term goal 5: Pt to stand with RW, maintain midline trunk, with stepping forward back with ea LE, mod assist with RLE  Short term goal 6: Pt to propel w/c >= 150 ft, SBA, for indoor mobility  Long term goals  Time Frame for Long term goals : 4 wks  Long term goal 1: Pt to go supine <->sit, +1 stand by assist , to get in/out of bed. Long term goal 2: Pt to get up/down from various seated surfaces, +1 CGA to get up to walk. Long term goal 3: Pt to perform stand/pivot transfer with use of RW +1 min assist to get in/out of w/c. Long term goal 4: Pt to walk with RW >= 50 ft, CGA to progress to home mobility  Long term goal 5: Pt to get in/out of car, +1 min assist for transportation needs. Following session, patient left in safe position with all fall risk precautions in place.

## 2020-01-08 NOTE — PROGRESS NOTES
edge.  Supine to Sit: Maximum Assistance to clear BLEs and elevate trunk,  Pt did assist at trunk elevation coming up approx 45 degrees then stopping the action. Continued assist to gain upright. 2 trials  Sit to Supine: Maximum Assistance to guide to Rt side and lift RLE onto bed. Min with LLE. Max cues for technique. Transfers:  Sit to Stand: Maximum Assistance +1, mod +1 to gain more upright trunk, stabilize Rt hip/knee. Once up, pt showing downward gaze, kyphotic trunk, retracted hips, lean to Rt. Assist needed to correct to midline and for more upright trunk. Stand to Sit:Maximum Assistance +1, mod +1 for controlled descent  Sit Pivot:Maximum Assistance to ea direction. Pt supporting Rt hand with LT      Balance:  Static Sitting Balance:  Maximum Assistance to gain midline. Once gained, assist varies b/w min to mod with mod cues. Throughout pt kept closing eyes and then showing forward trunk lean. Pushing to RT noted, but did improved with Pt supporting Rt hand with LT. Wheelchair Mobility:  Minimal Assistance  Extremities Used: Left Upper Extremity and Left Lower Extremity  Type of Chair:Manual  Surface: Level Tile  Distance: 75 ft  Quality: Veers Right, Decreased Fluidity and Pt continually veering to Rt, bumping into objects on Rt and not correcting even with max cues to primraily use LLE only. Pt would continue to primarily use LUE. Once it was placed across pt's lap, then min cues for path noted. Pt did have difficulty staying awake throughout the process, requiring mod to max verbal cues to keep eyes open. Static Standing Balance: see above    EXERCISES:  The following exercises were completed to improve functional mobility: passive RT heel cord stretch, passive Rt scapular mobilization, passive RT wrist/ hand inhibition and range. Assisted RT shoulder girdle protraction/retraction with 1/5 action noted. Bridges with LUE across chest x5 reps.    Flexor withdrawal noted with RLE and increased tone Rt wrist/hand, though less time needed to relax ea. ASSESSMENT:  Assessment: Patient progressing toward established goals. Activity Tolerance:  Patient tolerance of  treatment: fair. Difficulty staying awake. Equipment Recommendations:Equipment Needed: Yes(continue to assess. Pt has q. cane)  Discharge Recommendations:  Continue to assess pending progress, Patient would benefit from continued therapy after discharge    Plan: Times per week: 5x/ wk 90 min, 1x/ wk 30 min  Current Treatment Recommendations: Strengthening, Transfer Training, Endurance Training, ROM, Balance Training, Wheelchair Mobility Training, Neuromuscular Re-education, Patient/Caregiver Education & Training, Equipment Evaluation, Education, & procurement, Gait Training, Home Exercise Program, Functional Mobility Training, Safety Education & Training    Patient Education  Patient Education: [employer], Transfers, Wheelchair Mobility, - Patient Requires Continued Education    Goals:  Patient goals : get walking  Short term goals  Time Frame for Short term goals: 1 wk  Short term goal 1: Pt to roll to either direction, SBA with cues for Rt hemibody position only, for position change in bed. Short term goal 2: Pt to go supine <->sit, +1 mod assist at trunk up and with RLE going to supine, to get in/out of bed. Short term goal 3: Pt to perform sit/pivot transfer, supporting Rt hand with LT, +1 min assist to get in/out of w/c. Short term goal 4: Pt to get up/down from various seated surfaces, +1 mod assist to progress to pre gait standing with RW support. Short term goal 5: Pt to stand with RW, maintain midline trunk, with stepping forward back with ea LE, mod assist with RLE  Short term goal 6: Pt to propel w/c >= 150 ft, SBA, for indoor mobility  Long term goals  Time Frame for Long term goals : 4 wks  Long term goal 1: Pt to go supine <->sit, +1 stand by assist , to get in/out of bed.    Long term goal 2: Pt to

## 2020-01-08 NOTE — PROGRESS NOTES
adherence to established diet level of minced/moist and nectar/mildly thick liquids. Oral phase with demonstration of slow/uncoordinated bolus breakdown, formation and transit resulting in mild oral stasis; independent utilization of liquid/puree wash to ensure clearance of residuals. Ongoing need for cues r/t \"slow rate\" for intake with pt responding positively to. Nectar/midly thick juice via cup x3 trials completed with concerns maintaining for decreased bolus control s/p large liquid bolus consumption; certainly cannot r/o premature pharyngeal entry. Improved control with small, controlled drinks. With ST present, pt consumed ~25% presented meal without overt s/s aspiration; no concerns for swallow decompensation this date as patient independently stopped consumption of meal once fatigue presented. Recommend continuation of minced and moist diet with nectar/mildly thick liquids with CLOSE pulmonary monitoring. *Next session patient would benefit from ongoing education with focus on carryover of compensatory swallowing strategies       SHORT TERM GOAL #2:  Goal 2: Pt will complete labial/lingual/pharyngeal strengthening, coordionation, ROM exercises, DDK rates, rote speech tasks with SOS strategies and tongue twisters x10 for improved timeliness of oral bolus formation/transit, maximized speech intelligibility and improved airway protection. INTERVENTIONS: DNT d/t focus on additional STGs    SHORT TERM GOAL #3:  Goal 3: Pt will complete immediate/delayed/working memory tasks with 70% accuracy, min cues for improved retention of newly learned medical information. INTERVENTIONS: DNT secondary to focus on other goals  Previous tx:  Orientation: 5/6 independent, 1/6 logical cues for date  Recall of children's names: 4/6 independent, 2/5 min cues (verified by care board)    Functional 4-component medication list generated for retention.  Strategies of association and and repetition/rehearsal (x3 trials) utilized to aid in success. Immediate memory: 3/4 independent, 1/4 min cues  Delay x10 minutes: 1/4 independent, 3/4 min cues  *poor functional carryover noted    SHORT TERM GOAL #4:  Goal 4: Pt will complete problem solving, executive functioning and attention tasks (i.e. time/money management, scheduling, complex auditory/reading comprehension, etc) with 70% accuracy, mod cues for improved ADL/IADL management and safety awareness/insight. INTERVENTIONS: DNT d/t focus on other STG's      SHORT TERM GOAL #5:  Goal 5: Pt will complete complex naming, verbal sequencing/description and sentence formulation/repetition tasks with 70% accuracy, mod cues for improved expression of basic/complex thoughts. INTERVENTIONS:  Repetition of functional phrases; ST provided patient with x6 functional phrases, cued patient to read each phrase while utilizing compensatory speech strategies (S-speak up, O-open mouth, S-slow down) to target articulatory precision of expressive speech. Patient completed x6 phrases, x2 repetitions each with poor-fair success. Upon execution of compensatory strategies patient with significantly improved intelligibility to 75-90% intelligable; however, only 25% of task completed with effort. Remaining 75% of task patient with minimal participation with poor execution of strategies d/t closing of eyes with poor engagement overall despite max cues      Long-Term Goals:  Timeframe for Long-term Goals: 2 weeks     LONG TERM GOAL #1:  Goal 1: Pt will tolerate a soft/bite sized diet and thin liquids without overt s/s of aspiration to meet nutritional/hydration measures safely. ONGOING     LONG TERM GOAL #2:  Goal 2: Pt will improve krdduywii-bgmltzsfum-rvwmogxnypfhe skills to a supervision level of function for maximized expression of complex thoughts, independent completion of ADL/IADL responsibilities and appropriate transition to home setting with wife post discharge.   ONGOIN      Comprehension: 4 - Patient understands basic needs 75-90%+ of the time  Expression: 3 - Expresses basic ideas/needs 50-74% of the time  Social Interaction: 4 - Patient appropriate 75-90%+ of the time  Problem Solving: 3 - Patient solves simple/routine tasks 50%-74%  Memory: 3 - Patient remembers 50%-74% of the time    EDUCATION:  Learner: Patient and Significant Other  Education:  Reviewed ST goals and Plan of Care and Reviewed recommendations for follow-up  Evaluation of Education: Verbalizes understanding, Demonstrates with assistance and Needs further instruction    ASSESSMENT/PLAN:  Activity Tolerance:  Patient tolerance of  treatment: good. Appropriate participation throughout the session. Assessment/Plan: Patient progressing toward established goals. Continues to require skilled care of licensed speech pathologist to progress toward achievement of established goals and plan of care. .     Plan for Next Session: Cognitive tasks      NIXON Hernandez 23

## 2020-01-08 NOTE — PROGRESS NOTES
Nephrology Progress Note    Patient - Susi Spencer   MRN -  [id number]   Britnilyscirilo # - [de-identified]      - 1940    78 y.o. Admit Date: 2020  Hospital Day: 6  Location: [address] [place] [address]  Date of evaluation -  2020    Subjective:   CC: fall, slurred speech  Denies shortness of breath on Room air   Up in Rehab gym   Ate fair breafast  BP Range: Systolic (24CIT), FHJ:837 , Min:143 , RXD:476      Diastolic (93DXZ), GCN:66, Min:65, Max:76    Objective:   VITALS:  BP (!) 160/76   Pulse 62   Temp 98.5 °F (36.9 °C) (Oral)   Resp 16   Ht 6' (1.829 m)   Wt 154 lb 1.6 oz (69.9 kg)   SpO2 98%   BMI 20.90 kg/m²    Patient Vitals for the past 24 hrs:   BP Temp Temp src Pulse Resp SpO2   20 0710 (!) 160/76 98.5 °F (36.9 °C) Oral 62 16 98 %   20 1953 (!) 143/65 98.1 °F (36.7 °C) Axillary 67 14 93 %            Intake/Output Summary (Last 24 hours) at 2020 1100  Last data filed at 2020 0942  Gross per 24 hour   Intake 440 ml   Output 450 ml   Net -10 ml       Admission weight: 150 lb 14.4 oz (68.4 kg)  Patient Vitals for the past 96 hrs (Last 3 readings):   Weight   20 0515 154 lb 1.6 oz (69.9 kg)       EXAM:  CONSTITUTIONAL:  No acute distress. Pleasant. Flat affect  HEENT:  Head is normocephalic, Extraocular movement intact. Neck is supple. Voice is minimal  CARDIOVASCULAR:  S1, S2  regular rate and rhythm. RESPIRATORY: Clear to ausculation bilaterally. Equal breath sounds. No wheezes. No shortness of breath noted at rest.  ABDOMEN: soft, non tender  NEUROLOGICAL: Patient is alert and oriented to person, place, and time. Recent and remote memory intact. Thought is coherant. SKIN: no rash, No significant bruises on exposed surfaces  MUSCULOSKELETAL: Movement is coordinated Lt side. Rt hemiparesis  EXTREMITIES: Distal lower extremity temp is warm, No lower extremity edema.    PSYCHIATRIC: mood and affect flat    Medications:   Med reviewed  Scheduled Meds:   sodium bicarbonate  1,300 mg Oral

## 2020-01-08 NOTE — PROGRESS NOTES
Sit: Minimal Assistance, Moderate Assistance, X 2 required 2 assist d/t patietn's fatigue levels this session   Scooting: Maximum Assistance EOB     TRANSFERS:  Squat Pivot: Minimal Assistance, Moderate Assistance, Maximum Assistance, X 2, with verbal cues. From EOB <> wheelchair mod A x1 MIN A x1 with verbal cues for safe technique wheelchair <> EOM mod A x1 x min A x1, from EOM to wheelchair at end of session max A x1 min A x1 d/t increased fatigue     ADDITIONAL ACTIVITIES:  Patient completed neuro re-ed task on EOM this date; task was graded to involve sitting in midline x 20 minutes with use of mirror for extrinsic feedback with mod assist to bring bilateral shoulders out of forward flexed position. Required max verbal cues this date for arousal and keeping eyes open. Pt required min A to CGA at times while sitting EOM with min ability to complete righting reactions and returning to midline. Pt was able to sustain the [address] position on mat table throughout. Patient also completed UE neck stretches with mod assist to keep in midline, patient unable to maintain and returns to flexed position    ASSESSMENT:     Activity Tolerance:  Patient tolerance of  treatment: fair. Discharge Recommendations: 24 hour supervision or assist, Continue to assess pending progress  Equipment Recommendations:  Other: Monitor pending progress  Plan: Times per week: 5x/wk for 90 min and 1x/wk for 30 min   Current Treatment Recommendations: Balance Training, Self-Care / ADL, ROM, Strengthening, Functional Mobility Training, Endurance Training, Neuromuscular Re-education, Safety Education & Training, Patient/Caregiver Education & Training, Wheelchair Mobility Training, Home Management Training    Patient Education  Patient Education: Reviewed Prior Education    Goals  Short term goals  Time Frame for Short term goals: 1 week   Short term goal 1: Pt will complete sit to stand transfers with mod assist of 1 with min vcs or RUE support

## 2020-01-08 NOTE — PROGRESS NOTES
----- Message from Eligio Alvarez MD sent at 6/10/2019  4:53 PM CDT -----  RN team - Please call patient with results.  I would suggest a bone scan to exclude metastatic prostate cancer and see me after suprapubic tube placement    Pt aware of need for bone scan     with cues b/w ea reach trial.  Progressed to reaching forward and then attempts at reaching 2-3 \" off of midline to Rt, but loss of balance to Rt then noted. With Lt foot up on 4\" step with the last trials, less push noted. Wheelchair Mobility:  Minimal Assistance  Extremities Used: Left Upper Extremity and Left Lower Extremity  Type of Chair:Manual  Surface: Level Tile  Distance: 150 ft  Quality: Veers Right, Decreased Fluidity and max cues and min assist needed initially for pt to recognize need to use LUE only minimally as veering to RT. Once cued to not use LUE at all, then improved control noted.         EXERCISES:  The following exercises were completed to improve functional mobility: graded lifts from elevate mat. 1st attempted with pt placing katey hands on stool in front. Too much pushing from LUE noted. Graded lift without use of stool showed improved midline. Max assist with all trials. O.T:  ADL's:  Feeding: Setup(completed with non dominant hand )  Grooming: with set-up, with verbal cues  and with increased time for completion. Patient seated at sink to complete oral hygiene with hyrogen proxide and water mixture,completed with LUE, vcs for arousal, followed commands for safe technique. UE Bathing: Moderate assistance(and min A for sitting balance )  LE Bathing: Dependent/Total(Mod A of 1 for balance and total A of another )  UE Dressing: Minimal Assistance. Min A with threading R UE  LE Dressing: Moderate Assistance. Mod A with threading BLE  Toileting: Maximal Assistance. Toilet Transfer: Minimal Assistance, Moderate Assistance and X 2. Mod A of 1 Min of another, W/c <> BSC  Shower Transfer: Minimal Assistance, Moderate Assistance and X 2.   Mod A of 1, min A of another, w/c <> shower bench.     Functional Mobility:  Bed mobility  Supine to Sit: Maximum assistance     Functional Mobility  Functional Mobility Comments: Not appropriate at this time  OTR to assess as appropriate quality with demonstration of thoracic breathing pattern and poor level of breath support. Frequent aphonic breaks     LANGUAGE:  Receptive:  2 Step Commands: 2/4 indep, 1/4 repetition  Complex Yes/No Questions: 4/4     Expressive:  Confrontational Naming: 3/3 MOCA  Divergent Naming (abstract): impaired-- see below   Sentence Formulation: impaired-- short utterance length with reduced syntax  Conversational Speech: impaired-- see above   Paraphasias: none noted   Repetition: sentence level 1/2     COGNITION:  Myles Cognitive Assessment (MOCA) version 7.1 completed. Pt with adjusted score 18/25. Unable to complete written portion d/t extremity weakness in dominant right hand. Normal is greater than or equal to 26/30. Orientation: 6/6  Immediate Recall: 2/5-- improved to 4/5 with repetition   Short-Term Recall: 4/5 indep, 1/5 FO2  Divergent Naming: x4 indep 1 minute (N=11)   Reasoning: verbal abstraction- 0/2  Sequencing: impaired  Thought Organization: at least mild impairment  Insight: fair   Attention: decreased high level selective attention   Math Computation: 3/5  Executive Functioning: unable to complete d/t extremity weakness     SWALLOWING:  Current Diet: Minced/moist diet and mildly thick liquids   Respiratory Status:   Independent    Skilled dysphagia therapy via direct meal intervention. Pt with multiple trials of scrambled eggs, applesauce, magic cup and mildly thick liquids by cup. Impulsivity with large bolus/liquid intake and fast rate of PO intake intermittently noted. Improved small bolus size and slowed rate of PO intake with skilled level education provided. Pt demonstration of slow/uncoordinated bolus breakdown, formation and transit resulting in mild-moderate oral stasis with heavy reliance on liquid/pureed wash to maximize oral clearance. Pt many times attempting to complete continuous trials despite poor oral clearance achieved.  Required max cues for use of alternating liquids/purees in order to enhance clearance. Improved pt carryover with use of strategy as trials progressed. Certainly cannot r/o premature pharyngeal entry of liquid bolus with concerns for decreased bolus control s/p large liquid bolus consumption. Improved control with small, controlled drinks. Pt with presence of continuous, persistent coughing at END of meal trial with consumption of magic cup. Concerns for swallow decompensation. Skilled level education provided to pt re: skilled use of swallow strategies (I.e. starting with minced/moist trials and ending with purees to combat fatigue, focus on small/frequent meals, small bites/sips, slow rate, achievement of oral clearance with liquid/pureed wash, etc). Tray pulled to allow pt to rest given concerns for fatigue. Consumption of 50% of eggs, 50% of applesauce, 25% of magic cup and ~5 ounces of mildly thick liquids this date. Pt to resume minced/moist diet and mildly thick liquids with CLOSE pulmonary monitoring.        Behavioral Observation: Alert, Oriented and Dysarthric     ORAL MECHANISM EVALUATION:       Facial / Labial Impaired Right sided facial drooping with flaccidity. Decreased labial seal and strength    Lingual Impaired Decreased lingual ROM/strength and coordination.  Lingual deviation to right upon protrusion   Dentition WFL Decreased oral hygiene    Velum WFL     Vocal Quality Impaired Hoarse/breathy with reduced breath support and presence of frequent aphonic breaks    Sensation Not Tested     Cough Not Tested       PATIENT WAS EVALUATED USING:  Puree, hard solid, thin liquids by cup, mildly thick liquids by cup      ORAL PHASE:  Impaired:  Impaired AP Movement, Impaired Mastication and Reduced Bolus Formation     PHARYNGEAL PHASE:  Impaired:  Decreased Hyolaryngeal Elevation and Suspected Pharyngeal Residue     SIGNS AND SYMPTOMS OF LARYNGEAL PENETRATION / ASPIRATION:  No signs/symptoms of aspiration evident in this evaluation, but cannot rule out silent aspiration.     IMPRESSIONS: Pt presents with moderate oral and mild-moderate pharyngeal dysphagia evidenced by the above skilled level findings. Pt demonstration of slow, uncoordinated bolus breakdown, formation and AP transit resulting in decreased bolus formation of hard solid trials which was NOT cleared s/p use of liquid wash with mild-moderate oral stasis remaining. Required extra time to achieve adequate clearance. No overt pocketing present. Pt with decreased laryngeal elevation upon manual palpation and concerns for pharyngeal stasis given presence of spontaneous multiple swallows. No overt s/s of aspiration s/p trials of hard solid, puree, thin liquids and mildly thick liquids; HOWEVER, MBS completed on 12/26 with demonstration of SILENT aspiration of thin by cup. Given hx of silent aspiration, pt inappropriate for completion of advanced liquid trials. Recommend pharyngeal strengthening with plans for repeat MBS as early as 1/9 to further assess pharyngeal swallow function and determine appropriateness to progress liquid consumption. At this time pt to resume minced/moist diet and mildly thick liquids with restriction on use of straw. Will require intermittent supervision with occasional impulsivity noted with large bites/sips during PO intake this date. RECOMMENDATIONS:              Modified Barium Swallow:   Not indicated     DIET RECOMMENDATIONS:  Minced/moist and mildly thick liquids      STRATEGIES: Full Upright Position, Small Bite/Sip, No Straw, Multiple Swallow, Pulmonary Monitoring, Oral Care after all Meals, Supervision, Medication in Applesauce, Alternate Solids and Liquids, Limit Distractions and Monitor for Fatigue                 RECOMMENDATIONS/ASSESSMENT:  DIAGNOSTIC IMPRESSIONS:  Pt presents with at least mild cognitive deficits evidenced by derived/adjusted MOCA score of 18/25. This is improved from acute care stay in which pt scored a 11/25 on 12/27.  Deficits found within the domains of immediate/delayed recall, verbal reasoning, thought organization, decreased high level attention and math computation. Moderate expressive language deficits evidenced by impaired lexical retrieval, mental flexibility, verbal sequencing/description, short utterance length with reduced syntax and impaired sentence repetition. Pt with mild receptive language deficits evidenced by increasing difficulty following complex verbal commands, need for extra time d/t slowed mental processing and heavy reliance on repetitions. Pt reports hx of hearing aids. Denies need for hearing aids at this time. Pt with moderate dysarthria characterized by blended word boundaries, imprecise articulatory precision and omission of word endings. Moderate dysphonia evidenced by poor level of breath support, presence of aphonic breaks and hoarse/breathy vocal quality. Pt would highly benefit from ongoing ST intervention in order to progress [id number] functioning to a supervision level for safe, functional return to home setting and demonstration of independent ADL/IADL completion upon discharge. Rehabilitation Potential: excellent     Comprehension: 4 - Patient understands basic needs 75-90%+ of the time  Expression: 3 - Expresses basic ideas/needs 50-74% of the time  Social Interaction: 4 - Patient appropriate 75-90%+ of the time  Problem Solving: 3 - Patient solves simple/routine tasks 50%-74%  Memory: 3 - Patient remembers 50%-74% of the time.       Review of Systems:  CONSTITUTIONAL:  negative  RESPIRATORY:  negative  CARDIOVASCULAR:  negative  GASTROINTESTINAL:  negative  GENITOURINARY:  positive for a Solis catheter in place. MUSCULOSKELETAL:  positive for  decreased range of motion and muscle weakness.   NEUROLOGICAL:  positive for memory problems, speech problems, coordination problems, gait problems and weakness  BEHAVIOR/PSYCH:  negative  10 point system review otherwise negative    Objective:  BP (!) syndrome. Greater than 50% of the 30 minutes was spent with the patient and his wife on counseling and with respect to coordinating his current inpatient rehab. Care. The results of the Rehab. team conference held on 01/07/20 were discussed with the patient and his wife.     Missed Therapy Time:  · None    Girish Craig MD

## 2020-01-08 NOTE — PROGRESS NOTES
[address] [facility] THERAPY  [address] [facility]  DAILY NOTE    TIME   SLP Individual Minutes  Time In: 1130  Time Out: 3256  Minutes: 25  Timed Code Treatment Minutes: 25 Minutes        Date: 2020  Patient Name: Cecilia Atkinson      CSN: [id number]   : 1940  (78 y.o.)  Gender: male   Referring Physician:  Dr. Dolores Suarez   Diagnosis: CVA  Secondary Diagnosis: Dysarthria, Dysphagia, Cognitive-Linguistic Deficits   Precautions: High Fall Risk, Aspiration precautions   Current Diet: Minced and Moist with Mildly Thick Liquids  Swallowing Strategies: Full Upright Position, Small Bite/Sip, No Straw, Multiple Swallow, Pulmonary Monitoring, Oral Care after all Meals, Supervision, Medication in Applesauce, Alternate Solids and Liquids, Limit Distractions and Monitor for Fatigue    Date of Last MBS: 19    Pain:  Patient c/o \"strong pain\" in right shoulder/neck; YVAN Valentni aware and addressing. Subjective:  Pt sitting fully upright in wheelchair, alert speaking with YVAN Hicks and patient's wife Kiki Mark. Upon ST entering room patient alert; however, following initiation of treatment patient with immediate closing of eyes with refusal to respond to ST. Patient's wife provided much encouragement with use of verbal call of patient's name and use of cold washcloth to improve overall alertness. Patient's wife only present 1/2 of session then stated \"I am going to leave now to get lunch, please use the washcloth if you have to to keep him awake. \"  ST utilized tactile and verbal cues to maintain alertness for remainder of session; slightly shortened session d/t patient with refusal to continue to participate.   Much of session spent attempting to maintain patient alertness and engagement within therapeutic tasks     Short-Term Goals:  SHORT TERM GOAL #1:  Goal 1: Pt will tolerate a minced/moist diet and mildly thick liquids (no straws) and advanced solids with ST only (hx of silent aspiration with thin) without overt s/s of aspiration to meet nutritional/hydration measures safely. INTERVENTIONS: DNT secondary to focus on cognitive tx:   Previous tx:   Skilled dietary analysis completed for remainder of breakfast meal consisting of minced and moist textures + PO medication. PO trials consisting of chopped/cut up pancakes with syrup, cubed potatoes, PO medication x1 whole in puree and crushed in puree administered by YVAN Asencio and nectar/mildly thick juice via cup as adherence to established diet level of minced/moist and nectar/mildly thick liquids. Oral phase with demonstration of slow/uncoordinated bolus breakdown, formation and transit resulting in mild oral stasis; independent utilization of liquid/puree wash to ensure clearance of residuals. Ongoing need for cues r/t \"slow rate\" for intake with pt responding positively to. Nectar/midly thick juice via cup x3 trials completed with concerns maintaining for decreased bolus control s/p large liquid bolus consumption; certainly cannot r/o premature pharyngeal entry. Improved control with small, controlled drinks. With ST present, pt consumed ~25% presented meal without overt s/s aspiration; no concerns for swallow decompensation this date as patient independently stopped consumption of meal once fatigue presented. Recommend continuation of minced and moist diet with nectar/mildly thick liquids with CLOSE pulmonary monitoring. *Next session patient would benefit from ongoing education with focus on carryover of compensatory swallowing strategies       SHORT TERM GOAL #2:  Goal 2: Pt will complete labial/lingual/pharyngeal strengthening, coordionation, ROM exercises, DDK rates, rote speech tasks with SOS strategies and tongue twisters x10 for improved timeliness of oral bolus formation/transit, maximized speech intelligibility and improved airway protection.     INTERVENTIONS: DNT d/t focus on additional STGs    SHORT TERM GOAL #3:  Goal 3: Pt will complete immediate/delayed/working memory tasks with 70% accuracy, min cues for improved retention of newly learned medical information. INTERVENTIONS: Orientation: 1/6 mod cues + use of newspaper, 5/6 unable to elicit despite max cues + newspaper  Recall of home address: Patient initially stating \"[place]\"; patient's wife stating \"You haven't lived in [place] for years. \"  Provided max encouragement patient eventually stated correct response of [address]. Time: Patient able to successfully identify correct time with min cues to utilize clock within patient's room     Patient UNABLE to state address despite max cues provided patient ST and wife    Functional 3-component list of items from the hardware store; ST provided patient with written list to improve recall. Strategies of association and and repetition/rehearsal (x3 trials) utilized to aid in success. Immediate memory: 2/3 indep, 1/3 unable to recall despite max cues with POOR use of written list  10 minute delay: 2/3 indep, 1/3 unable to recall; max cues to utilize list to recall. Provided list patient able to successfully identify final item    SHORT TERM GOAL #4:  Goal 4: Pt will complete problem solving, executive functioning and attention tasks (i.e. time/money management, scheduling, complex auditory/reading comprehension, etc) with 70% accuracy, mod cues for improved ADL/IADL management and safety awareness/insight. INTERVENTIONS: DNT d/t focus on other STG's      SHORT TERM GOAL #5:  Goal 5: Pt will complete complex naming, verbal sequencing/description and sentence formulation/repetition tasks with 70% accuracy, mod cues for improved expression of basic/complex thoughts.     INTERVENTIONS:  [place] confrontational naming of pictures: 2/5 indep, 3/5 unable to elicit d/t perseveration on previous stated picture       Long-Term Goals:  Timeframe for Long-term Goals: 2 weeks     LONG TERM GOAL #1:  Goal 1: Pt will tolerate a soft/bite sized

## 2020-01-09 NOTE — PROGRESS NOTES
Physical Medicine & Rehabilitation   Progress Note    Chief Complaint: S/P C. V. 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 Subjective: Patient reported no current head, trunk, or limb pain except for his chronic neck pain which he reported as being mild. He slept well last evening. His inpatient rehab. therapies are going well. Patient was evaluated today while he was working in the therapeutic gym. Patient remains fatigued in the afternoon. His appetite remains poor. He had no acute concerns. Rehabilitation:  PT:   Bed Mobility:  Rolling to Left: Contact Guard Assistance, Minimal Assistance   Rolling to Right: Contact Guard Assistance, Minimal Assistance   Sit to Supine: Moderate Assistance, Maximum Assistance. Transfers:  Sit to Stand: Moderate Assistance +1, min+1 up from mat and then up from w/c into FELISHA stedy. Pt requiring mod assist for trunk elevation and then to gain TKE RLE. Once up, pt tends to retract RT hip, lean to Rt. With mirror cues and verbal cues, improved midline noted, though not maintained. Stand to Sit:Moderate Assistance +1, min +1. Pt showing difficulty allowing LLE to flex at hip/knee and to release LUE from walker, tending to push. Sit Pivot: Moderate Assistance +1 to either direction. Pt supporting Rt hand with LT,  Assist for more forward translation of wt over base combined with more dynamic trunk. Pt tends to lean to Rt, but improved alignment noted with intervention. Transfer performed using FELISHA stedy at end of session: +2 mod assist up to stand and then 1 to guide and +1 mod assist to maintain trunk balance to position the device.       Balance:  Dynamic Sitting Balance: Minimal Assistance with maintain Rt hand on wt bearing position. Pt reaching with LUE forward and laterally to Lt to  ring. Cues for more erect trunk, more forward trunk flexion and to visually look for the ring. Numerous reps.   Pt was able to then regain midline with cues b/w ea reach trial.  Progressed to reaching forward and then attempts at reaching 2-3 \" off of midline to Rt, but loss of balance to Rt then noted. With Lt foot up on 4\" step with the last trials, less push noted. Wheelchair Mobility:  Minimal Assistance  Extremities Used: Left Upper Extremity and Left Lower Extremity  Type of Chair:Manual  Surface: Level Tile  Distance: 150 ft  Quality: Veers Right, Decreased Fluidity and max cues and min assist needed initially for pt to recognize need to use LUE only minimally as veering to RT. Once cued to not use LUE at all, then improved control noted.         EXERCISES:  The following exercises were completed to improve functional mobility: graded lifts from elevate mat. 1st attempted with pt placing katey hands on stool in front. Too much pushing from LUE noted. Graded lift without use of stool showed improved midline. Max assist with all trials. O.T:  ADL's:  Feeding: Setup(completed with non dominant hand )  Grooming: with set-up, with verbal cues  and with increased time for completion. Patient seated at sink to complete oral hygiene with hyrogen proxide and water mixture,completed with LUE, vcs for arousal, followed commands for safe technique. UE Bathing: Moderate assistance(and min A for sitting balance )  LE Bathing: Dependent/Total(Mod A of 1 for balance and total A of another )  UE Dressing: Minimal Assistance. Min A with threading R UE  LE Dressing: Moderate Assistance. Mod A with threading BLE  Toileting: Maximal Assistance. Toilet Transfer: Minimal Assistance, Moderate Assistance and X 2. Mod A of 1 Min of another, W/c <> BSC  Shower Transfer: Minimal Assistance, Moderate Assistance and X 2.   Mod A of 1, min A of another, w/c <> shower bench.     Functional Mobility:  Bed mobility  Supine to Sit: Maximum assistance     Functional Mobility  Functional Mobility Comments: Not appropriate at this time  OTR to assess as appropriate Potassium 3.9 3.5 - 5.2 meq/L    Chloride 109 98 - 111 meq/L    CO2 26 23 - 33 meq/L    Glucose 157 (H) 70 - 108 mg/dL    BUN 23 (H) 7 - 22 mg/dL    CREATININE 0.9 0.4 - 1.2 mg/dL    Calcium 9.2 8.5 - 10.5 mg/dL   Anion Gap    Collection Time: 01/09/20  5:36 AM   Result Value Ref Range    Anion Gap 13.0 8.0 - 16.0 meq/L   Glomerular Filtration Rate, Estimated    Collection Time: 01/09/20  5:36 AM   Result Value Ref Range    Est, Glom Filt Rate 81 (A) ml/min/1.73m2   POCT glucose    Collection Time: 01/09/20  8:10 AM   Result Value Ref Range    POC Glucose 187 (H) 70 - 108 mg/dl   POCT glucose    Collection Time: 01/09/20 12:30 PM   Result Value Ref Range    POC Glucose 188 (H) 70 - 108 mg/dl       Impression:  1. Status post cerebrovascular accident with an acute infarct being  located in the left romain-darin as noted on a brain MRI performed on  12/24/2019.  2.  Gait dysfunction. 3.  Deficits with respect to his activities of daily living. 4.  Significant speech and cognitive deficits. 5.  Current history of dysphagia. 6.  Current history of right upper and lower limb weakness. 7.  Current history of significant cardiac disease including an  estimated ejection fraction of 25%. The patient currently has a cardiac  LifeVest in place. 8.  History of severe major depression along with anxiety. 9.  History of a psychotic episode occurring in 2017, requiring a mental  hospitalization in [facility]. 10.  History of benign prostatic hypertrophy. 11.  History of anemia. 12.  History of gastroesophageal reflux disease. 13.  History of hypertension. 14.  History of bilateral inguinal surgical repair performed in 2018  15. Current history of anorexia. 16.  Current history of restless leg syndrome. Plan:  · He is to continue with his current inpatient rehab. program.  · A Rehab. team conference was held on 01/07/20. His discharge goal is home with his wife on 01/24/20. · Labs drawn on 01/03/20 were reviewed.

## 2020-01-09 NOTE — PROGRESS NOTES
placement on bed to lower self down, moderate assistance at BLE to lift onto bed, X 1   Scooting: Moderate Assistance scooting Rt side foward, min assist to scoot left side forward, X 1, verbal cues to lean to opposite side of pelvis movement forward to unload    Transfers:  Sit to Stand: Moderate Assistance X 2 for elevation of trunk, RLE requires support for terminal knee extension, X 1, another to assist with initial stand, verbal cues for for bring pelvis foward  Stand to Sit:Moderate Assistance, X 1,  Sit Pivot:Maximum Assistance, X 1, assistance needed for proper B foot placement and for supporting pt Rt hand in Lt hand, poor initiation by pt, performed X 3 in both directions     Wheelchair Mobility:  Stand By Assistance, minimal verbal cues required for direction of chair, intermittent stops after 4-5 propulsions  Extremities Used: Left Upper Extremity and Left Lower Extremity  Type of Chair:Manual  Surface: Level Tile  Distance: 40'  Quality: Slow Velocity, Short Strokes, and Decreased Fluidity    Ambulation:  Not tested    Balance:  Dynamic Sitting Balance: Minimal Assistance at trunk to orientate to midline, X 1, verbal and oli cues to bring body to midline and for trunk elevation,  Reaching outside SOPHIE with minimal LOB, able to correct positioning to midline with verbal and manual cues, sitting for 20 minutes with minimal support, poor midline awareness with trunk sway in all directions but most frequently to the right  Static Standing Balance: Pt stand with max assist at RLE for terminal knee extension, able to initiate TKE but unable to sustain position, stand X 1 for 30 seconds and 60 seconds    Exercise:  Patient was guided in 2 set(s) 5 reps of exercise   Bridges. Exercises were completed for increased independence with functional mobility. Functional Outcome Measures: Not completed       ASSESSMENT:  Assessment: Patient progressing toward established goals.   Pt shows limitations with standing and sitting posture. Pt able to correct postioning with cues and orient self to midline. Pt progressing to stepping with Rt leg x 1 with standing. Wheelchair mobility improving with hesitations aprroximatly 5-10 feet. Activity Tolerance:  Patient tolerance of  treatment: fair. Equipment Recommendations:Equipment Needed: Yes(continue to assess. Pt has q. cane)  Discharge Recommendations:  Continue to assess pending progress, Patient would benefit from continued therapy after discharge    Plan: Times per week: 5x/ wk 90 min, 1x/ wk 30 min  Current Treatment Recommendations: Strengthening, Transfer Training, Endurance Training, ROM, Balance Training, Wheelchair Mobility Training, Neuromuscular Re-education, Patient/Caregiver Education & Training, Equipment Evaluation, Education, & procurement, Gait Training, Home Exercise Program, Functional Mobility Training, Safety Education & Training    Patient Education  Patient Education: Plan of Care, [employer] Mobility, Transfers, Wheelchair Mobility,  - Patient Verbalized Understanding, - Patient Requires Continued Education    Goals:  Patient goals : get walking  Short term goals  Time Frame for Short term goals: 1 wk  Short term goal 1: Pt to roll to either direction, SBA with cues for Rt hemibody position only, for position change in bed. Short term goal 2: Pt to go supine <->sit, +1 mod assist at trunk up and with RLE going to supine, to get in/out of bed. Short term goal 3: Pt to perform sit/pivot transfer, supporting Rt hand with LT, +1 min assist to get in/out of w/c. Short term goal 4: Pt to get up/down from various seated surfaces, +1 mod assist to progress to pre gait standing with RW support.   Short term goal 5: Pt to stand with RW, maintain midline trunk, with stepping forward back with ea LE, mod assist with RLE  Short term goal 6: Pt to propel w/c >= 150 ft, SBA, for indoor mobility  Long term goals  Time Frame for Long term goals : 4 wks  Long term goal

## 2020-01-09 NOTE — PROGRESS NOTES
[facility]AT[facility]  [facility]      Date: 2020  Patient Name: Natasha Murray        MRN: [id number]    : 1940  (78 y.o.)    REASON FOR MISSED TREATMENT:  ST attempts to see pt at scheduled time 1130; however, OT, [place] in room and assisting pt to restroom for completion of bowel movement. ST attempts thereafter at 1200; however, pt with SEVERE fatigue and frequent eye closing resulting in inability to complete cognitive targeting. Wife present and reports significant fatigue noted this date. Request to nursing for pt transfer back to bed to allow pt to rest. Hold lunch tray until 1330. ST to see pt at 1330 for completion of dysphagia management as appropriate. Pt inappropriate for BID ST treatment this date.      Samson Modi M.S. Kelton Delgado 2020

## 2020-01-09 NOTE — PROGRESS NOTES
[facility]  INPATIENT PHYSICAL THERAPY  DAILY NOTE  [address]    Time In: 1527  Time Out: 1440  Timed Code Treatment Minutes: 35 Minutes  Minutes: 35          Date: 2020  Patient Name: Ildefonso Burgos,  Gender:  male        MRN: [id number]  : 1940  (78 y.o.)     Referring Practitioner: Dr. Baron Andrew  Diagnosis: acute CVA  Additional Pertinent Hx: Pt admitted [facility] ED s/p fall. MRI+ for infarct Lt hemipons. Cardiology deemed need for life vest (2019),  Hx:  depression , anxiety     Prior Level of Function:  Lives With: Spouse  Type of Home: House  Home Layout: One level, Work area in basement  Home Access: Stairs to enter with rails  Entrance Stairs - Number of Steps: 2 with grab bar(Rt)  Home Equipment: Quad cane(used intermittently)   Bathroom Shower/Tub: Walk-in shower  Bathroom Toilet: Handicap height  Bathroom Equipment: Built-in shower seat    ADL Assistance: Independent  Ambulation Assistance: Independent  Transfer Assistance: Independent  Active : No  Additional Comments: Pt reports he completed his own self care and mobility with intermittent use of a quad cane. Pt states wife completed housekeeping, meal prep and finances. Restrictions/Precautions:  Restrictions/Precautions: General Precautions, Fall Risk, Modified Diet  Position Activity Restriction  Other position/activity restrictions: pt pushes to right, R romain, Rt neglect, poor sensation Rt hemibody    SUBJECTIVE: Patient in bed upon arrival, required extra time to open eyes and initiate session. Did agree to therapy with encouragement. Required max verbal cues for keeping eyes opened during session. PAIN: No pain reported.      OBJECTIVE:  Bed Mobility:  Rolling to Right: Minimal Assistance, X 1, with verbal cues , with increased time for completion   Supine to Sit: Maximum Assistance, X 1, with verbal cues , with increased time for completion, to elevate

## 2020-01-09 NOTE — PLAN OF CARE
Problem: Nutrition  Goal: Optimal nutrition therapy  Outcome: Ongoing   Nutrition Problem: Moderate malnutrition  Intervention: Food and/or Nutrient Delivery: Continue current diet, Continue current ONS, Vitamin Supplement(Diet per SLP & MD. Continue Magic Cups TID. Continue a Multivitamin w/minerals daily.)  Nutritional Goals: Patient will safely consume 75% or more of meals during LOS.

## 2020-01-09 NOTE — PLAN OF CARE
Problem: Falls - Risk of:  Goal: Will remain free from falls  Description  Will remain free from falls  1/9/2020 1514 by Librado Perez RN  Outcome: Ongoing  1/9/2020 0211 by Devyn Mclean RN  Outcome: Ongoing    Patient will remain free from falls during this admission as evidenced by no falls during this admission patient and staff will utilize safety devices and techniques for transfer     Goal: Absence of physical injury  Description  Absence of physical injury  1/9/2020 1514 by Librado Perez RN  Outcome: Ongoing  1/9/2020 0211 by Devyn Mclean RN  Outcome: Ongoing     Patient will remain free from physical injury during this admission as evidenced by no physical injury during this admission patient and staff will utilize safety devices and techniques for transfer     Problem: Risk for Impaired Skin Integrity  Goal: Tissue integrity - skin and mucous membranes  Description  Structural intactness and normal physiological function of skin and  mucous membranes. Outcome: Ongoing      Patient will participate in skin integrity promotion during this admission as patient offloading weight while in bed and in chair. Patient and family will alert staff to any concerns he has related to break in intact skin.        Problem: HEMODYNAMIC STATUS  Goal: Patient has stable vital signs and fluid balance  Outcome: Ongoing     Problem: IP BLADDER/VOIDING  Goal: STG - Patient demonstrates ability to take care of indwelling catheter  Outcome: Met This Shift     Problem: DISCHARGE BARRIERS  Goal: Patient's continuum of care needs are met  Outcome: Ongoing     Problem: Nutrition  Goal: Optimal nutrition therapy  1/9/2020 1416 by Jean Lott RD, LD  Outcome: Ongoing

## 2020-01-09 NOTE — PROGRESS NOTES
[address]  Occupational Therapy  Daily Note  Time:    Time In:   Time Out: 1145  Timed Code Treatment Minutes: 70 Minutes  Minutes: 70          Date: 2020  Patient Name: Tim Ott,   Gender: male      Room: [facility]56/056-A  MRN: [id number]  : 1940  (78 y.o.)  Referring Practitioner: Dr. Bryon Gaspar   Diagnosis: CVA   Additional Pertinent Hx: Per ER note on 19:  78 y.o. male who presents to the Emergency Department via EMS for the evaluation of a CVA. The patient was found on the floor sitting up around 0630 this morning by his wife, who heard him fall. She reports a drooping mouth on the right side, slurred speech, and right sided weakness when she found him at 0630. The patient's last known well was 22:00 last night. The patient is unable to walk because his left leg is weak, and the patient had to be lifted into a chair after he was found on the floor. 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 Restrictions/Precautions:  Restrictions/Precautions: General Precautions, Fall Risk, Modified Diet  Position Activity Restriction  Other position/activity restrictions: pt pushes to right, R romain, Rt neglect, poor sensation Rt hemibody    SUBJECTIVE: cooperative, max cues for opening eyes and maintaining eye contact with ADLs. Spouse present and supportive. Pt did follow 75 % of 1 step commands with min increased time . Spouse encouraged increased alertness and pt receptive. PAIN: 0/10: denied pain. COGNITION: Slow Processing, Decreased Recall, Decreased Insight, Impaired Memory, Inattention, Decreased Problem Solving, Decreased Safety Awareness, Impaired Attention and Decreased Arousal    ADL:   Grooming: Minimal Assistance. Bathing: Dependent. Mod cues for attention to task and initiation.  Pt able to wash chest, R UE with cues, ABD, assist for L UE, B LEs and a second person needed when standing to wash bottom. min to mod A  x 1-2 person for sit to stand transfers. Upper Extremity Dressing: Maximum Assistance. Mod to max cues for initiation and problem solving. Pt did follow 1 step commands to assist with changing the life vest as well for removing and reapplying shirt. Lower Extremity Dressing: Dependent. for changing pants, socks and shoes, mod A x 1, Min A x 1 for standing balance to pull pants up   Toileting: Dependent. for wiping and clothing mgt. 2 persons for assist for task. Toilet Transfer: Moderate Assistance. x 1 min A x 1 sit pivot to a drop arm BSC. Pratimavannessa Ch BALANCE:   Sitting Balance:  Minimal Assistance. Pt leaning to the right at times. Max A for placement of R UE into supported position. Occasionally pushing to the right. Standing Balance: Moderate Assistancex 2 wih mod support of R LE from buckling. TRANSFERS:  Sit to Stand: Moderate Assistance. x 2 for sit to stand with mod cues for supporting R UE with L UE. Stand to Sit: Moderate Assistance. x 2 to control descent. Sit pivot from w/c to EOB to drop arm BSC to w/c. Mod A x 1, min A x 1 with mod cues for romain tech. ADDITIONAL ACTIVITIES:   attempted wt bearing tasks and attention to R UE throughout ADLs. Increased time for toileting tasks on the commode. Fatigue noted. ASSESSMENT:     Activity Tolerance:  Patient tolerance of  treatment: fair. Discharge Recommendations: 24 hour supervision or assist, Continue to assess pending progress  Equipment Recommendations:  Other: Monitor pending progress  Plan: Times per week: 5x/wk for 90 min and 1x/wk for 30 min   Current Treatment Recommendations: Balance Training, Self-Care / ADL, ROM, Strengthening, Functional Mobility Training, Endurance Training, Neuromuscular Re-education, Safety Education & Training, Patient/Caregiver Education & Training, Wheelchair Mobility Training, Home Management Training    Patient Education  Patient Education: ADL's    Goals  Short term goals  Time Frame for Short term goals: 1 week   Short term goal 1: Pt will complete sit to stand transfers with mod assist of 1 with min vcs or RUE support to improve indep with LB clothing mgt   Short term goal 2: Pt will complete 5-7 min EOB ADL task with SBA for sitting balance & min vcs for midline posture to improve balance needed for ADLs. Short term goal 3: Pt will don UB clothing with SBA and mod vcs for romain dressing technique to improve indep with self care. Short term goal 4: Pt will tolerate standing >2 minutes in midline with CGA to improve midline awareness for completion of stand pivots to [facility]. Long term goals  Time Frame for Long term goals : 3-4 weeks   Long term goal 1: Pt will don LB clothing with CGA and min vcs for adaptive technique to ipmrove indep with self care. Long term goal 2: pt will complete stand pivot transfer to [facility] with CGA to improve indep with toileting. Long term goal 3: Pt will demonstrate 3/5 R elbow flexion to improve indep with donning a shirt. Following session, patient left in safe position with all fall risk precautions in place.

## 2020-01-09 NOTE — PROGRESS NOTES
stand   Stand to Sit: Moderate Assistance. x 1 min A x 1   Squat Pivot: Moderate Assistance. x 1 SBA x 1 for sit pivot to EOB. ADDITIONAL ACTIVITIES:  Neuro based supportive response training for R UE supported into slight ABD and sh flexion and encouraging reaching  L UE into ABD to encourage R scap retraction. Tolerated 5 reps x 2 sets. Pt able to actively retract shoulder and slide R UE down the board to return to neutral position. ASSESSMENT:     Activity Tolerance:  Patient tolerance of  treatment: good. Discharge Recommendations: 24 hour supervision or assist, Continue to assess pending progress  Equipment Recommendations: Other: Monitor pending progress  Plan: Times per week: 5x/wk for 90 min and 1x/wk for 30 min   Current Treatment Recommendations: Balance Training, Self-Care / ADL, ROM, Strengthening, Functional Mobility Training, Endurance Training, Neuromuscular Re-education, Safety Education & Training, Patient/Caregiver Education & Training, Wheelchair Mobility Training, Home Management Training    Patient Education  Patient Education: neuro based rehab. Goals  Short term goals  Time Frame for Short term goals: 1 week   Short term goal 1: Pt will complete sit to stand transfers with mod assist of 1 with min vcs or RUE support to improve indep with LB clothing mgt   Short term goal 2: Pt will complete 5-7 min EOB ADL task with SBA for sitting balance & min vcs for midline posture to improve balance needed for ADLs. Short term goal 3: Pt will don UB clothing with SBA and mod vcs for ormain dressing technique to improve indep with self care. Short term goal 4: Pt will tolerate standing >2 minutes in midline with CGA to improve midline awareness for completion of stand pivots to [facility]. Long term goals  Time Frame for Long term goals : 3-4 weeks   Long term goal 1: Pt will don LB clothing with CGA and min vcs for adaptive technique to ipmrove indep with self care.    Long term goal 2: pt

## 2020-01-09 NOTE — PROGRESS NOTES
labial/lingual/pharyngeal strengthening, coordionation, ROM exercises, DDK rates, rote speech tasks with SOS strategies and tongue twisters x10 for improved timeliness of oral bolus formation/transit, maximized speech intelligibility and improved airway protection. INTERVENTIONS: DNT d/t focus on additional STGs    SHORT TERM GOAL #3:  Goal 3: Pt will complete immediate/delayed/working memory tasks with 70% accuracy, min cues for improved retention of newly learned medical information. INTERVENTIONS: Orientation: FO2 for recall of month, date and year  Location: indep recall of specific location and city   FO2 to recall type of location   Required max cues/reference to clock for determination of current time with pt stating \"I don't know\". With min cues to reference clock, pt stating \"I don't have one\". Required max cues to locate with poor functional carryover demonstrated. Recall of rehab physician: FO2  Recall of ST name/department: 2/2 FO2     SHORT TERM GOAL #4:  Goal 4: Pt will complete problem solving, executive functioning and attention tasks (i.e. time/money management, scheduling, complex auditory/reading comprehension, etc) with 70% accuracy, mod cues for improved ADL/IADL management and safety awareness/insight. INTERVENTIONS: DNT d/t focus on other STG's      SHORT TERM GOAL #5:  Goal 5: Pt will complete complex naming, verbal sequencing/description and sentence formulation/repetition tasks with 70% accuracy, mod cues for improved expression of basic/complex thoughts. INTERVENTIONS: Responsive namin/8 indep, 1/8 repetition, 1/8 min cues, 1/8 mod cues, 3/8 FO2     Long-Term Goals:  Timeframe for Long-term Goals: 2 weeks     LONG TERM GOAL #1:  Goal 1: Pt will tolerate a soft/bite sized diet and thin liquids without overt s/s of aspiration to meet nutritional/hydration measures safely.   ONGOING     LONG TERM GOAL #2:  Goal 2: Pt will improve wdthywqsp-sebprznvjw-oyhkxonggaksq skills to a supervision level of function for maximized expression of complex thoughts, independent completion of ADL/IADL responsibilities and appropriate transition to home setting with wife post discharge. ONGOIN      Comprehension: 4 - Patient understands basic needs 75-90%+ of the time  Expression: 3 - Expresses basic ideas/needs 50-74% of the time  Social Interaction: 4 - Patient appropriate 75-90%+ of the time  Problem Solving: 3 - Patient solves simple/routine tasks 50%-74%  Memory: 3 - Patient remembers 50%-74% of the time    EDUCATION:  Learner: Patient  Education:  Reviewed ST goals and Plan of Care and Reviewed recommendations for follow-up  Evaluation of Education: Verbalizes understanding, Demonstrates with assistance, Needs further instruction and Family not present    ASSESSMENT/PLAN:  Activity Tolerance:  Patient tolerance of  treatment: poor. Poor basic attentiveness, participation and engagement   Assessment/Plan: Patient progressing toward established goals. Continues to require skilled care of licensed speech pathologist to progress toward achievement of established goals and plan of care. .     Plan for Next Session: organization, executive functioning, selective attention      Melissa Renae M.S. CCC-SLP [id number] 1/9/2020

## 2020-01-10 NOTE — PROGRESS NOTES
management, scheduling, complex auditory/reading comprehension, etc) with 70% accuracy, mod cues for improved ADL/IADL management and safety awareness/insight. INTERVENTIONS: DNT d/t focus on other goals   Prior session: Money word problems related to menu: x1 trial with need for max assist given poor sustained attention for reading full prompt, decreased initiation, poor visual selection with total assist required and poor working recall/problem solving for answering prompt given. Locating BASIC visual targets on topographical map of [facility]: 1/3 indep, 2/3 max cues  *decreased visual scanning and attention to right   *poor selective locating  *poor basic attention for deciphering building numbers/locations     SHORT TERM GOAL #5:  Goal 5: Pt will complete complex naming, verbal sequencing/description and sentence formulation/repetition tasks with 70% accuracy, mod cues for improved expression of basic/complex thoughts. INTERVENTIONS: DNT d/t focus on other goals   Prior session: Responsive namin/8 indep, 1/8 repetition, 1/8 min cues, 1/8 mod cues, 3/8 FO2     Long-Term Goals:  Timeframe for Long-term Goals: 2 weeks     LONG TERM GOAL #1:  Goal 1: Pt will tolerate a soft/bite sized diet and thin liquids without overt s/s of aspiration to meet nutritional/hydration measures safely. ONGOING     LONG TERM GOAL #2:  Goal 2: Pt will improve lgiykisjp-rxkfzfaszv-qvbqtxqytdgxc skills to a supervision level of function for maximized expression of complex thoughts, independent completion of ADL/IADL responsibilities and appropriate transition to home setting with wife post discharge.   ONGOING      Comprehension: 3 - Patient understands basic needs 50-74% of the time  Expression: 2 - Expresses basic ideas/needs 25-49% of the time  Social Interaction: 2 - Patient appropriate  25%-49% of the time  Problem Solvin - Patient solves simple/routine tasks 25%-49%  Memory: 2 - Patient remembers 25%-49% of the time    EDUCATION:  Learner: Patient, Significant Other and daughter   Education:  Reviewed diet and strategies, Reviewed signs, symptoms and risks of aspiration, Reviewed ST goals and Plan of Care and Reviewed recommendations for follow-up  Evaluation of Education: Verbalizes understanding, Demonstrates with assistance, Needs further instruction and No evidence of learning    ASSESSMENT/PLAN:  Activity Tolerance:  Patient tolerance of  treatment: poor. Poor basic attentiveness, participation and engagement   Assessment/Plan: Patient not progressing toward established goals due to progressive fatigue, frequent eye closing and poor basic sustained alertness/focus . Will modify plan of care as follows: continued current POC and drop down to 1x/daily vs BID as needed .      Plan for Next Session: memory, organization, NIXON Mae 1/10/2020

## 2020-01-10 NOTE — PROGRESS NOTES
is warm, No lower extremity edema. PSYCHIATRIC: mood and affect flat    Medications:   Med reviewed  Scheduled Meds:   lisinopril  5 mg Oral Daily    rOPINIRole  0.5 mg Oral Nightly    dronabinol  2.5 mg Oral BID    baclofen  10 mg Oral TID    atorvastatin  80 mg Oral Nightly    aspirin  81 mg Oral Daily    clopidogrel  75 mg Oral Daily    escitalopram  20 mg Oral Daily    finasteride  5 mg Oral Daily    insulin lispro  0-12 Units Subcutaneous TID WC    insulin lispro  0-6 Units Subcutaneous Nightly    metoprolol succinate  25 mg Oral Daily    pantoprazole  40 mg Oral QAM AC    therapeutic multivitamin-minerals  1 tablet Oral Daily    vitamin C  500 mg Oral Daily    Vitamin D  500 Units Oral Daily     PRN Meds acetaminophen, glucagon (rDNA), glucose, ondansetron   Labs:   Labs reviewed  Recent Labs     01/09/20  0536 01/10/20  0546   * 143   K 3.9 4.0    107   CO2 26 26   BUN 23* 25*   CREATININE 0.9 1.0   LABGLOM 81* 72*   GLUCOSE 157* 136*   CALCIUM 9.2 9.1     No results for input(s): WBC, RBC, HGB, HCT, MCV, MCH, RDW, PLT in the last 72 hours. ASSESSMENT:  1. Acute Kidney Injury resolved  2. HFrEF 25%. Life Vest on  3. Metabolic acidosis resolved. Stop PO bicarb, Repeat BMP Monday 1/3/20 Dose increased 1/7/20.  4. Essential Hypertension. BP at control  5. Diabetes Mellitus Type II  6. Acute CVA  7. Urinary retention, danielle. Active Problems:    Acute cerebrovascular accident (CVA) (HCC)    Moderate malnutrition (HCC)    Metabolic acidosis  Resolved Problems:    * No resolved hospital problems.  Dieudonne Tavarez, RYLEY - CNP 11:08 AM 1/10/2020

## 2020-01-10 NOTE — PROGRESS NOTES
[address]  Occupational Therapy  Daily Note  Time:    Time In: 1430  Time Out: 1445  Timed Code Treatment Minutes: 15 Minutes  Minutes: 15          Date: 1/10/2020  Patient Name: Cipriano Multani,   Gender: male      Room: [facility]/056-A  MRN: [id number]  : 1940  (78 y.o.)  Referring Practitioner: Dr. Dayana Grajeda   Diagnosis: CVA   Additional Pertinent Hx: Per ER note on 19:  78 y.o. male who presents to the Emergency Department via EMS for the evaluation of a CVA. The patient was found on the floor sitting up around 0630 this morning by his wife, who heard him fall. She reports a drooping mouth on the right side, slurred speech, and right sided weakness when she found him at 0630. The patient's last known well was 22:00 last night. The patient is unable to walk because his left leg is weak, and the patient had to be lifted into a chair after he was found on the floor. 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 Restrictions/Precautions:  Restrictions/Precautions: General Precautions, Fall Risk, Modified Diet  Position Activity Restriction  Other position/activity restrictions: pt pushes to right, R romain, Rt neglect, poor sensation Rt hemibody    SUBJECTIVE: cooperative , fatigued,     PAIN: 0/10: denied pain    COGNITION: Slow Processing, Decreased Recall, Decreased Insight and Impaired Memory    ADL:   No ADL's completed this session. .      ADDITIONAL ACTIVITIES:  PT completed supine neuro based activities, PNF patterns D 1 and D 2, with mod A for tech, supine AAROM ex for chest press, sh flexion to 90 degrees, elbow flexion/ extension to touch to the shoulder. ASSESSMENT:     Activity Tolerance:  Patient tolerance of  treatment: good. Discharge Recommendations: 24 hour supervision or assist, Continue to assess pending progress  Equipment Recommendations:  Other: Monitor pending progress  Plan: Times per week: 5x/wk for

## 2020-01-10 NOTE — PROGRESS NOTES
onto bed. Pt initiates LLE but requires min assist to complete movement onto bed, X 1 verbal and manual cues to utilize Lt hand to assist w/ lowering trunk to bed  Scooting: Max Assistance, X 1, verbal for anterior weight shift    Transfers:  Sit to Stand: Maximum Assistance for stance, X 2, min assist at Rt lower thigh for TKE during stand and proper setup of bilateral feet, verbal and manual cues to upright posture,   Stand to Sit:Maximum Assistance for descend X 2, min assist X 1 at Rt lower thigh, verbal and manual cues for anterior weight shift  Stand Pivot:Maximum Assistance, X 1, verbal cues for anterior weight shift    Balance:   Static Standing Balance: Pt required max assist X 2 to stand at grocery cart w/ pole on Lt side. Assistance required for Lt hand placement high on pole, hip extension, and midline orientation of body. Manual and verbal cues required for midline orientation noted with pushing to Rt. Min assist X 1 at Rt thigh for TKE. Functional Outcome Measures: Not completed       ASSESSMENT:  Assessment: Patient progressing toward established goals. Activity Tolerance:  Patient tolerance of  treatment: fair. Equipment Recommendations:Equipment Needed: Yes(continue to assess.   Pt has q. cane)  Discharge Recommendations:  Continue to assess pending progress, Patient would benefit from continued therapy after discharge    Plan: Times per week: 5x/ wk 90 min, 1x/ wk 30 min  Current Treatment Recommendations: Strengthening, Transfer Training, Endurance Training, ROM, Balance Training, Wheelchair Mobility Training, Neuromuscular Re-education, Patient/Caregiver Education & Training, Equipment Evaluation, Education, & procurement, Gait Training, Home Exercise Program, Functional Mobility Training, Safety Education & Training    Patient Education  Patient Education: Plan of Care, [employer] Mobility, Transfers,  - Patient Verbalized Understanding, - Patient Requires Continued

## 2020-01-10 NOTE — PROGRESS NOTES
Physical Medicine & Rehabilitation   Progress Note    Chief Complaint: [facility] with an acute infarct located in the Left hemipons. Subjective: Patient reported no current head, trunk, or limb pain except for his chronic neck pain which he reported as being mild. He slept well last evening. His inpatient rehab. therapies are going well. Patient was evaluated today while he was sitting in his wheelchair. Patient was more awake and alert today. His appetite remains poor. He and his family had no acute concerns. Rehabilitation:  PT:   Bed Mobility:  Rolling to Left: Contact Guard Assistance, Minimal Assistance   Rolling to Right: Contact Guard Assistance, Minimal Assistance   Sit to Supine: Moderate Assistance, Maximum Assistance. Transfers:  Sit to Stand: Moderate Assistance +1, min+1 up from mat and then up from w/c into FELISHA stedy. Pt requiring mod assist for trunk elevation and then to gain TKE RLE. Once up, pt tends to retract RT hip, lean to Rt. With mirror cues and verbal cues, improved midline noted, though not maintained. Stand to Sit:Moderate Assistance +1, min +1. Pt showing difficulty allowing LLE to flex at hip/knee and to release LUE from walker, tending to push. Sit Pivot: Moderate Assistance +1 to either direction. Pt supporting Rt hand with LT,  Assist for more forward translation of wt over base combined with more dynamic trunk. Pt tends to lean to Rt, but improved alignment noted with intervention. Transfer performed using FELISHA stedy at end of session: +2 mod assist up to stand and then 1 to guide and +1 mod assist to maintain trunk balance to position the device.       Balance: mirror use for visual feedback  Dynamic Sitting Balance: Minimal Assistance, X 1, with increased time for completion, Reaching outside SOPHIE to Lt and forward w/ Lt hand, verbal and manual cues for upright posture. Pt maintained midline with min assist for clothing management.   Static Standing Balance: Minimal Assistance, X 2, pt able to stand w/ min assist, Rt lean noted, verbal and manual cues to weight shift to midline, min assist at lower trunk for upright posture, min assist at lower anterior thigh for support TKE. Wheelchair Mobility:  Stand By Assistance. Extremities Used: Left Upper Extremity and Left Lower Extremity  Type of Chair:Manual  Surface: Level Tile  Distance: 150 ft  Quality: Veers Right, Decreased Fluidity and max cues and min assist needed initially for pt to recognize need to use LUE only minimally as veering to RT. Once cued to not use LUE at all, then improved control noted.         EXERCISES:  The following exercises were completed to improve functional mobility: graded lifts from elevate mat. 1st attempted with pt placing katey hands on stool in front. Too much pushing from LUE noted. Graded lift without use of stool showed improved midline. Max assist with all trials. O.T:  ADL's:  Feeding: Setup(completed with non dominant hand )  Grooming: with set-up, with verbal cues  and with increased time for completion. Patient seated at sink to complete oral hygiene with hyrogen proxide and water mixture,completed with LUE, vcs for arousal, followed commands for safe technique. UE Bathing: Moderate assistance(and min A for sitting balance )  LE Bathing: Dependent/Total(Mod A of 1 for balance and total A of another )  UE Dressing: Minimal Assistance. Min A with threading R UE  LE Dressing: Moderate Assistance. Mod A with threading BLE  Toileting: Maximal Assistance. Toilet Transfer: Minimal Assistance, Moderate Assistance and X 2. Mod A of 1 Min of another, W/c <> BSC  Shower Transfer: Minimal Assistance, Moderate Assistance and X 2.   Mod A of 1, min A of another, w/c <> shower bench.     Functional Mobility:  Bed mobility  Supine to Sit: Maximum assistance     Functional Mobility  Functional Mobility Comments: Not appropriate at this time  OTR to assess as appropriate    Balance:  Balance  Sitting Balance: (Fluctuated between CGA and moderate assist. Used brettapproved for visual feedback. Pt self correcting once cued to look in mirror. Seated rest break on L elbow prop while OT assisted patient with LB dressing.  )  Standing Balance: Moderate assistance of 1 fluctuating to max A of 1 and min A of another, RLE blocked, cues to scan in mirror to correct midline.  )  Standing Balance  Time: 45 seconds,  30 seconds for LB clothing mgt. Pt leaning right, constant cues to look in mirror to correct midline. Transfers:  Sit to Stand:  Minimal Assistance, Maximum Assistance, X 2. with use of puma matute (only to/from [facility]   Stand to Sit: Maximum Assistance, X 1. to control descend and VCs for R UE attn   Squat Pivot: Minimal Assistance, Moderate Assistance, X 2. w/c <> EOM, w/c > EOB. VCs for R UE attn. Pt was able to direct w/c position and leg rest / lap tray management, requiring cues for brake management and arm rest.    BED MOBILITY:  Supine to Sit: Moderate Assistance for bringing BLEs off side of mat and elevating trunk  Sit to Supine: Moderate Assistance for bringing BLEs onto mat  Scooting: Maximum Assistance advancing his forward        Upper Extremity Assessment:   RUE AROM : (Pt 2-/5 scap elevation. No shoulder AROM )     Sensation  Overall Sensation Status: (decreased proproception RUE, reports numbness )  RUE Tone: Hypotonic  LUE Tone: Normotonic  Coordination and Movement description: Fine motor impairments, Gross motor impairments, Right UE     Activity Tolerance: Patient limited by fatigue, Treatment limited secondary to decreased cognition. ADDITIONAL ACTIVITIES:  Increased time for toileting and bathing tasks with mod cues for attention to R romain body and pushing syndrome. Spouse educated on monitoring pushing syndrome when seated in the chair and to assist with adjusting it as needed. Speech:  SPEECH / VOICE:  Speech:  Moderate dysarthria characterized by blended word boundaries and omission of word endings. DDK rates completed with poor success d/t poor level of articulatory precision and reduced breath support. Speech intelligibility 60-70% within unknown context in conversation. Voice: Hoarse/breathy vocal quality with demonstration of thoracic breathing pattern and poor level of breath support. Frequent aphonic breaks     LANGUAGE:  Receptive:  2 Step Commands: 2/4 indep, 1/4 repetition  Complex Yes/No Questions: 4/4     Expressive:  Confrontational Naming: 3/3 MOCA  Divergent Naming (abstract): impaired-- see below   Sentence Formulation: impaired-- short utterance length with reduced syntax  Conversational Speech: impaired-- see above   Paraphasias: none noted   Repetition: sentence level 1/2    Recall of speech strategies: poor success  Reference to external aid (max cues-- careboard): able to read 25% of list at best   Reference to external aid (written paper): 100% accuracy indep  Utilization of strategies within rote speech tasks:   Counting 1-10: poor-fair success initially and quickly declined to poor success with fatigue   JEFFREY: poor-fair success initially and quickly declined to poor success with fatigue.     COGNITION:  Myles Cognitive Assessment (MOCA) version 7.1 completed. Pt with adjusted score 18/25. Unable to complete written portion d/t extremity weakness in dominant right hand. Normal is greater than or equal to 26/30.     Orientation: 6/6  Immediate Recall: 2/5-- improved to 4/5 with repetition   Short-Term Recall: 4/5 indep, 1/5 FO2  Divergent Naming: x4 indep 1 minute (N=11)   Reasoning: verbal abstraction- 0/2  Sequencing: impaired  Thought Organization: at least mild impairment  Insight: fair   Attention: decreased high level selective attention   Math Computation: 3/5  Executive Functioning: unable to complete d/t extremity weakness     SWALLOWING:  Current Diet: Minced/moist diet and mildly thick liquids   Respiratory Status:   Independent Skilled dysphagia therapy via direct meal intervention. Pt with multiple trials of scrambled eggs, applesauce, magic cup and mildly thick liquids by cup. Impulsivity with large bolus/liquid intake and fast rate of PO intake intermittently noted. Improved small bolus size and slowed rate of PO intake with skilled level education provided. Pt demonstration of slow/uncoordinated bolus breakdown, formation and transit resulting in mild-moderate oral stasis with heavy reliance on liquid/pureed wash to maximize oral clearance. Pt many times attempting to complete continuous trials despite poor oral clearance achieved. Required max cues for use of alternating liquids/purees in order to enhance clearance. Improved pt carryover with use of strategy as trials progressed. Certainly cannot r/o premature pharyngeal entry of liquid bolus with concerns for decreased bolus control s/p large liquid bolus consumption. Improved control with small, controlled drinks. Pt with presence of continuous, persistent coughing at END of meal trial with consumption of magic cup. Concerns for swallow decompensation. Skilled level education provided to pt re: skilled use of swallow strategies (I.e. starting with minced/moist trials and ending with purees to combat fatigue, focus on small/frequent meals, small bites/sips, slow rate, achievement of oral clearance with liquid/pureed wash, etc). Tray pulled to allow pt to rest given concerns for fatigue. Consumption of 50% of eggs, 50% of applesauce, 25% of magic cup and ~5 ounces of mildly thick liquids this date. Pt to resume minced/moist diet and mildly thick liquids with CLOSE pulmonary monitoring.        Behavioral Observation: Alert, Oriented and Dysarthric     ORAL MECHANISM EVALUATION:       Facial / Labial Impaired Right sided facial drooping with flaccidity. Decreased labial seal and strength    Lingual Impaired Decreased lingual ROM/strength and coordination.  Lingual deviation to right upon protrusion   Dentition WFL Decreased oral hygiene    Velum WFL     Vocal Quality Impaired Hoarse/breathy with reduced breath support and presence of frequent aphonic breaks    Sensation Not Tested     Cough Not Tested       PATIENT WAS EVALUATED USING:  Puree, hard solid, thin liquids by cup, mildly thick liquids by cup      ORAL PHASE:  Impaired:  Impaired AP Movement, Impaired Mastication and Reduced Bolus Formation     PHARYNGEAL PHASE:  Impaired:  Decreased Hyolaryngeal Elevation and Suspected Pharyngeal Residue     SIGNS AND SYMPTOMS OF LARYNGEAL PENETRATION / ASPIRATION:  No signs/symptoms of aspiration evident in this evaluation, but cannot rule out silent aspiration.     IMPRESSIONS: Pt presents with moderate oral and mild-moderate pharyngeal dysphagia evidenced by the above skilled level findings. Pt demonstration of slow, uncoordinated bolus breakdown, formation and AP transit resulting in decreased bolus formation of hard solid trials which was NOT cleared s/p use of liquid wash with mild-moderate oral stasis remaining. Required extra time to achieve adequate clearance. No overt pocketing present. Pt with decreased laryngeal elevation upon manual palpation and concerns for pharyngeal stasis given presence of spontaneous multiple swallows. No overt s/s of aspiration s/p trials of hard solid, puree, thin liquids and mildly thick liquids; HOWEVER, MBS completed on 12/26 with demonstration of SILENT aspiration of thin by cup. Given hx of silent aspiration, pt inappropriate for completion of advanced liquid trials. Recommend pharyngeal strengthening with plans for repeat MBS as early as 1/9 to further assess pharyngeal swallow function and determine appropriateness to progress liquid consumption. At this time pt to resume minced/moist diet and mildly thick liquids with restriction on use of straw.  Will require intermittent supervision with occasional impulsivity noted with large bites/sips during PO intake this date. RECOMMENDATIONS:              Modified Barium Swallow:   Not indicated     DIET RECOMMENDATIONS:  Minced/moist and mildly thick liquids      STRATEGIES: Full Upright Position, Small Bite/Sip, No Straw, Multiple Swallow, Pulmonary Monitoring, Oral Care after all Meals, Supervision, Medication in Applesauce, Alternate Solids and Liquids, Limit Distractions and Monitor for Fatigue                 RECOMMENDATIONS/ASSESSMENT:  DIAGNOSTIC IMPRESSIONS:  Pt presents with at least mild cognitive deficits evidenced by derived/adjusted MOCA score of 18/25. This is improved from acute care stay in which pt scored a 11/25 on 12/27. Deficits found within the domains of immediate/delayed recall, verbal reasoning, thought organization, decreased high level attention and math computation. Moderate expressive language deficits evidenced by impaired lexical retrieval, mental flexibility, verbal sequencing/description, short utterance length with reduced syntax and impaired sentence repetition. Pt with mild receptive language deficits evidenced by increasing difficulty following complex verbal commands, need for extra time d/t slowed mental processing and heavy reliance on repetitions. Pt reports hx of hearing aids. Denies need for hearing aids at this time. Pt with moderate dysarthria characterized by blended word boundaries, imprecise articulatory precision and omission of word endings. Moderate dysphonia evidenced by poor level of breath support, presence of aphonic breaks and hoarse/breathy vocal quality. Pt would highly benefit from ongoing ST intervention in order to progress [id number] functioning to a supervision level for safe, functional return to home setting and demonstration of independent ADL/IADL completion upon discharge.      Rehabilitation Potential: excellent     Comprehension: 4 - Patient understands basic needs 75-90%+ of the time  Expression: 3 - Expresses basic History of severe major depression along with anxiety. 9.  History of a psychotic episode occurring in 2017, requiring a mental  hospitalization in [facility]. 10.  History of benign prostatic hypertrophy. 11.  History of anemia. 12.  History of gastroesophageal reflux disease. 13.  History of hypertension. 14.  History of bilateral inguinal surgical repair performed in 2018  15. Current history of anorexia. 16.  Current history of restless leg syndrome. Plan:  · He is to continue with his current inpatient rehab. program.  · A Rehab. team conference was held on 01/07/20. His discharge goal is home with his wife on 01/24/20. · A BMP was drawn on 01/10/20. All of his labs remain stable. · His recent blood sugars have been stable with a range of 109 - 243. Continue to closely monitor and adjust his medications as needed. · Started the patient on Baclofen 10 mg to be taken 3 times daily. This is to treat his increased muscle tone involving the Right lower limb. · Started the patient on Marinol 2.5 mg to be taken twice daily. This is to treat his anorexia. · Increased his Requip to 0.5 mg to be taken once daily. This is being used to treat his restless leg syndrome. · Started the patient on Lisinopril 5 mg to be taken once daily due to his blood pressure being elevated. Greater than 50% of the 30 minutes was spent with the patient and his wife on counseling and with respect to coordinating his current inpatient rehab. Care. The results of the Rehab. team conference held on 01/07/20 were discussed with the patient and his wife.     Missed Therapy Time:  · None    Michelle Blanco MD

## 2020-01-10 NOTE — PROGRESS NOTES
[address]  Occupational Therapy  Daily Note  Time: Time In: 1010  Time Out: 1125  Timed Code Treatment Minutes: 75 Minutes  Minutes: 75          Date: 1/10/2020  Patient Name: Demetrice Beckham,   Gender: male      Room: [facility]56/056-A  MRN: [id number]  : 1940  (78 y.o.)  Referring Practitioner: Dr. Meg Coelho   Diagnosis: CVA   Additional Pertinent Hx: Per ER note on 19:  78 y.o. male who presents to the Emergency Department via EMS for the evaluation of a CVA. The patient was found on the floor sitting up around 0630 this morning by his wife, who heard him fall. She reports a drooping mouth on the right side, slurred speech, and right sided weakness when she found him at 0630. The patient's last known well was 22:00 last night. The patient is unable to walk because his left leg is weak, and the patient had to be lifted into a chair after he was found on the floor. 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 Restrictions/Precautions:  Restrictions/Precautions: General Precautions, Fall Risk, Modified Diet  Position Activity Restriction  Other position/activity restrictions: pt pushes to right, R romain, Rt neglect, poor sensation Rt hemibody    SUBJECTIVE: cooperative, fatigued but more alert this date. Spouse and daughter present and supportive. Pt with min increased intiation and problem solving. PAIN: 0 /10: denied pain    COGNITION: Slow Processing, Decreased Recall, Decreased Insight, Impaired Memory, Inattention, Decreased Problem Solving, Decreased Safety Awareness, Impaired Attention and Decreased Arousal    ADL:   Feeding: with set-up. to take sips of thickened water. Grooming: Moderate  Assistance. to wash hair. mod A for washing R hand. Pt able to wash face seated. mod A for shaving with an electric razor. Bathing: maximum, Assistance. assist for washing L UE, max A for positioning R UE to wash.  assist for B LEs, ADL's    Goals  Short term goals  Time Frame for Short term goals: 1 week   Short term goal 1: Pt will complete sit to stand transfers with mod assist of 1 with min vcs or RUE support to improve indep with LB clothing mgt   Short term goal 2: Pt will complete 5-7 min EOB ADL task with SBA for sitting balance & min vcs for midline posture to improve balance needed for ADLs. Short term goal 3: Pt will don UB clothing with SBA and mod vcs for romain dressing technique to improve indep with self care. Short term goal 4: Pt will tolerate standing >2 minutes in midline with CGA to improve midline awareness for completion of stand pivots to [facility]. Long term goals  Time Frame for Long term goals : 3-4 weeks   Long term goal 1: Pt will don LB clothing with CGA and min vcs for adaptive technique to ipm[place] indep with self care. Long term goal 2: pt will complete stand pivot transfer to [facility] with CGA to improve indep with toileting. Long term goal 3: Pt will demonstrate 3/5 R elbow flexion to improve indep with donning a shirt. Following session, patient left in safe position with all fall risk precautions in place.

## 2020-01-10 NOTE — PROGRESS NOTES
scheduling, complex auditory/reading comprehension, etc) with 70% accuracy, mod cues for improved ADL/IADL management and safety awareness/insight. INTERVENTIONS: Money word problems related to menu: x1 trial with need for max assist given poor sustained attention for reading full prompt, decreased initiation, poor visual selection with total assist required and poor working recall/problem solving for answering prompt given. Locating BASIC visual targets on topographical map of [facility]: 1/3 indep, 2/3 max cues  *decreased visual scanning and attention to right   *poor selective locating  *poor basic attention for deciphering building numbers/locations     SHORT TERM GOAL #5:  Goal 5: Pt will complete complex naming, verbal sequencing/description and sentence formulation/repetition tasks with 70% accuracy, mod cues for improved expression of basic/complex thoughts. INTERVENTIONS: DNT d/t focus on other goals   Prior session: Responsive namin/8 indep, 1/8 repetition, 1/8 min cues, 1/8 mod cues, 3/8 FO2     Long-Term Goals:  Timeframe for Long-term Goals: 2 weeks     LONG TERM GOAL #1:  Goal 1: Pt will tolerate a soft/bite sized diet and thin liquids without overt s/s of aspiration to meet nutritional/hydration measures safely. ONGOING     LONG TERM GOAL #2:  Goal 2: Pt will improve [id number] skills to a supervision level of function for maximized expression of complex thoughts, independent completion of ADL/IADL responsibilities and appropriate transition to home setting with wife post discharge.   ONGOING      Comprehension: 4 - Patient understands basic needs 75-90%+ of the time  Expression: 3 - Expresses basic ideas/needs 50-74% of the time  Social Interaction: 4 - Patient appropriate 75-90%+ of the time  Problem Solving: 3 - Patient solves simple/routine tasks 50%-74%  Memory: 3 - Patient remembers 50%-74% of the time    EDUCATION:  Learner: Patient and daughter   Education:

## 2020-01-10 NOTE — PROGRESS NOTES
[facility]  INPATIENT PHYSICAL THERAPY  DAILY NOTE  [facility] - 7E-56/056-A    Time In: 0730  Time Out: 0830  Timed Code Treatment Minutes: 60 Minutes  Minutes: 60          Date: 1/10/2020  Patient Name: Cuate Hill,  Gender:  male        MRN: [id number]  : 1940  (78 y.o.)     Referring Practitioner: Dr. Dane Nelson  Diagnosis: acute CVA  Additional Pertinent Hx: Pt admitted [facility] ED s/p fall. MRI+ for infarct Lt hemipons. Cardiology deemed need for life vest (2019),  Hx:  depression , anxiety     Prior Level of Function:  Lives With: Spouse  Type of Home: House  Home Layout: One level, Work area in basement  Home Access: Stairs to enter with rails  Entrance Stairs - Number of Steps: 2 with grab bar(Rt)  Home Equipment: Quad cane(used intermittently)   Bathroom Shower/Tub: Walk-in shower  Bathroom Toilet: Handicap height  Bathroom Equipment: Built-in shower seat    ADL Assistance: Independent  Ambulation Assistance: Independent  Transfer Assistance: Independent  Active : No  Additional Comments: Pt reports he completed his own self care and mobility with intermittent use of a quad cane. Pt states wife completed housekeeping, meal prep and finances. Restrictions/Precautions:  Restrictions/Precautions: General Precautions, Fall Risk, Modified Diet  Position Activity Restriction  Other position/activity restrictions: pt pushes to right, R romain, Rt neglect, poor sensation Rt hemibody    SUBJECTIVE: Pt laying in awake in bed with elevated HOB. Pt pleasant and cooperative. Pt eyes closed for parts of sessions, verbal cues to open eyes. Heating pad placed on neck of pt at end of session. Assisted with clothing management while sitting EOB. PAIN: Pt reported minimal neck pain after session.     OBJECTIVE:  Bed Mobility:  Rolling to Left: Minimal Assistance at upper trunk, pt initiates roll but needs assistance to complete, X 1, with verbal cues mobility:   Passive scapular mobility, heel cord stretches with slow passive RLE movement to decrease tone as moderate flexor withdrawal noted initially. Functional Outcome Measures: Not completed       ASSESSMENT:  Assessment: Patient progressing toward established goals. Pt showed improvements in midline orientation. Activity Tolerance:  Patient tolerance of  treatment: fair. Equipment Recommendations:Equipment Needed: Yes(continue to assess. Pt has q. cane)  Discharge Recommendations:  Continue to assess pending progress, Patient would benefit from continued therapy after discharge    Plan: Times per week: 5x/ wk 90 min, 1x/ wk 30 min  Current Treatment Recommendations: Strengthening, Transfer Training, Endurance Training, ROM, Balance Training, Wheelchair Mobility Training, Neuromuscular Re-education, Patient/Caregiver Education & Training, Equipment Evaluation, Education, & procurement, Gait Training, Home Exercise Program, Functional Mobility Training, Safety Education & Training    Patient Education  Patient Education: Plan of Care, [employer] Mobility, Transfers, Wheelchair Mobility, Verbal Exercise Instruction,  - Patient Verbalized Understanding, - Patient Requires Continued Education    Goals:  Patient goals : get walking  Short term goals  Time Frame for Short term goals: 1 wk  Short term goal 1: Pt to roll to either direction, SBA with cues for Rt hemibody position only, for position change in bed. Short term goal 2: Pt to go supine <->sit, +1 mod assist at trunk up and with RLE going to supine, to get in/out of bed. Short term goal 3: Pt to perform sit/pivot transfer, supporting Rt hand with LT, +1 min assist to get in/out of w/c. Short term goal 4: Pt to get up/down from various seated surfaces, +1 mod assist to progress to pre gait standing with RW support.   Short term goal 5: Pt to stand with RW, maintain midline trunk, with stepping forward back with ea LE, mod assist with RLE  Short term goal

## 2020-01-10 NOTE — PLAN OF CARE
Problem: Falls - Risk of:  Goal: Will remain free from falls  Description  Will remain free from falls  Outcome: Ongoing  Patient understands limitations for transfers  Goal: Absence of physical injury  Description  Absence of physical injury  Outcome: Ongoing  Patient turned in bed and skin examined for pressure ulcers     Problem: Risk for Impaired Skin Integrity  Goal: Tissue integrity - skin and mucous membranes  Description  Structural intactness and normal physiological function of skin and  mucous membranes.   Outcome: Ongoing     Problem: HEMODYNAMIC STATUS  Goal: Patient has stable vital signs and fluid balance  Vitals signs within normal range and patient encouraged to drink fluids  Outcome: Ongoing     Problem: IP BLADDER/VOIDING  Goal: STG - Patient demonstrates ability to take care of indwelling catheter  Outcome: Ongoing    Problem: DISCHARGE BARRIERS  Goal: Patient's continuum of care needs are met  Outcome: Ongoing     Problem: Nutrition  Goal: Optimal nutrition therapy  Outcome: Ongoing

## 2020-01-11 NOTE — PROGRESS NOTES
Nephrology Progress Note    Patient - Francisco Javier Castrejon   MRN -  [id number]   Sugar # - [de-identified]      - 1940    78 y.o. Admit Date: 2020  Hospital Day: 9  Location: [address] [place] [address]  Date of evaluation -  2020    Subjective:   CC: fall, slurred speech  Denies shortness of breath on Room air   Ate good breakfast, improved appetite  BP ok  BP Range: Systolic (76BEW), NUE:493 , Min:135 , GZX:282      Diastolic (40TVN), RM, Min:61, Max:66    Objective:   VITALS:  BP (!) 151/66   Pulse 55   Temp 98.8 °F (37.1 °C) (Oral)   Resp 16   Ht 6' (1.829 m)   Wt 154 lb 11.2 oz (70.2 kg)   SpO2 96%   BMI 20.98 kg/m²    Patient Vitals for the past 24 hrs:   BP Temp Temp src Pulse Resp SpO2   20 0937 (!) 151/66 98.8 °F (37.1 °C) Oral 55 16 96 %   01/10/20 2130 135/61 98.1 °F (36.7 °C) Oral 61 16 95 %            Intake/Output Summary (Last 24 hours) at 2020 1213  Last data filed at 1/10/2020 2111  Gross per 24 hour   Intake 240 ml   Output 300 ml   Net -60 ml       Admission weight: 150 lb 14.4 oz (68.4 kg)  Patient Vitals for the past 96 hrs (Last 3 readings):   Weight   01/10/20 0600 154 lb 11.2 oz (70.2 kg)       EXAM:  CONSTITUTIONAL:  No acute distress. HEENT:  Head is normocephalic, Extraocular movement intact. Neck is supple. Voice is minimal  CARDIOVASCULAR:  S1, S2  regular rate and rhythm. RESPIRATORY: Clear to ausculation bilaterally. Equal breath sounds. No wheezes. No shortness of breath noted at rest.  ABDOMEN: soft, non tender  NEUROLOGICAL: Patient is alert and oriented to person, place, and time. Recent and remote memory intact. Thought is coherant. SKIN: no rash, No significant bruises on exposed surfaces  MUSCULOSKELETAL: Movement is coordinated Lt side. Rt hemiparesis  EXTREMITIES: Distal lower extremity temp is warm, No lower extremity edema.    PSYCHIATRIC: mood and affect flat    Medications:   Med reviewed  Scheduled Meds:   lisinopril  5 mg Oral Daily    rOPINIRole  0.5 mg Oral Nightly    dronabinol  2.5 mg Oral BID    baclofen  10 mg Oral TID    atorvastatin  80 mg Oral Nightly    aspirin  81 mg Oral Daily    clopidogrel  75 mg Oral Daily    escitalopram  20 mg Oral Daily    finasteride  5 mg Oral Daily    insulin lispro  0-12 Units Subcutaneous TID WC    insulin lispro  0-6 Units Subcutaneous Nightly    metoprolol succinate  25 mg Oral Daily    pantoprazole  40 mg Oral QAM AC    therapeutic multivitamin-minerals  1 tablet Oral Daily    vitamin C  500 mg Oral Daily    Vitamin D  500 Units Oral Daily     PRN Meds acetaminophen, glucagon (rDNA), glucose, ondansetron   Labs:   Labs reviewed  Recent Labs     01/09/20  0536 01/10/20  0546   * 143   K 3.9 4.0    107   CO2 26 26   BUN 23* 25*   CREATININE 0.9 1.0   LABGLOM 81* 72*   GLUCOSE 157* 136*   CALCIUM 9.2 9.1     No results for input(s): WBC, RBC, HGB, HCT, MCV, MCH, RDW, PLT in the last 72 hours. ASSESSMENT:  1. Acute Kidney Injury resolved. Discussed with spouse at bedside  2. HFrEF 25%. Life Vest on  3. Metabolic acidosis resolved. Stop PO bicarb, Repeat BMP Monday 1/3/20  4. Essential Hypertension. BP at control  5. Diabetes Mellitus Type II  6. Acute CVA  7. Urinary retention, danielle. Active Problems:    Acute cerebrovascular accident (CVA) (HCC)    Moderate malnutrition (HCC)    Metabolic acidosis  Resolved Problems:    * No resolved hospital problems.  RYLEY Howard - CNP 12:13 PM 1/11/2020

## 2020-01-11 NOTE — PLAN OF CARE
Problem: Falls - Risk of:  Goal: Will remain free from falls  Description  Will remain free from falls  1/11/2020 1201 by Jamison Alcazar RN  Outcome: Ongoing  1/11/2020 0010 by Rehan Greene RN  Outcome: Ongoing  Goal: Absence of physical injury  Description  Absence of physical injury  1/11/2020 1201 by Jamison Alcazar RN  Outcome: Ongoing  Note:   Patient verbalizes understanding of fall precautions, uses call light appropriately. 1/11/2020 0010 by Rehan Greene RN  Outcome: Ongoing     Problem: Risk for Impaired Skin Integrity  Goal: Tissue integrity - skin and mucous membranes  Description  Structural intactness and normal physiological function of skin and  mucous membranes. Outcome: Ongoing  Note:   Skin remains clean dry and intact. Problem: HEMODYNAMIC STATUS  Goal: Patient has stable vital signs and fluid balance  Outcome: Ongoing  Note:   Fluids encourage to maintain stable vital signs  and fluid balance. Problem: IP BLADDER/VOIDING  Goal: STG - Patient demonstrates ability to take care of indwelling catheter  Outcome: Ongoing  Note:   Danielle care performed, patient verbalized understanding of how to care for danielle catheter. Problem: DISCHARGE BARRIERS  Goal: Patient's continuum of care needs are met  Outcome: Ongoing  Note:   Working toward goal of discharge to home. Problem: Nutrition  Goal: Optimal nutrition therapy  Outcome: Ongoing  Note:   Encouraged patient to eat % of most meals. Problem: Metabolic:  Goal: Ability to maintain clinical measurements within normal limits will improve  Description  Ability to maintain clinical measurements within normal limits will improve  Outcome: Ongoing  Note:   Glucose levels improving. Problem: Nutritional:  Goal: Maintenance of adequate nutrition will improve  Description  Maintenance of adequate nutrition will improve  Outcome: Ongoing  Note:   Encouraged and assisted patient  to eat.

## 2020-01-11 NOTE — PLAN OF CARE
Problem: Falls - Risk of:  Goal: Will remain free from falls  Description  Will remain free from falls  Outcome: Ongoing  Goal: Absence of physical injury  Description  Absence of physical injury  Outcome: Ongoing   Does not use the call light for assistance.  Frequent rounding done by staff and Bed Alarm in place

## 2020-01-11 NOTE — PROGRESS NOTES
[address]  Occupational Therapy  Daily Note  Time:  Time In: 1400  Time Out: 1430  Timed Code Treatment Minutes: 30 Minutes  Minutes: 30          Date: 2020  Patient Name: Efren Kerns,   Gender: male      Room: -56/056-A  MRN: [id number]  : 1940  (78 y.o.)  Referring Practitioner: Dr. Chapis Hernadez   Diagnosis: CVA   Additional Pertinent Hx: Per ER note on 19:  78 y.o. male who presents to the Emergency Department via EMS for the evaluation of a CVA. The patient was found on the floor sitting up around 0630 this morning by his wife, who heard him fall. She reports a drooping mouth on the right side, slurred speech, and right sided weakness when she found him at 0630. The patient's last known well was 22:00 last night. The patient is unable to walk because his left leg is weak, and the patient had to be lifted into a chair after he was found on the floor. 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 Restrictions/Precautions:  Restrictions/Precautions: General Precautions, Fall Risk, Modified Diet  Position Activity Restriction  Other position/activity restrictions: pt pushes to right, R romain, Rt neglect, poor sensation Rt hemibody    SUBJECTIVE: Patient seated in wheel chair upon arrival    PAIN: Denies    ADL:   Grooming: Minimal Assistance, X 1, with set-up and with verbal cues . Min A with taking doffing and donning glasses for completing task. [facility] BALANCE:  Sitting Balance:  Contact Guard Assistance, seated in wheel chair with safety belt  Standing Balance: Moderate Assistance, X 1 and Min A x 1, with cues for safety, with verbal cues , with increased time for completion  Patient standing at edge of wheel chair with Right side leaning onto Therapist.  Therapist gave the patient verbal cues for correcting standing posture    TRANSFERS:  Sit to Stand:   Moderate Assistance, X 2, with increased time for completion, with verbal cues, to/from chair with arms, Verbal cues given for sccooting to edge of wheel chair and assistance given for feet placement before transfer. .    Stand to Sit: Moderate Assistance x 1 and Min A x 1, with increased time for completion, with verbal cues, to/from chair with arms. for slow decent in to wheel chair. ADDITIONAL ACTIVITIES:  Patient completed Left UE AROM reaching across midline needed for improved ADLs    ASSESSMENT:     Activity Tolerance:  Patient tolerance of  treatment: Good      Discharge Recommendations: 24 hour supervision or assist, Continue to assess pending progress  Equipment Recommendations: Other: Monitor pending progress  Plan: Times per week: 5x/wk for 90 min and 1x/wk for 30 min   Current Treatment Recommendations: Balance Training, Self-Care / ADL, ROM, Strengthening, Functional Mobility Training, Endurance Training, Neuromuscular Re-education, Safety Education & Training, Patient/Caregiver Education & Training, Wheelchair Mobility Training, Home Management Training    Patient Education  Patient Education: Plan of Care and  Standing balance, Standing posture    Goals  Short term goals  Time Frame for Short term goals: 1 week   Short term goal 1: Pt will complete sit to stand transfers with mod assist of 1 with min vcs or RUE support to improve indep with LB clothing mgt   Short term goal 2: Pt will complete 5-7 min EOB ADL task with SBA for sitting balance & min vcs for midline posture to improve balance needed for ADLs. Short term goal 3: Pt will don UB clothing with SBA and mod vcs for romain dressing technique to improve indep with self care. Short term goal 4: Pt will tolerate standing >2 minutes in midline with CGA to improve midline awareness for completion of stand pivots to [facility]. Long term goals  Time Frame for Long term goals : 3-4 weeks   Long term goal 1: Pt will don LB clothing with CGA and min vcs for adaptive technique to ipmrove indep with self care.

## 2020-01-11 NOTE — PROGRESS NOTES
noted.  Therapist assisting to maintain R hand on puma-stedy, TKE on RLE and also achieve Mid-line but patient pushing very heavily to R side. Functional Outcome Measures: Not completed       ASSESSMENT:  Assessment: Patient progressing toward established goals. Activity Tolerance:  Patient tolerance of  treatment: fair. Limited by increased fatigue, poor attention to R side and midline orientation. Equipment Recommendations:Equipment Needed: Yes(continue to assess. Pt has q. cane)  Discharge Recommendations:  Continue to assess pending progress, Patient would benefit from continued therapy after discharge    Plan: Times per week: 5x/ wk 90 min, 1x/ wk 30 min  Current Treatment Recommendations: Strengthening, Transfer Training, Endurance Training, ROM, Balance Training, Wheelchair Mobility Training, Neuromuscular Re-education, Patient/Caregiver Education & Training, Equipment Evaluation, Education, & procurement, Gait Training, Home Exercise Program, Functional Mobility Training, Safety Education & Training    Patient Education  Patient Education: Plan of Care, Precautions/Restrictions, Transfers, Reviewed Prior Education, Wheelchair Mobility, Midline orientation, R side awareness, posture, - Patient Requires Continued Education    Goals:  Patient goals : get walking  Short term goals  Time Frame for Short term goals: 1 wk  Short term goal 1: Pt to roll to either direction, SBA with cues for Rt hemibody position only, for position change in bed. Short term goal 2: Pt to go supine <->sit, +1 mod assist at trunk up and with RLE going to supine, to get in/out of bed. Short term goal 3: Pt to perform sit/pivot transfer, supporting Rt hand with LT, +1 min assist to get in/out of w/c. Short term goal 4: Pt to get up/down from various seated surfaces, +1 mod assist to progress to pre gait standing with RW support.   Short term goal 5: Pt to stand with RW, maintain midline trunk, with stepping forward back with ea LE, mod assist with RLE  Short term goal 6: Pt to propel w/c >= 150 ft, SBA, for indoor mobility  Long term goals  Time Frame for Long term goals : 4 wks  Long term goal 1: Pt to go supine <->sit, +1 stand by assist , to get in/out of bed. Long term goal 2: Pt to get up/down from various seated surfaces, +1 CGA to get up to walk. Long term goal 3: Pt to perform stand/pivot transfer with use of RW +1 min assist to get in/out of w/c. Long term goal 4: Pt to walk with RW >= 50 ft, CGA to progress to home mobility  Long term goal 5: Pt to get in/out of car, +1 min assist for transportation needs. Following session, patient left in safe position with all fall risk precautions in place.

## 2020-01-12 NOTE — PLAN OF CARE
Problem: Falls - Risk of:  Goal: Will remain free from falls  Description  Will remain free from falls  1/12/2020 0122 by Nakul Tripathi RN  Outcome: Ongoing  1/11/2020 1201 by Gatito Florence RN  Outcome: Ongoing  Goal: Absence of physical injury  Description  Absence of physical injury  1/12/2020 0122 by Nakul Tripathi RN  Outcome: Ongoing  1/11/2020 1201 by Gatito Florence RN  Outcome: Ongoing  Note:   Does not use always use the call light appropriately. Frequent checks and Alarms are in place.

## 2020-01-12 NOTE — PROGRESS NOTES
rales  Abdomen: +bs, soft, no tenderness to palpation and no palpable mass. No abdominal bruit   Ext: No LE edema  Musculoskeletal:Intact  Neuro:Alert, awake with right-sided weakness      Electronically signed by Dallis Lesches, MD on 1/12/2020 at 11:33 AM

## 2020-01-12 NOTE — PLAN OF CARE
Problem: Falls - Risk of:  Goal: Will remain free from falls  Description  Will remain free from falls  1/12/2020 1356 by Carolann Dye RN  Outcome: Ongoing  1/12/2020 0122 by Luc Best RN  Outcome: Ongoing  Goal: Absence of physical injury  Description  Absence of physical injury  1/12/2020 1356 by Carolann Dye RN  Outcome: Ongoing  Note:   Patient verbalizes understanding of fall precautions, uses call light appropriately. 1/12/2020 0122 by Luc Best RN  Outcome: Ongoing     Problem: Risk for Impaired Skin Integrity  Goal: Tissue integrity - skin and mucous membranes  Description  Structural intactness and normal physiological function of skin and  mucous membranes. Outcome: Ongoing  Note:   Skin remains clean dry and intact. Problem: HEMODYNAMIC STATUS  Goal: Patient has stable vital signs and fluid balance  Outcome: Ongoing  Note:   Vital signs and fluid balance remain normal.     Problem: IP BLADDER/VOIDING  Goal: STG - Patient demonstrates ability to take care of indwelling catheter  Outcome: Ongoing  Note:   Patient unable to care for catheter, family educated on catheter care. Problem: DISCHARGE BARRIERS  Goal: Patient's continuum of care needs are met  Outcome: Ongoing  Note:   Working toward goal of discharge to home. Problem: Nutrition  Goal: Optimal nutrition therapy  Outcome: Ongoing  Note:   Good nutrition encouraged. Problem: Metabolic:  Goal: Ability to maintain clinical measurements within normal limits will improve  Description  Ability to maintain clinical measurements within normal limits will improve  Outcome: Ongoing  Note:   Blood sugars remain within normal limits. Problem: Nutritional:  Goal: Maintenance of adequate nutrition will improve  Description  Maintenance of adequate nutrition will improve  Outcome: Ongoing  Note:   Good nutrition encouraged.   Goal: Ability to identify appropriate dietary choices will improve  Description  Ability to identify appropriate dietary choices will improve  Outcome: Ongoing  Note:   Family educated on making appropriate dietary choices.

## 2020-01-12 NOTE — PROGRESS NOTES
word boundaries and omission of word endings. DDK rates completed with poor success d/t poor level of articulatory precision and reduced breath support. Speech intelligibility 60-70% within unknown context in conversation. Voice: Hoarse/breathy vocal quality with demonstration of thoracic breathing pattern and poor level of breath support. Frequent aphonic breaks     LANGUAGE:  Receptive:  2 Step Commands: 2/4 indep, 1/4 repetition  Complex Yes/No Questions: 4/4     Expressive:  Confrontational Naming: 3/3 MOCA  Divergent Naming (abstract): impaired-- see below   Sentence Formulation: impaired-- short utterance length with reduced syntax  Conversational Speech: impaired-- see above   Paraphasias: none noted   Repetition: sentence level 1/2    Recall of speech strategies: poor success  Reference to external aid (max cues-- careboard): able to read 25% of list at best   Reference to external aid (written paper): 100% accuracy indep  Utilization of strategies within rote speech tasks:   Counting 1-10: poor-fair success initially and quickly declined to poor success with fatigue   JEFFREY: poor-fair success initially and quickly declined to poor success with fatigue.     COGNITION:  Myles Cognitive Assessment (MOCA) version 7.1 completed. Pt with adjusted score 18/25. Unable to complete written portion d/t extremity weakness in dominant right hand. Normal is greater than or equal to 26/30.     Orientation: 6/6  Immediate Recall: 2/5-- improved to 4/5 with repetition   Short-Term Recall: 4/5 indep, 1/5 FO2  Divergent Naming: x4 indep 1 minute (N=11)   Reasoning: verbal abstraction- 0/2  Sequencing: impaired  Thought Organization: at least mild impairment  Insight: fair   Attention: decreased high level selective attention   Math Computation: 3/5  Executive Functioning: unable to complete d/t extremity weakness     SWALLOWING:  Current Diet: Minced/moist diet and mildly thick liquids   Respiratory Status:   Independent intake this date. RECOMMENDATIONS:              Modified Barium Swallow:   Not indicated     DIET RECOMMENDATIONS:  Minced/moist and mildly thick liquids      STRATEGIES: Full Upright Position, Small Bite/Sip, No Straw, Multiple Swallow, Pulmonary Monitoring, Oral Care after all Meals, Supervision, Medication in Applesauce, Alternate Solids and Liquids, Limit Distractions and Monitor for Fatigue                 RECOMMENDATIONS/ASSESSMENT:  DIAGNOSTIC IMPRESSIONS:  Pt presents with at least mild cognitive deficits evidenced by derived/adjusted MOCA score of 18/25. This is improved from acute care stay in which pt scored a 11/25 on 12/27. Deficits found within the domains of immediate/delayed recall, verbal reasoning, thought organization, decreased high level attention and math computation. Moderate expressive language deficits evidenced by impaired lexical retrieval, mental flexibility, verbal sequencing/description, short utterance length with reduced syntax and impaired sentence repetition. Pt with mild receptive language deficits evidenced by increasing difficulty following complex verbal commands, need for extra time d/t slowed mental processing and heavy reliance on repetitions. Pt reports hx of hearing aids. Denies need for hearing aids at this time. Pt with moderate dysarthria characterized by blended word boundaries, imprecise articulatory precision and omission of word endings. Moderate dysphonia evidenced by poor level of breath support, presence of aphonic breaks and hoarse/breathy vocal quality. Pt would highly benefit from ongoing ST intervention in order to progress [id number] functioning to a supervision level for safe, functional return to home setting and demonstration of independent ADL/IADL completion upon discharge.      Rehabilitation Potential: excellent     Comprehension: 4 - Patient understands basic needs 75-90%+ of the time  Expression: 3 - Expresses basic ideas/needs 50-74% of the time  Social Interaction: 4 - Patient appropriate 75-90%+ of the time  Problem Solving: 3 - Patient solves simple/routine tasks 50%-74%  Memory: 3 - Patient remembers 50%-74% of the time.       Review of Systems:  CONSTITUTIONAL:  negative  RESPIRATORY:  negative  CARDIOVASCULAR:  negative  GASTROINTESTINAL:  negative  GENITOURINARY:  positive for a Solis catheter in place. MUSCULOSKELETAL:  positive for  decreased range of motion and muscle weakness. NEUROLOGICAL:  positive for memory problems, speech problems, coordination problems, gait problems and weakness  BEHAVIOR/PSYCH:  negative  10 point system review otherwise negative    Objective:  BP (!) 155/70   Pulse 70   Temp 98.6 °F (37 °C) (Oral)   Resp 16   Ht 6' (1.829 m)   Wt 154 lb 11.2 oz (70.2 kg)   SpO2 94%   BMI 20.98 kg/m²   He was noted to be awake, alert, and in no acute distress. Orientation: Unable to fully evaluate due to his limited cognition and expressive aphasia. Mood: within normal limits  Affect: calm  General appearance: Patient is well nourished, well developed, well groomed and in no acute distress, appearing stated age, thin. Memory:   abnormal   Attention/Concentration: abnormal   Language:  abnormal - with moderate to severe expressive aphasia and mild to moderate receptive aphasia. HEART:  S1 and S2 with a regular rate and rhythm without murmur, gallop  or rub. LUNGS:  Clear to auscultation bilaterally without rales, rhonchi or  wheezes. ABDOMEN:  Positive bowel sounds in all four quadrants. His abdomen was  noted to be soft, nontender, without masses. Cranial Nerves:  VII: Facial strength: abnormal with respect to a moderate Right-sided facial drooping. XI: Shoulder shrug: no functional Right shoulder shrug. ROM:  abnormal - with respect to the Right uppper and lower limbs. Motor Exam: No acute changes noted with respect to the patient's limb strength.   Tone:  Increased muscle tone noted at discharge goal is home with his wife on 01/24/20. · A BMP was drawn on 01/10/20. All of his labs remain stable. · His recent blood sugars have been stable with a range of 140 - 163. Continue to closely monitor and adjust his medications as needed. · Started the patient on Baclofen 10 mg to be taken 3 times daily. This is to treat his increased muscle tone involving the Right lower limb. · Started the patient on Marinol 2.5 mg to be taken twice daily. This is to treat his anorexia. · Increased his Requip to 0.5 mg to be taken once daily. This is being used to treat his restless leg syndrome. · Started the patient on Lisinopril 5 mg to be taken once daily due to his blood pressure being elevated. Greater than 50% of the 30 minutes was spent with the patient and his wife on counseling and with respect to coordinating his current inpatient rehab. Care. The results of the Rehab. team conference held on 01/07/20 were discussed with the patient and his wife.     Missed Therapy Time:  · None    Brandan Jackson MD

## 2020-01-13 NOTE — PROGRESS NOTES
area. Pt then able to retract shoulders x 5 reps bilaterally with min tactile cues R UE. Transitioned to fwd leaning and push up with R trace triceps noted . ASSESSMENT:  Assessment: Mildred Nascimento" has made steady progress on IP Rehab this week. He has progressed to a mod A of 1  with functional sit pivot transfers. He is initiating better with supporting R UE with L UE during transfers. He requires mod A for UB ADLs and max A for LB ADLs. However, Her continues to retuire a second person with standing balance for LE dressing, toileting tasks. He is limited by  pusher's snydrome, but has shown great progress with this with use of extrinisc feedback. He presents with 2-/5 in scap elevation and retraction, trace noted for biceps and triceps. He is also limited by mild R inattention, balance for both sitting and standing. He is more alert for need for toileting tasks. He requires skilled OT intervention to progress with ADL remediation with a focus on romain techniques, IADL remediation, progressive and intensive neuro re-ed to improve midline orientation and R UE function. Activity Tolerance:  Patient tolerance of  treatment: good. Discharge Recommendations: 24 hour supervision or assist, Continue to assess pending progress  Equipment Recommendations: Other: Monitor pending progress  Plan: Times per week: 5x/wk for 90 min and 1x/wk for 30 min   Current Treatment Recommendations: Balance Training, Self-Care / ADL, ROM, Strengthening, Functional Mobility Training, Endurance Training, Neuromuscular Re-education, Safety Education & Training, Patient/Caregiver Education & Training, Wheelchair Mobility Training, Home Management Training    Patient Education  Patient Education: neuro based activities.      Goals  Short term goals  Time Frame for Short term goals: 1 week   Short term goal 1: Pt will complete sit to stand transfers with mod assist of 1 with min vcs or RUE support to improve indep with LB clothing mgt   Short term goal 2: Pt will complete 5-7 min EOB ADL task with SBA for sitting balance & min vcs for midline posture to improve balance needed for ADLs. Short term goal 3: Pt will don UB clothing with SBA and mod vcs for romain dressing technique to improve indep with self care. Short term goal 4: Pt will tolerate standing >2 minutes in midline with CGA to improve midline awareness for completion of stand pivots to [facility]. Long term goals  Time Frame for Long term goals : 3-4 weeks   Long term goal 1: Pt will don LB clothing with CGA and min vcs for adaptive technique to ipmrove indep with self care. Long term goal 2: pt will complete stand pivot transfer to [facility] with CGA to improve indep with toileting. Long term goal 3: Pt will demonstrate 3/5 R elbow flexion to improve indep with donning a shirt. Following session, patient left in safe position with all fall risk precautions in place.

## 2020-01-13 NOTE — PROGRESS NOTES
Physical Medicine & Rehabilitation   Progress Note    Chief Complaint: [facility] with an acute infarct located in the Left hemipons. Subjective: Patient reported no current head, trunk, or limb pain. He slept well last evening. His inpatient rehab. therapies are going well. Patient was evaluated today while he was resting in his bed. Patient remains awake and alert today. His appetite remains poor. He and his family had no acute concerns. Rehabilitation:  PT:   Bed Mobility:  Rolling to Left: Contact Guard Assistance, Minimal Assistance   Rolling to Right: Contact Guard Assistance, Minimal Assistance   Sit to Supine: Moderate Assistance, Maximum Assistance. Transfers:  Sit to Stand: Moderate Assistance +1, min+1 up from mat and then up from w/c into FELISHA stedy. Pt requiring mod assist for trunk elevation and then to gain TKE RLE. Once up, pt tends to retract RT hip, lean to Rt. With mirror cues and verbal cues, improved midline noted, though not maintained. Stand to Sit:Moderate Assistance +1, min +1. Pt showing difficulty allowing LLE to flex at hip/knee and to release LUE from walker, tending to push. Sit Pivot: Moderate Assistance +1 to either direction. Pt supporting Rt hand with LT,  Assist for more forward translation of wt over base combined with more dynamic trunk. Pt tends to lean to Rt, but improved alignment noted with intervention. Transfer performed using FELISHA stedy at end of session: +2 mod assist up to stand and then 1 to guide and +1 mod assist to maintain trunk balance to position the device.       Balance: mirror use for visual feedback  Dynamic Sitting Balance: Minimal Assistance, X 1, with increased time for completion, Reaching outside SOPHIE to Lt and forward w/ Lt hand, verbal and manual cues for upright posture. Pt maintained midline with min assist for clothing management.   Static Standing Balance: Minimal Assistance, X 2, pt able to stand w/ min assist, Rt lean moderate assist. Used [employer] for visual feedback. Pt self correcting once cued to look in mirror. Seated rest break on L elbow prop while OT assisted patient with LB dressing.  )  Standing Balance: Moderate assistance of 1 fluctuating to max A of 1 and min A of another, RLE blocked, cues to scan in mirror to correct midline.  )  Standing Balance  Time: 45 seconds,  30 seconds for LB clothing mgt. Pt leaning right, constant cues to look in mirror to correct midline. Transfers:  Sit to Stand:  Minimal Assistance, Maximum Assistance, X 2. with use of puma matute (only to/from [facility]   Stand to Sit: Maximum Assistance, X 1. to control descend and VCs for R UE attn   Squat Pivot: Minimal Assistance, Moderate Assistance, X 2. w/c <> EOM, w/c > EOB. VCs for R UE attn. Pt was able to direct w/c position and leg rest / lap tray management, requiring cues for brake management and arm rest.    BED MOBILITY:  Supine to Sit: Moderate Assistance for bringing BLEs off side of mat and elevating trunk  Sit to Supine: Moderate Assistance for bringing BLEs onto mat  Scooting: Maximum Assistance advancing his forward        Upper Extremity Assessment:   RUE AROM : (Pt 2-/5 scap elevation. No shoulder AROM )     Sensation  Overall Sensation Status: (decreased proproception RUE, reports numbness )  RUE Tone: Hypotonic  LUE Tone: Normotonic  Coordination and Movement description: Fine motor impairments, Gross motor impairments, Right UE     Activity Tolerance: Patient limited by fatigue, Treatment limited secondary to decreased cognition. ADDITIONAL ACTIVITIES:  Increased time for toileting and bathing tasks with mod cues for attention to R romain body and pushing syndrome. Spouse educated on monitoring pushing syndrome when seated in the chair and to assist with adjusting it as needed. Speech:  SPEECH / VOICE:  Speech: Moderate dysarthria characterized by blended word boundaries and omission of word endings.  DDK rates completed with poor success d/t poor level of articulatory precision and reduced breath support. Speech intelligibility 60-70% within unknown context in conversation. Voice: Hoarse/breathy vocal quality with demonstration of thoracic breathing pattern and poor level of breath support. Frequent aphonic breaks     LANGUAGE:  Receptive:  2 Step Commands: 2/4 indep, 1/4 repetition  Complex Yes/No Questions: 4/4     Expressive:  Confrontational Naming: 3/3 MOCA  Divergent Naming (abstract): impaired-- see below   Sentence Formulation: impaired-- short utterance length with reduced syntax  Conversational Speech: impaired-- see above   Paraphasias: none noted   Repetition: sentence level 1/2    Recall of speech strategies: poor success  Reference to external aid (max cues-- careboard): able to read 25% of list at best   Reference to external aid (written paper): 100% accuracy indep  Utilization of strategies within rote speech tasks:   Counting 1-10: poor-fair success initially and quickly declined to poor success with fatigue   JEFFREY: poor-fair success initially and quickly declined to poor success with fatigue.     COGNITION:  Myles Cognitive Assessment (MOCA) version 7.1 completed. Pt with adjusted score 18/25. Unable to complete written portion d/t extremity weakness in dominant right hand. Normal is greater than or equal to 26/30. Orientation: 6/6  Immediate Recall: 2/5-- improved to 4/5 with repetition   Short-Term Recall: 4/5 indep, 1/5 FO2  Divergent Naming: x4 indep 1 minute (N=11)   Reasoning: verbal abstraction- 0/2  Sequencing: impaired  Thought Organization: at least mild impairment  Insight: fair   Attention: decreased high level selective attention   Math Computation: 3/5  Executive Functioning: unable to complete d/t extremity weakness     SWALLOWING:  Current Diet: Minced/moist diet and mildly thick liquids   Respiratory Status:   Independent    Skilled dysphagia therapy via direct meal intervention.  Pt with Swallow:   Not indicated     DIET RECOMMENDATIONS:  Minced/moist and mildly thick liquids      STRATEGIES: Full Upright Position, Small Bite/Sip, No Straw, Multiple Swallow, Pulmonary Monitoring, Oral Care after all Meals, Supervision, Medication in Applesauce, Alternate Solids and Liquids, Limit Distractions and Monitor for Fatigue                 RECOMMENDATIONS/ASSESSMENT:  DIAGNOSTIC IMPRESSIONS:  Pt presents with at least mild cognitive deficits evidenced by derived/adjusted MOCA score of 18/25. This is improved from acute care stay in which pt scored a 11/25 on 12/27. Deficits found within the domains of immediate/delayed recall, verbal reasoning, thought organization, decreased high level attention and math computation. Moderate expressive language deficits evidenced by impaired lexical retrieval, mental flexibility, verbal sequencing/description, short utterance length with reduced syntax and impaired sentence repetition. Pt with mild receptive language deficits evidenced by increasing difficulty following complex verbal commands, need for extra time d/t slowed mental processing and heavy reliance on repetitions. Pt reports hx of hearing aids. Denies need for hearing aids at this time. Pt with moderate dysarthria characterized by blended word boundaries, imprecise articulatory precision and omission of word endings. Moderate dysphonia evidenced by poor level of breath support, presence of aphonic breaks and hoarse/breathy vocal quality. Pt would highly benefit from ongoing ST intervention in order to progress [id number] functioning to a supervision level for safe, functional return to home setting and demonstration of independent ADL/IADL completion upon discharge.      Rehabilitation Potential: excellent     Comprehension: 4 - Patient understands basic needs 75-90%+ of the time  Expression: 3 - Expresses basic ideas/needs 50-74% of the time  Social Interaction: 4 - Patient appropriate 75-90%+ of the time  Problem Solving: 3 - Patient solves simple/routine tasks 50%-74%  Memory: 3 - Patient remembers 50%-74% of the time.       Review of Systems:  CONSTITUTIONAL:  negative  RESPIRATORY:  negative  CARDIOVASCULAR:  negative  GASTROINTESTINAL:  negative  GENITOURINARY:  positive for a Solis catheter in place. MUSCULOSKELETAL:  positive for  decreased range of motion and muscle weakness. NEUROLOGICAL:  positive for memory problems, speech problems, coordination problems, gait problems and weakness  BEHAVIOR/PSYCH:  negative  10 point system review otherwise negative    Objective:  BP (!) 152/70   Pulse 56   Temp 97.3 °F (36.3 °C) (Oral)   Resp 16   Ht 6' (1.829 m)   Wt 151 lb 6.4 oz (68.7 kg)   SpO2 95%   BMI 20.53 kg/m²   He was noted to be awake, alert, and in no acute distress. Orientation: Unable to fully evaluate due to his limited cognition and expressive aphasia. Mood: within normal limits  Affect: calm  General appearance: Patient is well nourished, well developed, well groomed and in no acute distress, appearing stated age, thin. Memory:   abnormal   Attention/Concentration: abnormal   Language:  abnormal - with moderate to severe expressive aphasia and mild to moderate receptive aphasia. HEART:  S1 and S2 with a regular rate and rhythm without murmur, gallop  or rub. LUNGS:  Clear to auscultation bilaterally without rales, rhonchi or  wheezes. ABDOMEN:  Positive bowel sounds in all four quadrants. His abdomen was  noted to be soft, nontender, without masses. Cranial Nerves:  VII: Facial strength: abnormal with respect to a moderate Right-sided facial drooping. XI: Shoulder shrug: no functional Right shoulder shrug. ROM:  abnormal - with respect to the Right uppper and lower limbs. Motor Exam: No acute changes noted with respect to the patient's limb strength. Tone:  Increased muscle tone noted at the Right hip and knee.   Muscle bulk: within normal limits  Sensory: Sensory intact  Coordination:   abnormal - with respect to his mobility. Deep Tendon Reflexes: No acute changes noted. Skin: warm and dry, no rash or erythema  Edema: None in either lower limb. Diagnostics:   Recent Results (from the past 24 hour(s))   POCT glucose    Collection Time: 01/12/20 12:13 PM   Result Value Ref Range    POC Glucose 163 (H) 70 - 108 mg/dl   POCT glucose    Collection Time: 01/12/20  4:52 PM   Result Value Ref Range    POC Glucose 195 (H) 70 - 108 mg/dl   POCT glucose    Collection Time: 01/12/20 10:34 PM   Result Value Ref Range    POC Glucose 164 (H) 70 - 108 mg/dl   Basic Metabolic Panel    Collection Time: 01/13/20  5:45 AM   Result Value Ref Range    Sodium 142 135 - 145 meq/L    Potassium 3.8 3.5 - 5.2 meq/L    Chloride 105 98 - 111 meq/L    CO2 25 23 - 33 meq/L    Glucose 120 (H) 70 - 108 mg/dL    BUN 28 (H) 7 - 22 mg/dL    CREATININE 1.0 0.4 - 1.2 mg/dL    Calcium 9.3 8.5 - 10.5 mg/dL   Anion Gap    Collection Time: 01/13/20  5:45 AM   Result Value Ref Range    Anion Gap 12.0 8.0 - 16.0 meq/L   Glomerular Filtration Rate, Estimated    Collection Time: 01/13/20  5:45 AM   Result Value Ref Range    Est, Glom Filt Rate 72 (A) ml/min/1.73m2   POCT glucose    Collection Time: 01/13/20  7:12 AM   Result Value Ref Range    POC Glucose 129 (H) 70 - 108 mg/dl   POCT glucose    Collection Time: 01/13/20 11:13 AM   Result Value Ref Range    POC Glucose 285 (H) 70 - 108 mg/dl       Impression:  1. Status post cerebrovascular accident with an acute infarct being  located in the left romain-darin as noted on a brain MRI performed on  12/24/2019.  2.  Gait dysfunction. 3.  Deficits with respect to his activities of daily living. 4.  Significant speech and cognitive deficits. 5.  Current history of dysphagia. 6.  Current history of right upper and lower limb weakness. 7.  Current history of significant cardiac disease including an  estimated ejection fraction of 25%.   The patient currently has a cardiac  LifeVest in place. 8.  History of severe major depression along with anxiety. 9.  History of a psychotic episode occurring in 2017, requiring a mental  hospitalization in [address]  10.  History of benign prostatic hypertrophy. 11.  History of anemia. 12.  History of gastroesophageal reflux disease. 13.  History of hypertension. 14.  History of bilateral inguinal surgical repair performed in 2018  15. Current history of anorexia. 16.  Current history of restless leg syndrome. Plan:  · He is to continue with his current inpatient rehab. program.  · A Rehab. team conference was held on 01/07/20. His discharge goal is home with his wife on 01/24/20. · A BMP was drawn on 01/13/20. All of his labs remain stable. · His recent blood sugars have been stable with a range of 120 - 195. Continue to closely monitor and adjust his medications as needed. · Started the patient on Baclofen 10 mg to be taken 3 times daily. This is to treat his increased muscle tone involving the Right lower limb. · Started the patient on Marinol 2.5 mg to be taken twice daily. This is to treat his anorexia. · Increased his Requip to 0.5 mg to be taken once daily. This is being used to treat his restless leg syndrome. · Increased his Lisinopril to 10 mg to be taken once daily due to his blood pressure remaining elevated. · A U/A C&S is to be performed today. Greater than 50% of the 30 minutes was spent with the patient and his wife on counseling and with respect to coordinating his current inpatient rehab. Care. The results of the Rehab. team conference held on 01/07/20 were discussed with the patient and his wife.     Missed Therapy Time:  · None    Stevan Phillips MD

## 2020-01-13 NOTE — PROGRESS NOTES
Nephrology Progress Note    Patient - Sylwia Valentine   MRN -  [id number]   Britnilyscirilo # - [de-identified]      - 1940    78 y.o. Admit Date: 2020  Hospital Day: 11  Location: [address] [place] [address]  Date of evaluation -  2020    Subjective:   CC: fall, slurred speech  Denies shortness of breath on Room air   BP ok  BP Range: Systolic (53HFB), NLO:664 , Min:142 , EQM:389      Diastolic (70URY), DWA:44, Min:65, Max:70    Objective:   VITALS:  BP (!) 142/65   Pulse 69   Temp 98.2 °F (36.8 °C) (Oral)   Resp 14   Ht 6' (1.829 m)   Wt 151 lb 6.4 oz (68.7 kg)   SpO2 97%   BMI 20.53 kg/m²    Patient Vitals for the past 24 hrs:   BP Temp Temp src Pulse Resp SpO2 Height Weight   20 0615 -- -- -- -- -- -- 6' (1.829 m) 151 lb 6.4 oz (68.7 kg)   20 2241 (!) 142/65 98.2 °F (36.8 °C) Oral 69 14 97 % -- --   20 0830 (!) 155/70 98.6 °F (37 °C) Oral 70 16 94 % -- --            Intake/Output Summary (Last 24 hours) at 2020 0745  Last data filed at 2020 8779  Gross per 24 hour   Intake 540 ml   Output 900 ml   Net -360 ml       Admission weight: 150 lb 14.4 oz (68.4 kg)  Patient Vitals for the past 96 hrs (Last 3 readings):   Weight   20 0615 151 lb 6.4 oz (68.7 kg)   01/10/20 0600 154 lb 11.2 oz (70.2 kg)       EXAM:  CONSTITUTIONAL:  No acute distress. HEENT:  Head is normocephalic, Extraocular movement intact. Neck is supple. Voice is minimal  CARDIOVASCULAR:  S1, S2  regular rate and rhythm. RESPIRATORY: Clear to ausculation bilaterally. Equal breath sounds. No wheezes. No shortness of breath noted at rest.  ABDOMEN: soft, non tender  NEUROLOGICAL: Patient is alert and oriented to person, place, and time. Recent and remote memory intact. Thought is coherant. SKIN: no rash, No significant bruises on exposed surfaces  MUSCULOSKELETAL: Movement is coordinated Lt side. Rt hemiparesis  EXTREMITIES: Distal lower extremity temp is warm, No lower extremity edema.    PSYCHIATRIC: mood and affect appropriate     Medications:   Med reviewed  Scheduled Meds:   lisinopril  5 mg Oral Daily    rOPINIRole  0.5 mg Oral Nightly    dronabinol  2.5 mg Oral BID    baclofen  10 mg Oral TID    atorvastatin  80 mg Oral Nightly    aspirin  81 mg Oral Daily    clopidogrel  75 mg Oral Daily    escitalopram  20 mg Oral Daily    finasteride  5 mg Oral Daily    insulin lispro  0-12 Units Subcutaneous TID WC    insulin lispro  0-6 Units Subcutaneous Nightly    metoprolol succinate  25 mg Oral Daily    pantoprazole  40 mg Oral QAM AC    therapeutic multivitamin-minerals  1 tablet Oral Daily    vitamin C  500 mg Oral Daily    Vitamin D  500 Units Oral Daily     PRN Meds acetaminophen, glucagon (rDNA), glucose, ondansetron   Labs:   Labs reviewed  Recent Labs     01/13/20  0545      K 3.8      CO2 25   BUN 28*   CREATININE 1.0   LABGLOM 72*   GLUCOSE 120*   CALCIUM 9.3     No results for input(s): WBC, RBC, HGB, HCT, MCV, MCH, RDW, PLT in the last 72 hours. ASSESSMENT:  1. Acute Kidney Injury resolved. 2. HFrEF 25%. Life Vest on  3. Metabolic acidosis resolved. 4. Essential Hypertension. BP at control  5. Diabetes Mellitus Type II  6. Acute CVA  7. Urinary retention, danielle. Active Problems:    Acute cerebrovascular accident (CVA) (HCC)    Moderate malnutrition (HCC)    Metabolic acidosis  Resolved Problems:    * No resolved hospital problems.  RYLEY Garibay - CNP 7:45 AM 1/13/2020

## 2020-01-13 NOTE — PROGRESS NOTES
[address] [address]  INPATIENT PHYSICAL THERAPY  DAILY NOTE  [facility] - [id number]    Time In: 1400  Time Out: 1430  Timed Code Treatment Minutes: 30 Minutes  Minutes: 30          Date: 2020  Patient Name: Akshat Alex,  Gender:  male        MRN: [id number]  : 1940  (78 y.o.)     Referring Practitioner: Dr. Puma Ramírez  Diagnosis: acute CVA  Additional Pertinent Hx: Pt admitted [facility] ED s/p fall. MRI+ for infarct Lt hemipons. Cardiology deemed need for life vest (2019),  Hx:  depression , anxiety     Prior Level of Function:  Lives With: Spouse  Type of Home: House  Home Layout: One level, Work area in basement  Home Access: Stairs to enter with rails  Entrance Stairs - Number of Steps: 2 with grab bar(Rt)  Home Equipment: Quad cane(used intermittently)   Bathroom Shower/Tub: Walk-in shower  Bathroom Toilet: Handicap height  Bathroom Equipment: Built-in shower seat    ADL Assistance: Independent  Ambulation Assistance: Independent  Transfer Assistance: Independent  Active : No  Additional Comments: Pt reports he completed his own self care and mobility with intermittent use of a quad cane. Pt states wife completed housekeeping, meal prep and finances. Restrictions/Precautions:  Restrictions/Precautions: General Precautions, Fall Risk, Modified Diet  Position Activity Restriction  Other position/activity restrictions: pt pushes to right, R romain, Rt neglect, poor sensation Rt hemibody    SUBJECTIVE: Pt seated in w/c. Pleasant and cooperative. PAIN: 0/10:     OBJECTIVE:  Bed Mobility:  Sit to Supine: Moderate Assistance to guide trunk to RT side, max with lifting RLE onto bed, min to guide LLE onto bed. Transfers:  Sit to Stand: Maximum Assistance +2. Pt supporting Rt hand with LT. Assist for more forward translation of wt over base, maintaining midline wt shift, facilitating Rt quads and hip ext.   Once up, assist to position Rt hand on cart  and LUE reaching up overhead onto pole on LT.  2 trials. Max cues for visual assist with mirror in front. Stand to Sit:Maximum Assistance +2 to maintain midline, gain Lt hip/knee flexion as pt tends to stiffen into extension. Pt supporting Rt hand with Lt. Partial stands from elevated mat: Maximum Assistance +1 with pt supporting Rt hand with LT. Assist to facilitate Rt quads, maintain midline and wt shift forward over base throughout the maneuver x3 trials with pt touching mat with buttocks and then back up. 2 trials. More push to Rt noted with 2nd trial and more difficult maintaining forward trunk flexion. Balance:  Static Standing Balance: Maximum Assistance +2 with pt holding onto grocery cart, dycem on Rt and pole on LT       ASSESSMENT:  Assessment: Patient progressing toward established goals. Activity Tolerance:  Patient tolerance of  treatment: good. Pt more alert and awake this session. Equipment Recommendations:Equipment Needed: Yes(continue to assess.   Pt has q. cane)  Discharge Recommendations:  Continue to assess pending progress, Patient would benefit from continued therapy after discharge, 24 hour supervision or assist    Plan: Times per week: 5x/ wk 90 min, 1x/ wk 30 min  Current Treatment Recommendations: Strengthening, Transfer Training, Endurance Training, ROM, Balance Training, Wheelchair Mobility Training, Neuromuscular Re-education, Patient/Caregiver Education & Training, Equipment Evaluation, Education, & procurement, Gait Training, Home Exercise Program, Functional Mobility Training, Safety Education & Training    Patient Education  Patient Education: Transfers, standing midline and balance,  - Patient Verbalized Understanding, - Patient Requires Continued Education    Goals:  Patient goals : get walking  Short term goals  Time Frame for Short term goals: 1 wk  Short term goal 1: Pt to roll to either direction, SBA with cues for Rt hemibody position only, for position change in bed. Short term goal 2: Pt to go supine <->sit, +1 mod assist at trunk up and with RLE going to supine, to get in/out of bed. Short term goal 3: Pt to perform sit/pivot transfer, supporting Rt hand with LT, +1 min assist to get in/out of w/c. Short term goal 4: Pt to get up/down from various seated surfaces, +1 mod assist to progress to pre gait standing with RW support. Short term goal 5: Pt to stand with RW, maintain midline trunk, with stepping forward back with ea LE, mod assist with RLE  Short term goal 6: Pt to propel w/c >= 150 ft, SBA, for indoor mobility  Long term goals  Time Frame for Long term goals : 4 wks  Long term goal 1: Pt to go supine <->sit, +1 stand by assist , to get in/out of bed. Long term goal 2: Pt to get up/down from various seated surfaces, +1 CGA to get up to walk. Long term goal 3: Pt to perform stand/pivot transfer with use of RW +1 min assist to get in/out of w/c. Long term goal 4: Pt to walk with RW >= 50 ft, CGA to progress to home mobility  Long term goal 5: Pt to get in/out of car, +1 min assist for transportation needs. Long term goal 6: Pt to propel w/c >= 150 ft, Mod I, for indoor mobility    Following session, patient left in safe position with all fall risk precautions in place.

## 2020-01-13 NOTE — PROGRESS NOTES
terminal knee extension, cues for Rt hand to be supported by Lt hand, with verbal and manual cues for upright posture. RLE blocked from behind on initial stand for safety precaution due to flexor withdrawl  Stand to [address] [place] [address], X 2, with increased time for completion, cues for hand Rt hand to be supported by Lt hand, with verbal cues for anterior weight shift upon descend  Sit Pivot:Maximum Assistance for transfer and proper B foot placement to prepare for transfer, X 1, cues for Rt hand to be supported by Lt hand, verbal cues for anterior weight shift to initiate transfer,      Wheelchair Mobility:  Stand By Assist  Extremities Used: Left Lower Extremity  Type of Chair:Manual  Surface: Level Tile  Distance: 150 ft  Quality: Slow Velocity, Short Strokes, Decreased Fluidity and mild path deviations. Balance:   Static Sitting Balance:  Contact Guard Assistance X 1, verbal and manual cues for upright posture and to orient self into midline positioning. Occasional minimal assistance X 1 to achieve midline postioning,  Assistance for Rt hand postioning on PT right thigh for WB and occasional realignment  Dynamic Sitting Balance: Contact Guard Assistance, X 1, reaching outside SOPHIE to Lt and in front of body. Standing Balance: Moderate Assistance X 2 to maintain forward pelvis, upright posture and terminal knee extension, verbal cues for upright posture and to keep eyes open during standing. Weight transfer to right and left sides. Trunk lean to the right noted, pt performed 3 mini squats during stand w/ minimal knee extension to activate on RLE        Exercise:   Passive stretching of RLE into knee extension and heel cords to decrease flexor tone. Passive motion of Rt shoulder girdle into protraction and upward rotation to decrease tone. Pt completed 1 set 10 reps of active assisted shoulder protraction and upward rotation.    Exercises were completed for increased independence with functional mobility. ASSESSMENT:  Assessment:   Pt has met 1 STG of wheelchair management. Pt demonstrates deficits in transfers, bed mobility, strength, upright posture with sitting and standing, and standing balance. Pt shows improvements in wheelchair management, standing time, sitting balance and posture, alertness during PT session, decreased assist level for bed mobility. Pt would continue to benefit from skill PT intervention to improve functional mobility. Activity Tolerance:  Patient tolerance of  treatment: fair. Equipment Recommendations:Equipment Needed: Yes(continue to assess. Pt has q. cane)  Discharge Recommendations:  Discharge Recommendations: Continue to assess pending progress, Patient would benefit from continued therapy after discharge, 24 hour supervision or assist    Plan: Times per week: 5x/ wk 90 min, 1x/ wk 30 min  Current Treatment Recommendations: Strengthening, Transfer Training, Endurance Training, ROM, Balance Training, Wheelchair Mobility Training, Neuromuscular Re-education, Patient/Caregiver Education & Training, Equipment Evaluation, Education, & procurement, Gait Training, Home Exercise Program, Functional Mobility Training, Safety Education & Training    Patient Education  Patient Education: Plan of Care, [employer] Mobility, Transfers, Wheelchair Mobility, Verbal Exercise Instruction,  - Patient Verbalized Understanding, - Patient Requires Continued Education    Goals:  Patient goals : get walking   Short term goals  Time Frame for Short term goals: 1 wk  Short term goal 1: Pt to roll to either direction, SBA with cues for Rt hemibody position only, for position change in bed. NOT MET, continue  Short term goal 2: Pt to go supine <->sit, +1 mod assist at trunk up and with RLE going to supine, to get in/out of bed. NOT MET, continue  Short term goal 3: Pt to perform sit/pivot transfer, supporting Rt hand with LT, +1 min assist to get in/out of w/c.  NOT MET, continue  Short term goal 4: Pt to get up/down from various seated surfaces, +1 mod assist to progress to pre gait standing with RW support. NOT MET, continue  Short term goal 5: Pt to stand with RW, maintain midline trunk, with stepping forward back with ea LE, mod assist with RLE. NOT MET, continue  Short term goal 6: Pt to propel w/c >= 150 ft, SBA, for indoor mobility MET, see LTG  Long term goals  Time Frame for Long term goals : 4 wks  Long term goal 1: Pt to go supine <->sit, +1 stand by assist , to get in/out of bed. Long term goal 2: Pt to get up/down from various seated surfaces, +1 CGA to get up to walk. Long term goal 3: Pt to perform stand/pivot transfer with use of RW +1 min assist to get in/out of w/c. Long term goal 4: Pt to walk with RW >= 50 ft, CGA to progress to home mobility  Long term goal 5: Pt to get in/out of car, +1 min assist for transportation needs.    Long term goal 6: Pt to propel w/c >= 150 ft, Mod I, for indoor mobility

## 2020-01-13 NOTE — PROGRESS NOTES
[facility] THERAPY  [facility]  PROGRESS NOTE    TIME   SLP Individual Minutes  Time In: 6001  Time Out: 1311  Minutes: 36  Timed Code Treatment Minutes: 0 Minutes     Date: 2020  Patient Name: Vanna Nguyen      CSN: [id number]   : 1940  (78 y.o.)  Gender: male   Referring Physician:  Dr. Wilson Valdez   Diagnosis: CVA  Secondary Diagnosis: Dysarthria, Dysphagia, Cognitive-Linguistic Deficits   Precautions: High Fall Risk, Aspiration precautions   Current Diet: Downgraded to pureed and mildly thick    Swallowing Strategies: Full Upright Position, Small Bite/Sip, No Straw, Multiple Swallow, Pulmonary Monitoring, Oral Care after all Meals, Supervision, Medication in Applesauce, Alternate Solids and Liquids, Limit Distractions and Monitor for Fatigue    Date of Last MBS: 19    Pain:  None reported     Subjective: Pt seen upright in wheelchair s/p session with OT Jaci. Lunch tray present. Wife/son present within first ~25 minutes of session. Pt fully alert. Growing increasingly frustrated with ST requests for pt to \"open mouth\" for observation of oral clearance with minced food textures with pt stating \"I don't swallow all the time\". Skilled level education provided to pt/family re: increasing pt difficulty with breakdown, formation and transit of minced solids with moderate stasis and presence of right buccal pocketing present. Despite multiple pt attempts to complete pureed/liquid wash, stasis remaining and required significantly prolonged time to clear. For this reason, ST recommendations for downgrade to pureed diet. Wife agreeable stating pt difficulty with scrambled eggs during breakfast meals.  ST to follow up with further detailed dysphagia education (I.e. thickening of liquids, review of skilled swallowing strategies, appropriate dietary items and precautions, s/s and risks for aspiration, etc) within follow up treatment sessions. Short-Term Goals:  SHORT TERM GOAL #1:  Goal 1: Pt will tolerate a minced/moist diet and mildly thick liquids (no straws) and advanced solids with ST only (hx of silent aspiration with thin) without overt s/s of aspiration to meet nutritional/hydration measures safely. GOAL NOT MET   REVISED GOAL: Pt will tolerate a pureed diet and mildly thick liquids (no straws) and advanced minced solids with ST only (hx of silent aspiration with thin) without overt s/s of aspiration to meet nutritional/hydration measures safely. INTERVENTIONS: Skilled dysphagia therapy via direct meal intervention. Pt with multiple trials of mashed potatoes, magic cup, minced chicken/gravy, diced peaches and mildly thick liquids by cup. Pt showing strong preference towards pureed textures during meal intervention, required cues from ST for consumption of minced solids in order to assess pt tolerance and ability to clear material from oral cavity. Suspect decreased PO consumption likely d/t pt preference for pureed textures with concerns for decreased management of minced solids. Pt demonstration of significantly poor bolus breakdown of diced peach resulting in poor bolus formation and presence of moderate oral stasis in right buccal cavity. Despite multiple pt uses of pureed/liquid wash, stasis still present with need for significantly prolonged time in order to achieve successful clearance. Improved breakdown/formation of minced meat; however, at least mild oral stasis in right buccal cavity still present with need for extra time and MULTIPLE liquid/pureed washes to clear. Pt demonstration of intermittent wet vocal quality throughout meal intervention. Pt able to clear with cued throat clear/cough. Presence of cough x1 s/p pt attempts to answer ST prompt with food still present in oral cavity. Given the above, ST recommendations for pt DOWNGRADE to pureed diet and mildly thick liquids.  Direct meal intervention still recommended

## 2020-01-13 NOTE — PROGRESS NOTES
Discussed diabetes with wife and need for education. Wife reports she knows how to do insulin and denies further need  For education on insulin and or diabetic meds.

## 2020-01-13 NOTE — PROGRESS NOTES
[address] THERAPY  [facility]  DAILY NOTE    TIME   SLP Individual Minutes  Time In: 0900  Time Out: 0930  Minutes: 30  Timed Code Treatment Minutes: 30 Minutes     Date: 2020  Patient Name: Astrid Castillo      CSN: [id number]   : 1940  (78 y.o.)  Gender: male   Referring Physician:  Dr. Shakir Roberts   Diagnosis: CVA  Secondary Diagnosis: Dysarthria, Dysphagia, Cognitive-Linguistic Deficits   Precautions: High Fall Risk, Aspiration precautions   Current Diet: Minced and Moist with Mildly Thick Liquids  Swallowing Strategies: Full Upright Position, Small Bite/Sip, No Straw, Multiple Swallow, Pulmonary Monitoring, Oral Care after all Meals, Supervision, Medication in Applesauce, Alternate Solids and Liquids, Limit Distractions and Monitor for Fatigue    Date of Last MBS: 19    Pain:  None reported     Subjective: Pt seen upright in wheelchair with breakfast tray present and pt with eyes closed upon ST arrival. Pt easily awakened and agreeable to session. Improved alertness noted this date; however, basic attention to task continues to wax/wane throughout the session. Fluctuations in cognitive performance continue to be noted and heavily reliant on pt's level of alertness/engagement within the task. No family present. Short-Term Goals:  SHORT TERM GOAL #1:  Goal 1: Pt will tolerate a minced/moist diet and mildly thick liquids (no straws) and advanced solids with ST only (hx of silent aspiration with thin) without overt s/s of aspiration to meet nutritional/hydration measures safely. INTERVENTIONS: Pt refusing PO intake. 100% consumption of pancakes, 25% of yogurt and 4 ounces of thickened juice. Plans for BID treatment within lunch session in order to further assess pt's dietary tolerance, compliance with skilled strategies and determine pt appropriateness to initiate advanced solids.      SHORT TERM GOAL #2:  Goal 2: Pt will complete present    ASSESSMENT/PLAN:  Activity Tolerance:  Patient tolerance of  Treatment: fair. [place] attention to task   Assessment/Plan: Patient progressing toward established goals. Continues to require skilled care of licensed speech pathologist to progress toward achievement of established goals and plan of care. .     Plan for Next Session: skilled dysphagia therapy      Milan Epley, M.S. CCC-SLP [id number] 1/13/2020

## 2020-01-13 NOTE — PROGRESS NOTES
[facility] NOTE    Conference Date: 2020  Admit Date:  2020 11:54 AM  Patient Name: Santos Arias    MRN: [id number]    : 1940  (78 y.o.)  Rehabilitation Admitting Diagnosis:  CVA (cerebral vascular accident) ([facility] [address] [I63.9]  Acute cerebrovascular accident (CVA) ([facility] [address] [I63.9]  Referring Practitioner: Dr. Charley Cheek issues/needs regarding patient and family discharge status: Concerns regarding patient's level of engagement with therapy sessions, as well as fatigue levels. Therapy notes indicate spouse, Trina Frazier, present for more therapy sessions last week to begin education for transition home. Anticipated discharge for Friday, 2020, with home health care services for RN/PT/OT/ST/HHA/SW. SW to follow and maintain involvement in discharge planning. PHYSICAL THERAPY   Pt has met 1 STG of wheelchair management. Pt demonstrates deficits in transfers, bed mobility, strength, upright posture with sitting and standing, and standing balance. Pt shows improvements in wheelchair management, standing time, sitting balance and posture, alertness during PT session, decreased assist level for bed mobility. Pt would continue to benefit from skill PT intervention to improve functional mobility. Equipment Needed: Yes(continue to assess. Pt has q. cane)    SPEECH THERAPY  SUMMARY: Pt has not met any of above 5/5 STG's and/or 2/2 LTG's this therapy period. Highly fluctuating cognitive/linguistic/swallowing performance d/t high level of pt fatigue with poor pt ability to maintain adequate eye opening throughout therapeutic interventions, poor demonstration of appropriate attention to task, engagement and participation as well as demonstration of severely poor functional carryover. Improvements noted early this week; however, performance continues to remain inconsistent.  Pt demonstrating with decline in cognitive-linguistic performance from a mild severity to now a moderate severity level given poor functional carryover (I.e. recall of call light when provided with prompts from staff, fluctuating orientation, poor recall of placement of \"aids\" in room, etc), impaired basic problem solving, executive functioning, thought organization and attention (I.e. identification of targets on menu/map, use of appropriate silverware while eating, grouping items by basic/concrete categories I.e. colors, animals, etc, etc and impaired lexical retrieval/sequencing (I.e. increased difficulty with basic responsive naming). Pt fluctuating between mild-moderate dysarthria and moderate-severe dysarthria that is HIGHLY influenced by fatigue levels and pt engagement. Fluctuating successful completion/engagement with OME program to maximize speech and swallowing performance. Pt with moderate dysphagia with need for downgrade to pureed diet and mildly thick liquids d/t moderate build up of oral stasis in right buccal cavity. Poor clearance despite use of pureed/liquid wash. Intermittent s/s of aspiration present with significant wet vocal quality and presence of intermittent throat clearing/cough. Recommendations for close pulmonary monitoring. Pt would highly benefit from continued ST intervention in order to progress towards least restrictive PO diet and maximize bgdxsljzf-idhrnchdmd-wqwbuvvvhqmcy performance to a supervision level of function for maximized expression of basic wants/needs, improved pt satisfaction and reduced risks for further medical decline. [id number] B. Highxiomara, \"Dc\" has made steady progress on IP Rehab this week. He has progressed to a mod A of 1  with functional sit pivot transfers. He is initiating better with supporting R UE with L UE during transfers. He requires mod A for UB ADLs and max A for LB ADLs. However, Her continues to retuire a second person with standing balance for LE dressing, toileting tasks.   He is limited by  pusher's snydrome, but has shown great progress with this with use of extrinisc feedback. He presents with 2-/5 in scap elevation and retraction, trace noted for biceps and triceps. He is also limited by mild R inattention, balance for both sitting and standing. He is more alert for need for toileting tasks. He requires skilled OT intervention to progress with ADL remediation with a focus on romain techniques, IADL remediation, progressive and intensive neuro re-ed to improve midline orientation and R UE function. Other: Monitor pending progress    RECREATIONAL THERAPY  Patient has been offered participation in recreational therapy activities and participates as able. wants to rest in between his therapies and not interested in RT services     NUTRITION  Weight: 151 lb 6.4 oz (68.7 kg) / Body mass index is 20.53 kg/m². Current diet: Dietary Nutrition Supplements: Frozen Oral Supplement  DIET DYSPHAGIA PUREED; Mildly Thick (Nectar); No Drinking Straw  Please see nutrition note for details. NURSING  Continent of Bowel and Bladder: No  Pain is Managed:  Yes  Signs and Symptoms of Infection:  Yes; he currently has a U. T.I. Signs and Symptoms of Skin Breakdown:  No  Injury and Fall Free during Inpatient Rehabilitation Admission: Yes    Consultations/Labs/X-rays: Nephrology    Stroke Premorbid Conditions:   Diabetes:   Recent Labs     01/12/20  2234 01/13/20  0712 01/13/20  1113   POCGLU 164* 129* 285*     Hyperlipidemia:   Lab Results   Component Value Date    LDLCALC 76 12/26/2019    LDLDIRECT 111 (H) 11/04/2019     Patient is on statin?  Yes    Hypertension:   Vitals:    01/12/20 0830 01/12/20 2241 01/13/20 0615 01/13/20 0815   BP: (!) 155/70 (!) 142/65  (!) 152/70   Pulse: 70 69  56   Resp: 16 14  16   Temp: 98.6 °F (37 °C) 98.2 °F (36.8 °C)  97.3 °F (36.3 °C)   TempSrc: Oral Oral  Oral   SpO2: 94% 97%  95%   Weight:   151 lb 6.4 oz (68.7 kg)    Height:   6' (1.829 m)       Patient is on Blood Pressure Medicine? Yes    Stroke Prophylaxis: Patient with ischemic CVA and is on medication to prevent recurrent CVA    Family Education: Family available and participating in education   Fall Risk:  Ultra high fall risk program initiated  Is the patient appropriate for a stay in the functional apartment? no    Discharge Plan   Estimated Discharge Date: He is to be discharged on 01/24/20. Destination: undetermined at this time  Services at Discharge: To be determined. Is patient appropriate for an outpatient driving evaluation? no  Equipment at Discharge: To be determined. Factors facilitating achievement of predicted outcomes: Cooperative and Pleasant  Barriers to the achievement of predicted outcomes: Limited family support, Confusion, Cognitive deficit, Limited safety awareness, Decreased motivation, Limited participation, Depression, Communication deficit, Decreased endurance, Upper extremity weakness, Lower extremity weakness, Long standing deficits, Incontinence of bowel and Medical complications    Team Members Present at Conference:  :Chantal Quintero [place]  Occupational Therapist: Priscilla Harris OTR/L [id number]. Physical Therapist: Machelle Godinez PT [id number]  Speech Therapist: Marilia Ramos, [facility], [facility] Nurse: Mani Lacey RN   Psychologist: Keila Ray, PhD.    I approve the established interdisciplinary plan of care as documented within the medical record of Key Duran.     Michelle Blanco MD

## 2020-01-13 NOTE — PLAN OF CARE
Problem: Nutrition  Goal: Optimal nutrition therapy  Outcome: Ongoing  Note:   Patient is on Marinol to help increase appetite twice a day.

## 2020-01-13 NOTE — PROGRESS NOTES
Extremity Dressing: Moderate Assistance. mod cues and mod A for pt to don R UE into shirt. CGA for pt to don L UE min A to pull overheadn and min cues for adjusting clothing down. Lower Extremity Dressing: Dependent. Pt able to lift R LE up to have pants donned over feet, min A for L LE into pants, mod to max A x 1 for standng balance and a second person needed for clothing mgt up. Toileting: Dependent. min incontinent of stool. 2 persons needed for clothing mgt and wiping. Toilet Transfer: Moderate Assistance. sit pivot to and from a drop arm BSC>. BALANCE:  Sitting Balance:  Minimal Assistance to CGA sitting EOB with pushing to the right at times. 1 side propping to the Left for a rest break to reset for better sititng midline balance. Standing Balance: Moderate Assistance standing balance x 1 with min A x 1 for LE bathing, dressing and toileting tasks. BED MOBILITY:  Rolling to Right: Minimal Assistance    Supine to Sit: Moderate Assistance    Scooting: Minimal Assistance 1 LOB with leaning fwd with R LE flexing into synergy. TRANSFERS:  Sit to Stand: Moderate Assistance. x 1 and min A x 1 with pt holding R UE with L UE   Stand to Sit: Moderate Assistance. x 1  Squat Pivot: Moderate Assistance. x 1 EOB to drop arm BSC to w/c     ADDITIONAL ACTIVITIES:  Max A for placement of R UE into wt bearing and sustained position. ASSESSMENT:  Activity Tolerance:  Patient tolerance of  treatment: good. Assessment: Assessment: Holly Mccoy" has made steady progress on IP Rehab this week. He has progressed to a mod A of 1  with functional sit pivot transfers. He is initiating better with supporting R UE with L UE during transfers. He requires mod A for UB ADLs and max A for LB ADLs. However, Her continues to retuire a second person with standing balance for LE dressing, toileting tasks. He is limited by  pusher's snydrome, but has shown great progress with this with use of extrinisc feedback.

## 2020-01-14 NOTE — PROGRESS NOTES
[facility] THERAPY  [address] [facility]  DAILY NOTE    TIME   SLP Individual Minutes  Time In: 416  Time Out: 0900  Minutes: 27  Timed Code Treatment Minutes: 0 Minutes     Date: 2020  Patient Name: Hi Millan      CSN: [id number]   : 1940  (78 y.o.)  Gender: male   Referring Physician:  Dr. Robin Ferrera   Diagnosis: CVA  Secondary Diagnosis: Dysarthria, Dysphagia, Cognitive-Linguistic Deficits   Precautions: High Fall Risk, Aspiration precautions   Current Diet: Downgraded to pureed and mildly thick    Swallowing Strategies: Full Upright Position, Small Bite/Sip, No Straw, Multiple Swallow, Pulmonary Monitoring, Oral Care after all Meals, Supervision, Medication in Applesauce, Alternate Solids and Liquids, Limit Distractions and Monitor for Fatigue    Date of Last MBS: 19    Pain:  None reported     Subjective: Pt seen sitting fully upright in wheelchair upon arrival with arrival of breakfast meal tray. Overall poor engagement with lethargy apparent. MAX re-direction cues (verbal, tactile) required throughout to maintain appropriate alertness and to continue with meal consumption. No family at bedside. Short-Term Goals:  SHORT TERM GOAL #1:  Goal 1: Pt will tolerate a pureed diet and mildly thick liquids (no straws) and advanced minced solids with ST only (hx of silent aspiration with thin) without overt s/s of aspiration to meet nutritional/hydration measures safely. INTERVENTIONS: Skilled dietary analysis completed via direct meal intervention for consumption of pureed [place] toast + syrup, applesauce, pudding, and nectar/mildly thick juice via cup as adherence to recently downgraded diet level. Decline in self-feeding skills with approximately 80% assist required by ST for dependent dining; during self-feeding attempts, MAX cues required, both verbal and tactile ( your spoon, take a bite, put the spoon in your mouth, etc). complete complex naming, verbal sequencing/description and sentence formulation/repetition tasks with 70% accuracy, mod cues for improved expression of basic/complex thoughts. INTERVENTIONS: DNT d/t focus on additional/alternate STGs    Long-Term Goals:  Timeframe for Long-term Goals: 2 weeks     LONG TERM GOAL #1:  Goal 1: Pt will tolerate a minced/moist diet and thin liquids without overt s/s of aspiration to meet nutritional/hydration measures safely. ONGOING    LONG TERM GOAL #2:  Goal 2: Pt will improve phssrgttf-zimxxswvtd-corcvrilphllp skills to a supervision level of function for maximized expression of complex thoughts, independent completion of ADL/IADL responsibilities and appropriate transition to home setting with wife post discharge. ONGOING      Comprehension: 3 - Patient understands basic needs 50-74% of the time  Expression: 3 - Expresses basic ideas/needs 50-74% of the time  Social Interaction: 3 - Patient appropriate  50%-74% of the time  Problem Solving: 3 - Patient solves simple/routine tasks 50%-74%  Memory: 3 - Patient remembers 50%-74% of the time       EDUCATION:  Learner: Patient  Education:  Reviewed  goals and Plan of Care  Evaluation of Education: Verbalizes understanding, Demonstrates with assistance and Needs further instruction    ASSESSMENT/PLAN:  Activity Tolerance:  Patient tolerance of  Treatment: fair. Poor engagement and endurance levels. Assessment/Plan: Patient progressing toward established goals. Continues to require skilled care of licensed speech pathologist to progress toward achievement of established goals and plan of care. .     Plan for Next Session: OME's, dysarthria targeting, recall, organization       Meliza Sarabia M.A., [facility]

## 2020-01-14 NOTE — PROGRESS NOTES
Physical Medicine & Rehabilitation   Progress Note    Chief Complaint: S/P [facility]. [facility]. with an acute infarct located in the Left hemipons. Subjective: Patient reported no current head, trunk, or limb pain. He slept well last evening. His inpatient rehab. therapies are going well. Patient was evaluated today while he was resting in his bed. Patient was tired this morning. His appetite remains poor. He and his daughter had no acute concerns. The results of the Rehab. team conference held on 01/14/20 were discussed with the patient and his daughter. Rehabilitation:  PT:   Bed Mobility:  Rolling to Left: Contact Guard Assistance, Minimal Assistance   Rolling to Right: Contact Guard Assistance, Minimal Assistance   Sit to Supine: Moderate Assistance, Maximum Assistance. Transfers:  Sit to Stand: Moderate Assistance +1, min+1 up from mat and then up from w/c into FELISHA stedy. Pt requiring mod assist for trunk elevation and then to gain TKE RLE. Once up, pt tends to retract RT hip, lean to Rt. With mirror cues and verbal cues, improved midline noted, though not maintained. Stand to Sit:Moderate Assistance +1, min +1. Pt showing difficulty allowing LLE to flex at hip/knee and to release LUE from walker, tending to push. Sit Pivot: Moderate Assistance +1 to either direction. Pt supporting Rt hand with LT,  Assist for more forward translation of wt over base combined with more dynamic trunk. Pt tends to lean to Rt, but improved alignment noted with intervention. Transfer performed using FELISHA stedy at end of session: +2 mod assist up to stand and then 1 to guide and +1 mod assist to maintain trunk balance to position the device.       Balance: mirror use for visual feedback  Dynamic Sitting Balance: Minimal Assistance, X 1, with increased time for completion, Reaching outside SOPHIE to Lt and forward w/ Lt hand, verbal and manual cues for upright posture.    Pt maintained midline with min assist for clothing Respiratory Status:   Independent    Skilled dysphagia therapy via direct meal intervention. Pt with multiple trials of scrambled eggs, applesauce, magic cup and mildly thick liquids by cup. Impulsivity with large bolus/liquid intake and fast rate of PO intake intermittently noted. Improved small bolus size and slowed rate of PO intake with skilled level education provided. Pt demonstration of slow/uncoordinated bolus breakdown, formation and transit resulting in mild-moderate oral stasis with heavy reliance on liquid/pureed wash to maximize oral clearance. Pt many times attempting to complete continuous trials despite poor oral clearance achieved. Required max cues for use of alternating liquids/purees in order to enhance clearance. Improved pt carryover with use of strategy as trials progressed. Certainly cannot r/o premature pharyngeal entry of liquid bolus with concerns for decreased bolus control s/p large liquid bolus consumption. Improved control with small, controlled drinks. Pt with presence of continuous, persistent coughing at END of meal trial with consumption of magic cup. Concerns for swallow decompensation. Skilled level education provided to pt re: skilled use of swallow strategies (I.e. starting with minced/moist trials and ending with purees to combat fatigue, focus on small/frequent meals, small bites/sips, slow rate, achievement of oral clearance with liquid/pureed wash, etc). Tray pulled to allow pt to rest given concerns for fatigue. Consumption of 50% of eggs, 50% of applesauce, 25% of magic cup and ~5 ounces of mildly thick liquids this date. Pt to resume minced/moist diet and mildly thick liquids with CLOSE pulmonary monitoring.        Behavioral Observation: Alert, Oriented and Dysarthric     ORAL MECHANISM EVALUATION:       Facial / Labial Impaired Right sided facial drooping with flaccidity.  Decreased labial seal and strength    Lingual Impaired Decreased lingual ROM/strength and coordination. Lingual deviation to right upon protrusion   Dentition WFL Decreased oral hygiene    Velum WFL     Vocal Quality Impaired Hoarse/breathy with reduced breath support and presence of frequent aphonic breaks    Sensation Not Tested     Cough Not Tested       PATIENT WAS EVALUATED USING:  Puree, hard solid, thin liquids by cup, mildly thick liquids by cup      ORAL PHASE:  Impaired:  Impaired AP Movement, Impaired Mastication and Reduced Bolus Formation     PHARYNGEAL PHASE:  Impaired:  Decreased Hyolaryngeal Elevation and Suspected Pharyngeal Residue     SIGNS AND SYMPTOMS OF LARYNGEAL PENETRATION / ASPIRATION:  No signs/symptoms of aspiration evident in this evaluation, but cannot rule out silent aspiration.     IMPRESSIONS: Pt presents with moderate oral and mild-moderate pharyngeal dysphagia evidenced by the above skilled level findings. Pt demonstration of slow, uncoordinated bolus breakdown, formation and AP transit resulting in decreased bolus formation of hard solid trials which was NOT cleared s/p use of liquid wash with mild-moderate oral stasis remaining. Required extra time to achieve adequate clearance. No overt pocketing present. Pt with decreased laryngeal elevation upon manual palpation and concerns for pharyngeal stasis given presence of spontaneous multiple swallows. No overt s/s of aspiration s/p trials of hard solid, puree, thin liquids and mildly thick liquids; HOWEVER, MBS completed on 12/26 with demonstration of SILENT aspiration of thin by cup. Given hx of silent aspiration, pt inappropriate for completion of advanced liquid trials. Recommend pharyngeal strengthening with plans for repeat MBS as early as 1/9 to further assess pharyngeal swallow function and determine appropriateness to progress liquid consumption. At this time pt to resume minced/moist diet and mildly thick liquids with restriction on use of straw.  Will require intermittent supervision with occasional impulsivity time  Expression: 3 - Expresses basic ideas/needs 50-74% of the time  Social Interaction: 4 - Patient appropriate 75-90%+ of the time  Problem Solving: 3 - Patient solves simple/routine tasks 50%-74%  Memory: 3 - Patient remembers 50%-74% of the time.       Review of Systems:  CONSTITUTIONAL:  negative  RESPIRATORY:  negative  CARDIOVASCULAR:  negative  GASTROINTESTINAL:  negative  GENITOURINARY:  positive for a Solis catheter in place. MUSCULOSKELETAL:  positive for  decreased range of motion and muscle weakness. NEUROLOGICAL:  positive for memory problems, speech problems, coordination problems, gait problems and weakness  BEHAVIOR/PSYCH:  negative  10 point system review otherwise negative    Objective:  /70   Pulse 57   Temp 98.2 °F (36.8 °C) (Oral)   Resp 20   Ht 6' (1.829 m)   Wt 151 lb 6.4 oz (68.7 kg)   SpO2 96%   BMI 20.53 kg/m²   He was noted to be awake, alert, and in no acute distress. Orientation: Unable to fully evaluate due to his limited cognition and expressive aphasia. Mood: within normal limits  Affect: calm  General appearance: Patient is well nourished, well developed, well groomed and in no acute distress, appearing stated age, thin. Memory:   abnormal   Attention/Concentration: abnormal   Language:  abnormal - with moderate to severe expressive aphasia and mild to moderate receptive aphasia. HEART:  S1 and S2 with a regular rate and rhythm without murmur, gallop  or rub. LUNGS:  Clear to auscultation bilaterally without rales, rhonchi or  wheezes. ABDOMEN:  Positive bowel sounds in all four quadrants. His abdomen was  noted to be soft, nontender, without masses. Cranial Nerves:  VII: Facial strength: abnormal with respect to a moderate Right-sided facial drooping. XI: Shoulder shrug: no functional Right shoulder shrug. ROM:  abnormal - with respect to the Right uppper and lower limbs.   Motor Exam: No acute changes noted with respect to the patient's limb strength. Tone:  Increased muscle tone noted at the Right hip and knee. Muscle bulk: within normal limits  Sensory:  Sensory intact  Coordination:   abnormal - with respect to his mobility. Deep Tendon Reflexes: No acute changes noted. Skin: warm and dry, no rash or erythema  Edema: None in either lower limb. Diagnostics:   Recent Results (from the past 24 hour(s))   Urine with Reflexed Micro    Collection Time: 01/13/20  3:10 PM   Result Value Ref Range    Glucose, Ur NEGATIVE NEGATIVE mg/dl    Bilirubin Urine NEGATIVE NEGATIVE    Ketones, Urine NEGATIVE NEGATIVE    Specific Gravity, Urine 1.023 1.002 - 1.03    Blood, Urine LARGE (A) NEGATIVE    pH, UA 5.0 5.0 - 9.0    Protein,  (A) NEGATIVE    Urobilinogen, Urine 1.0 0.0 - 1.0 eu/dl    Nitrite, Urine POSITIVE (A) NEGATIVE    Leukocyte Esterase, Urine LARGE (A) NEGATIVE    Color, UA DK YELLOW (A) STRAW-YELL    Character, Urine CLOUDY (A) CLEAR-SL C    RBC, UA 50-75 0-2/hpf /hpf    WBC, UA > 200 0-4/hpf /hpf    Epi Cells 0-2 3-5/hpf /hpf    Bacteria, UA MANY FEW/NONE S /hpf    Casts UA NONE SEEN NONE SEEN /lpf    Crystals NONE SEEN NONE SEEN    Renal Epithelial, Urine NONE SEEN NONE SEEN    Yeast, UA NONE SEEN NONE SEEN    CASTS 2 NONE SEEN NONE SEEN /lpf    MISCELLANEOUS 2 NONE SEEN    Culture, Reflexed, Urine    Collection Time: 01/13/20  3:10 PM   Result Value Ref Range    Organism enteric gram negative bacilli (A)     Urine Culture Reflex Pierre count: >100,000 CFU/mL     POCT glucose    Collection Time: 01/13/20  4:50 PM   Result Value Ref Range    POC Glucose 87 70 - 108 mg/dl   POCT glucose    Collection Time: 01/13/20  9:34 PM   Result Value Ref Range    POC Glucose 166 (H) 70 - 108 mg/dl   POCT glucose    Collection Time: 01/14/20  8:31 AM   Result Value Ref Range    POC Glucose 128 (H) 70 - 108 mg/dl       Impression:  1.   Status post cerebrovascular accident with an acute infarct being  located in the left romain-darin as noted on a brain MRI performed on  12/24/2019.  2.  Gait dysfunction. 3.  Deficits with respect to his activities of daily living. 4.  Significant speech and cognitive deficits. 5.  Current history of dysphagia. 6.  Current history of right upper and lower limb weakness. 7.  Current history of significant cardiac disease including an  estimated ejection fraction of 25%. The patient currently has a cardiac  LifeVest in place. 8.  History of severe major depression along with anxiety. 9.  History of a psychotic episode occurring in 2017, requiring a mental  hospitalization in [facility]. 10.  History of benign prostatic hypertrophy. 11.  History of anemia. 12.  History of gastroesophageal reflux disease. 13.  History of hypertension. 14.  History of bilateral inguinal surgical repair performed in 2018  15. Current history of anorexia. 16.  Current history of restless leg syndrome. Plan:  · He is to continue with his current inpatient rehab. program.  · A Rehab. team conference was held on 01/07/20. His discharge goal is home with his wife on 01/24/20. · A BMP was drawn on 01/13/20. All of his labs remain stable. · His recent blood sugars have been stable with a range of 120 - 195. Continue to closely monitor and adjust his medications as needed. · Started the patient on Baclofen 10 mg to be taken 3 times daily. This is to treat his increased muscle tone involving the Right lower limb. · Discontinued the Marinol 2.5 mg to be taken twice daily. This medication did not appear to increase his appetite. · Increased his Requip to 0.5 mg to be taken once daily. This is being used to treat his restless leg syndrome. · Increased his Lisinopril to 10 mg to be taken once daily due to his blood pressure remaining elevated. · A U/A C&S performed on 01/14/20 did demonstrate a U. T.I. The culture and sensitivity results are pending. He has been started on Macrobid 100 mg to be taken twice daily for 7 days.   · Started the

## 2020-01-14 NOTE — PROGRESS NOTES
[facility]  INPATIENT PHYSICAL THERAPY  DAILY NOTE  [address] [facility] - [id number]    Time In: 0730  Time Out: 0830  Timed Code Treatment Minutes: 60 Minutes  Minutes: 60          Date: 2020  Patient Name: Marcus Zayas,  Gender:  male        MRN: [id number]  : 1940  (78 y.o.)     Referring Practitioner: Dr. Bridget Saravia  Diagnosis: acute CVA  Additional Pertinent Hx: Pt admitted [facility] ED s/p fall. MRI+ for infarct Lt hemipons. Cardiology deemed need for life vest (2019),  Hx:  depression , anxiety     Prior Level of Function:  Lives With: Spouse  Type of Home: House  Home Layout: One level, Work area in basement  Home Access: Stairs to enter with rails  Entrance Stairs - Number of Steps: 2 with grab bar(Rt)  Home Equipment: Quad cane(used intermittently)   Bathroom Shower/Tub: Walk-in shower  Bathroom Toilet: Handicap height  Bathroom Equipment: Built-in shower seat    ADL Assistance: Independent  Ambulation Assistance: Independent  Transfer Assistance: Independent  Active : No  Additional Comments: Pt reports he completed his own self care and mobility with intermittent use of a quad cane. Pt states wife completed housekeeping, meal prep and finances. Restrictions/Precautions:  Restrictions/Precautions: General Precautions, Fall Risk, Modified Diet  Position Activity Restriction  Other position/activity restrictions: pt pushes to right, R romain, Rt neglect, poor sensation Rt hemibody    SUBJECTIVE: Pt was asleep in bed upon arrival.  Pt showed tiredness throughout session w/ difficulty opening eyes. Pt was cooperative after multiple cues to open eyes. PAIN: Not reported by pt.     OBJECTIVE:  Bed Mobility:  Rolling to Right: Minimal Assistance, X 1 assist to bend RLE to prepare for roll, verbal cues for proper foot placement, increased time for completion  Supine to Sit: Moderate Assistance, X 1 assist to elevate trunk, verbal cues to take legs off of bed to floor, manual cues for upright posture once in sitting, increased time for completion      Transfers:  Sit to Stand: Maximum Assistance, X 2 assist for blocking of Lt foot from sliding forward, assist of another for blocking RLE into TKE,  Manual and verbal cues for anterior weight shift and upright posture once in stand, cues for Rt hand to be supported by Lt hand. Stand to Sit:Maximum Assistance, X 2 assist for blocking Lt foot from sliding forward, assist of another for descend and flexion on Rt knee , verbal and manual cues for anterior weight shift for descend,  Sit Pivot: Moderate Assistance, X 1 bobath transfer to achieve more anterior weight shift to initiate and ease stand, assist for proper foot placement, verbal and manual cues for increased anterior weight shift,     Balance:  Dynamic Sitting Balance: Contact Guard Assistance, X 1, verbal and manual cues for upright posture, pt leans to Rt but able to correct with cueing,  Mirror placed in front for visual feedback. Pt reached outside SOPHIE to Lt and in front of body 1 set 8 reps, min assist at times to maintain midline positioning. Rt hand placed on pt's or Pt's leg for support. Standing Balance: Pt stood in standing frame at [place] [id number] with mirror in front for visual feedback. Pt reached outside SOPHIE to left and in front with Lt hand. Rt hand placed on table of standing from in open position. Manual and verbal cues to maintain upright position, open eyes to complete task, and to orient body to midline during stand. Pt completed 2 stands w/o standing frame with max assist X 2. Verbal and manual cues for upright posture and to open eyes to use mirror for visual feedback or trunk sway to the Rt. Exercise:  Pt's RLE was passively stretched into knee extension and with heel cord stretching X 2 for 30 sec. Passive shoulder girdle protraction and upward rotation completed.    Exercises were completed for increased independence with functional mobility. Functional Outcome Measures: Not completed       ASSESSMENT:  Assessment: Patient progressing toward established goals. Pt showed improved effort with BLE support during sit pivot transfers. Rt side hemibody neglect noted during session. Pt unable to maintain proper posture during standing or sitting for an extended period of time due to fatigue. Tone has decreased due to Baclofen. Activity Tolerance:  Patient tolerance of  treatment: fair. Equipment Recommendations:Equipment Needed: Yes(continue to assess. Pt has q. cane)  Discharge Recommendations:  Continue to assess pending progress, Patient would benefit from continued therapy after discharge, 24 hour supervision or assist    Plan: Times per week: 5x/ wk 90 min, 1x/ wk 30 min  Current Treatment Recommendations: Strengthening, Transfer Training, Endurance Training, ROM, Balance Training, Wheelchair Mobility Training, Neuromuscular Re-education, Patient/Caregiver Education & Training, Equipment Evaluation, Education, & procurement, Gait Training, Home Exercise Program, Functional Mobility Training, Safety Education & Training    Patient Education  Patient Education: Plan of Care, [employer] Mobility, Equipment Education, Transfers, Verbal Exercise Instruction,  - Patient Verbalized Understanding, - Patient Requires Continued Education    Goals:  Patient goals : get walking  Short term goals  Time Frame for Short term goals: 1 wk  Short term goal 1: Pt to roll to either direction, SBA with cues for Rt hemibody position only, for position change in bed. Short term goal 2: Pt to go supine <->sit, +1 mod assist at trunk up and with RLE going to supine, to get in/out of bed. Short term goal 3: Pt to perform sit/pivot transfer, supporting Rt hand with LT, +1 min assist to get in/out of w/c. Short term goal 4: Pt to get up/down from various seated surfaces, +1 mod assist to progress to pre gait standing with RW support.   Short term goal 5: Pt to stand with RW, maintain midline trunk, with stepping forward back with ea LE, mod assist with RLE  Short term goal 6: Pt to propel w/c >= 150 ft, SBA, for indoor mobility  Long term goals  Time Frame for Long term goals : 4 wks  Long term goal 1: Pt to go supine <->sit, +1 stand by assist , to get in/out of bed. Long term goal 2: Pt to get up/down from various seated surfaces, +1 CGA to get up to walk. Long term goal 3: Pt to perform stand/pivot transfer with use of RW +1 min assist to get in/out of w/c. Long term goal 4: Pt to walk with RW >= 50 ft, CGA to progress to home mobility  Long term goal 5: Pt to get in/out of car, +1 min assist for transportation needs. Long term goal 6: Pt to propel w/c >= 150 ft, Mod I, for indoor mobility    Following session, patient left in safe position with all fall risk precautions in place.

## 2020-01-14 NOTE — PROGRESS NOTES
Findings: pt seen; thin; cachexic appearing; poor appetite; pt needs assist with meals; diet per SLP; pt denies N/V/D/C; last BM x2 on 1/11. Labs: POC Glucose 128. Rx includes: Humalog, Multivitamin, Vitamin C & D  · Wound Type: (right perineum wound)  · Current Nutrition Therapies:  · Oral Diet Orders: Dysphagia Minced and Moist (Dysphagia 2), Mildly Thick, No Straws   · Oral Diet intake: %, 51-75%(most meals over the past few days; however pt ate only 40% of breakfast today)  · Oral Nutrition Supplement (ONS) Orders: Frozen Oral Supplement(TID)  · ONS intake: 0%, %  · Anthropometric Measures:  · Ht: 6' (182.9 cm)   · Current Body Wt: 151 lb 6.4 oz (68.7 kg)(1/13; +2 non-pitting RUE)  · Admission Body Wt: 150 lb 14.4 oz (68.4 kg)(1/2; no edema)  · Usual Body Wt: 157 lb (71.2 kg)(per EMR 6/11/19 and 5/6/19; per patient ~180# 1 year ago)  · % Weight Change: patient reports ~30# loss in 1 year (16% of body weight), EMR shows 7# loss in 6 months (4% loss of body weight)   · Ideal Body Wt: 178 lb (80.7 kg)   · BMI Classification: BMI 18.5 - 24.9 Normal Weight    Nutrition Interventions:   Continue current diet, Continue current ONS, Vitamin Supplement((Diet per SLP & MD. Continue Magic Cups TID. Continue Multivitamin w/minerals, Vitamin C and Vitamin B-12 daily as ordered.)  Continued Inpatient Monitoring, Speech Therapy, Education Initiated, Coordination of Care(Encouraged po intake of meals and ONS at best effort)    Nutrition Evaluation:   · Evaluation: No progress toward goals(not improving much)   · Goals: Patient will safely consume 75% or more of meals during LOS.      · Monitoring: Nutrition Progression, Meal Intake, Supplement Intake, Diet Tolerance, Skin Integrity, Wound Healing, Weight, Pertinent Labs, Monitor Bowel Function, Chewing/Swallowing    Electronically signed by Raulito Reagan RD, LD on 1/14/20 at 11:47 AM    Contact Number: [phone]

## 2020-01-14 NOTE — PROGRESS NOTES
[facility] [facility] THERAPY  [address] [facility]  DAILY NOTE    TIME   SLP Individual Minutes  Time In: 1030  Time Out: 1100  Minutes: 30  Timed Code Treatment Minutes: 0 Minutes   Dysphagia therapy: 15 minutes  Speech/language therapy: 15 minutes    Date: 2020  Patient Name: Constancia Sandifer      CSN: [id number]   : 1940  (78 y.o.)  Gender: male   Referring Physician:  Dr. Krupa Best   Diagnosis: CVA  Secondary Diagnosis: Dysarthria, Dysphagia, Cognitive-Linguistic Deficits   Precautions: High Fall Risk, Aspiration precautions   Current Diet: Downgraded to pureed and mildly thick    Swallowing Strategies: Full Upright Position, Small Bite/Sip, No Straw, Multiple Swallow, Pulmonary Monitoring, Oral Care after all Meals, Supervision, Medication in Applesauce, Alternate Solids and Liquids, Limit Distractions and Monitor for Fatigue    Date of Last MBS: 19    Pain:  None reported     Subjective: Pt resting in bed upon arrival, requiring max and consistent employment of verbal + tactile cues to improve wakefulness; additional requirement of sternal rub and cold washcloth to extremities to enhance alertness. Daughter present at bedside, providing extensive support and encouragement to aid in pt's participation within therapeutic interventions. Overall, poor performance across all ST domains at date with limited goal addressing d/t fatigue and requiring of rest breaks. Short-Term Goals:  SHORT TERM GOAL #1:  Goal 1: Pt will tolerate a pureed diet and mildly thick liquids (no straws) and advanced minced solids with ST only (hx of silent aspiration with thin) without overt s/s of aspiration to meet nutritional/hydration measures safely. INTERVENTIONS: Skilled education provided to pt's daughter re: rationale for recently downgraded diet level to pureed textured solids with continuation of nectar/mildly thick liquids.  ST with direct outlining for need of focus on additional/alternate STGs    SHORT TERM GOAL #5:  Goal 5: Pt will complete complex naming, verbal sequencing/description and sentence formulation/repetition tasks with 70% accuracy, mod cues for improved expression of basic/complex thoughts. INTERVENTIONS: DNT d/t focus on additional/alternate STGs    Long-Term Goals:  Timeframe for Long-term Goals: 2 weeks     LONG TERM GOAL #1:  Goal 1: Pt will tolerate a minced/moist diet and thin liquids without overt s/s of aspiration to meet nutritional/hydration measures safely. ONGOING    LONG TERM GOAL #2:  Goal 2: Pt will improve iwvbsgxdm-uspzlwrhkr-tabnmpklktial skills to a supervision level of function for maximized expression of complex thoughts, independent completion of ADL/IADL responsibilities and appropriate transition to home setting with wife post discharge. ONGOING      Comprehension: 3 - Patient understands basic needs 50-74% of the time  Expression: 3 - Expresses basic ideas/needs 50-74% of the time  Social Interaction: 3 - Patient appropriate  50%-74% of the time  Problem Solving: 3 - Patient solves simple/routine tasks 50%-74%  Memory: 3 - Patient remembers 50%-74% of the time       EDUCATION:  Learner: Patient  Education:  Reviewed ST goals and Plan of Care  Evaluation of Education: Verbalizes understanding, Demonstrates with assistance and Needs further instruction    ASSESSMENT/PLAN:  Activity Tolerance:  Patient tolerance of  Treatment: fair. Poor engagement and endurance levels. Assessment/Plan: Patient progressing toward established goals. Continues to require skilled care of licensed speech pathologist to progress toward achievement of established goals and plan of care. .     Plan for Next Session: OME's, dysarthria targeting, recall, organization       Dipika Cr M.A., [address] [place] [address]

## 2020-01-14 NOTE — TELEPHONE ENCOUNTER
Patient is currently being treated inpatient at [facility]. Dr Arleen Gosselin noted UA and started on Macrobid.

## 2020-01-14 NOTE — PROGRESS NOTES
Pt.awakened at 0700 but keeps eyes closed all the  Time and will open at request but closes almost immediatley. Skin clear dry and intact. Pt. Ate poor for breakfast and needed to be fed most of meal.[facility] care done with soap and water. Urine remains foul smelling. Lifevest maint and battery changed at 0950.

## 2020-01-14 NOTE — PROGRESS NOTES
[address] [address]  INPATIENT PHYSICAL THERAPY  DAILY NOTE  [address] [facility] - [id number]    Time In: 1400  Time Out: 1430  Timed Code Treatment Minutes: 30 Minutes  Minutes: 30          Date: 2020  Patient Name: Vu Gonzales,  Gender:  male        MRN: [id number]  : 1940  (78 y.o.)     Referring Practitioner: Dr. Tita Soto  Diagnosis: acute CVA  Additional Pertinent Hx: Pt admitted [facility] ED s/p fall. MRI+ for infarct Lt hemipons. Cardiology deemed need for life vest (2019),  Hx:  depression , anxiety     Prior Level of Function:  Lives With: Spouse  Type of Home: House  Home Layout: One level, Work area in basement  Home Access: Stairs to enter with rails  Entrance Stairs - Number of Steps: 2 with grab bar(Rt)  Home Equipment: Quad cane(used intermittently)   Bathroom Shower/Tub: Walk-in shower  Bathroom Toilet: Handicap height  Bathroom Equipment: Built-in shower seat    ADL Assistance: Independent  Ambulation Assistance: Independent  Transfer Assistance: Independent  Active : No  Additional Comments: Pt reports he completed his own self care and mobility with intermittent use of a quad cane. Pt states wife completed housekeeping, meal prep and finances. Restrictions/Precautions:  Restrictions/Precautions: General Precautions, Fall Risk, Modified Diet  Position Activity Restriction  Other position/activity restrictions: pt pushes to right, R romain, Rt neglect, poor sensation Rt hemibody    SUBJECTIVE: Pt was asleep in w/c upon arrival into room. Eyes were closed for 90% of PT session. Pt was pleasant and cooperative when he was able to open his eyes momentarily. Pt's daughter arrived midway through session and observed. PAIN: Pt did not report any pain.     OBJECTIVE:  Bed Mobility:  Rolling to Left: Minimal Assistance, X 1, assist with upper trunk rotation and proper foot placement of BLE before rolling, Rt hand supported by Lt hand before rolling, with increased time for completion, verbal cues to reach with BUE to initiate roll  Rolling to Right: Minimal Assistance, X 1, assist w/ upper trunk rotation and proper foot placement of BLE before rolling,  with increased time for completion   Sit to Supine: Moderate Assistance, X 1 assist to descend to side and negotiate RLE onto mat, verbal cues to lift LLE onto mat and to complete roll from side to supine    Transfers:  Sit Pivot:Maximum Assistance bobath transfer, X 1 assist w/ proper foot placement before transfer, manual and verbal cues for Rt hand to be supported by Lt hand and anterior weight shift to unload. Exercise:  Patient was guided in 1 set(s) 10 reps of   Bridges w/ manual cues at Rt hip for elevation. While in Lt sidelying pt completed Rt upper and lower trunk isometrics w/ manual resistance. Exercises were completed for increased independence with functional mobility. Functional Outcome Measures: Not completed       ASSESSMENT:  Assessment: Patient progressing toward established goals. Pt displayed decreases with alertness and attentiveness during PT session. Pt unable to keep eyes open for an extended period of time. Improvements noted with LE muscle activation during transfers. Activity Tolerance:  Patient tolerance of  treatment: fair. Equipment Recommendations:Equipment Needed: Yes(continue to assess.   Pt has q. cane)  Discharge Recommendations:  Continue to assess pending progress, Patient would benefit from continued therapy after discharge, 24 hour supervision or assist    Plan: Times per week: 5x/ wk 90 min, 1x/ wk 30 min  Current Treatment Recommendations: Strengthening, Transfer Training, Endurance Training, ROM, Balance Training, Wheelchair Mobility Training, Neuromuscular Re-education, Patient/Caregiver Education & Training, Equipment Evaluation, Education, & procurement, Gait Training, Home Exercise Program, Functional Mobility Training, Safety Education & Training    Patient Education  Patient Education: Plan of Care, Bed Mobility, Transfers, Verbal Exercise Instruction,  - Patient Verbalized Understanding, - Patient Requires Continued Education    Goals:  Patient goals : get walking  Short term goals  Time Frame for Short term goals: 1 wk  Short term goal 1: Pt to roll to either direction, SBA with cues for Rt hemibody position only, for position change in bed. Short term goal 2: Pt to go supine <->sit, +1 mod assist at trunk up and with RLE going to supine, to get in/out of bed. Short term goal 3: Pt to perform sit/pivot transfer, supporting Rt hand with LT, +1 min assist to get in/out of w/c. Short term goal 4: Pt to get up/down from various seated surfaces, +1 mod assist to progress to pre gait standing with RW support. Short term goal 5: Pt to stand with RW, maintain midline trunk, with stepping forward back with ea LE, mod assist with RLE  Short term goal 6: Pt to propel w/c >= 150 ft, SBA, for indoor mobility  Long term goals  Time Frame for Long term goals : 4 wks  Long term goal 1: Pt to go supine <->sit, +1 stand by assist , to get in/out of bed. Long term goal 2: Pt to get up/down from various seated surfaces, +1 CGA to get up to walk. Long term goal 3: Pt to perform stand/pivot transfer with use of RW +1 min assist to get in/out of w/c. Long term goal 4: Pt to walk with RW >= 50 ft, CGA to progress to home mobility  Long term goal 5: Pt to get in/out of car, +1 min assist for transportation needs. Long term goal 6: Pt to propel w/c >= 150 ft, Mod I, for indoor mobility    Following session, patient left in safe position with all fall risk precautions in place.

## 2020-01-14 NOTE — PROGRESS NOTES
supported on a sloped surface, pt able to complete reaching with L UE to encourage increased R muscle activation with scapula retraction, Transitioned to wt shifitng fwd and reaching fwd with pt then asked to push with R UE into trunk extension to neutral. Pt able to complete light sh retraction, elbow extension down a sloped surface. ASSESSMENT:     Activity Tolerance:  Patient tolerance of  treatment: fair. Due to fair alertness. Discharge Recommendations: 24 hour supervision or assist, Continue to assess pending progress  Equipment Recommendations: Other: PT may need a drop arm BSC and shower bench  Plan: Times per week: 5x/wk for 90 min and 1x/wk for 30 min   Current Treatment Recommendations: Balance Training, Self-Care / ADL, ROM, Strengthening, Functional Mobility Training, Endurance Training, Neuromuscular Re-education, Safety Education & Training, Patient/Caregiver Education & Training, Wheelchair Mobility Training, Home Management Training    Patient Education  Patient Education: Home Exercise Program ,     Goals  Short term goals  Time Frame for Short term goals: 1 week   Short term goal 1: Pt will complete sit to stand transfers with mod assist of 1 with min vcs or RUE support to improve indep with LB clothing mgt   Short term goal 2: Pt will complete 5-7 min EOB ADL task with SBA for sitting balance & min vcs for midline posture to improve balance needed for ADLs. Short term goal 3: Pt will don UB clothing with SBA and mod vcs for romain dressing technique to improve indep with self care. Short term goal 4: Pt will tolerate standing >2 minutes in midline with CGA to improve midline awareness for completion of stand pivots to [facility]. Long term goals  Time Frame for Long term goals : 3-4 weeks   Long term goal 1: Pt will don LB clothing with CGA and min vcs for adaptive technique to ipmrove indep with self care.    Long term goal 2: pt will complete stand pivot transfer to [facility] with CGA to improve indep with toileting. Long term goal 3: Pt will demonstrate 3/5 R elbow flexion to improve indep with donning a shirt. Following session, patient left in safe position with all fall risk precautions in place.

## 2020-01-14 NOTE — PROGRESS NOTES
[facility]  Inpatient Rehabilitation  Occupational Therapy  Daily Note  Time:  Time In: 6127  Time Out: 9374  Timed Code Treatment Minutes: 60 Minutes  Minutes: 60        Date: 2020  Patient Name: Chester Ramirez,   Gender: male      Room: [facility]56/056-A  MRN: [id number]  : 1940  (78 y.o.)  Referring Practitioner: Dr. Rena Alicea   Diagnosis: CVA   Additional Pertinent Hx: Per ER note on 19:  78 y.o. male who presents to the Emergency Department via EMS for the evaluation of a CVA. The patient was found on the floor sitting up around 0630 this morning by his wife, who heard him fall. She reports a drooping mouth on the right side, slurred speech, and right sided weakness when she found him at 0630. The patient's last known well was 22:00 last night. The patient is unable to walk because his left leg is weak, and the patient had to be lifted into a chair after he was found on the floor. 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 Restrictions/Precautions:  Restrictions/Precautions: General Precautions, Fall Risk, Modified Diet  Position Activity Restriction  Other position/activity restrictions: pt pushes to right, R romain, Rt neglect, poor sensation Rt hemibody    SUBJECTIVE: decreased alertness noted this session. Pt with   decrease in initiaton and problem solving. Keeping eyes closed during most of the sesion. Mod cues for attention to task and mod cues for R romain body positioning. Daughter  present and supportive throughout,. PAIN: 0/10: denied pain     COGNITION: Slow Processing, Decreased Recall, Decreased Insight, Impaired Memory, Inattention, Decreased Problem Solving, Decreased Safety Awareness and Impaired Attention    ADL:   Grooming: Minimal Assistance. to wash R hand after toileting. Bathing: Dependent.   Pt required moderate cues for attention and followed direction to wash chest, ABD and R UE. assist for L UE and thighs, assist for B feet,mod A to and retraction, trace noted for biceps and triceps. He is also limited by mild R inattention, balance for both sitting and standing. He is more alert for need for toileting tasks. He requires skilled OT intervention to progress with ADL remediation with a focus on romain techniques, IADL remediation, progressive and intensive neuro re-ed to improve midline orientation and R UE function. Discharge Recommendations: 24 hour supervision or assist, Continue to assess pending progress  Equipment Recommendations: Other: PT may need a drop arm BSC and shower bench  Plan: Times per week: 5x/wk for 90 min and 1x/wk for 30 min   Current Treatment Recommendations: Balance Training, Self-Care / ADL, ROM, Strengthening, Functional Mobility Training, Endurance Training, Neuromuscular Re-education, Safety Education & Training, Patient/Caregiver Education & Training, Wheelchair Mobility Training, Home Management Training    Patient Education  Patient Education: ADL's    Goals  Revised Short-Term Goals  Short term goals  Time Frame for Short term goals: 1 week   Short term goal 1: Pt will complete sit to stand transfers with mod assist of 1 with min vcs or RUE support to improve indep with LB clothing mgt   Short term goal 2: Pt will complete 5-7 min EOB ADL task with SBA for sitting balance & min vcs for midline posture to improve balance needed for ADLs. Short term goal 3: Pt will don UB clothing with SBA and mod vcs for romain dressing technique to improve indep with self care. Short term goal 4: Pt will tolerate standing >2 minutes in midline with CGA to improve midline awareness for completion of stand pivots to [facility]. Long term goals  Time Frame for Long term goals : 3-4 weeks   Long term goal 1: Pt will don LB clothing with CGA and min vcs for adaptive technique to ipmrove indep with self care. Long term goal 2: pt will complete stand pivot transfer to [facility] with CGA to improve indep with toileting.    Long term goal 3: Pt

## 2020-01-14 NOTE — TELEPHONE ENCOUNTER
----- Message from RYLEY Guevara CNP sent at 1/14/2020  8:07 AM EST -----  Please let patient know his UA showed infection. He needs to follow up with his PCP regarding this. Please fax a copy to PCP on file.   Jose Alberto Mccrary CNP

## 2020-01-15 NOTE — TELEPHONE ENCOUNTER
Spouse called and states pt still has a long way to go with rehab and is being moved to a nursing home for more therapy since his stroke, She will call back when she is ready to reschedule.

## 2020-01-15 NOTE — PROGRESS NOTES
Standing Balance: Moderate Assistance. x 1 to max A at times, second person needed for safety. BED MOBILITY:  Not Tested    TRANSFERS:  Sit to Stand: Moderate Assistance. x 1, min Ax 1 for sit to stand with min cues for safe hand placement. Stand to Sit: Moderate Assistance. x 1       ASSESSMENT:     Activity Tolerance:  Patient tolerance of  treatment: fair. Discharge Recommendations: 24 hour supervision or assist, Continue to assess pending progress  Equipment Recommendations: Other: PT may need a drop arm BSC and shower bench  Plan: Times per week: 5x/wk for 90 min and 1x/wk for 30 min   Current Treatment Recommendations: Balance Training, Self-Care / ADL, ROM, Strengthening, Functional Mobility Training, Endurance Training, Neuromuscular Re-education, Safety Education & Training, Patient/Caregiver Education & Training, Wheelchair Mobility Training, Home Management Training    Patient Education  Patient Education: ADL's    Goals  Short term goals  Time Frame for Short term goals: 1 week   Short term goal 1: Pt will complete sit to stand transfers with mod assist of 1 with min vcs or RUE support to improve indep with LB clothing mgt   Short term goal 2: Pt will complete 5-7 min EOB ADL task with SBA for sitting balance & min vcs for midline posture to improve balance needed for ADLs. Short term goal 3: Pt will don UB clothing with SBA and mod vcs for romain dressing technique to improve indep with self care. Short term goal 4: Pt will tolerate standing >2 minutes in midline with CGA to improve midline awareness for completion of stand pivots to [address] Long term goals  Time Frame for Long term goals : 3-4 weeks   Long term goal 1: Pt will don LB clothing with CGA and min vcs for adaptive technique to ipmrove indep with self care. Long term goal 2: pt will complete stand pivot transfer to [address] with CGA to improve indep with toileting.    Long term goal 3: Pt will demonstrate 3/5 R elbow flexion to improve indep with donning a shirt. Following session, patient left in safe position with all fall risk precautions in place.

## 2020-01-15 NOTE — PROGRESS NOTES
for manipulation and coordination for AP transit. Intermittent oral bolus holding noted within consumption of pureed textures with cues required to \"swallow\" to elicit pharyngeal swallow trigger. Min right buccal pocketing, however, improved clearance of stasis in comparison to minced/soft textured trials. Nectar/mildly thick juice via cup 6oz trials completed, no s/s of aspiration. With ST present, pt consumed 100% of  pureed [place] toast only, ~10% of pureed eggs and refused all additional PO intake. Concerns maintain at this time for poor ability to maintain nutrition/hydration measures, in which lethargy/fatigue levels highly impacting performance. Recommend continuation of pureed diet with nectar/mildly thick liquids with close pulmonary monitoring to ensue. Continue with recommendations for direct 1:1 assistance and for medications to be crushed in purees. SHORT TERM GOAL #2:  Goal 2: Pt will complete labial/lingual/pharyngeal strengthening, coordionation, ROM exercises, DDK rates, rote speech tasks with SOS strategies and tongue twisters x10 for improved timeliness of oral bolus formation/transit, maximized speech intelligibility and improved airway protection. INTERVENTIONS: DNT secondary to focus on other goals  Previous tx:   Speech production:  -Numerical counting 1-5: x2 trials with pt requiring consistent cues to \"open mouth\" d/t poor ability to expand oral cavity for pronounce consonants; intelligibility best approximated 70% with fatigue levels impacting presenting dysarthria  *poor overall awareness to immediate surroundings given lethargy levels, in which pt did NOT attempt to open eyes within session to locate established external aid containing SOS speech strategies    SHORT TERM GOAL #3:  Goal 3: Pt will complete immediate/delayed/working memory tasks with 70% accuracy, min cues for improved retention of newly learned medical information.     INTERVENTIONS: Orientation:  -Month: correct provided FO2  -Year: independent  -Place: independent   -Date: unable to elicit despite max cues  -JEFFREY: incorrect despite FO2 choices  -Location: indep  -Name of location: indep  -Etiology: correct provided FO2  -Deficits: unable to elicit despite max cues; patient stating \"I don't know. \"   -Time: indep with indep use of wall clock    Following 5 minute delay recall of basic orientation information (month, date, JEFFREY, Year): 2/4 indep, 2/4 incorrect despite FO2 choices provided     Biographical information:  -Recall of location in which children live: 5/6 indep, 1/6 unable to elicit despite max cues. Unable to confirm patient response as no family present this session       [facility] #4:  Goal 4: Pt will complete problem solving, executive functioning and attention tasks (i.e. time/money management, scheduling, complex auditory/reading comprehension, etc) with 70% accuracy, mod cues for improved ADL/IADL management and safety awareness/insight. INTERVENTIONS: Reading analog clocks: 4/6 indep, 2/6 mod-max cues     SHORT TERM GOAL #5:  Goal 5: Pt will complete complex naming, verbal sequencing/description and sentence formulation/repetition tasks with 70% accuracy, mod cues for improved expression of basic/complex thoughts. INTERVENTIONS: DNT d/t focus on additional/alternate STGs    Long-Term Goals:  Timeframe for Long-term Goals: 2 weeks     LONG TERM GOAL #1:  Goal 1: Pt will tolerate a minced/moist diet and thin liquids without overt s/s of aspiration to meet nutritional/hydration measures safely. ONGOING    LONG TERM GOAL #2:  Goal 2: Pt will improve kfcrrwdyr-oulpmkjjrz-kaxepekzmcdtr skills to a supervision level of function for maximized expression of complex thoughts, independent completion of ADL/IADL responsibilities and appropriate transition to home setting with wife post discharge.   ONGOING      Comprehension: 3 - Patient understands basic needs 50-74% of the time  Expression: 3 - Expresses basic ideas/needs 50-74% of the time  Social Interaction: 3 - Patient appropriate  50%-74% of the time  Problem Solving: 3 - Patient solves simple/routine tasks 50%-74%  Memory: 3 - Patient remembers 50%-74% of the time       EDUCATION:  Learner: Patient  Education:  Reviewed ST goals and Plan of Care  Evaluation of Education: Verbalizes understanding, Demonstrates with assistance and Needs further instruction    ASSESSMENT/PLAN:  Activity Tolerance:  Patient tolerance of  Treatment: fair. Poor engagement and endurance levels. Assessment/Plan: Patient progressing toward established goals. Continues to require skilled care of licensed speech pathologist to progress toward achievement of established goals and plan of care. .     Plan for Next Session: OME's, dysarthria targeting, recall,      Deborrah Kehr, M.S. [address]

## 2020-01-15 NOTE — PROGRESS NOTES
[address]  Occupational Therapy  Daily Note  Time:    Time In:   Time Out: 1405  Timed Code Treatment Minutes: 30 Minutes  Minutes: 30          Date: 1/15/2020  Patient Name: Chester Ramirez,   Gender: male      Room: [facility]/056-A  MRN: [id number]  : 1940  (78 y.o.)  Referring Practitioner: Dr. Rena Alicea   Diagnosis: CVA   Additional Pertinent Hx: Per ER note on 19:  78 y.o. male who presents to the Emergency Department via EMS for the evaluation of a CVA. The patient was found on the floor sitting up around 0630 this morning by his wife, who heard him fall. She reports a drooping mouth on the right side, slurred speech, and right sided weakness when she found him at 0630. The patient's last known well was 22:00 last night. The patient is unable to walk because his left leg is weak, and the patient had to be lifted into a chair after he was found on the floor. 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 Restrictions/Precautions:  Restrictions/Precautions: General Precautions, Fall Risk, Modified Diet  Position Activity Restriction  Other position/activity restrictions: pt pushes to right, R romain, Rt neglect, poor sensation Rt hemibody    SUBJECTIVE: fatigued however cooperative,     PAIN: 0/10: denied pain    COGNITION: Slow Processing    ADL:   No ADL's completed this session. Ludin Celestin BALANCE:  Sitting Balance:  Supervision. BED MOBILITY:  Sit to Supine: Moderate Assistance on the mat with pt asked to guide with wt bearing into R UE.     TRANSFERS:  Squat Pivot: Moderate Assistance. x 1        ADDITIONAL ACTIVITIES:  Pt completed dynamic sitting tasks with side propping to the right with support at the shoulder  For lean down and push up for increased repetition with muscle activation for scapular retraction. Laid sidelying  To the left for propping on R UE .  Toleratex 3 min of side propping on L side for upper trunk

## 2020-01-15 NOTE — PROGRESS NOTES
[address] [address]  INPATIENT PHYSICAL THERAPY  DAILY NOTE  [facility] - [id number]    Time In: 0730  Time Out: 0830  Timed Code Treatment Minutes: 60 Minutes  Minutes: 60          Date: 1/15/2020  Patient Name: Radha Solitario,  Gender:  male        MRN: [id number]  : 1940  (78 y.o.)     Referring Practitioner: Dr. Christian Ladd  Diagnosis: acute CVA  Additional Pertinent Hx: Pt admitted [facility] ED s/p fall. MRI+ for infarct Lt hemipons. Cardiology deemed need for life vest (2019),  Hx:  depression , anxiety     Prior Level of Function:  Lives With: Spouse  Type of Home: House  Home Layout: One level, Work area in basement  Home Access: Stairs to enter with rails  Entrance Stairs - Number of Steps: 2 with grab bar(Rt)  Home Equipment: Quad cane(used intermittently)   Bathroom Shower/Tub: Walk-in shower  Bathroom Toilet: Handicap height  Bathroom Equipment: Built-in shower seat    ADL Assistance: Independent  Ambulation Assistance: Independent  Transfer Assistance: Independent  Active : No  Additional Comments: Pt reports he completed his own self care and mobility with intermittent use of a quad cane. Pt states wife completed housekeeping, meal prep and finances. Restrictions/Precautions:  Restrictions/Precautions: General Precautions, Fall Risk, Modified Diet  Position Activity Restriction  Other position/activity restrictions: pt pushes to right, R romain, Rt neglect, poor sensation Rt hemibody    SUBJECTIVE: Pt was asleep in bed upon arrival, pleasant and cooperative. Eyes closed during majority of PT session. Pt drank 2 slips of fluid at end of session. PAIN: Not reported by pt.     OBJECTIVE:  Bed Mobility:  Rolling to Right: Minimal Assistance, X 1, assist for upper trunk rotation and flexion of RLE before roll, with increased time for completion, verbal cues for flexion of BLE to assist with roll  Supine to Sit: Moderate Assistance, X 1, assist to elevate trunk, with increased time for completion, verbal cues to move BLE off bed to floor  Scooting: Maximum Assistance, X 1 verbal and manual cues for technique of unloading side being moved    Transfers:  Sit to Stand: Maximum Assistance, X 2 assist at RLE for TKE, assist of another for stand and to block Lt foot from anterior translation, verbal cues for upright posture and Rt hand to be supported by Lt. Stood in standing frame X 2 and w/o assistive device X 1. Stand to Sit:Maximum Assistance, X 2 assist with LLE for knee flexion w/ descend, assist of another for descend, verbal cues for anterior weight shift  Sit Pivot:Maximum Assistance bobath transfer, X 1 assist w/ proper foot and hand placement, verbal and manual cues for anterior weight shift    Balance:  Dynamic Standing Balance: Maximum Assistance, X 2, assist at RLE for TKE, assist of another for upright posture and to maintain midline orientation, verbal cues for posture. Pt stood in standing from X 2 while reaching outside SOPHIE to left and in front of body. Pt stood X 1 w/o assist device with max assist X 2. Exercise:  Patient was guided in 1 set(s) 10 reps of exercise   Bridges w/ manual cues for Rt hip elevation. Passive heel cord stretches and shoulder girdle protraction and upward rotation. Exercises were completed for increased independence with functional mobility. Functional Outcome Measures: Not completed       ASSESSMENT:  Assessment: Patient progressing toward established goals. Pt midline orientation improving with each treatment session. Activity Tolerance:  Patient tolerance of  treatment: fair. Equipment Recommendations:Equipment Needed: Yes(continue to assess.   Pt has q. cane)  Discharge Recommendations:  Continue to assess pending progress, Patient would benefit from continued therapy after discharge, 24 hour supervision or assist    Plan: Times per week: 5x/ wk 90 min, 1x/ wk 30 min  Current Treatment with all fall risk precautions in place.

## 2020-01-15 NOTE — PROGRESS NOTES
[address] [address]  Recreational Therapy  Daily Note  [address]    Time Spent with Patient: 10 minutes    Date:  1/15/2020       Patient Name: Akshat Alex      MRN: [id number]      YOB: 1940 (78 y.o.)       Gender: male  Diagnosis: CVA   Referring Practitioner: Dr. Hakeem Ryan     Patient enjoyed spending time with our pet therapy dogs.  Pt enjoyed petting our pet therapy dog Stefania this am-affect brightened-also asked pt about joining our painting group next week in the dining room and he said he would think about it    Electronically signed by: Elma Valentine CTRS  Date: 1/15/2020

## 2020-01-15 NOTE — PROGRESS NOTES
Physical Medicine & Rehabilitation   Progress Note    Chief Complaint: [facility] with an acute infarct located in the Left hemipons. Subjective: Patient reported no current head, trunk, or limb pain. He slept well last evening. His inpatient rehab. therapies are going well. Patient was evaluated today while he was resting in his bed. Patient was more awake and alert this afternoon. His appetite remains poor. He and his wife had no acute concerns. Rehabilitation:  PT:   Bed Mobility:  Rolling to Left: Contact Guard Assistance, Minimal Assistance   Rolling to Right: Contact Guard Assistance, Minimal Assistance   Sit to Supine: Moderate Assistance, Maximum Assistance. Transfers:  Sit to Stand: Moderate Assistance +1, min+1 up from mat and then up from w/c into FELISHA stedy. Pt requiring mod assist for trunk elevation and then to gain TKE RLE. Once up, pt tends to retract RT hip, lean to Rt. With mirror cues and verbal cues, improved midline noted, though not maintained. Stand to Sit:Moderate Assistance +1, min +1. Pt showing difficulty allowing LLE to flex at hip/knee and to release LUE from walker, tending to push. Sit Pivot: Moderate Assistance +1 to either direction. Pt supporting Rt hand with LT,  Assist for more forward translation of wt over base combined with more dynamic trunk. Pt tends to lean to Rt, but improved alignment noted with intervention. Transfer performed using FELISHA stedy at end of session: +2 mod assist up to stand and then 1 to guide and +1 mod assist to maintain trunk balance to position the device.       Balance: mirror use for visual feedback  Dynamic Sitting Balance: Minimal Assistance, X 1, with increased time for completion, Reaching outside SOPHIE to Lt and forward w/ Lt hand, verbal and manual cues for upright posture. Pt maintained midline with min assist for clothing management.   Static Standing Balance: Minimal Assistance, X 2, pt able to stand w/ min assist, Rt moderate assist. Used [employer] for visual feedback. Pt self correcting once cued to look in mirror. Seated rest break on L elbow prop while OT assisted patient with LB dressing.  )  Standing Balance: Moderate assistance of 1 fluctuating to max A of 1 and min A of another, RLE blocked, cues to scan in mirror to correct midline.  )  Standing Balance  Time: 45 seconds,  30 seconds for LB clothing mgt. Pt leaning right, constant cues to look in mirror to correct midline. Transfers:  Sit to Stand:  Minimal Assistance, Maximum Assistance, X 2. with use of puma matute (only to/from [facility]   Stand to Sit: Moderate Assistance, X 1.   Squat Pivot: Minimal Assistance, Moderate Assistance, X 2. w/c <> EOM, w/c > EOB. VCs for R UE attn. Pt was able to direct w/c position and leg rest / lap tray management, requiring cues for brake management and arm rest.    BED MOBILITY:  Supine to Sit: Moderate Assistance for bringing BLEs off side of mat and elevating trunk  Sit to Supine: Moderate Assistance for bringing BLEs onto mat  Scooting: Maximum Assistance advancing his forward        Upper Extremity Assessment:   RUE AROM : (Pt 2-/5 scap elevation. No shoulder AROM )     Sensation  Overall Sensation Status: (decreased proproception RUE, reports numbness )  RUE Tone: Hypotonic  LUE Tone: Normotonic  Coordination and Movement description: Fine motor impairments, Gross motor impairments, Right UE     Activity Tolerance: Patient limited by fatigue, Treatment limited secondary to decreased cognition. ADDITIONAL ACTIVITIES:  Increased time for toileting and bathing tasks with mod cues for attention to R romain body and pushing syndrome. Spouse educated on monitoring pushing syndrome when seated in the chair and to assist with adjusting it as needed. Speech:  SPEECH / VOICE:  Speech: Moderate dysarthria characterized by blended word boundaries and omission of word endings.  DDK rates completed with poor success d/t poor level of articulatory precision and reduced breath support. Speech intelligibility 60-70% within unknown context in conversation. Voice: Hoarse/breathy vocal quality with demonstration of thoracic breathing pattern and poor level of breath support. Frequent aphonic breaks     LANGUAGE:  Receptive:  2 Step Commands: 2/4 indep, 1/4 repetition  Complex Yes/No Questions: 4/4     Expressive:  Confrontational Naming: 3/3 MOCA  Divergent Naming (abstract): impaired-- see below   Sentence Formulation: impaired-- short utterance length with reduced syntax  Conversational Speech: impaired-- see above   Paraphasias: none noted   Repetition: sentence level 1/2    Recall of speech strategies: poor success  Reference to external aid (max cues-- careboard): able to read 25% of list at best   Reference to external aid (written paper): 100% accuracy indep  Utilization of strategies within rote speech tasks:   Counting 1-10: poor-fair success initially and quickly declined to poor success with fatigue   JEFFREY: poor-fair success initially and quickly declined to poor success with fatigue.     COGNITION:  Myles Cognitive Assessment (MOCA) version 7.1 completed. Pt with adjusted score 18/25. Unable to complete written portion d/t extremity weakness in dominant right hand. Normal is greater than or equal to 26/30. Orientation: 6/6  Immediate Recall: 2/5-- improved to 4/5 with repetition   Short-Term Recall: 4/5 indep, 1/5 FO2  Divergent Naming: x4 indep 1 minute (N=11)   Reasoning: verbal abstraction- 0/2  Sequencing: impaired  Thought Organization: at least mild impairment  Insight: fair   Attention: decreased high level selective attention   Math Computation: 3/5  Executive Functioning: unable to complete d/t extremity weakness     SWALLOWING:  Current Diet: Minced/moist diet and mildly thick liquids   Respiratory Status:   Independent    Skilled dysphagia therapy via direct meal intervention.  Pt with multiple trials of scrambled eggs,

## 2020-01-15 NOTE — PROGRESS NOTES
[address] [facility] THERAPY  [facility]  DAILY NOTE    TIME   SLP Individual Minutes  Time In: 7433  Time Out: 1230  Minutes: 15  Timed Code Treatment Minutes: 15 Minutes         Date: 1/15/2020  Patient Name: Mell Meadows      CSN: [id number]   : 1940  (78 y.o.)  Gender: male   Referring Physician:  Dr. Mignon Rico   Diagnosis: CVA  Secondary Diagnosis: Dysarthria, Dysphagia, Cognitive-Linguistic Deficits   Precautions: High Fall Risk, Aspiration precautions   Current Diet: Downgraded to pureed and mildly thick    Swallowing Strategies: Full Upright Position, Small Bite/Sip, No Straw, Multiple Swallow, Pulmonary Monitoring, Oral Care after all Meals, Supervision, Medication in Applesauce, Alternate Solids and Liquids, Limit Distractions and Monitor for Fatigue    Date of Last MBS: 19    Pain:  None reported     Subjective: Pt seen sitting upright in wheelchair following OT session; shortened session d/t working with OT prior to [address] session. Patient attentive throughout first 10 minutes of session; fatigue increasing final 5 minutes of session. No family present     Short-Term Goals:  SHORT TERM GOAL #1:  Goal 1: Pt will tolerate a pureed diet and mildly thick liquids (no straws) and advanced minced solids with ST only (hx of silent aspiration with thin) without overt s/s of aspiration to meet nutritional/hydration measures safely. INTERVENTIONS: DNT secondary to focus on other goals  Previous tx:   Skilled dietary analysis completed via direct meal intervention for consumption of pureed Maori toast + syrup, pureed eggs, and nectar/mildly thick juice via cup as adherence to recently downgraded diet level. Patient with improved feeding success when provided small bite on utensil; following placement of food on utensil patient with independent use of utilizing spoon to oral cavity.   Oral phase continues to be uncoordinated with delays noted for manipulation and coordination for AP transit. Intermittent oral bolus holding noted within consumption of pureed textures with cues required to \"swallow\" to elicit pharyngeal swallow trigger. Min right buccal pocketing, however, improved clearance of stasis in comparison to minced/soft textured trials. Nectar/mildly thick juice via cup 6oz trials completed, no s/s of aspiration. With ST present, pt consumed 100% of  pureed Japanese toast only, ~10% of pureed eggs and refused all additional PO intake. Concerns maintain at this time for poor ability to maintain nutrition/hydration measures, in which lethargy/fatigue levels highly impacting performance. Recommend continuation of pureed diet with nectar/mildly thick liquids with close pulmonary monitoring to ensue. Continue with recommendations for direct 1:1 assistance and for medications to be crushed in purees. SHORT TERM GOAL #2:  Goal 2: Pt will complete labial/lingual/pharyngeal strengthening, coordionation, ROM exercises, DDK rates, rote speech tasks with SOS strategies and tongue twisters x10 for improved timeliness of oral bolus formation/transit, maximized speech intelligibility and improved airway protection. INTERVENTIONS: DNT secondary to focus on other goals      SHORT TERM GOAL #3:  Goal 3: Pt will complete immediate/delayed/working memory tasks with 70% accuracy, min cues for improved retention of newly learned medical information. INTERVENTIONS: Patient provided with 3 basic/realted words to retreive from the hardware store; cued to recall for 5 minutes. Immediate recall: 3/3 indep, 5 minute delay: 5/5 indep     Previous tx: Orientation:  -Month: correct provided FO2  -Year: independent  -Place: independent   -Date: unable to elicit despite max cues  -JEFFREY: incorrect despite FO2 choices  -Location: indep  -Name of location: indep  -Etiology: correct provided FO2  -Deficits: unable to elicit despite max cues; patient stating \"I don't know. \"

## 2020-01-15 NOTE — PROGRESS NOTES
[address] [address]  Recreational Therapy  Daily Note  [address]    Time Spent with Patient: 10 minutes    Date:  1/15/2020       Patient Name: Cipriano Multani      MRN: [id number]      YOB: 1940 (78 y.o.)       Gender: male  Diagnosis: CVA   Referring Practitioner: Dr. Dayana Grajeda     RESTRICTIONS/PRECAUTIONS:  Restrictions/Precautions: General Precautions, Fall Risk, Modified Diet  Vision: Impaired  Hearing: Within functional limits    PAIN: 0-no c/o pain    SUBJECTIVE:  Family wants staff to keep encouraging him     OBJECTIVE:  Family paid money for pt to get involved in the painting activity next Monday with peers-she appreciates staff's encouragement each day      Patient Education  New Education Provided: Importance of Leisure,     Electronically signed by: NELLIE Pichardo  Date: 1/15/2020

## 2020-01-16 NOTE — PROGRESS NOTES
Patient/Caregiver Education & Training, Equipment Evaluation, Education, & procurement, Gait Training, Home Exercise Program, Functional Mobility Training, Safety Education & Training    Patient Education  Patient Education: Plan of Care    Goals:  Patient goals : get walking  Short term goals  Time Frame for Short term goals: 1 wk  Short term goal 1: Pt to roll to either direction, SBA with cues for Rt hemibody position only, for position change in bed. Short term goal 2: Pt to go supine <->sit, +1 mod assist at trunk up and with RLE going to supine, to get in/out of bed. Short term goal 3: Pt to perform sit/pivot transfer, supporting Rt hand with LT, +1 min assist to get in/out of w/c. Short term goal 4: Pt to get up/down from various seated surfaces, +1 mod assist to progress to pre gait standing with RW support. Short term goal 5: Pt to stand with RW, maintain midline trunk, with stepping forward back with ea LE, mod assist with RLE  Short term goal 6: Pt to propel w/c >= 150 ft, SBA, for indoor mobility  Long term goals  Time Frame for Long term goals : 4 wks  Long term goal 1: Pt to go supine <->sit, +1 stand by assist , to get in/out of bed. Long term goal 2: Pt to get up/down from various seated surfaces, +1 CGA to get up to walk. Long term goal 3: Pt to perform stand/pivot transfer with use of RW +1 min assist to get in/out of w/c. Long term goal 4: Pt to walk with RW >= 50 ft, CGA to progress to home mobility  Long term goal 5: Pt to get in/out of car, +1 min assist for transportation needs. Long term goal 6: Pt to propel w/c >= 150 ft, Mod I, for indoor mobility    Following session, patient left in safe position with all fall risk precautions in place.

## 2020-01-16 NOTE — PROGRESS NOTES
Alert and oriented to name, place and year. Temperature 97.4 orally. Apical pulse 65 regular and distant. Respiration rate 18 easy and regular. Blood pressure 120/74, left upper arm, sitting. Pulse ox 98% on room air. Denies any pain at this time. Calm and appropriate interaction with staff. KIKO 3mm to 2mm brisk. Sclera white. Mucous membrane pink, moist and intact. Hair clean and dry. Skin warm and dry. Speech mumbled. No swallowing difficulty noted. Lung sounds clear anterior, posterior, and lateral. No cough noted. Bowel sounds active in all four quadrants. Abdomen round, soft, and non-tender on palpation. States yes passing gas. Solis draining clear, yellow urine. States last bowel movement on 01/16/2020. Radial pulses strong and regular bilaterally. Left hand grasp strong, unable to do right hand grasp. Left arm drift negative, unable to do right arm drift. Capillary refill less than 3 seconds. Skin turgor brisk. No edema noted. Pedal pulses strong and regular. Left pedal push and pull strong, unable to do right pedal push and pull. Chandrika's sign negative bilaterally. Left leg lift strong, unable to do right leg lift. Denies numbness and tingling. Wheelchair locked, alarm on. Call light and over bed table within reach. Denies any further needs at this time.

## 2020-01-16 NOTE — PROGRESS NOTES
pivots to [facility]. Long term goals  Time Frame for Long term goals : 3-4 weeks   Long term goal 1: Pt will don LB clothing with CGA and min vcs for adaptive technique to ipmrove indep with self care. Long term goal 2: pt will complete stand pivot transfer to [facility] with CGA to improve indep with toileting. Long term goal 3: Pt will demonstrate 3/5 R elbow flexion to improve indep with donning a shirt. Following session, patient left in safe position with all fall risk precautions in place.

## 2020-01-16 NOTE — PROGRESS NOTES
with PT demonstration  2 graded lift offs with maxA and cues for midline and LUE to hold RUE    Sit pivot transfers bed to w/c to left with maxA, cues for wt shift and LUE to hold RUE  Balance:  Static Sitting Balance: Moderate Assistance, progressed to Anne/CGA, pt initially pushing to right, cues for erect posture and midline  Static Standing Balance: Dependent, however when initiating reaching activity head level and OBOS to left pt would progress to modA however unable to maintain, RUE supported on table, tolerated 5 min first 2 sessions and around 2 min third session with pt inc fatigue    W/c mobility on level surface using left UE/ LLE to advance w/c with SBA, min cues for use of LLE to direct w/c, tolerated around 200'    ASSESSMENT:  Assessment: Patient progressing toward established goals. Activity Tolerance:  Patient tolerance of  treatment: good. Equipment Recommendations:Equipment Needed: Yes(continue to assess. Pt has q. cane)  Discharge Recommendations:  Continue to assess pending progress, Patient would benefit from continued therapy after discharge, 24 hour supervision or assist    Plan: Times per week: 5x/ wk 90 min, 1x/ wk 30 min  Current Treatment Recommendations: Strengthening, Transfer Training, Endurance Training, ROM, Balance Training, Wheelchair Mobility Training, Neuromuscular Re-education, Patient/Caregiver Education & Training, Equipment Evaluation, Education, & procurement, Gait Training, Home Exercise Program, Functional Mobility Training, Safety Education & Training    Patient Education  Patient Education: Transfers, midline orientation in sitting and standing, w/c mobility    Goals:  Patient goals : get walking  Short term goals  Time Frame for Short term goals: 1 wk  Short term goal 1: Pt to roll to either direction, SBA with cues for Rt hemibody position only, for position change in bed.   Short term goal 2: Pt to go supine <->sit, +1 mod assist at trunk up and with RLE going to supine, to get in/out of bed. Short term goal 3: Pt to perform sit/pivot transfer, supporting Rt hand with LT, +1 min assist to get in/out of w/c. Short term goal 4: Pt to get up/down from various seated surfaces, +1 mod assist to progress to pre gait standing with RW support. Short term goal 5: Pt to stand with RW, maintain midline trunk, with stepping forward back with ea LE, mod assist with RLE  Short term goal 6: Pt to propel w/c >= 150 ft, SBA, for indoor mobility  Long term goals  Time Frame for Long term goals : 4 wks  Long term goal 1: Pt to go supine <->sit, +1 stand by assist , to get in/out of bed. Long term goal 2: Pt to get up/down from various seated surfaces, +1 CGA to get up to walk. Long term goal 3: Pt to perform stand/pivot transfer with use of RW +1 min assist to get in/out of w/c. Long term goal 4: Pt to walk with RW >= 50 ft, CGA to progress to home mobility  Long term goal 5: Pt to get in/out of car, +1 min assist for transportation needs. Long term goal 6: Pt to propel w/c >= 150 ft, Mod I, for indoor mobility    Following session, patient left in safe position with all fall risk precautions in place.

## 2020-01-16 NOTE — PROGRESS NOTES
[facility] THERAPY  [address] [facility]  DAILY NOTE    TIME   SLP Individual Minutes  Time In: 6609  Time Out: 4543  Minutes: 35  Timed Code Treatment Minutes: 15 Minutes     Date: 2020  Patient Name: Demetrice Beckham      CSN: [id number]   : 1940  (78 y.o.)  Gender: male   Referring Physician:  Dr. Best David   Diagnosis: CVA  Secondary Diagnosis: Dysarthria, Dysphagia, Cognitive-Linguistic Deficits   Precautions: High Fall Risk, Aspiration precautions   Current Diet: Downgraded to pureed and mildly thick    Swallowing Strategies: Full Upright Position, Small Bite/Sip, No Straw, Multiple Swallow, Pulmonary Monitoring, Oral Care after all Meals, Supervision, Medication in Applesauce, Alternate Solids and Liquids, Limit Distractions and Monitor for Fatigue    Date of Last MBS: 19    Pain:  None reported     Subjective: Pt seen upright in wheelchair with breakfast meal present. Pt alert and fully engaged within session. No family present. Short-Term Goals:  SHORT TERM GOAL #1:  Goal 1: Pt will tolerate a pureed diet and mildly thick liquids (no straws) and advanced minced solids with ST only (hx of silent aspiration with thin) without overt s/s of aspiration to meet nutritional/hydration measures safely. INTERVENTIONS: Skilled dietary analysis completed via direct meal intervention for consumption of pureed waffle, pureed eggs, magic cup, yogurt and mildly (nectar) thick liquids. Pt able to complete 100% of self feeding this date. Demonstration of appropriate sized pureed bolus maintained throughout meal. Poor adherence to appropriate sized drinks with large consumption consistently noted with total pt disregard to skilled level cueing provided. Oral phase significantly improved with adequate alertness evidenced by demonstration of improved bolus formation and more timely AP transit. Intermittent trace oral stasis.  No observed buccal

## 2020-01-16 NOTE — PROGRESS NOTES
[facility] THERAPY  [address] [facility]  DAILY NOTE    TIME   SLP Individual Minutes  Time In: 1330  Time Out: 1400  Minutes: 30  Timed Code Treatment Minutes: 30 Minutes     Date: 2020  Patient Name: Chester Ramirez      CSN: [id number]   : 1940  (78 y.o.)  Gender: male   Referring Physician:  Dr. Garcia Zamudio   Diagnosis: CVA  Secondary Diagnosis: Dysarthria, Dysphagia, Cognitive-Linguistic Deficits   Precautions: High Fall Risk, Aspiration precautions   Current Diet: Downgraded to pureed and mildly thick    Swallowing Strategies: Full Upright Position, Small Bite/Sip, No Straw, Multiple Swallow, Pulmonary Monitoring, Oral Care after all Meals, Supervision, Medication in Applesauce, Alternate Solids and Liquids, Limit Distractions and Monitor for Fatigue    Date of Last MBS: 19    Pain:  None reported     Subjective: Pt seen upright in wheelchair. Fully alert, engaged and cooperative throughout session. NO family present. Good active wakefulness/participation this date. Short-Term Goals:  SHORT TERM GOAL #1:  Goal 1: Pt will tolerate a pureed diet and mildly thick liquids (no straws) and advanced minced solids with ST only (hx of silent aspiration with thin) without overt s/s of aspiration to meet nutritional/hydration measures safely. INTERVENTIONS: DNT d/t focus on other goals   AM session: Skilled dietary analysis completed via direct meal intervention for consumption of pureed waffle, pureed eggs, magic cup, yogurt and mildly (nectar) thick liquids. Pt able to complete 100% of self feeding this date. Demonstration of appropriate sized pureed bolus maintained throughout meal. Poor adherence to appropriate sized drinks with large consumption consistently noted with total pt disregard to skilled level cueing provided.  Oral phase significantly improved with adequate alertness evidenced by demonstration of improved bolus formation and more timely AP transit. Intermittent trace oral stasis. No observed buccal pocketing. Pt able to clear oral residuals when provided with extra time and liquid wash. No presence of oral holding this date. Pt consumption of ~75% of waffle, 60% of eggs, 50% of magic cup, x1 bite of yogurt and ~5+ ounces of mildly (nectar) thick liquids. Recommend continuation of pureed diet with nectar/mildly thick liquids with close pulmonary monitoring to ensue. Continue with recommendations for direct 1:1 assistance and for medications to be crushed in purees. SHORT TERM GOAL #2:  Goal 2: Pt will complete labial/lingual/pharyngeal strengthening, coordionation, ROM exercises, DDK rates, rote speech tasks with SOS strategies and tongue twisters x10 for improved timeliness of oral bolus formation/transit, maximized speech intelligibility and improved airway protection. INTERVENTIONS: recall of skilled speech strategies:   indep pt use of external aid for 3/3 indep     SHORT TERM GOAL #3:  Goal 3: Pt will complete immediate/delayed/working memory tasks with 70% accuracy, min cues for improved retention of newly learned medical information. INTERVENTIONS:Recall of ST name/department: 1/2 phonemic cues, 1/2 min cues   Recall of lunch items: 2/6 indep, 2/6 min cues, 1/6 FO2, 1/6 unable to elicit despite cues   *paraphasic errors x1 (I.e. cranberry sauce vs cranberry juice)      AM session: Recall of AM therapy session: indep  Recall of discipline: poor success despite FO2  Recall of therapy tasks: indep recall of going down to the gym and working on standing. Poor recall of balance work on table mat despite min cues provided.      Orientation:   Month- extra time   Date- FO2 despite max reference to calendar   Year- indep   JEFFREY- FO2 despite max reference to calendar   Location- 2/2 indep   Time- indep reference to clock     Maricruz Arias 1277 #4:  Goal 4: Pt will complete problem solving, executive functioning and attention tasks (i.e. time/money management, scheduling, complex auditory/reading comprehension, etc) with 70% accuracy, mod cues for improved ADL/IADL management and safety awareness/insight. INTERVENTIONS: Calculating bill/coin amounts (worksheet): 3/6 indep, 1/6 min cues, 1/6 mod cues, 1/6 max cues  *decreased working recall   *decreased organization     Locating targets on hospital directory: 2/10 indep, 1/10 extra time, 2/10 min cues, 1/10 min-mod cues, 3/10 mod cues, 4/05 unable to elicit despite cues      AM session: Mixed math problems: 5/12 indep, 3/12 extra time, 2/12 min cues, 1/12 min-mod cues, 1/12 mod cues     SHORT TERM GOAL #5:  Goal 5: Pt will complete complex naming, verbal sequencing/description and sentence formulation/repetition tasks with 70% accuracy, mod cues for improved expression of basic/complex thoughts. INTERVENTIONS: DNT d/t focus on additional/alternate STGs    Long-Term Goals:  Timeframe for Long-term Goals: 2 weeks     LONG TERM GOAL #1:  Goal 1: Pt will tolerate a minced/moist diet and thin liquids without overt s/s of aspiration to meet nutritional/hydration measures safely. ONGOING    LONG TERM GOAL #2:  Goal 2: Pt will improve [id number] skills to a supervision level of function for maximized expression of complex thoughts, independent completion of ADL/IADL responsibilities and appropriate transition to home setting with wife post discharge.   ONGOING      Comprehension: 3 - Patient understands basic needs 50-74% of the time  Expression: 4 - Expresses basic ideas/needs 75-90%+ of the time  Social Interaction: 4 - Patient appropriate 75-90%+ of the time  Problem Solving: 3 - Patient solves simple/routine tasks 50%-74%  Memory: 3 - Patient remembers 50%-74% of the time       EDUCATION:  Learner: Patient  Education:  Reviewed ST goals and Plan of Care  Evaluation of Education: Verbalizes understanding, Demonstrates with assistance, Needs further instruction and Family

## 2020-01-16 NOTE — PROGRESS NOTES
Physical Medicine & Rehabilitation   Progress Note    Chief Complaint: S/P [facility]. [facility]. A. with an acute infarct located in the Left hemipons. Subjective: Patient reported no current head, trunk, or limb pain. He slept well last evening. His inpatient rehab. therapies are going well. Patient was evaluated today while he was working with Speech. Patient was more awake and alert this afternoon. His appetite remains poor. He had no acute concerns. Rehabilitation:  PT:   Bed Mobility:  Rolling to Left: Contact Guard Assistance, Minimal Assistance   Rolling to Right: Contact Guard Assistance, Minimal Assistance   Sit to Supine: Moderate Assistance, Maximum Assistance. Transfers:  Sit to Stand: Moderate Assistance +1, min+1 up from mat and then up from w/c into FELISHA stedy. Pt requiring mod assist for trunk elevation and then to gain TKE RLE. Once up, pt tends to retract RT hip, lean to Rt. With mirror cues and verbal cues, improved midline noted, though not maintained. Stand to Sit:Moderate Assistance +1, min +1. Pt showing difficulty allowing LLE to flex at hip/knee and to release LUE from walker, tending to push. Sit Pivot: Moderate Assistance +1 to either direction. Pt supporting Rt hand with LT,  Assist for more forward translation of wt over base combined with more dynamic trunk. Pt tends to lean to Rt, but improved alignment noted with intervention. Transfer performed using FELISHA stedy at end of session: +2 mod assist up to stand and then 1 to guide and +1 mod assist to maintain trunk balance to position the device.       Balance: mirror use for visual feedback  Dynamic Sitting Balance: Minimal Assistance, X 1, with increased time for completion, Reaching outside SOPHIE to Lt and forward w/ Lt hand, verbal and manual cues for upright posture. Pt maintained midline with min assist for clothing management.   Static Standing Balance: Minimal Assistance, X 2, pt able to stand w/ min assist, Rt lean noted, verbal and manual cues to weight shift to midline, min assist at lower trunk for upright posture, min assist at lower anterior thigh for support TKE. Wheelchair Mobility:  Stand By Assistance. Extremities Used: Left Upper Extremity and Left Lower Extremity  Type of Chair:Manual  Surface: Level Tile  Distance: 200 ft  Quality: Veers Right, Decreased Fluidity and max cues and min assist needed initially for pt to recognize need to use LUE only minimally as veering to RT. Once cued to not use LUE at all, then improved control noted.         EXERCISES:  The following exercises were completed to improve functional mobility: graded lifts from elevate mat. 1st attempted with pt placing katey hands on stool in front. Too much pushing from LUE noted. Graded lift without use of stool showed improved midline. Max assist with all trials. O.T:  ADL's:  Feeding: Setup(completed with non dominant hand )  Grooming: with set-up, with verbal cues  and with increased time for completion. Patient seated at sink to complete oral hygiene with hyrogen proxide and water mixture,completed with LUE, vcs for arousal, followed commands for safe technique. UE Bathing: Moderate assistance(and min A for sitting balance )  LE Bathing: Dependent/Total(Mod A of 1 for balance and total A of another )  UE Dressing: Minimal Assistance. Min A with threading R UE  LE Dressing: Moderate Assistance. Mod A with threading BLE  Toileting: Maximal Assistance. Toilet Transfer: Minimal Assistance, Moderate Assistance and X 2. Mod A of 1 Min of another, W/c <> BSC  Shower Transfer: Minimal Assistance, Moderate Assistance and X 2.   Mod A of 1, min A of another, w/c <> shower bench.     Functional Mobility:  Bed mobility  Supine to Sit: Maximum assistance     Functional Mobility  Functional Mobility Comments: Not appropriate at this time  OTR to assess as appropriate      Balance:  Balance  Sitting Balance: (Fluctuated between CGA and moderate precision and reduced breath support. Speech intelligibility 60-70% within unknown context in conversation. Voice: Hoarse/breathy vocal quality with demonstration of thoracic breathing pattern and poor level of breath support. Frequent aphonic breaks     LANGUAGE:  Receptive:  2 Step Commands: 2/4 indep, 1/4 repetition  Complex Yes/No Questions: 4/4     Expressive:  Confrontational Naming: 3/3 MOCA  Divergent Naming (abstract): impaired-- see below   Sentence Formulation: impaired-- short utterance length with reduced syntax  Conversational Speech: impaired-- see above   Paraphasias: none noted   Repetition: sentence level 1/2    Recall of speech strategies: poor success  Reference to external aid (max cues-- careboard): able to read 25% of list at best   Reference to external aid (written paper): 100% accuracy indep  Utilization of strategies within rote speech tasks:   Counting 1-10: poor-fair success initially and quickly declined to poor success with fatigue   JEFFREY: poor-fair success initially and quickly declined to poor success with fatigue.     COGNITION:  Myles Cognitive Assessment (MOCA) version 7.1 completed. Pt with adjusted score 18/25. Unable to complete written portion d/t extremity weakness in dominant right hand. Normal is greater than or equal to 26/30. Orientation: 6/6  Immediate Recall: 2/5-- improved to 4/5 with repetition   Short-Term Recall: 4/5 indep, 1/5 FO2  Divergent Naming: x4 indep 1 minute (N=11)   Reasoning: verbal abstraction- 0/2  Sequencing: impaired  Thought Organization: at least mild impairment  Insight: fair   Attention: decreased high level selective attention   Math Computation: 3/5  Executive Functioning: unable to complete d/t extremity weakness     SWALLOWING:  Current Diet: Minced/moist diet and mildly thick liquids   Respiratory Status:   Independent    Skilled dysphagia therapy via direct meal intervention.  Pt with multiple trials of scrambled eggs, applesauce, reduced breath support and presence of frequent aphonic breaks    Sensation Not Tested     Cough Not Tested       PATIENT WAS EVALUATED USING:  Puree, hard solid, thin liquids by cup, mildly thick liquids by cup      ORAL PHASE:  Impaired:  Impaired AP Movement, Impaired Mastication and Reduced Bolus Formation     PHARYNGEAL PHASE:  Impaired:  Decreased Hyolaryngeal Elevation and Suspected Pharyngeal Residue     SIGNS AND SYMPTOMS OF LARYNGEAL PENETRATION / ASPIRATION:  No signs/symptoms of aspiration evident in this evaluation, but cannot rule out silent aspiration.     IMPRESSIONS: Pt presents with moderate oral and mild-moderate pharyngeal dysphagia evidenced by the above skilled level findings. Pt demonstration of slow, uncoordinated bolus breakdown, formation and AP transit resulting in decreased bolus formation of hard solid trials which was NOT cleared s/p use of liquid wash with mild-moderate oral stasis remaining. Required extra time to achieve adequate clearance. No overt pocketing present. Pt with decreased laryngeal elevation upon manual palpation and concerns for pharyngeal stasis given presence of spontaneous multiple swallows. No overt s/s of aspiration s/p trials of hard solid, puree, thin liquids and mildly thick liquids; HOWEVER, MBS completed on 12/26 with demonstration of SILENT aspiration of thin by cup. Given hx of silent aspiration, pt inappropriate for completion of advanced liquid trials. Recommend pharyngeal strengthening with plans for repeat MBS as early as 1/9 to further assess pharyngeal swallow function and determine appropriateness to progress liquid consumption. At this time pt to resume minced/moist diet and mildly thick liquids with restriction on use of straw. Will require intermittent supervision with occasional impulsivity noted with large bites/sips during PO intake this date.     RECOMMENDATIONS:              Modified Barium Swallow:   Not indicated     DIET RECOMMENDATIONS: 50-74%+ of the time  Expression: 3 - Expresses basic ideas/needs 50-74% of the time  Social Interaction: 4 - Patient appropriate 75-90%+ of the time  Problem Solving: 3 - Patient solves simple/routine tasks 50%-74%  Memory: 3 - Patient remembers 50%-74% of the time.       Review of Systems:  CONSTITUTIONAL:  negative  RESPIRATORY:  negative  CARDIOVASCULAR:  negative  GASTROINTESTINAL:  negative  GENITOURINARY:  positive for a Solis catheter in place. MUSCULOSKELETAL:  positive for  decreased range of motion and muscle weakness. NEUROLOGICAL:  positive for memory problems, speech problems, coordination problems, gait problems and weakness  BEHAVIOR/PSYCH:  negative  10 point system review otherwise negative    Objective:  /74   Pulse 65   Temp 97.4 °F (36.3 °C) (Oral)   Resp 18   Ht 6' (1.829 m)   Wt 151 lb 6.4 oz (68.7 kg)   SpO2 98%   BMI 20.53 kg/m²   He was noted to be awake, alert, and in no acute distress. Orientation: Unable to fully evaluate due to his limited cognition and expressive aphasia. Mood: within normal limits  Affect: calm  General appearance: Patient is well nourished, well developed, well groomed and in no acute distress, appearing stated age, thin. Memory:   abnormal   Attention/Concentration: abnormal   Language:  abnormal - with moderate to severe expressive aphasia and mild to moderate receptive aphasia. HEART:  S1 and S2 with a regular rate and rhythm without murmur, gallop  or rub. LUNGS:  Clear to auscultation bilaterally without rales, rhonchi or  wheezes. ABDOMEN:  Positive bowel sounds in all four quadrants. His abdomen was  noted to be soft, nontender, without masses. Cranial Nerves:  VII: Facial strength: abnormal with respect to a moderate Right-sided facial drooping. XI: Shoulder shrug: no functional Right shoulder shrug. ROM:  abnormal - with respect to the Right uppper and lower limbs.   Motor Exam: No acute changes noted with respect to the patient's limb strength. Tone:  Increased muscle tone noted at the Right hip and knee. Muscle bulk: within normal limits  Sensory:  Sensory intact  Coordination:   abnormal - with respect to his mobility. Deep Tendon Reflexes: No acute changes noted. Skin: warm and dry, no rash or erythema  Edema: None in either lower limb. Diagnostics:   Recent Results (from the past 24 hour(s))   POCT glucose    Collection Time: 01/15/20  5:17 PM   Result Value Ref Range    POC Glucose 173 (H) 70 - 108 mg/dl   POCT glucose    Collection Time: 01/15/20  9:39 PM   Result Value Ref Range    POC Glucose 153 (H) 70 - 108 mg/dl   POCT glucose    Collection Time: 01/16/20  7:22 AM   Result Value Ref Range    POC Glucose 131 (H) 70 - 108 mg/dl   POCT glucose    Collection Time: 01/16/20 12:13 PM   Result Value Ref Range    POC Glucose 194 (H) 70 - 108 mg/dl       Impression:  1. Status post cerebrovascular accident with an acute infarct being  located in the left romain-darin as noted on a brain MRI performed on  12/24/2019.  2.  Gait dysfunction. 3.  Deficits with respect to his activities of daily living. 4.  Significant speech and cognitive deficits. 5.  Current history of dysphagia. 6.  Current history of right upper and lower limb weakness. 7.  Current history of significant cardiac disease including an  estimated ejection fraction of 25%. The patient currently has a cardiac  LifeVest in place. 8.  History of severe major depression along with anxiety. 9.  History of a psychotic episode occurring in 2017, requiring a mental  hospitalization in [facility]. 10.  History of benign prostatic hypertrophy. 11.  History of anemia. 12.  History of gastroesophageal reflux disease. 13.  History of hypertension. 14.  History of bilateral inguinal surgical repair performed in 2018  15. Current history of anorexia. 16.  Current history of restless leg syndrome.     Plan:  · He is to continue with his current inpatient rehab. program.  · A Rehab. team conference was held on 01/14/20. His discharge goal is home with his wife on 01/24/20. · A BMP was drawn on 01/13/20. All of his labs remain stable. · His recent blood sugars have been stable with a range of 131 - 192. Continue to closely monitor and adjust his medications as needed. · Started the patient on Baclofen 10 mg to be taken 3 times daily. This is to treat his increased muscle tone involving the Right lower limb. · Discontinued the Marinol 2.5 mg to be taken twice daily. This medication did not appear to increase his appetite. · Increased his Requip to 0.5 mg to be taken once daily. This is being used to treat his restless leg syndrome. · Increased his Lisinopril to 10 mg to be taken once daily due to his blood pressure remaining elevated. Also increased his Toprol XL to 50 mg to be taken once daily starting on 01/17/20. · A U/A C&S performed on 01/13/20 did demonstrate a U. T.I. The culture and sensitivity demonstrated greater than 100,000 E coli. He will continue on Macrobid 100 mg to be taken twice daily for 7 days. · Started the patient on Ritalin 5 mg to be taken at 8:00 A. M. and at [place]. This was started to see if it would allow him to better participate with his inpatient rehab therapies. Since the Ritalin was started he has been more awake and able to concentrate during his therapy sessions. · O.T. is to provide the patient a Right functional wrist and hand splint. · A BMP and a CBC with diff. were drawn on 01/15/20. All of his labs remain stable. Greater than 50% of the 30 minutes was spent with the patient on counseling and with respect to coordinating his current inpatient rehab. care and also managing his medical issues.       Missed Therapy Time:  · None    Jet Garner MD

## 2020-01-16 NOTE — PLAN OF CARE
Problem: Falls - Risk of:  Goal: Will remain free from falls  Description  Will remain free from falls  Outcome: Ongoing  Note:   Up with assist of two with puma steady to transfer. Tolerates fair. No c/o voiced. Problem: Risk for Impaired Skin Integrity  Goal: Tissue integrity - skin and mucous membranes  Description  Structural intactness and normal physiological function of skin and  mucous membranes. Outcome: Ongoing  Note:   Skin warm and dry. Mucous membranes pink and moist.      Problem: HEMODYNAMIC STATUS  Goal: Patient has stable vital signs and fluid balance  Outcome: Ongoing     Problem: IP BLADDER/VOIDING  Goal: STG - Patient demonstrates ability to take care of indwelling catheter  Outcome: Ongoing  Note:   Solis catheter remains in place. Cath care done this shift.

## 2020-01-16 NOTE — PLAN OF CARE
Care plan reviewed with patient and  verbalize understanding of the plan of care and contribute to goal setting. Problem: HEMODYNAMIC STATUS  Goal: Patient has stable vital signs and fluid balance  Outcome: Met This Shift  Note:   stable     Problem: Risk for Impaired Skin Integrity  Goal: Tissue integrity - skin and mucous membranes  Description  Structural intactness and normal physiological function of skin and  mucous membranes. Outcome: Met This Shift  Note:   intact     Problem: Falls - Risk of:  Goal: Absence of physical injury  Description  Absence of physical injury  Outcome: Met This Shift  Note:   No injuries     Problem: Falls - Risk of:  Goal: Will remain free from falls  Description  Will remain free from falls  Outcome: Met This Shift  Note:   Not use call light. Alarms in place     Problem: IP BLADDER/VOIDING  Goal: STG - Patient demonstrates ability to take care of indwelling catheter  Outcome: Ongoing  Note:   Due to cognition ed not done with pt.      Problem: DISCHARGE BARRIERS  Goal: Patient's continuum of care needs are met  Outcome: Met This Shift  Note:   Needs met     Problem: Metabolic:  Goal: Ability to maintain clinical measurements within normal limits will improve  Description  Ability to maintain clinical measurements within normal limits will improve  Outcome: Met This Shift  Note:   Refer to labs     Problem: Nutritional:  Goal: Maintenance of adequate nutrition will improve  Description  Maintenance of adequate nutrition will improve  Outcome: Ongoing  Note:   Still poor and one on one with meals

## 2020-01-17 NOTE — PROGRESS NOTES
[facility]  [address] [facility]  Occupational Therapy  Daily Note    Time In: 1400  Time Out: 1430  Timed Code Treatment Minutes: 30 Minutes  Minutes: 30          Date: 2020  Patient Name: Juan Antonio Ballesteros,   Gender: male      Room: [facility]56/056-A  MRN: [id number]  : 1940  (78 y.o.)  Referring Practitioner: Dr. Tommy Beth   Diagnosis: CVA   Additional Pertinent Hx: Per ER note on 19:  78 y.o. male who presents to the Emergency Department via EMS for the evaluation of a CVA. The patient was found on the floor sitting up around 0630 this morning by his wife, who heard him fall. She reports a drooping mouth on the right side, slurred speech, and right sided weakness when she found him at 0630. The patient's last known well was 22:00 last night. The patient is unable to walk because his left leg is weak, and the patient had to be lifted into a chair after he was found on the floor. 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 Restrictions/Precautions:  Restrictions/Precautions: General Precautions, Fall Risk, Modified Diet  Position Activity Restriction  Other position/activity restrictions: pt pushes to right, R romain, Rt neglect, poor sensation Rt hemibody    SUBJECTIVE: Pt in w/c, family present and supportive, pt agreeable to OT. Eyes open more frequently this session compared to am session    PAIN: denies/10:     COGNITION: Slow Processing, Decreased Recall, Inattention, Decreased Problem Solving, Decreased Safety Awareness and Decreased Arousal pt was able to follow all simple commands this date with increased time and min instructions repeated    ADL:   No ADL's completed this session. Pollok Ruba BALANCE:  Sitting Balance:  Minimal Assistance. -CGA and close SBA at [facility] SERVICES this date. tolerated 20 minutes both static and dynamic sit,     BED MOBILITY:  Not Tested    TRANSFERS:  Squat Pivot: Moderate Assistance.  with vc for technique and increased time pivots to [facility]. Long term goals  Time Frame for Long term goals : 3-4 weeks   Long term goal 1: Pt will don LB clothing with CGA and min vcs for adaptive technique to ipmrove indep with self care. Long term goal 2: pt will complete stand pivot transfer to [facility] with CGA to improve indep with toileting. Long term goal 3: Pt will demonstrate 3/5 R elbow flexion to improve indep with donning a shirt. Following session, patient left in safe position with all fall risk precautions in place.

## 2020-01-17 NOTE — PLAN OF CARE
Problem: Nutrition  Goal: Optimal nutrition therapy  1/17/2020 1059 by Christie Lopez RD, LD  Outcome: Ongoing   Nutrition Problem: Severe malnutrition, In context of chronic illness  Intervention: Food and/or Nutrient Delivery: Continue current diet, Continue current ONS, Vitamin Supplement(Diet per SLP & MD. Continue Magic Cups TID. Continue Multivitamin w/minerals, Vitamin C and Vitamin B-12 daily as ordered)  Nutritional Goals: Patient will safely consume 75% or more of meals during LOS.

## 2020-01-17 NOTE — PLAN OF CARE
Problem: IP BLADDER/VOIDING  Goal: STG - Patient demonstrates ability to take care of indwelling catheter  Outcome: Ongoing  Note:   Patient continues with a danielle, and patient is unable to care for indwelling catheter, r/t hand splint, and arm is flaccid.

## 2020-01-17 NOTE — PLAN OF CARE
Problem: Falls - Risk of:  Goal: Will remain free from falls  Description  Will remain free from falls  Outcome: Ongoing  Goal: Absence of physical injury  Description  Absence of physical injury  Outcome: Ongoing  Note:   Patient verbalizes understanding of fall precautions, uses call light appropriately. Problem: Risk for Impaired Skin Integrity  Goal: Tissue integrity - skin and mucous membranes  Description  Structural intactness and normal physiological function of skin and  mucous membranes. Outcome: Ongoing  Note:   Skin remains clean dry and intact. Problem: HEMODYNAMIC STATUS  Goal: Patient has stable vital signs and fluid balance  Outcome: Ongoing  Note:   Vital signs and fluid balance remain stable. Problem: IP BLADDER/VOIDING  Goal: STG - Patient demonstrates ability to take care of indwelling catheter  1/17/2020 1053 by Wallace Og RN  Outcome: Ongoing  Note:   Family and patient and educated on catheter care. 1/17/2020 0241 by Maddison Valentine RN  Outcome: Ongoing  Note:   Patient continues with a danielle, and patient is unable to care for indwelling catheter, r/t hand splint, and arm is flaccid. Problem: DISCHARGE BARRIERS  Goal: Patient's continuum of care needs are met  Outcome: Ongoing  Note:   Working on goal of discharge to home. Problem: Nutrition  Goal: Optimal nutrition therapy  Outcome: Ongoing  Note:   Patient eats  % of most meals. Problem: Metabolic:  Goal: Ability to maintain clinical measurements within normal limits will improve  Description  Ability to maintain clinical measurements within normal limits will improve  Outcome: Ongoing  Note:   Blood sugar levels remain within normal limits.      Problem: Nutritional:  Goal: Maintenance of adequate nutrition will improve  Description  Maintenance of adequate nutrition will improve  Outcome: Ongoing  Goal: Ability to identify appropriate dietary choices will improve  Description  Ability to identify

## 2020-01-17 NOTE — PROGRESS NOTES
element commands: 1/5 min cues, 2/5 mod cues, 2/5 max cues     SHORT TERM GOAL #5:  Goal 5: Pt will complete complex naming, verbal sequencing/description and sentence formulation/repetition tasks with 70% accuracy, mod cues for improved expression of basic/complex thoughts. INTERVENTIONS: DNT d/t focus on other goals   AM session: Responsive namin/20 indep,  min cues,  mod cues,  FO2, / unable to elicit despite cues     Long-Term Goals:  Timeframe for Long-term Goals: 2 weeks     LONG TERM GOAL #1:  Goal 1: Pt will tolerate a minced/moist diet and thin liquids without overt s/s of aspiration to meet nutritional/hydration measures safely. ONGOING    LONG TERM GOAL #2:  Goal 2: Pt will improve vpyprxbpf-rhviqeooit-cqkutrdqkeukw skills to a supervision level of function for maximized expression of complex thoughts, independent completion of ADL/IADL responsibilities and appropriate transition to home setting with wife post discharge. ONGOING      Comprehension: 3 - Patient understands basic needs 50-74% of the time  Expression: 3 - Expresses basic ideas/needs 50-74% of the time  Social Interaction: 4 - Patient appropriate 75-90%+ of the time  Problem Solving: 3 - Patient solves simple/routine tasks 50%-74%  Memory: 3 - Patient remembers 50%-74% of the time       EDUCATION:  Learner: Patient  Education:  Reviewed ST goals and Plan of Care  Evaluation of Education: Verbalizes understanding, Demonstrates with assistance, Needs further instruction and Family not present    ASSESSMENT/PLAN:  Activity Tolerance:  Patient tolerance of  Treatment: fair-good. Appropriate participation/tolerance of ST established Goals/POC  Assessment/Plan: Patient progressing toward established goals. Continues to require skilled care of licensed speech pathologist to progress toward achievement of established goals and plan of care. .     Plan for Next Session: Recall, organization, medication management      Eunice Barksdale

## 2020-01-17 NOTE — PROGRESS NOTES
independent completion of ADL/IADL responsibilities and appropriate transition to home setting with wife post discharge. ONGOING      Comprehension: 3 - Patient understands basic needs 50-74% of the time  Expression: 4 - Expresses basic ideas/needs 75-90%+ of the time  Social Interaction: 4 - Patient appropriate 75-90%+ of the time  Problem Solving: 3 - Patient solves simple/routine tasks 50%-74%  Memory: 3 - Patient remembers 50%-74% of the time       EDUCATION:  Learner: Patient  Education:  Reviewed ST goals and Plan of Care  Evaluation of Education: Verbalizes understanding, Demonstrates with assistance, Needs further instruction and Family not present    ASSESSMENT/PLAN:  Activity Tolerance:  Patient tolerance of  Treatment: good. Appropriate participation/tolerance of ST established Goals/POC  Assessment/Plan: Patient progressing toward established goals. Continues to require skilled care of licensed speech pathologist to progress toward achievement of established goals and plan of care. .     Plan for Next Session: Recall, organization, medication management      NIXON Montelongo 1/17/2020

## 2020-01-17 NOTE — PROGRESS NOTES
[facility]  INPATIENT PHYSICAL THERAPY  DAILY NOTE  [address] [facility] - 7E-56/056-A    Time In: 0730  Time Out: 08  Timed Code Treatment Minutes: 60 Minutes  Minutes: 60          Date: 2020  Patient Name: Jeferson Parada,  Gender:  male        MRN: [id number]  : 1940  (78 y.o.)     Referring Practitioner: Dr. Akiko Kent  Diagnosis: acute CVA  Additional Pertinent Hx: Pt admitted [facility] ED s/p fall. MRI+ for infarct Lt hemipons. Cardiology deemed need for life vest (2019),  Hx:  depression , anxiety     Prior Level of Function:  Lives With: Spouse  Type of Home: House  Home Layout: One level, Work area in basement  Home Access: Stairs to enter with rails  Entrance Stairs - Number of Steps: 2 with grab bar(Rt)  Home Equipment: Quad cane(used intermittently)   Bathroom Shower/Tub: Walk-in shower  Bathroom Toilet: Handicap height  Bathroom Equipment: Built-in shower seat    ADL Assistance: Independent  Ambulation Assistance: Independent  Transfer Assistance: Independent  Active : No  Additional Comments: Pt reports he completed his own self care and mobility with intermittent use of a quad cane. Pt states wife completed housekeeping, meal prep and finances. Restrictions/Precautions:  Restrictions/Precautions: General Precautions, Fall Risk, Modified Diet  Position Activity Restriction  Other position/activity restrictions: pt pushes to right, R romain, Rt neglect, poor sensation Rt hemibody    SUBJECTIVE: Pt asleep in bed. Woke easily. REquired verbal cues, min to mod throughout session to keep eyes open. PAIN: 0/10:     OBJECTIVE:  Bed Mobility:  Rolling to Right: Maximum Assistance to position Rt hemibody. Then min assist at shoulder girdle to complete the roll, cues to reach with LUE across to opp edge of bed. Cues to look for edge of bed  Supine to Sit: Moderate Assistance to elevate trunk and max to realign RLE once up.   Cues throughout to clear BLEs and technique. Once up, mod assist with max cues to align trunk,  Then min to cGA to maintain midline    Transfers:  Sit to Stand: Maximum Assistance +2 from mat up to grocery cart. Assist for more forward translation of wt over base, stabilize and support RT hip/knee ext and then to gain midline. Pt supporting Rt hand with LT. Numerous trials. Stand to Sit:Maximum Assistance +2 with pt tending to push with LT hemibody. Max manual cues to release hip/knee ext on LT. Sit Pivot: Moderate Assistance +1 ea direction. 3 scoots to complete. Improved control noted once pt fully assisted into more forward trunk flexion. Pt supporting Rt hand with LT      Balance:  Static Sitting Balance:  see above. Once gained and with sit breaks b/w stands, then SBA with marked improved midline noted. Dynamic sitting:  Reaching with LUE forward and to LT out of base 3-4\", min assist to gain forward trunk flexion with more dynamic trunk. Assist to maintain Rt hand in wt bearing position throughout. Static Standing Balance: Maximum Assistance, X 2.  Trial with grocery cart, pt holding onto pole overhead with LUE and RT hand on . Tends to still push. Mirror for visual cues. MAx cues and assist to gain midline and then maintain. 3rd trial pt placed LUE around waist of 2nd person with marked improved alignment and much less push through LLE and LT foot. EXERCISES:  The following exercises were completed to improve functional mobility: supine- passive RT heel cord stretch, passive Rt scapular mobilization followed by manually assisted shoulder girdle protraction/retraction, elevation/depression, PNF D2 x7 reps. Bridges with assist to increase Rt LE activation. Manually assisted/resisted Rt hip ABD x10 reps. Min m. Activity noted. ASSESSMENT:  Assessment: Patient progressing toward established goals. Activity Tolerance:  Patient tolerance of  treatment: good.       Equipment Recommendations:Equipment Needed: Yes(continue to assess. Pt has q. cane)  Discharge Recommendations:  Continue to assess pending progress, Patient would benefit from continued therapy after discharge, 24 hour supervision or assist    Plan: Times per week: 5x/ wk 90 min, 1x/ wk 30 min  Current Treatment Recommendations: Strengthening, Transfer Training, Endurance Training, ROM, Balance Training, Wheelchair Mobility Training, Neuromuscular Re-education, Patient/Caregiver Education & Training, Equipment Evaluation, Education, & procurement, Gait Training, Home Exercise Program, Functional Mobility Training, Safety Education & Training    Patient Education  Patient Education: [employer], Transfers,  - Patient Verbalized Understanding, - Patient Requires Continued Education    Goals:  Patient goals : get walking  Short term goals  Time Frame for Short term goals: 1 wk  Short term goal 1: Pt to roll to either direction, SBA with cues for Rt hemibody position only, for position change in bed. Short term goal 2: Pt to go supine <->sit, +1 mod assist at trunk up and with RLE going to supine, to get in/out of bed. Short term goal 3: Pt to perform sit/pivot transfer, supporting Rt hand with LT, +1 min assist to get in/out of w/c. Short term goal 4: Pt to get up/down from various seated surfaces, +1 mod assist to progress to pre gait standing with RW support. Short term goal 5: Pt to stand with RW, maintain midline trunk, with stepping forward back with ea LE, mod assist with RLE  Short term goal 6: Pt to propel w/c >= 150 ft, SBA, for indoor mobility  Long term goals  Time Frame for Long term goals : 4 wks  Long term goal 1: Pt to go supine <->sit, +1 stand by assist , to get in/out of bed. Long term goal 2: Pt to get up/down from various seated surfaces, +1 CGA to get up to walk. Long term goal 3: Pt to perform stand/pivot transfer with use of RW +1 min assist to get in/out of w/c.    Long term goal 4: Pt to walk with RW >= 50 ft, CGA to progress to home mobility  Long term goal 5: Pt to get in/out of car, +1 min assist for transportation needs. Long term goal 6: Pt to propel w/c >= 150 ft, Mod I, for indoor mobility    Following session, patient left in safe position with all fall risk precautions in place.

## 2020-01-17 NOTE — PROGRESS NOTES
[address] [address]  INPATIENT PHYSICAL THERAPY  DAILY NOTE  [address] [facility] - 7E-56/056-A      Time In: 1430  Time Out: 1500  Timed Code Treatment Minutes: 30 Minutes  Minutes: 30          Date: 2020  Patient Name: Harinder Bailey,  Gender:  male        MRN: [id number]  : 1940  (78 y.o.)     Referring Practitioner: Dr. Garrett Pinedo  Diagnosis: acute CVA  Additional Pertinent Hx: Pt admitted [facility] ED s/p fall. MRI+ for infarct Lt hemipons. Cardiology deemed need for life vest (2019),  Hx:  depression , anxiety     Prior Level of Function:  Lives With: Spouse  Type of Home: House  Home Layout: One level, Work area in basement  Home Access: Stairs to enter with rails  Entrance Stairs - Number of Steps: 2 with grab bar(Rt)  Home Equipment: Quad cane(used intermittently)   Bathroom Shower/Tub: Walk-in shower  Bathroom Toilet: Handicap height  Bathroom Equipment: Built-in shower seat    ADL Assistance: Independent  Ambulation Assistance: Independent  Transfer Assistance: Independent  Active : No  Additional Comments: Pt reports he completed his own self care and mobility with intermittent use of a quad cane. Pt states wife completed housekeeping, meal prep and finances.      Restrictions/Precautions:  Restrictions/Precautions: General Precautions, Fall Risk, Modified Diet  Position Activity Restriction  Other position/activity restrictions: pt pushes to right, R romain, Rt neglect, poor sensation Rt hemibody    SUBJECTIVE: pt just finished with OT he did c/o of fatigue, pts family present and very supportive, pt did close his eyes frequently during session     PAIN: 0    OBJECTIVE:  Bed Mobility:  Rolling to Left: Moderate Assistance, pt needed assist to get right LE into hooklying position prior to rolling he supported his right UE and cues and assist to bring shoulders over to complete the roll    Supine to Sit: Maximum Assistance pt required assist to lower both LEs over the edge of bed and then assist at trunk, once sitting he needed mod assist to scoot hips to edge of bed    Transfers:  Sit to Stand: Moderate Assistance, X 2, from mat table   Stand to Sit:Moderate Assistance, to max assist x 2 pt supporting his right UE cues for midline and trunk alignment as he sits down   Sit Pivot: Moderate Assistance, from mat to w/c in 2 scoots going to the right       Balance:  Pt sitting on edge of bed completed upper trunk rotations with gentle stretch in prep for standing and he was able to hold midline with SBA and occ cues following stretches   standing from mat table placed right UE on tray table and left UE around staff waist at his left side with cues for wt shifting to the left also provided assist and facilitation at right hip and knee he needed CGA to mod assist of one and max assist of another for midline in standing  Pt did complete some graded squats in this position   Pt completed some graded lifts from mat table with katey UEs in wbing at left knee due to him pushing I did provide some input at right LE he completed x 5 reps and was able to fully clear buttock 3 times       Functional Outcome Measures: not Completed       ASSESSMENT:  Assessment: Patient progressing toward established goals. Activity Tolerance:  Patient tolerance of  treatment: fair. Equipment Recommendations:Equipment Needed: Yes(continue to assess.   Pt has q. cane)  Discharge Recommendations:    Continue to assess pending progress, Patient would benefit from continued therapy after discharge, 24 hour supervision or assist    Plan: Times per week: 5x/ wk 90 min, 1x/ wk 30 min  Current Treatment Recommendations: Strengthening, Transfer Training, Endurance Training, ROM, Balance Training, Wheelchair Mobility Training, Neuromuscular Re-education, Patient/Caregiver Education & Training, Equipment Evaluation, Education, & procurement, Gait Training, Home Exercise Program, Functional Mobility Training,

## 2020-01-17 NOTE — PROGRESS NOTES
Laying in bed, eyes closed. Appears to be asleep. Bed in lowest position, wheels locked, alarm on, side rails up times 2.

## 2020-01-17 NOTE — PROGRESS NOTES
activity patient varied from [address]- mod A to correct to return to midline with mirror placed in front of patient. Required verbal cues to keep eyes open. BED MOBILITY:  Supine to Sit: Minimal Assistance    Scooting: Moderate Assistance      TRANSFERS:  Squat Pivot: Moderate Assistance. from EOB to wheelchair <> wheelchair to edge of mat <> back to wheelchair, requires assist with BLE feet positioning and scooting hips towards edge of chair. ASSESSMENT:     Activity Tolerance:  Patient tolerance of  treatment: fair. Discharge Recommendations: 24 hour supervision or assist, Continue to assess pending progress  Equipment Recommendations: Other: PT may need a drop arm BSC and shower bench  Plan: Times per week: 5x/wk for 90 min and 1x/wk for 30 min   Current Treatment Recommendations: Balance Training, Self-Care / ADL, ROM, Strengthening, Functional Mobility Training, Endurance Training, Neuromuscular Re-education, Safety Education & Training, Patient/Caregiver Education & Training, Wheelchair Mobility Training, Home Management Training    Patient Education  Patient Education: Reviewed Prior Education    Goals  Short term goals  Time Frame for Short term goals: 1 week   Short term goal 1: Pt will complete sit to stand transfers with mod assist of 1 with min vcs or RUE support to improve indep with LB clothing mgt   Short term goal 2: Pt will complete 5-7 min EOB ADL task with SBA for sitting balance & min vcs for midline posture to improve balance needed for ADLs. Short term goal 3: Pt will don UB clothing with SBA and mod vcs for romain dressing technique to improve indep with self care. Short term goal 4: Pt will tolerate standing >2 minutes in midline with CGA to improve midline awareness for completion of stand pivots to [facility].    Long term goals  Time Frame for Long term goals : 3-4 weeks   Long term goal 1: Pt will don LB clothing with CGA and min vcs for adaptive technique to ipmrove indep with self

## 2020-01-17 NOTE — PROGRESS NOTES
Physical Medicine & Rehabilitation   Progress Note    Chief Complaint: S/P [facility] with an acute infarct located in the Left hemipons. Subjective: Patient reported no current head, trunk, or limb pain. He slept well last evening. His inpatient rehab. therapies are going well. Patient was evaluated today while he was resting in his bed. He and his wife had no acute concerns. Rehabilitation:  PT:   Bed Mobility:  Rolling to Left: Contact Guard Assistance, Minimal Assistance   Rolling to Right: Contact Guard Assistance, Minimal Assistance   Sit to Supine: Moderate Assistance, Maximum Assistance. Transfers:  Sit to Stand: Moderate Assistance +1, min+1 up from mat and then up from w/c into FELISHA stedy. Pt requiring mod assist for trunk elevation and then to gain TKE RLE. Once up, pt tends to retract RT hip, lean to Rt. With mirror cues and verbal cues, improved midline noted, though not maintained. Stand to Sit:Moderate Assistance +1, min +1. Pt showing difficulty allowing LLE to flex at hip/knee and to release LUE from walker, tending to push. Sit Pivot: Moderate Assistance +1 to either direction. Pt supporting Rt hand with LT,  Assist for more forward translation of wt over base combined with more dynamic trunk. Pt tends to lean to Rt, but improved alignment noted with intervention. Transfer performed using FELISHA stedy at end of session: +2 mod assist up to stand and then 1 to guide and +1 mod assist to maintain trunk balance to position the device.       Balance: mirror use for visual feedback  Dynamic Sitting Balance: Minimal Assistance, X 1, with increased time for completion, Reaching outside SOPHIE to Lt and forward w/ Lt hand, verbal and manual cues for upright posture. Pt maintained midline with min assist for clothing management.   Static Standing Balance: Minimal Assistance, X 2, pt able to stand w/ min assist, Rt lean noted, verbal and manual cues to weight shift to midline, min assist at lower trunk for upright posture, min assist at lower anterior thigh for support TKE. Wheelchair Mobility:  Stand By Assistance. Extremities Used: Left Upper Extremity and Left Lower Extremity  Type of Chair:Manual  Surface: Level Tile  Distance: 200 ft  Quality: Veers Right, Decreased Fluidity and max cues and min assist needed initially for pt to recognize need to use LUE only minimally as veering to RT. Once cued to not use LUE at all, then improved control noted.         EXERCISES:  The following exercises were completed to improve functional mobility: graded lifts from elevate mat. 1st attempted with pt placing katey hands on stool in front. Too much pushing from LUE noted. Graded lift without use of stool showed improved midline. Max assist with all trials. O.T:  ADL's:  Feeding: Setup(completed with non dominant hand )  Grooming: with set-up, with verbal cues  and with increased time for completion. Patient seated at sink to complete oral hygiene with hyrogen proxide and water mixture,completed with LUE, vcs for arousal, followed commands for safe technique. UE Bathing: Moderate assistance(and min A for sitting balance )  LE Bathing: Dependent/Total(Mod A of 1 for balance and total A of another )  UE Dressing: Minimal Assistance. Min A with threading R UE  LE Dressing: Moderate Assistance. Mod A with threading BLE  Toileting: Maximal Assistance. Toilet Transfer: Minimal Assistance, Moderate Assistance and X 2. Mod A of 1 Min of another, W/c <> BSC  Shower Transfer: Minimal Assistance, Moderate Assistance and X 2. Mod A of 1, min A of another, w/c <> shower bench.     Functional Mobility:  Bed mobility  Supine to Sit: Maximum assistance     Functional Mobility  Functional Mobility Comments: Not appropriate at this time  OTR to assess as appropriate      Balance:  Sitting Balance:  Supervision. Standing Balance  Time: 45 seconds,  30 seconds for LB clothing mgt.    Pt leaning right, constant cues to look in mirror to correct midline. Transfers:  Sit to Stand:  Minimal Assistance, Maximum Assistance, X 2. with use of puma matute (only to/from [facility]   Stand to Sit: Moderate Assistance, X 1.   Squat Pivot: Minimal Assistance, Moderate Assistance, X 2. w/c <> EOM, w/c > EOB. VCs for R UE attn. Pt was able to direct w/c position and leg rest / lap tray management, requiring cues for brake management and arm rest.    BED MOBILITY:  Supine to Sit: Moderate Assistance for bringing BLEs off side of mat and elevating trunk  Sit to Supine: Moderate Assistance for bringing BLEs onto mat  Scooting: Maximum Assistance advancing his forward        Upper Extremity Assessment:   RUE AROM : (Pt 2-/5 scap elevation. No shoulder AROM )     Sensation  Overall Sensation Status: (decreased proproception RUE, reports numbness )  RUE Tone: Hypotonic  LUE Tone: Normotonic  Coordination and Movement description: Fine motor impairments, Gross motor impairments, Right UE     Activity Tolerance: Patient limited by fatigue, Treatment limited secondary to decreased cognition. ADDITIONAL ACTIVITIES:  Increased time for toileting and bathing tasks with mod cues for attention to R romain body and pushing syndrome. Spouse educated on monitoring pushing syndrome when seated in the chair and to assist with adjusting it as needed. Speech:  SPEECH / VOICE:  Speech: Moderate dysarthria characterized by blended word boundaries and omission of word endings. DDK rates completed with poor success d/t poor level of articulatory precision and reduced breath support. Speech intelligibility 60-70% within unknown context in conversation. Voice: Hoarse/breathy vocal quality with demonstration of thoracic breathing pattern and poor level of breath support.  Frequent aphonic breaks     LANGUAGE:  Receptive:  2 Step Commands: 2/4 indep, 1/4 repetition  Complex Yes/No Questions: 4/4     Expressive:  Confrontational Naming: 3/3 MOCA  Divergent Naming (abstract): impaired-- see below   Sentence Formulation: impaired-- short utterance length with reduced syntax  Conversational Speech: impaired-- see above   Paraphasias: none noted   Repetition: sentence level 1/2    Recall of speech strategies: poor success  Reference to external aid (max cues-- careboard): able to read 25% of list at best   Reference to external aid (written paper): 100% accuracy indep  Utilization of strategies within rote speech tasks:   Counting 1-10: poor-fair success initially and quickly declined to poor success with fatigue   JEFFREY: poor-fair success initially and quickly declined to poor success with fatigue.     COGNITION:  Myles Cognitive Assessment (MOCA) version 7.1 completed. Pt with adjusted score 18/25. Unable to complete written portion d/t extremity weakness in dominant right hand. Normal is greater than or equal to 26/30. Orientation: 6/6  Immediate Recall: 2/5-- improved to 4/5 with repetition   Short-Term Recall: 4/5 indep, 1/5 FO2  Divergent Naming: x4 indep 1 minute (N=11)   Reasoning: verbal abstraction- 0/2  Sequencing: impaired  Thought Organization: at least mild impairment  Insight: fair   Attention: decreased high level selective attention   Math Computation: 3/5  Executive Functioning: unable to complete d/t extremity weakness     SWALLOWING:  Current Diet: Minced/moist diet and mildly thick liquids   Respiratory Status:   Independent    Skilled dysphagia therapy via direct meal intervention. Pt with multiple trials of scrambled eggs, applesauce, magic cup and mildly thick liquids by cup. Impulsivity with large bolus/liquid intake and fast rate of PO intake intermittently noted. Improved small bolus size and slowed rate of PO intake with skilled level education provided. Pt demonstration of slow/uncoordinated bolus breakdown, formation and transit resulting in mild-moderate oral stasis with heavy reliance on liquid/pureed wash to maximize oral clearance.  Pt many AND SYMPTOMS OF LARYNGEAL PENETRATION / ASPIRATION:  No signs/symptoms of aspiration evident in this evaluation, but cannot rule out silent aspiration.     IMPRESSIONS: Pt presents with moderate oral and mild-moderate pharyngeal dysphagia evidenced by the above skilled level findings. Pt demonstration of slow, uncoordinated bolus breakdown, formation and AP transit resulting in decreased bolus formation of hard solid trials which was NOT cleared s/p use of liquid wash with mild-moderate oral stasis remaining. Required extra time to achieve adequate clearance. No overt pocketing present. Pt with decreased laryngeal elevation upon manual palpation and concerns for pharyngeal stasis given presence of spontaneous multiple swallows. No overt s/s of aspiration s/p trials of hard solid, puree, thin liquids and mildly thick liquids; HOWEVER, MBS completed on 12/26 with demonstration of SILENT aspiration of thin by cup. Given hx of silent aspiration, pt inappropriate for completion of advanced liquid trials. Recommend pharyngeal strengthening with plans for repeat MBS as early as 1/9 to further assess pharyngeal swallow function and determine appropriateness to progress liquid consumption. At this time pt to resume minced/moist diet and mildly thick liquids with restriction on use of straw. Will require intermittent supervision with occasional impulsivity noted with large bites/sips during PO intake this date. RECOMMENDATIONS:              Modified Barium Swallow:   Not indicated     DIET RECOMMENDATIONS: Recommend continuation of pureed diet with nectar/mildly thick liquids with close pulmonary monitoring to ensue.  Continue with recommendations for direct 1:1 assistance and for medications to be crushed in purees.      STRATEGIES: Full Upright Position, Small Bite/Sip, No Straw, Multiple Swallow, Pulmonary Monitoring, Oral Care after all Meals, Supervision, Medication in Applesauce, Alternate Solids and Liquids, Limit Distractions and Monitor for Fatigue                 RECOMMENDATIONS/ASSESSMENT:  DIAGNOSTIC IMPRESSIONS:  Pt presents with at least mild cognitive deficits evidenced by derived/adjusted MOCA score of 18/25. This is improved from acute care stay in which pt scored a 11/25 on 12/27. Deficits found within the domains of immediate/delayed recall, verbal reasoning, thought organization, decreased high level attention and math computation. Moderate expressive language deficits evidenced by impaired lexical retrieval, mental flexibility, verbal sequencing/description, short utterance length with reduced syntax and impaired sentence repetition. Pt with mild receptive language deficits evidenced by increasing difficulty following complex verbal commands, need for extra time d/t slowed mental processing and heavy reliance on repetitions. Pt reports hx of hearing aids. Denies need for hearing aids at this time. Pt with moderate dysarthria characterized by blended word boundaries, imprecise articulatory precision and omission of word endings. Moderate dysphonia evidenced by poor level of breath support, presence of aphonic breaks and hoarse/breathy vocal quality. Pt would highly benefit from ongoing ST intervention in order to progress utnbsacru-[id number] functioning to a supervision level for safe, functional return to home setting and demonstration of independent ADL/IADL completion upon discharge.      Rehabilitation Potential: excellent     Comprehension: 3 - Patient understands basic needs 50-74%+ of the time  Expression: 3 - Expresses basic ideas/needs 50-74% of the time  Social Interaction: 4 - Patient appropriate 75-90%+ of the time  Problem Solving: 3 - Patient solves simple/routine tasks 50%-74%  Memory: 3 - Patient remembers 50%-74% of the time.       Review of Systems:  CONSTITUTIONAL:  negative  RESPIRATORY:  negative  CARDIOVASCULAR:  negative  GASTROINTESTINAL:  negative  GENITOURINARY:  positive 01/16/20 12:13 PM   Result Value Ref Range    POC Glucose 194 (H) 70 - 108 mg/dl   POCT glucose    Collection Time: 01/16/20  4:57 PM   Result Value Ref Range    POC Glucose 166 (H) 70 - 108 mg/dl   POCT glucose    Collection Time: 01/16/20  7:50 PM   Result Value Ref Range    POC Glucose 168 (H) 70 - 108 mg/dl   POCT glucose    Collection Time: 01/17/20  8:42 AM   Result Value Ref Range    POC Glucose 124 (H) 70 - 108 mg/dl       Impression:  1. Status post cerebrovascular accident with an acute infarct being  located in the left romain-darin as noted on a brain MRI performed on  12/24/2019.  2.  Gait dysfunction. 3.  Deficits with respect to his activities of daily living. 4.  Significant speech and cognitive deficits. 5.  Current history of dysphagia. 6.  Current history of right upper and lower limb weakness. 7.  Current history of significant cardiac disease including an  estimated ejection fraction of 25%. The patient currently has a cardiac  LifeVest in place. 8.  History of severe major depression along with anxiety. 9.  History of a psychotic episode occurring in 2017, requiring a mental  hospitalization in [facility]. 10.  History of benign prostatic hypertrophy. 11.  History of anemia. 12.  History of gastroesophageal reflux disease. 13.  History of hypertension. 14.  History of bilateral inguinal surgical repair performed in 2018  15. Current history of anorexia. 16.  Current history of restless leg syndrome. Plan:  · He is to continue with his current inpatient rehab. program.  · A Rehab. team conference was held on 01/14/20. His discharge goal is home with his wife on 01/24/20. · A BMP was drawn on 01/13/20. All of his labs remain stable. · His recent blood sugars have been stable with a range of 124 - 194. Continue to closely monitor and adjust his medications as needed. · Started the patient on Baclofen 10 mg to be taken 3 times daily.  This is to treat his increased muscle tone involving the Right lower limb. · Discontinued the Marinol 2.5 mg to be taken twice daily. This medication did not appear to increase his appetite. · Increased his Requip to 0.5 mg to be taken once daily. This is being used to treat his restless leg syndrome. · Increased his Lisinopril to 10 mg to be taken once daily due to his blood pressure remaining elevated. Also increased his Toprol XL to 50 mg to be taken once daily starting on 01/17/20. · A U/A C&S performed on 01/13/20 did demonstrate a U. T.I. The culture and sensitivity demonstrated greater than 100,000 E coli. He will continue on Macrobid 100 mg to be taken twice daily for 7 days. · Started the patient on Ritalin 5 mg to be taken at 8:00 A. M. and at [place]. This was started to see if it would allow him to better participate with his inpatient rehab therapies. Since the Ritalin was started he has been more awake and able to concentrate during his therapy sessions. · O.T. is to provide the patient a Right functional wrist and hand splint. · A BMP and a CBC with diff. were drawn on 01/15/20. All of his labs remain stable. Greater than 50% of the 30 minutes was spent with the patient on counseling and with respect to coordinating his current inpatient rehab. care and also managing his medical issues.       Missed Therapy Time:  · None    Everardo Christina MD

## 2020-01-18 NOTE — PROGRESS NOTES
falling posteriorly when lifting L foot from the floor. BED MOBILITY:  Rolling to Right: Maximum Assistance    Supine to Sit: Maximum Assistance    Scooting: Moderate Assistance      TRANSFERS:  Sit to Stand: Moderate Assistance. x 1 and Anne of a second person. supporitng R Knee. Stand to Sit: Moderate Assistance. x 1 min Ax 1  Squat Pivot: Moderate Assistance. x 1 to the w/c   ASSESSMENT:     Activity Tolerance:  Patient tolerance of  treatment: fair. Discharge Recommendations: 24 hour supervision or assist, Continue to assess pending progress  Equipment Recommendations: Other: PT may need a drop arm BSC and shower bench  Plan: Times per week: 5x/wk for 90 min and 1x/wk for 30 min   Current Treatment Recommendations: Balance Training, Self-Care / ADL, ROM, Strengthening, Functional Mobility Training, Endurance Training, Neuromuscular Re-education, Safety Education & Training, Patient/Caregiver Education & Training, Wheelchair Mobility Training, Home Management Training    Patient Education  Patient Education: ADL's alertness      Goals  Short term goals  Time Frame for Short term goals: 1 week   Short term goal 1: Pt will complete sit to stand transfers with mod assist of 1 with min vcs or RUE support to improve indep with LB clothing mgt   Short term goal 2: Pt will complete 5-7 min EOB ADL task with SBA for sitting balance & min vcs for midline posture to improve balance needed for ADLs. Short term goal 3: Pt will don UB clothing with SBA and mod vcs for romain dressing technique to improve indep with self care. Short term goal 4: Pt will tolerate standing >2 minutes in midline with CGA to improve midline awareness for completion of stand pivots to [facility]. Long term goals  Time Frame for Long term goals : 3-4 weeks   Long term goal 1: Pt will don LB clothing with CGA and min vcs for adaptive technique to ipmrove indep with self care.    Long term goal 2: pt will complete stand pivot transfer to [facility] with CGA to improve indep with toileting. Long term goal 3: Pt will demonstrate 3/5 R elbow flexion to improve indep with donning a shirt. Following session, patient left in safe position with all fall risk precautions in place.

## 2020-01-18 NOTE — PROGRESS NOTES
During OT noted right heel and and outer area on ankle to be slightly gray but intact and blanches. Skin prep applied and issued moon boot to protect heel when in bed. P t remains lethargic and not open eyes. Participated in OT but without initiation. Had to be fed lunch but ate well.

## 2020-01-18 NOTE — PLAN OF CARE
Care plan reviewed with patient and  verbalize understanding of the plan of care and contribute to goal setting. Problem: IP BLADDER/VOIDING  Goal: STG - Patient demonstrates ability to take care of indwelling catheter  Outcome: Ongoing  Note:   Ed with wife to be done     Problem: HEMODYNAMIC STATUS  Goal: Patient has stable vital signs and fluid balance  Outcome: Met This Shift  Note:   stable     Problem: Risk for Impaired Skin Integrity  Goal: Tissue integrity - skin and mucous membranes  Description  Structural intactness and normal physiological function of skin and  mucous membranes.   Outcome: Met This Shift  Note:   intact     Problem: Falls - Risk of:  Goal: Will remain free from falls  Description  Will remain free from falls  Outcome: Met This Shift  Note:   Alarms maint as does not use call light     Problem: Nutrition  Goal: Optimal nutrition therapy  Outcome: Met This Shift  Note:   adequate     Problem: Nutritional:  Goal: Maintenance of adequate nutrition will improve  Description  Maintenance of adequate nutrition will improve  Outcome: Met This Shift  Note:   adequate     Problem: Nutritional:  Goal: Ability to identify appropriate dietary choices will improve  Description  Ability to identify appropriate dietary choices will improve  Outcome: Ongoing  Note:   Wife order diet

## 2020-01-18 NOTE — PROGRESS NOTES
Pt .awakened at 1725 and needed max assist to w/c. Kept eyes closed an did not speek. Needed fed . Had rested in bed with eyes closed once therapy done at 1430.

## 2020-01-18 NOTE — DISCHARGE INSTR - COC
Continuity of Care Form    Patient Name: Astrid Castillo   :  1940  MRN:  [id number]    Admit date:  2020  Discharge date:  2020    Code Status Order: Full Code   Advance Directives: None on file  Advance Care Flowsheet Documentation     Date/Time Healthcare Directive Type of Healthcare Directive Copy in [address] Agent's Name Healthcare Agent's Phone Number    [phone]  No, patient does not have an advance directive for healthcare treatment -- -- -- -- --          Admitting Physician:  Anita Avery MD  PCP: Makenzie Tavarez MD    Discharging Nurse: CELINE [address] Unit/Room#: 7E-56/056-A  Discharging Unit Phone Number: [phone]    Emergency Contact:   Extended Emergency Contact Information  Primary Emergency Contact: Ariel Cox  Address: Sommer RAMIREZ, [address] [place] [address] Phone: [phone]  Relation: Spouse  Secondary Emergency Contact: Datmunirvickie Connolly [facility] [address] [facility] Phone: [phone]  Relation: Child    Past Surgical History:  Past Surgical History:   Procedure Laterality Date    ENDOSCOPY, COLON, DIAGNOSTIC      OTHER SURGICAL HISTORY  1970    right thumb surgery     AL REPAIR ING HERNIA,5+Y/O,REDUCIBL Right 2018    RIGHT INGUINAL HERNIA REPAIR WITH MESH performed by Bryant Perry MD at [address] HERNIA,5+Y/O,REDUCIBL Left 2018    LEFT HERNIA INGUINAL REPAIR WITH MESH performed by Bryant Perry MD at [address]  2017    EGD       Immunization History:   Immunization History   Administered Date(s) Administered    Influenza Vaccine, unspecified formulation 10/27/2014, 10/04/2015    Influenza Whole 2011    Influenza, High Dose (Fluzone 65 yrs and older) 10/18/2017, 2018    Influenza, Quadv, IM, (6 mo and older Fluzone, Flulaval, Fluarix and 3 yrs and older Afluria) 10/10/2016    Influenza, Triv, inactivated, subunit, adjuvanted, IM (Fluad 65 yrs and older) 11/06/2019    Pneumococcal Conjugate 13-valent ([id number]) 09/20/2016    Pneumococcal Polysaccharide ([id number]) 09/10/2010    Tdap (Boostrix, Adacel) 03/02/2010       Active Problems:  Patient Active Problem List   Diagnosis Code    Uncontrolled hypertension I10    Dysphagia R13.10    Acute urinary retention R33.8    Benign prostatic hyperplasia with lower urinary tract symptoms N40.1    Erosive esophagitis K22.10    Acute gastritis without hemorrhage K29.00    Duodenitis K29.80    Microscopic hematuria R31.29    Severe malnutrition (HCC) E43    Type 2 diabetes mellitus treated without insulin (HCC) E11.9    Anxiety disorder F41.9    Non-recurrent inguinal hernia of right side with obstruction and without gangrene K40.30    Lipoma of left shoulder D17.22    Inguinal hernia of left side without obstruction or gangrene K40.90    S/P right inguinal hernia repair Z98.890, Z87.19    S/P left inguinal hernia repair Z98.890, Z87.19    Left pontine stroke (HCC) I63.50    Right hemiparesis (HCC) G81.91    Dysarthria R47.1    Hypokalemia E87.6    Chronic depression F32.9    Abnormal EKG R94.31    Elevated troponin R79.89    Acute kidney injury (HCC) N17.9    Leukocytosis D72.829    Hypernatremia E87.0    Thrombocytopenia (HCC) D69.6    Loose stools R19.5    Moderate protein-calorie malnutrition (HCC) E44.0    Acute cerebrovascular accident (CVA) ([facility] [address] K94.3    Metabolic acidosis E27.9       Isolation/Infection:   Isolation          No Isolation        Patient Infection Status     None to display          Nurse Assessment:  Last Vital Signs: BP (!) 160/79   Pulse 56   Temp 97.8 °F (36.6 °C) (Oral)   Resp 20   Ht 6' (1.829 m)   Wt 152 lb 11.2 oz (69.3 kg)   SpO2 99%   BMI 20.71 kg/m²     Last documented pain score (0-10 scale): Pain Level: 0  Last Weight: 152.9lbs  Wt Readings from Last 1 Encounters: Medication crushed in applesauce. Magic cup with meals tid  Routes of Feeding:oral  Liquids: Nectar  Daily Fluid Restriction: None  Last Modified Barium Swallow with Video (Video Swallowing Test): 12/26/2019    Treatments at the Time of Hospital Discharge:   Respiratory Treatments: Incentive spirometry every 2 hours while awake. Oxygen Therapy: Initiate oxygen therapy if the patient has 1) SpO2 is less than 90%, 2) Cyanosis, Chest Pain, Dyspnea, or Altered level of consciousness AND SpO2 checked and it is less than 90%, or 3) patient on Home oxygen. Ventilator: none    Rehab Therapies:PT, OT, ST  Weight Bearing Status/Restrictions: Weight bearing as tolerated. 2 maximum assist to stand/pivot with transfers. Other Medical Equipment (for information only, NOT a DME order): Wheelchair with right half lap tray, walker  Other Treatments: Check blood sugar before meals and at bedtime. . .  Maintain Life vest and change battery daily - 2 times a day. (Battery does not last 24 hrs. Like suppose  To) Wear right hand splint at night, all night to prevent contractures. Wear moon heel protector boot at night to protect right heel. Use skin prep/ no sting  bid to right outer ankle/heel till heeled. Light therapy twice a day at breakfast and lunch. Patient's personal belongings (please select all that are sent with patient):      RN SIGNATURE:  CELINE Mendez RN    CASE MANAGEMENT/SOCIAL WORK SECTION    Inpatient Status Date: 12/24/2019 to 01/02/2020 Acute Hospitalization. 01/02/2020 to 01/24/2020 Acute Inpatient Rehabilitation Hospitalization (does not use skilled days).     Readmission Risk Assessment Score:  Readmission Risk              Risk of Unplanned Readmission:        11           Discharging to Facility/ Agency   · Name: The [address] [place] [address]   · Address: [address] [place] [address] Chato Teixeira LEOKRISTINA NAVARRO II[address]  · Phone: [phone]  · Fax: [phone]    / signature: Electronically signed by Josef Jeffrey Sommre York on 1/22/20 at 2:35 PM    PHYSICIAN SECTION    Prognosis: Good    Condition at Discharge: Stable    Rehab Potential (if transferring to Rehab): Excellent    Recommended Labs or Other Treatments After Discharge: Continue to closely monitor his blood sugar. Physician Certification: I certify the above information and transfer of Radha Solitario  is necessary for the continuing treatment of the diagnosis listed and that he requires [facility] for greater 30 days.      Update Admission H&P: No change in H&P    PHYSICIAN SIGNATURE:  Electronically signed by Román Biggs MD on 1/23/20 at 5:18 PM

## 2020-01-18 NOTE — PROGRESS NOTES
[facility]  INPATIENT PHYSICAL THERAPY  DAILY NOTE  [address] [facility] - 7E-56/056-A    Time In: 1400  Time Out: 1430  Timed Code Treatment Minutes: 30 Minutes  Minutes: 30          Date: 2020  Patient Name: Briana Larry,  Gender:  male        MRN: [id number]  : 1940  (78 y.o.)     Referring Practitioner: Dr. Kirti Gonzales  Diagnosis: acute CVA  Additional Pertinent Hx: Pt admitted [facility] ED s/p fall. MRI+ for infarct Lt hemipons. Cardiology deemed need for life vest (2019),  Hx:  depression , anxiety     Prior Level of Function:  Lives With: Spouse  Type of Home: House  Home Layout: One level, Work area in basement  Home Access: Stairs to enter with rails  Entrance Stairs - Number of Steps: 2 with grab bar(Rt)  Home Equipment: Quad cane(used intermittently)   Bathroom Shower/Tub: Walk-in shower  Bathroom Toilet: Handicap height  Bathroom Equipment: Built-in shower seat    ADL Assistance: Independent  Ambulation Assistance: Independent  Transfer Assistance: Independent  Active : No  Additional Comments: Pt reports he completed his own self care and mobility with intermittent use of a quad cane. Pt states wife completed housekeeping, meal prep and finances. Restrictions/Precautions:  Restrictions/Precautions: General Precautions, Fall Risk, Modified Diet  Position Activity Restriction  Other position/activity restrictions: pt pushes to right, R romain, Rt neglect, poor sensation Rt hemibody    SUBJECTIVE: Patient sitting in w/c upon arrival. Patient agreeable to therapy. PAIN: denies    OBJECTIVE:  Bed Mobility:  Sit to Supine: Maximum Assistance, X 2, bed flat     Transfers:  Sit Pivot: Moderate Assistance, X 1, with increased time for completion, cues for hand placement, with verbal cues, w/c>bed, improved right sided neglect    Wheelchair Mobility:  Contact Guard Assistance, Minimal Assistance, X 1  Extremities Used: Left Lower Extremity  Type of w/c.  Short term goal 4: Pt to get up/down from various seated surfaces, +1 mod assist to progress to pre gait standing with RW support. Short term goal 5: Pt to stand with RW, maintain midline trunk, with stepping forward back with ea LE, mod assist with RLE  Short term goal 6: Pt to propel w/c >= 150 ft, SBA, for indoor mobility  Long term goals  Time Frame for Long term goals : 4 wks  Long term goal 1: Pt to go supine <->sit, +1 stand by assist , to get in/out of bed. Long term goal 2: Pt to get up/down from various seated surfaces, +1 CGA to get up to walk. Long term goal 3: Pt to perform stand/pivot transfer with use of RW +1 min assist to get in/out of w/c. Long term goal 4: Pt to walk with RW >= 50 ft, CGA to progress to home mobility  Long term goal 5: Pt to get in/out of car, +1 min assist for transportation needs. Long term goal 6: Pt to propel w/c >= 150 ft, Mod I, for indoor mobility    Following session, patient left in safe position with all fall risk precautions in place.

## 2020-01-18 NOTE — PROGRESS NOTES
Pt .awakened at 0800 but would not open eyes more than a few seconds . Up to w/c for breakfast but poor interaction and would not feed self. Back in bed at 0945 with moderate assist. Pt. Would not assist in lifting legs in bed. Solis care done after breakfast with soap and water. Statlock maint. Life vest maint and battery checked at 1000 and not changed as had 3/4 battery life.

## 2020-01-20 NOTE — PROGRESS NOTES
organize of medications for full week, placing extra pills back into empty pill bottle, etc). Pt with poor functional carryover of strategies provided (I.e. closing of individual pill boxes s/p organization, recall of placing extra pills in empty pill bottles, etc). Able to comprehend pill instructions for 3/4 indep, 1/4 max/total assist. Organization of pills into correct slots: 1/4 indep, 1/4 min cues, 1/4 min-mod cues, 1/4 max/total assist   *pt with poor recall of already organized pills  *poor comprehension of complex instructions (I.e. x2 pills, twice daily)  *good insight into need to seek out pharmacy for insufficient number of pills     Prior session: Time word problems (complex): 1/3 min cues, 1/3 mod cues, 1/3 max cues   *poor complex time management, task analysis, math computation and reasoning     Answering questions/locating targets on medication prescription label: 3/8 indep, /8 min cues,  min-mod cues,  mod cues,  mod-max cues,  unable to elicit despite cues   *decreased attention for reading of full medication instructions   *decreased selective attention/locating of targets   *decreased reading/auditory comprehension   *poor reasoning     SHORT TERM GOAL #5:  Goal 5: Pt will complete complex naming, verbal sequencing/description and sentence formulation/repetition tasks with 70% accuracy, mod cues for improved expression of basic/complex thoughts. GOAL NOT MET- CONTINUE   INTERVENTIONS: DNT d/t focus on other goals   Prior session: Responsive namin/20 indep,  min cues,  mod cues,  FO2,  unable to elicit despite cues     Long-Term Goals:  Timeframe for Long-term Goals: 1 week     LONG TERM GOAL #1:  Goal 1: Pt will tolerate a minced/moist diet and thin liquids without overt s/s of aspiration to meet nutritional/hydration measures safely.   GOAL NOT MET- CONTINUE     LONG TERM GOAL #2:  Goal 2: Pt will improve axmbhpgce-stixkkicrg-[id number] skills to a

## 2020-01-20 NOTE — PROGRESS NOTES
verbal and manual cues for technique    Transfers:  Sit to Stand: Maximum Assistance, X 2, assist for Rt terminal knee extension, assist of another for upright posture and midline orientation, cues for Rt hand to be supported by Lt hand. Verbal and manual cues for upright posture, midline orientation, and anterior weight shift. Stand to Sit:Maximum Assistance, X 2 assist at Rt knee, assist of another for controlled descend and anterior weight shift. Verbal and manual cues for upright posture and anterior weight shift. Sit Pivot: Moderate Assistance bobath transfer to both directions, X 1  Assistance for foot placement prior to transfer, cues for Rt hand to be supported by Lt hand, verbal and manual cues for anterior weight shift and upright posture    Balance:  Pt sat at EOB on mat table w/ 4 inch platform under Lt foot to diminish pushing and completed dynamic reaching activity. Reaching forward and 2-3 inches to Lt side for rings and then placed them on ring stand at waist height 2-3 inches across midline. Pt lean to Rt side minimally, but able to correct with cueing. Pt also completed dynamic sitting activity of reaching w/ LLE to touch stationary points on ground. Pt required manual and verbal cues for upright posture, midline orientation, and at RLE for awareness and control. Completed X 1 for 10 reps fro each activity. Pt completed standing dynamic activities w/ mod assist X 2. Assistance required for RLE terminal knee extension and approximation/control of RUE. Assistance of another for midline orientation and upright posture. Pt lean to Rt side occasionally, but able to fix with cueing. Pt's LUE was placed around waist of PT to help facilitate midline orientation. Verbal and manual cues for upright posture and midline orientation. Pt completed X 1 7 reps of mini squats in standing. PT tapped pt's Rt thigh to initiate knee extension, pt unable to fully extend w/o min assistance.   Verbal and manual cues for straightening of legs and upright posture. Wheelchair Mobility:  Stand By Assistance  Extremities Used: Left Lower Extremity  Type of Chair:Manual  Surface: Level Tile  Distance: 150 ft  Quality: Slow Velocity, Short Strokes and Decreased Fluidity      Exercise:   Exercises were completed for increased independence with functional mobility. Pt completed passive Rt shoulder girdle protraction and upward rotation, passive Rt heel cord stretches 30 sec X 1, Bridging completed for clothing management and not for set amount. Functional Outcome Measures: Not completed       ASSESSMENT:  Assessment:   Pt has met 2 STG w/ sit <->supine mobility and w/c management. Pt shows improvements in Rt sided awareness, decreased assistance to maintain standing balance, more alertness during sessions, and wheelchair management. Pt demonstrates deficits in bed mobility, sit pivot transfers, standing transfers, strength, and proprioception in RLE. Pt would continue to benefit from skilled PT to address these deficits and improve balance, endurance, safety awareness, and functional mobility. Activity Tolerance:  Patient tolerance of  treatment: good. Equipment Recommendations:Equipment Needed: Yes(continue to assess.   Pt has q. cane)  Discharge Recommendations:  Discharge Recommendations: Continue to assess pending progress, Patient would benefit from continued therapy after discharge, 24 hour supervision or assist    Plan: Times per week: 5x/ wk 90 min, 1x/ wk 30 min  Current Treatment Recommendations: Strengthening, Transfer Training, Endurance Training, ROM, Balance Training, Wheelchair Mobility Training, Neuromuscular Re-education, Patient/Caregiver Education & Training, Equipment Evaluation, Education, & procurement, Gait Training, Home Exercise Program, Functional Mobility Training, Safety Education & Training    Patient Education  Patient Education: Plan of Care, [employer], Transfers, Wheelchair Mobility, Verbal Exercise Instruction,  - Patient Verbalized Understanding, - Patient Requires Continued Education    Goals:  Patient goals : get walking  Short term goals  Time Frame for Short term goals: 1 wk  Short term goal 1: Pt to roll to either direction, SBA with cues for Rt hemibody position only, for position change in bed. NOT MET, continue  Short term goal 2: Pt to go supine <->sit, +1 mod assist at trunk up and with RLE going to supine, to get in/out of bed. Met, see LTG  Short term goal 3: Pt to perform sit/pivot transfer, supporting Rt hand with LT, +1 min assist to get in/out of w/c. NOT MET, continue  Short term goal 4: Pt to get up/down from various seated surfaces, +1 mod assist to progress to pre gait standing with RW support. NOT MET, Continue  Short term goal 5: Pt to stand with RW, maintain midline trunk, with stepping forward back with ea LE, mod assist with RLE. NOT MET, continue  Short term goal 6: Pt to propel w/c >= 150 ft, SBA, for indoor mobility MET, See LTG  Long term goals  Time Frame for Long term goals : 4 wks  Long term goal 1: Pt to go supine <->sit, +1 stand by assist , to get in/out of bed. Long term goal 2: Pt to get up/down from various seated surfaces, +1 CGA to get up to walk. NOT MET, continue  Long term goal 3: Pt to perform stand/pivot transfer with use of RW +1 min assist to get in/out of w/c. Long term goal 4: Pt to walk with RW >= 50 ft, CGA to progress to home mobility  Long term goal 5: Pt to get in/out of car, +1 min assist for transportation needs. Long term goal 6: Pt to propel w/c >= 150 ft, Mod I, for indoor mobility NOT MET, continue     Revised Long-Term Goals  Long term goals  Time Frame for Long term goals : 4 wks  Long term goal 1: Pt to go supine <->sit, +1 min assist , to get in/out of bed. Long term goal 2: Pt to get up/down from various seated surfaces, +1 min assist to get up to walk.    Long term goal 3: Pt to perform stand/pivot transfer with use of RW +1 min assist to get in/out of w/c. Long term goal 4: Pt to walk with RW >= 50 ft, CGA to progress to home mobility  Long term goal 5: Pt to get in/out of car, +1 min assist for transportation needs. Long term goal 6: Pt to propel w/c >= 150 ft, SBA, around obstacles, for indoor mobility.

## 2020-01-20 NOTE — PROGRESS NOTES
[address] [facility]  [address] [facility],[address]  DAILY NOTE    TIME   SLP Individual Minutes  Time In: 0615  Time Out: 1439  Minutes: 30  Timed Code Treatment Minutes: 0 Minutes     Date: 2020  Patient Name: Kandace Davalos      CSN: [id number]   : 1940  (78 y.o.)  Gender: male   Referring Physician:  Dr. Neda Taylor   Diagnosis: CVA  Secondary Diagnosis: Dysarthria, Dysphagia, Cognitive-Linguistic Deficits   Precautions: High Fall Risk, Aspiration precautions   Current Diet: Downgraded to pureed and mildly thick    Swallowing Strategies: Full Upright Position, Small Bite/Sip, No Straw, Multiple Swallow, Pulmonary Monitoring, Oral Care after all Meals, Supervision, Medication in Applesauce, Alternate Solids and Liquids, Limit Distractions and Monitor for Fatigue    Date of Last MBS: 19    Pain:  None reported     Subjective: Pt upright in wheelchair. No family present. Alert and cooperative. Mildly increased frustrations with targeting of dysarthria/linguistic targets this date. Short-Term Goals:  SHORT TERM GOAL #1:  Goal 1: Pt will tolerate a pureed diet and mildly thick liquids (no straws) and advanced minced solids with ST only (hx of silent aspiration with thin) without overt s/s of aspiration to meet nutritional/hydration measures safely. INTERVENTIONS: ST received feedback from Clerts!d re: pt swallowing performance. Reports pt able to complete self feeding for full duration of meal with good success and no overt s/s of aspiration. Will complete advanced PO trials subsequent treatment sessions. SHORT TERM GOAL #2:  Goal 2: Pt will complete labial/lingual/pharyngeal strengthening, coordionation, ROM exercises, DDK rates, rote speech tasks with SOS strategies and tongue twisters x10 for improved timeliness of oral bolus formation/transit, maximized speech intelligibility and improved airway protection.     INTERVENTIONS: Recall of SOS strategies: 2/3 indep, 1/3 mod cues     Reading functional sentences with focus on SOS strategies and frequent pausing to maximize breath support: 2/11 indep, 3/11 min cues, 3/11 mod-max cues, 9/64 unable to elicit   **pt with fair successful utilization of strategies in x2 trials. Additional cueing NOT provided d/t increased pt frustrations with task     SHORT TERM GOAL #3:  Goal 3: Pt will complete immediate/delayed/working memory tasks with 80% accuracy, min cues for improved retention of newly learned medical information. INTERVENTIONS: DNT d/t focus on other goals   AM session: Orientation:   indep recall of month and year  Able to state Holiday with reference to external aid   indep locating of date and JEFFREY with use of external aid   **pt required min cues to recall and locate external aid this date     SHORT TERM GOAL #4:  Goal 4: Pt will complete problem solving, executive functioning and attention tasks (i.e. time/money management, scheduling, complex auditory/reading comprehension, etc) with 70% accuracy, mod cues for improved ADL/IADL management and safety awareness/insight. INTERVENTIONS: DNT d/t focus on other goals   AM session: Medication management task within binary pill box: Pt requiring set up assistance/cues for full duration of task (I.e. determination of \"which\" pill slots to open, organize of medications for full week, placing extra pills back into empty pill bottle, etc). Pt with poor functional carryover of strategies provided (I.e. closing of individual pill boxes s/p organization, recall of placing extra pills in empty pill bottles, etc).  Able to comprehend pill instructions for 3/4 indep, 1/4 max/total assist. Organization of pills into correct slots: 1/4 indep, 1/4 min cues, 1/4 min-mod cues, 1/4 max/total assist   *pt with poor recall of already organized pills  *poor comprehension of complex instructions (I.e. x2 pills, twice daily)  *good insight into need to seek Continues to require skilled care of licensed speech pathologist to progress toward achievement of established goals and plan of care. .     Plan for Next Session: advanced minced/moist solids      Susan Martinez M.S. Monika Clark 1/20/2020

## 2020-01-20 NOTE — PROGRESS NOTES
[address] [address]  Recreational Therapy  Daily Note  [address]    Time Spent with Patient: 60 minutes    Date:  1/20/2020       Patient Name: Juan David Current      MRN: [id number]      YOB: 1940 (78 y.o.)       Gender: male  Diagnosis: CVA   Referring Practitioner: Dr. Sachi Couch     RESTRICTIONS/PRECAUTIONS:  Restrictions/Precautions: General Precautions, Fall Risk, Modified Diet  Vision: Impaired  Hearing: Within functional limits    PAIN: 0-no c/o pain     SUBJECTIVE:  OK     OBJECTIVE:  Pt's family brought him down to the dining room via w/c where he and family picked out saying, painted the board and peeled off saying together -affect did brighten during activity-they took him back to his room via w/c       Patient Education  New Education Provided: Importance of Leisure,     Electronically signed by: KEMI CarboneS  Date: 1/20/2020

## 2020-01-20 NOTE — PROGRESS NOTES
[address]  Occupational Therapy  Daily Note  Time:   Time In: 1400  Time Out: 1435  Timed Code Treatment Minutes: 35 Minutes  Minutes: 35          Date: 2020  Patient Name: Cipriano Multani,   Gender: male      Room: -56/056-A  MRN: [id number]  : 1940  (78 y.o.)  Referring Practitioner: Dr. Dayana Grajeda   Diagnosis: CVA   Additional Pertinent Hx: Per ER note on 19:  78 y.o. male who presents to the Emergency Department via EMS for the evaluation of a CVA. The patient was found on the floor sitting up around 0630 this morning by his wife, who heard him fall. She reports a drooping mouth on the right side, slurred speech, and right sided weakness when she found him at 0630. The patient's last known well was 22:00 last night. The patient is unable to walk because his left leg is weak, and the patient had to be lifted into a chair after he was found on the floor. 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 Restrictions/Precautions:  Restrictions/Precautions: General Precautions, Fall Risk, Modified Diet  Position Activity Restriction  Other position/activity restrictions: pt pushes to right, R romain, Rt neglect, poor sensation Rt hemibody    SUBJECTIVE: Pt seated in w/c with wife present upon arrival, alert and agreeable to OT session. Wife requesting pt stay up in w/c as he is to participate in recreational therapy activity following OT session. PAIN: no c/o. COGNITION: Slow Processing and Decreased Insight    ADL:   No ADL's completed this session. Pt declined. BALANCE:  Sitting Balance:  Stand By Assistance, [employer]. Seated unsupported in w/c for dynamic sitting balance activity. FUNCTIONAL MOBILITY:  Assistive Device: Wheelchair  Assist Level:  Stand By Assistance and Minimal Assistance. Distance:  To and from therapy gym  Completed functional mobility to/from therapy gym using [place] at fair pace. Pt requires Oscar at R side to negotiate turns cues for safety. ADDITIONAL ACTIVITIES:  Pt participated in dynamic sitting activity to challenge balance and facilitate forward trunk flexion, crossing midline, and RUE WB. Pt seated unsupported in w/c reaching with LUE towards R side for rings and tossed onto target. Pt required mod tactile cues to position RUE in [address] position throughout and min cues for thoracic extension. Completed to increase safety with dynamic sitting required for EOB grooming tasks. Completed B scapular retractions x10 reps x1 sets to maintain joint intergrity required for ADLs. Pt required rest break between each exercise and demos good tolerance. ASSESSMENT:     Activity Tolerance:  Patient tolerance of  treatment: good. Discharge Recommendations: 24 hour supervision or assist, Continue to assess pending progress  Equipment Recommendations: Other: PT may need a drop arm BSC and shower bench  Plan: Times per week: 5x/wk for 90 min and 1x/wk for 30 min   Current Treatment Recommendations: Balance Training, Self-Care / ADL, ROM, Strengthening, Functional Mobility Training, Endurance Training, Neuromuscular Re-education, Safety Education & Training, Patient/Caregiver Education & Training, Wheelchair Mobility Training, Home Management Training    Patient Education  Patient Education: Importance of Increasing Activity, Assistive Device Safety and dynamic sitting balance, RUE WB'ing. Goals  Short term goals  Time Frame for Short term goals: 1 week   Short term goal 1: Pt will complete sit to stand transfers with mod assist of 1 with min vcs or RUE support to improve indep with LB clothing mgt   Short term goal 2: Pt will complete 5-7 min EOB ADL task with SBA for sitting balance & min vcs for midline posture to improve balance needed for ADLs. Short term goal 3: Pt will don UB clothing with SBA and mod vcs for romain dressing technique to improve indep with self care.

## 2020-01-20 NOTE — PROGRESS NOTES
[address]  Occupational Therapy  Daily Note  Time:   Time In: 1000  Time Out: 1100  Timed Code Treatment Minutes: 60 Minutes  Minutes: 60    Date: 2020  Patient Name: Efren Kerns,   Gender: male      Room: -56/056-A  MRN: [id number]  : 1940  (78 y.o.)  Referring Practitioner: Dr. Chapis Hernadez   Diagnosis: CVA   Additional Pertinent Hx: Per ER note on 19:  78 y.o. male who presents to the Emergency Department via EMS for the evaluation of a CVA. The patient was found on the floor sitting up around 0630 this morning by his wife, who heard him fall. She reports a drooping mouth on the right side, slurred speech, and right sided weakness when she found him at 0630. The patient's last known well was 22:00 last night. The patient is unable to walk because his left leg is weak, and the patient had to be lifted into a chair after he was found on the floor. 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 Restrictions/Precautions:  Restrictions/Precautions: General Precautions, Fall Risk, Modified Diet  Position Activity Restriction  Other position/activity restrictions: pt pushes to right, R romain, Rt neglect, poor sensation Rt hemibody    SUBJECTIVE: Patient in chair upon arrival agreeable to OT treatment, patient more alert this date     PAIN: denies/10:     COGNITION: Slow Processing, Decreased Insight and Decreased Safety Awareness    ADL:   Grooming: with set-up. completing oral care, increased time to complete, patient following commands appropriately   Bathing: Dependent. x2 this date, mod A of 1 for clean up after incotinenece of bowel, pushing to the right, verbal and tactile cues to correct   Upper Extremity Dressing: Minimal Assistance. RUE romain dressing technique   Lower Extremity Dressing: Maximum Assistance.   arley/doff pants   Toileting: Dependent and X 2.  incontinent upon standing increased time for clean up term goal 3: Pt will demonstrate 3/5 R elbow flexion to improve indep with donning a shirt. Following session, patient left in safe position with all fall risk precautions in place.

## 2020-01-20 NOTE — PLAN OF CARE
Problem: Falls - Risk of:  Goal: Will remain free from falls  Description  Will remain free from falls  1/20/2020 0214 by Carmen Hernandez RN  Outcome: Ongoing  1/20/2020 0211 by Carmen Hernandez RN  Outcome: Ongoing  1/20/2020 0208 by Carmen Hernandez RN  Outcome: Ongoing     Problem: Risk for Impaired Skin Integrity  Goal: Tissue integrity - skin and mucous membranes  Description  Structural intactness and normal physiological function of skin and  mucous membranes.   1/20/2020 0214 by Carmen Hernandez RN  Outcome: Ongoing  1/20/2020 0211 by Carmen Hernandez RN  Outcome: Ongoing  1/20/2020 0208 by Carmen Hernandez RN  Outcome: Ongoing     Problem: HEMODYNAMIC STATUS  Goal: Patient has stable vital signs and fluid balance  1/20/2020 0214 by Carmen Hernandez RN  Outcome: Ongoing  1/20/2020 0211 by Carmen Hernandez RN  Outcome: Ongoing  1/20/2020 0208 by Carmen Hernandez RN  Outcome: Ongoing     Problem: Nutrition  Goal: Optimal nutrition therapy  Outcome: Ongoing     Problem: Metabolic:  Goal: Ability to maintain clinical measurements within normal limits will improve  Description  Ability to maintain clinical measurements within normal limits will improve  Outcome: Ongoing     Problem: Nutritional:  Goal: Maintenance of adequate nutrition will improve  Description  Maintenance of adequate nutrition will improve  Outcome: Ongoing  Goal: Ability to identify appropriate dietary choices will improve  Description  Ability to identify appropriate dietary choices will improve  Outcome: Ongoing

## 2020-01-20 NOTE — PLAN OF CARE
Problem: Falls - Risk of:  Goal: Will remain free from falls  Description  Will remain free from falls  1/20/2020 1016 by Gus Mitchell RN  Outcome: Ongoing  1/20/2020 0214 by Siva Gallardo RN  Outcome: Ongoing  1/20/2020 0211 by Siva Gallardo RN  Outcome: Ongoing  1/20/2020 0208 by Siva Gallardo RN  Outcome: Ongoing  Goal: Absence of physical injury  Description  Absence of physical injury  1/20/2020 1016 by Gus Mitchell RN  Outcome: Ongoing  1/20/2020 0214 by Siva Gallardo RN  Outcome: Ongoing  1/20/2020 0211 by Siva Gallardo RN  Outcome: Ongoing  1/20/2020 0208 by Siva Gallardo RN  Outcome: Ongoing     Problem: Falls - Risk of:  Goal: Absence of physical injury  Description  Absence of physical injury  1/20/2020 1016 by Gus Mitchell RN  Outcome: Ongoing  1/20/2020 0214 by Siva Gallardo RN  Outcome: Ongoing  1/20/2020 0211 by Siva Gallardo RN  Outcome: Ongoing  1/20/2020 0208 by Siva Gallardo RN  Outcome: Ongoing     Patient will participate in fall and physical injury prevention during this admission as evidenced by no falls during this admission patient and staff will utilize safety devices and techniques for transfer     Problem: Risk for Impaired Skin Integrity  Goal: Tissue integrity - skin and mucous membranes  Description  Structural intactness and normal physiological function of skin and  mucous membranes. 1/20/2020 1016 by Gus Mitchell RN  Outcome: Ongoing  1/20/2020 0214 by Siva Gallardo RN  Outcome: Ongoing  1/20/2020 0211 by Siva Gallardo RN  Outcome: Ongoing  1/20/2020 0208 by Siva Gallardo RN  Outcome: Ongoing     Patient is participating in skin integrity promotion during this admission as patient offloading weight while in bed and in chair. Patient and family will alert staff to any concerns he has related to break in intact skin.       Problem: HEMODYNAMIC STATUS  Goal: Patient has stable vital signs and fluid balance  1/20/2020 1016 by Daniel Hernandez

## 2020-01-20 NOTE — PROGRESS NOTES
Pharyngeal Residue     SIGNS AND SYMPTOMS OF LARYNGEAL PENETRATION / ASPIRATION:  No signs/symptoms of aspiration evident in this evaluation, but cannot rule out silent aspiration.     IMPRESSIONS: Pt presents with moderate oral and mild-moderate pharyngeal dysphagia evidenced by the above skilled level findings. Pt demonstration of slow, uncoordinated bolus breakdown, formation and AP transit resulting in decreased bolus formation of hard solid trials which was NOT cleared s/p use of liquid wash with mild-moderate oral stasis remaining. Required extra time to achieve adequate clearance. No overt pocketing present. Pt with decreased laryngeal elevation upon manual palpation and concerns for pharyngeal stasis given presence of spontaneous multiple swallows. No overt s/s of aspiration s/p trials of hard solid, puree, thin liquids and mildly thick liquids; HOWEVER, MBS completed on 12/26 with demonstration of SILENT aspiration of thin by cup. Given hx of silent aspiration, pt inappropriate for completion of advanced liquid trials. Recommend pharyngeal strengthening with plans for repeat MBS as early as 1/9 to further assess pharyngeal swallow function and determine appropriateness to progress liquid consumption. At this time pt to resume minced/moist diet and mildly thick liquids with restriction on use of straw. Will require intermittent supervision with occasional impulsivity noted with large bites/sips during PO intake this date. RECOMMENDATIONS:              Modified Barium Swallow:   Not indicated     DIET RECOMMENDATIONS: Recommend continuation of pureed diet with nectar/mildly thick liquids with close pulmonary monitoring to ensue.  Continue with recommendations for direct 1:1 assistance and for medications to be crushed in purees.      STRATEGIES: Full Upright Position, Small Bite/Sip, No Straw, Multiple Swallow, Pulmonary Monitoring, Oral Care after all Meals, Supervision, Medication in Applesauce, Alternate negative  GENITOURINARY:  positive for a Solis catheter in place. MUSCULOSKELETAL:  positive for  decreased range of motion and muscle weakness. NEUROLOGICAL:  positive for memory problems, speech problems, coordination problems, gait problems and weakness  BEHAVIOR/PSYCH:  negative  10 point system review otherwise negative    Objective:  /72   Pulse 69   Temp 98.1 °F (36.7 °C) (Oral)   Resp 16   Ht 6' (1.829 m)   Wt 157 lb 12.8 oz (71.6 kg)   SpO2 99%   BMI 21.40 kg/m²   He was noted to be awake, alert, and in no acute distress. Orientation: Unable to fully evaluate due to his limited cognition and expressive aphasia. Mood: within normal limits  Affect: calm  General appearance: Patient is well nourished, well developed, well groomed and in no acute distress, appearing stated age, thin. Memory:   abnormal   Attention/Concentration: abnormal   Language:  abnormal - with moderate to severe expressive aphasia and mild to moderate receptive aphasia. HEART:  S1 and S2 with a regular rate and rhythm without murmur, gallop  or rub. LUNGS:  Clear to auscultation bilaterally without rales, rhonchi or  wheezes. ABDOMEN:  Positive bowel sounds in all four quadrants. His abdomen was  noted to be soft, nontender, without masses. Cranial Nerves:  VII: Facial strength: abnormal with respect to a moderate Right-sided facial drooping. XI: Shoulder shrug: no functional Right shoulder shrug. ROM:  abnormal - with respect to the Right uppper and lower limbs. Motor Exam: No acute changes noted with respect to the patient's limb strength. Tone:  Increased muscle tone noted at the Right hip and knee. Muscle bulk: within normal limits  Sensory:  Sensory intact  Coordination:   abnormal - with respect to his mobility. Deep Tendon Reflexes: No acute changes noted. Skin: warm and dry, no rash or erythema  Edema: None in either lower limb.       Diagnostics:   Recent Results (from the past 24 hour(s))   POCT Impression:  1. Status post cerebrovascular accident with an acute infarct being  located in the left romain-darin as noted on a brain MRI performed on  12/24/2019.  2.  Gait dysfunction. 3.  Deficits with respect to his activities of daily living. 4.  Significant speech and cognitive deficits. 5.  Current history of dysphagia. 6.  Current history of right upper and lower limb weakness. 7.  Current history of significant cardiac disease including an  estimated ejection fraction of 25%. The patient currently has a cardiac  LifeVest in place. 8.  History of severe major depression along with anxiety. 9.  History of a psychotic episode occurring in 2017, requiring a mental  hospitalization in [facility]. 10.  History of benign prostatic hypertrophy. 11.  History of anemia. 12.  History of gastroesophageal reflux disease. 13.  History of hypertension. 14.  History of bilateral inguinal surgical repair performed in 2018  15. Current history of anorexia. 16.  Current history of restless leg syndrome. Plan:  · He is to continue with his current inpatient rehab. program.  · A Rehab. team conference was held on 01/14/20. His discharge goal is home with his wife on 01/24/20. · His recent blood sugars have been stable with a range of 124 - 200. Continue to closely monitor and adjust his medications as needed. · Started the patient on Baclofen 10 mg to be taken 3 times daily. This is to treat his increased muscle tone involving the Right lower limb. · Discontinued the Marinol 2.5 mg to be taken twice daily. This medication did not appear to increase his appetite. · Increased his Requip to 0.5 mg to be taken once daily. This is being used to treat his restless leg syndrome. · Increased his Lisinopril to 10 mg to be taken once daily due to his blood pressure remaining elevated. Also increased his Toprol XL to 50 mg to be taken once daily starting on 01/17/20.   · A U/A C&S performed on 01/13/20 did demonstrate a U. T.I. The culture and sensitivity demonstrated greater than 100,000 E coli. He will continue on Macrobid 100 mg to be taken twice daily for 7 days. · Started the patient on Ritalin 5 mg to be taken at 8:00 A. M. and at [place]. This was started to see if it would allow him to better participate with his inpatient rehab therapies. Since the Ritalin was started he has been more awake and able to concentrate during his therapy sessions. · O.T. is to provide the patient a Right functional wrist and hand splint. · A BMP and a CBC with diff. were drawn on 01/20/20. All of his labs remain stable. Greater than 50% of the 30 minutes was spent with the patient and his wife on counseling and with respect to coordinating his current inpatient rehab. care and also managing his medical issues.       Missed Therapy Time:  · None    Aba Gilbert MD

## 2020-01-20 NOTE — PROGRESS NOTES
[facility] NOTE    Conference Date: 2020  Admit Date:  2020 11:54 AM  Patient Name: Belkis Abad    MRN: [id number]    : 1940  (78 y.o.)  Rehabilitation Admitting Diagnosis:  CVA (cerebral vascular accident) ([facility] [address] [I63.9]  Acute cerebrovascular accident (CVA) ([facility] [address] [I63.9]  Referring Practitioner: Dr. Armida Johnson issues/needs regarding patient and family discharge status: Patient continues to participate with PT/OT/ST. Concerns regarding patient's current level of function and ability for spouse, Christopher Mead, to provide twenty four hour care at discharge. Continued discussion at team conference regarding progress from last week to this week with medications changes. Spouse, Christopher Mead, continues to be present during therapy sessions. SW to follow and maintain involvement in discharge planning. PHYSICAL THERAPY   Pt has met 2 STG w/ sit <->supine mobility and w/c management. Pt shows improvements in Rt sided awareness, decreased assistance to maintain standing balance, more alertness during sessions, and wheelchair management. Pt demonstrates deficits in bed mobility, sit pivot transfers, standing transfers, strength, and proprioception in RLE. Pt would continue to benefit from skilled PT to address these deficits and improve balance, endurance, safety awareness, and functional mobility. Equipment Needed: Yes(continue to assess. Pt has q. cane)    SPEECH THERAPY  SUMMARY: Pt has met above 2/5 STG's and 0/2 LTG's this therapy period. Pt improvements within overall level of alertness, engagement and participation within therapeutic sessions compared to last week; however, continues to struggle with carryover of skills, insight into deficits and transfer of skills into functional settings resulting in limited goal achievement this week.  Pt continues to present with at least moderate cognitive-linguistic deficits evidenced by continued difficulty with functional carryover (I.e. orientation, skilled strategies, recall of previously completed therapies), retention of newly learned information and need for continued cues for reference to external aids, poor functional problem solving, reasoning, executive functioning (I.e. decreased time management, organization of medications, etc), higher level attention and initiation. Difficulty with complex naming, sentence formation and verbal description, understanding of complex/multi step auditory commands, decreased reading comprehension and continued reliance on short utterance length (1-2 words) with needed cues to maximize expression of basic thoughts. Pt continues to present with at least moderate dysarthria characterized by imprecise articulatory precision, blended word boundaries and omission of final sounds/consonants d/t reduced breath support. Pt is tolerating a pureed diet and mildly thick liquids with good success. Will complete advanced trials of minced/moist solids to further maximize bolus formation/clearance and facilitate pt ability to progress towards least restrictive PO diet. Pt would highly benefit from ongoing ST intervention in order to progress towards least restrictive PO diet and promote [id number] skills to a supervision level of assist for safe, functional return to home setting upon discharge. [id number] BKelli Eldridge, \"Dc\" has made steady progress on IP Rehab this week. He has progressed to a mod A of 1  with functional sit pivot transfers. He is initiating better with supporting R UE with L UE during transfers. He requires mod A for UB ADLs and max A for LB ADLs. However, Her continues to retuire a second person with standing balance for LE dressing, toileting tasks. He is limited by  pusher's snydrome, but has shown great progress with this with use of extrinisc feedback.  He presents with 2-/5 in scap elevation and retraction, trace noted for biceps and triceps. He is also limited by mild R inattention, balance for both sitting and standing. He is more alert for need for toileting tasks. He requires skilled OT intervention to progress with ADL remediation with a focus on romain techniques, IADL remediation, progressive and intensive neuro re-ed to improve midline orientation and R UE function. Other: PT may need a drop arm BSC and shower bench    RECREATIONAL THERAPY  Patient has been offered participation in recreational therapy activities and participates as able. Pt enjoyed petting our pet therapy dog Stefania this am-affect brightened-also asked pt about joining our painting group next week in the dining room and he said he would think about it -Family paid money for pt to get involved in the painting activity next Monday with peers-she appreciates staff's encouragement each day -  Pt's family brought him down to the dining room via w/c where he and family picked out saying, painted the board and peeled off saying together -affect did brighten during activity-they took him back to his room via w/c    NUTRITION  Weight: 157 lb 12.8 oz (71.6 kg) / Body mass index is 21.4 kg/m². Current diet: Dietary Nutrition Supplements: Frozen Oral Supplement  DIET DYSPHAGIA PUREED; Mildly Thick (Nectar); No Drinking Straw  Please see nutrition note for details. NURSING  Continent of Bowel and Bladder: No  Pain is Managed:  Yes  Signs and Symptoms of Infection:  No  Signs and Symptoms of Skin Breakdown:  No  Injury and Fall Free during Inpatient Rehabilitation Admission: Yes    Consultations/Labs/X-rays: Nephrology. U/A C&S is pending. Stroke Premorbid Conditions:   Diabetes:   Recent Labs     01/19/20 1952 01/20/20  0832 01/20/20  1228   POCGLU 187* 174* 190*     Hyperlipidemia:   Lab Results   Component Value Date    LDLCALC 76 12/26/2019    LDLDIRECT 111 (H) 11/04/2019     Patient is on statin?  Yes    Hypertension:   Vitals:    01/19/20 0845

## 2020-01-20 NOTE — PROGRESS NOTES
assist to progress to pre gait standing with RW support. Short term goal 5: Pt to stand with RW, maintain midline trunk, with stepping forward back with ea LE, mod assist with RLE  Short term goal 6: Pt to propel w/c >= 150 ft, SBA, for indoor mobility. MET, See LTG  Long term goals  Time Frame for Long term goals : 4 wks  Long term goal 1: Pt to go supine <->sit, +1 stand by assist , to get in/out of bed. Long term goal 2: Pt to get up/down from various seated surfaces, +1 min assist to get up to walk. Long term goal 3: Pt to perform stand/pivot transfer with use of RW +1 min assist to get in/out of w/c. Long term goal 4: Pt to walk with RW >= 50 ft, CGA to progress to home mobility  Long term goal 5: Pt to get in/out of car, +1 min assist for transportation needs. Long term goal 6: Pt to propel w/c >= 150 ft, SBA, around obstacles, for indoor mobility. Following session, patient left in safe position with all fall risk precautions in place.

## 2020-01-20 NOTE — PROGRESS NOTES
Physical Medicine & Rehabilitation   Progress Note    Chief Complaint: S/P [facility] with an acute infarct located in the Left hemipons. Subjective: Patient reported no current head, trunk, or limb pain. He slept well last evening. His inpatient rehab. therapies are going well. Patient was evaluated today while he was resting in his bed. He had no acute concerns. Rehabilitation:  PT:   Bed Mobility:  Rolling to Left: Contact Guard Assistance, Minimal Assistance   Rolling to Right: Contact Guard Assistance, Minimal Assistance   Sit to Supine: Moderate Assistance, Maximum Assistance. Transfers:  Sit to Stand: Moderate Assistance +1, min+1 up from mat and then up from w/c into FELISHA stedy. Pt requiring mod assist for trunk elevation and then to gain TKE RLE. Once up, pt tends to retract RT hip, lean to Rt. With mirror cues and verbal cues, improved midline noted, though not maintained. Stand to Sit:Moderate Assistance +1, min +1. Pt showing difficulty allowing LLE to flex at hip/knee and to release LUE from walker, tending to push. Sit Pivot: Moderate Assistance +1 to either direction. Pt supporting Rt hand with LT,  Assist for more forward translation of wt over base combined with more dynamic trunk. Pt tends to lean to Rt, but improved alignment noted with intervention. Transfer performed using FELISHA stedy at end of session: +2 mod assist up to stand and then 1 to guide and +1 mod assist to maintain trunk balance to position the device.       Balance: mirror use for visual feedback  Dynamic Sitting Balance: Minimal Assistance, X 1, with increased time for completion, Reaching outside SOPHIE to Lt and forward w/ Lt hand, verbal and manual cues for upright posture. Pt maintained midline with min assist for clothing management.   Static Standing Balance: Minimal Assistance, X 2, pt able to stand w/ min assist, Rt lean noted, verbal and manual cues to weight shift to midline, min assist at lower trunk SYMPTOMS OF LARYNGEAL PENETRATION / ASPIRATION:  No signs/symptoms of aspiration evident in this evaluation, but cannot rule out silent aspiration.     IMPRESSIONS: Pt presents with moderate oral and mild-moderate pharyngeal dysphagia evidenced by the above skilled level findings. Pt demonstration of slow, uncoordinated bolus breakdown, formation and AP transit resulting in decreased bolus formation of hard solid trials which was NOT cleared s/p use of liquid wash with mild-moderate oral stasis remaining. Required extra time to achieve adequate clearance. No overt pocketing present. Pt with decreased laryngeal elevation upon manual palpation and concerns for pharyngeal stasis given presence of spontaneous multiple swallows. No overt s/s of aspiration s/p trials of hard solid, puree, thin liquids and mildly thick liquids; HOWEVER, MBS completed on 12/26 with demonstration of SILENT aspiration of thin by cup. Given hx of silent aspiration, pt inappropriate for completion of advanced liquid trials. Recommend pharyngeal strengthening with plans for repeat MBS as early as 1/9 to further assess pharyngeal swallow function and determine appropriateness to progress liquid consumption. At this time pt to resume minced/moist diet and mildly thick liquids with restriction on use of straw. Will require intermittent supervision with occasional impulsivity noted with large bites/sips during PO intake this date. RECOMMENDATIONS:              Modified Barium Swallow:   Not indicated     DIET RECOMMENDATIONS: Recommend continuation of pureed diet with nectar/mildly thick liquids with close pulmonary monitoring to ensue.  Continue with recommendations for direct 1:1 assistance and for medications to be crushed in purees.      STRATEGIES: Full Upright Position, Small Bite/Sip, No Straw, Multiple Swallow, Pulmonary Monitoring, Oral Care after all Meals, Supervision, Medication in Applesauce, Alternate Solids and Liquids, Limit 01/18/20  9:24 PM   Result Value Ref Range    POC Glucose 153 (H) 70 - 108 mg/dl   POCT glucose    Collection Time: 01/19/20  9:28 AM   Result Value Ref Range    POC Glucose 149 (H) 70 - 108 mg/dl   POCT glucose    Collection Time: 01/19/20 12:44 PM   Result Value Ref Range    POC Glucose 124 (H) 70 - 108 mg/dl   POCT glucose    Collection Time: 01/19/20  4:45 PM   Result Value Ref Range    POC Glucose 200 (H) 70 - 108 mg/dl       Impression:  1. Status post cerebrovascular accident with an acute infarct being  located in the left romain-darin as noted on a brain MRI performed on  12/24/2019.  2.  Gait dysfunction. 3.  Deficits with respect to his activities of daily living. 4.  Significant speech and cognitive deficits. 5.  Current history of dysphagia. 6.  Current history of right upper and lower limb weakness. 7.  Current history of significant cardiac disease including an  estimated ejection fraction of 25%. The patient currently has a cardiac  LifeVest in place. 8.  History of severe major depression along with anxiety. 9.  History of a psychotic episode occurring in 2017, requiring a mental  hospitalization in [facility]. 10.  History of benign prostatic hypertrophy. 11.  History of anemia. 12.  History of gastroesophageal reflux disease. 13.  History of hypertension. 14.  History of bilateral inguinal surgical repair performed in 2018  15. Current history of anorexia. 16.  Current history of restless leg syndrome. Plan:  · He is to continue with his current inpatient rehab. program.  · A Rehab. team conference was held on 01/14/20. His discharge goal is home with his wife on 01/24/20. · His recent blood sugars have been stable with a range of 124 - 210. Continue to closely monitor and adjust his medications as needed. · Started the patient on Baclofen 10 mg to be taken 3 times daily. This is to treat his increased muscle tone involving the Right lower limb.   · Discontinued the Marinol 2.5

## 2020-01-21 NOTE — PROGRESS NOTES
right UE awareness     Goals:  Patient goals : get walking  Short term goals  Time Frame for Short term goals: 1 wk  Short term goal 1: Pt to roll to either direction, SBA with cues for Rt hemibody position only, for position change in bed. Short term goal 2: Pt to go supine <->sit, +1 mod assist at trunk up and with RLE going to supine, to get in/out of bed. MET, See LTG  Short term goal 3: Pt to perform sit/pivot transfer, supporting Rt hand with LT, +1 min assist to get in/out of w/c. Short term goal 4: Pt to get up/down from various seated surfaces, +1 mod assist to progress to pre gait standing with RW support. Short term goal 5: Pt to stand with RW, maintain midline trunk, with stepping forward back with ea LE, mod assist with RLE  Short term goal 6: Pt to propel w/c >= 150 ft, SBA, for indoor mobility. MET, See LTG  Long term goals  Time Frame for Long term goals : 4 wks  Long term goal 1: Pt to go supine <->sit, +1 stand by assist , to get in/out of bed. Long term goal 2: Pt to get up/down from various seated surfaces, +1 min assist to get up to walk. Long term goal 3: Pt to perform stand/pivot transfer with use of RW +1 min assist to get in/out of w/c. Long term goal 4: Pt to walk with RW >= 50 ft, CGA to progress to home mobility  Long term goal 5: Pt to get in/out of car, +1 min assist for transportation needs. Long term goal 6: Pt to propel w/c >= 150 ft, SBA, around obstacles, for indoor mobility. Following session, patient left in safe position with all fall risk precautions in place.

## 2020-01-21 NOTE — PROGRESS NOTES
[address] [address]  INPATIENT PHYSICAL THERAPY  DAILY NOTE  [facility] - [id number]    Time In: 0730  Time Out: 0830  Timed Code Treatment Minutes: 60 Minutes  Minutes: 60          Date: 2020  Patient Name: Astrid Castillo,  Gender:  male        MRN: [id number]  : 1940  (78 y.o.)     Referring Practitioner: Dr. Shakir Roberts  Diagnosis: acute CVA  Additional Pertinent Hx: Pt admitted [facility] ER ED s/p fall. MRI+ for infarct Lt hemipons. Cardiology deemed need for life vest (2019),  Hx:  depression , anxiety     Prior Level of Function:  Lives With: Spouse  Type of Home: House  Home Layout: One level, Work area in basement  Home Access: Stairs to enter with rails  Entrance Stairs - Number of Steps: 2 with grab bar(Rt)  Home Equipment: Quad cane(used intermittently)   Bathroom Shower/Tub: Walk-in shower  Bathroom Toilet: Handicap height  Bathroom Equipment: Built-in shower seat    ADL Assistance: Independent  Ambulation Assistance: Independent  Transfer Assistance: Independent  Active : No  Additional Comments: Pt reports he completed his own self care and mobility with intermittent use of a quad cane. Pt states wife completed housekeeping, meal prep and finances. Restrictions/Precautions:  Restrictions/Precautions: General Precautions, Fall Risk, Modified Diet  Position Activity Restriction  Other position/activity restrictions: pt pushes to right, R romain, Rt neglect, poor sensation Rt hemibody    SUBJECTIVE: Pt was asleep in bed upon arrival into room. Pt was pleasant, cooperative, and showed alertness throughout session w/ eyes open ~80% of session.     PAIN: Not reported by pt    OBJECTIVE:  Bed Mobility:  Rolling to Right: Minimal Assistance, X 1, assist for upper trunk rotation, mod assist for Rt foot placement, cues for reaching LUE across midline to initiate roll, with increased time for completion   Supine to Sit: Moderate Assistance, X 1, assist to to get up/down from various seated surfaces, +1 mod assist to progress to pre gait standing with RW support. Short term goal 5: Pt to stand with RW, maintain midline trunk, with stepping forward back with ea LE, mod assist with RLE  Short term goal 6: Pt to propel w/c >= 150 ft, SBA, for indoor mobility. MET, See LTG  Long term goals  Time Frame for Long term goals : 4 wks  Long term goal 1: Pt to go supine <->sit, +1 stand by assist , to get in/out of bed. Long term goal 2: Pt to get up/down from various seated surfaces, +1 min assist to get up to walk. Long term goal 3: Pt to perform stand/pivot transfer with use of RW +1 min assist to get in/out of w/c. Long term goal 4: Pt to walk with RW >= 50 ft, CGA to progress to home mobility  Long term goal 5: Pt to get in/out of car, +1 min assist for transportation needs. Long term goal 6: Pt to propel w/c >= 150 ft, SBA, around obstacles, for indoor mobility. Following session, patient left in safe position with all fall risk precautions in place.

## 2020-01-21 NOTE — PROGRESS NOTES
[facility]  INPATIENT SPEECH THERAPY  [facility]  DAILY NOTE    TIME   SLP Individual Minutes  Time In: 3094  Time Out: 1400  Minutes: 25  Timed Code Treatment Minutes: 25 Minutes     Date: 2020  Patient Name: Akshat Alex      CSN: [id number]   : 1940  (78 y.o.)  Gender: male   Referring Physician:  Dr. Puma Ramírez   Diagnosis: CVA  Secondary Diagnosis: Dysarthria, Dysphagia, Cognitive-Linguistic Deficits   Precautions: High Fall Risk, Aspiration precautions   Current Diet: Downgraded to pureed and mildly thick    Swallowing Strategies: Full Upright Position, Small Bite/Sip, No Straw, Multiple Swallow, Pulmonary Monitoring, Oral Care after all Meals, Supervision, Medication in Applesauce, Alternate Solids and Liquids, Limit Distractions and Monitor for Fatigue    Date of Last MBS: 19    Pain:  None reported     Subjective: Pt seen upright in wheelchair with wife present. Pt alert and cooperative. Short-Term Goals:  SHORT TERM GOAL #1:  Goal 1: Pt will tolerate a pureed diet and mildly thick liquids (no straws) and advanced minced solids with ST only (hx of silent aspiration with thin) without overt s/s of aspiration to meet nutritional/hydration measures safely. INTERVENTIONS: DNT d/t focus on other goals  AM session: Advanced PO trials of diced peaches: x5 with pt demonstration of severely poor oral bolus breakdown resulting in poor formation and clearance of trials with build up of moderate stasis present. Pt with appropriate insight that peaches were \"hard\"; however, poor insight into need for clearance of prior bolus, use of liquid wash and risks for overpacking of bolus/stasis noted. Cues provided; however, pt requiring MULTIPLE repetitions to engage in/carryover use of skilled strategies. No overt s/s of aspiration; however, d/t above deficits, pt continues to be inappropriate for further PO dietary upgrade.  Pt to resume pureed diet and mildly (nectar) thick liquids. SHORT TERM GOAL #2:  Goal 2: Pt will complete labial/lingual/pharyngeal strengthening, coordionation, ROM exercises, DDK rates, rote speech tasks with SOS strategies and tongue twisters x10 for improved timeliness of oral bolus formation/transit, maximized speech intelligibility and improved airway protection. INTERVENTIONS: DNT d/t focus on other goals   AM session: Recall of SOS strategies: 2/3 indep, 1/3 min cues     SHORT TERM GOAL #3:  Goal 3: Pt will complete immediate/delayed/working memory tasks with 80% accuracy, min cues for improved retention of newly learned medical information. INTERVENTIONS: DNT d/t focus on other goals     SHORT TERM GOAL #4:  Goal 4: Pt will complete problem solving, executive functioning and attention tasks (i.e. time/money management, scheduling, complex auditory/reading comprehension, etc) with 70% accuracy, mod cues for improved ADL/IADL management and safety awareness/insight. INTERVENTIONS: Locating targets on menu: 1/5 indep, 1/5 repetition, 1/5 min-mod cues, 1/5 mod cues, 1/5 max cues     Answering questions related to medication label: 3/7 indep, 1/7 min cues, 1/7 min-mod cues, 2/7 mod cues  *decreased comprehension (reading and auditory)  *decreased reasoning  *decreased visual scanning/attention     Answering questions related to recipe: 1/5 indep, 4/5 max cues   *decreased selective attention/locating     SHORT TERM GOAL #5:  Goal 5: Pt will complete complex naming, verbal sequencing/description and sentence formulation/repetition tasks with 70% accuracy, mod cues for improved expression of basic/complex thoughts.     INTERVENTIONS: DNT d/t focus on other goals   AM session:  Filling in empty dialogue boxes for targeting of visual reasoning, verbal description and sentence formulation: 1/5 indep, 3/5 min cues, 1/5 max cues   *decreased visual reasoning x2  *poor initiation of task with cues to improve   *pt with missing articles/pronouns x1  *reliance on short utterance length with need for cues to utilize full/complete sentences     Verbal description of target words to maximize use/carryover of circumlocution: x3 trials with max cues required     Long-Term Goals:  Timeframe for Long-term Goals: 1 week     LONG TERM GOAL #1:  Goal 1: Pt will tolerate a minced/moist diet and thin liquids without overt s/s of aspiration to meet nutritional/hydration measures safely. ONGOING     LONG TERM GOAL #2:  Goal 2: Pt will improve [id number] skills to a supervision level of function for maximized expression of complex thoughts, independent completion of ADL/IADL responsibilities and appropriate transition to home setting with wife post discharge. ONGOING        Comprehension: 3 - Patient understands basic needs 50-74% of the time  Expression: 3 - Expresses basic ideas/needs 50-74% of the time  Social Interaction: 4 - Patient appropriate 75-90%+ of the time  Problem Solving: 3 - Patient solves simple/routine tasks 50%-74%  Memory: 3 - Patient remembers 50%-74% of the time     EDUCATION:  Learner: Patient and Significant Other  Education:  Reviewed ST goals and Plan of Care  Evaluation of Education: Verbalizes understanding, Demonstrates with assistance and Needs further instruction    ASSESSMENT/PLAN:  Activity Tolerance:  Patient tolerance of  Treatment: fair. Flat affect, decreased engagement, poor functional carryover   Assessment/Plan: Patient progressing toward established goals. Continues to require skilled care of licensed speech pathologist to progress toward achievement of established goals and plan of care. .     Plan for Next Session: DDK rates, OME, pharyngeal strengthening, verbal description/complex naming      Brisa Martínez M.S. [id number] [id number] 1/21/2020

## 2020-01-21 NOTE — PROGRESS NOTES
goals  Time Frame for Short term goals: 1 week   Short term goal 1: Pt will complete sit to stand transfers with mod assist of 1 with min vcs or RUE support to improve indep with LB clothing mgt   Short term goal 2: Pt will complete 5-7 min EOB ADL task with SBA for sitting balance & min vcs for midline posture to improve balance needed for ADLs. Short term goal 3: Pt will don UB clothing with SBA and mod vcs for romain dressing technique to improve indep with self care. Short term goal 4: Pt will tolerate standing >2 minutes in midline with CGA to improve midline awareness for completion of stand pivots to [facility]. Long term goals  Time Frame for Long term goals : 3-4 weeks   Long term goal 1: Pt will don LB clothing with CGA and min vcs for adaptive technique to ipm[place] indep with self care. Long term goal 2: pt will complete stand pivot transfer to [facility] with CGA to improve indep with toileting. Long term goal 3: Pt will demonstrate 3/5 R elbow flexion to improve indep with donning a shirt. Following session, patient left in safe position with all fall risk precautions in place.

## 2020-01-21 NOTE — PLAN OF CARE
Problem: Falls - Risk of:  Goal: Absence of physical injury  Description  Absence of physical injury  1/20/2020 2245 by Javier Arce RN  Outcome: Ongoing   Patient educated on importance of turning to decrease skin breakdown  Problem: Risk for Impaired Skin Integrity  Goal: Tissue integrity - skin and mucous membranes  Description  Structural intactness and normal physiological function of skin and  mucous membranes.   1/20/2020 2245 by Javier Arce RN  Outcome: Ongoing   Skin prep applied to RT ankle   Problem: HEMODYNAMIC STATUS  Goal: Patient has stable vital signs and fluid balance  1/20/2020 2245 by Javier Arce RN  Outcome: Ongoing  Vitals stable

## 2020-01-21 NOTE — PROGRESS NOTES
Physical Medicine & Rehabilitation   Progress Note    Chief Complaint: S/P [facility]. [facility]. A. with an acute infarct located in the Left hemipons. Subjective: Patient reported no current head, trunk, or limb pain. He slept O.K. last evening. His inpatient rehab. therapies are going well. Patient was evaluated today while he was sitting in his wheelchair. He and his wife had no acute concerns. The results of the Rehab. team conference which was held on 01/21/20 were discussed with the patient and his wife. Rehabilitation:  PT:   Bed Mobility:  Rolling to Left: Contact Guard Assistance, Minimal Assistance   Rolling to Right: Contact Guard Assistance, Minimal Assistance   Sit to Supine: Moderate Assistance, Maximum Assistance. Transfers:  Sit to Stand: Moderate Assistance +1, min+1 up from mat and then up from w/c into FELISHA stedy. Pt requiring mod assist for trunk elevation and then to gain TKE RLE. Once up, pt tends to retract RT hip, lean to Rt. With mirror cues and verbal cues, improved midline noted, though not maintained. Stand to Sit:Moderate Assistance +1, min +1. Pt showing difficulty allowing LLE to flex at hip/knee and to release LUE from walker, tending to push. Sit Pivot: Moderate Assistance +1 to either direction. Pt supporting Rt hand with LT,  Assist for more forward translation of wt over base combined with more dynamic trunk. Pt tends to lean to Rt, but improved alignment noted with intervention. Transfer performed using FELISHA stedy at end of session: +2 mod assist up to stand and then 1 to guide and +1 mod assist to maintain trunk balance to position the device.       Balance: mirror use for visual feedback  Dynamic Sitting Balance: Minimal Assistance, X 1, with increased time for completion, Reaching outside SOPHIE to Lt and forward w/ Lt hand, verbal and manual cues for upright posture. Pt maintained midline with min assist for clothing management.   Static Standing Balance: Minimal    Balance:  Sitting Balance:  Supervision. Standing Balance  Time: 45 seconds,  30 seconds for LB clothing mgt. Pt leaning right, constant cues to look in mirror to correct midline. Transfers:  Sit to Stand:  Minimal Assistance, Maximum Assistance, X 2. with use of puma matute (only to/from [facility]   Stand to Sit: Moderate Assistance, X 1.   Squat Pivot: Minimal Assistance, Moderate Assistance, X 2. w/c <> EOM, w/c > EOB. VCs for R UE attn. Pt was able to direct w/c position and leg rest / lap tray management, requiring cues for brake management and arm rest.    BED MOBILITY:  Supine to Sit: Moderate Assistance for bringing BLEs off side of mat and elevating trunk  Sit to Supine: Moderate Assistance for bringing BLEs onto mat  Scooting: Maximum Assistance advancing his forward        Upper Extremity Assessment:   RUE AROM : (Pt 2-/5 scap elevation. No shoulder AROM )     Sensation  Overall Sensation Status: (decreased proproception RUE, reports numbness )  RUE Tone: Hypotonic  LUE Tone: Normotonic  Coordination and Movement description: Fine motor impairments, Gross motor impairments, Right UE     Activity Tolerance: Patient limited by fatigue, Treatment limited secondary to decreased cognition. ADDITIONAL ACTIVITIES:  Increased time for toileting and bathing tasks with mod cues for attention to R romain body and pushing syndrome. Spouse educated on monitoring pushing syndrome when seated in the chair and to assist with adjusting it as needed. Speech:  SPEECH / VOICE:  Speech: Moderate dysarthria characterized by blended word boundaries and omission of word endings. DDK rates completed with poor success d/t poor level of articulatory precision and reduced breath support. Speech intelligibility 60-70% within unknown context in conversation. Voice: Hoarse/breathy vocal quality with demonstration of thoracic breathing pattern and poor level of breath support.  Frequent aphonic breaks     LANGUAGE:  Receptive:  2 Step Commands: 2/4 indep, 1/4 repetition  Complex Yes/No Questions: 4/4     Expressive:  Confrontational Naming: 3/3 MOCA  Divergent Naming (abstract): impaired-- see below   Sentence Formulation: impaired-- short utterance length with reduced syntax  Conversational Speech: impaired-- see above   Paraphasias: none noted   Repetition: sentence level 1/2    Recall of speech strategies: poor success  Reference to external aid (max cues-- careboard): able to read 25% of list at best   Reference to external aid (written paper): 100% accuracy indep  Utilization of strategies within rote speech tasks:   Counting 1-10: poor-fair success initially and quickly declined to poor success with fatigue   JEFFREY: poor-fair success initially and quickly declined to poor success with fatigue.     COGNITION:  Myles Cognitive Assessment (MOCA) version 7.1 completed. Pt with adjusted score 18/25. Unable to complete written portion d/t extremity weakness in dominant right hand. Normal is greater than or equal to 26/30. Orientation: 6/6  Immediate Recall: 2/5-- improved to 4/5 with repetition   Short-Term Recall: 4/5 indep, 1/5 FO2  Divergent Naming: x4 indep 1 minute (N=11)   Reasoning: verbal abstraction- 0/2  Sequencing: impaired  Thought Organization: at least mild impairment  Insight: fair   Attention: decreased high level selective attention   Math Computation: 3/5  Executive Functioning: unable to complete d/t extremity weakness     SWALLOWING:  Current Diet: Minced/moist diet and mildly thick liquids   Respiratory Status:   Independent    Skilled dysphagia therapy via direct meal intervention. Pt with multiple trials of scrambled eggs, applesauce, magic cup and mildly thick liquids by cup. Impulsivity with large bolus/liquid intake and fast rate of PO intake intermittently noted. Improved small bolus size and slowed rate of PO intake with skilled level education provided.  Pt demonstration of slow/uncoordinated bolus breakdown, formation and transit resulting in mild-moderate oral stasis with heavy reliance on liquid/pureed wash to maximize oral clearance. Pt many times attempting to complete continuous trials despite poor oral clearance achieved. Required max cues for use of alternating liquids/purees in order to enhance clearance. Improved pt carryover with use of strategy as trials progressed. Certainly cannot r/o premature pharyngeal entry of liquid bolus with concerns for decreased bolus control s/p large liquid bolus consumption. Improved control with small, controlled drinks. Pt with presence of continuous, persistent coughing at END of meal trial with consumption of magic cup. Concerns for swallow decompensation. Skilled level education provided to pt re: skilled use of swallow strategies (I.e. starting with minced/moist trials and ending with purees to combat fatigue, focus on small/frequent meals, small bites/sips, slow rate, achievement of oral clearance with liquid/pureed wash, etc). Tray pulled to allow pt to rest given concerns for fatigue. Consumption of 50% of eggs, 50% of applesauce, 25% of magic cup and ~5 ounces of mildly thick liquids this date. Pt to resume minced/moist diet and mildly thick liquids with CLOSE pulmonary monitoring.        Behavioral Observation: Alert, Oriented and Dysarthric     ORAL MECHANISM EVALUATION:       Facial / Labial Impaired Right sided facial drooping with flaccidity. Decreased labial seal and strength    Lingual Impaired Decreased lingual ROM/strength and coordination.  Lingual deviation to right upon protrusion   Dentition WFL Decreased oral hygiene    Velum WFL     Vocal Quality Impaired Hoarse/breathy with reduced breath support and presence of frequent aphonic breaks    Sensation Not Tested     Cough Not Tested       PATIENT WAS EVALUATED USING:  Puree, hard solid, thin liquids by cup, mildly thick liquids by cup      ORAL PHASE:  Impaired: 50%-74% of the time.       Review of Systems:  CONSTITUTIONAL:  negative  RESPIRATORY:  negative  CARDIOVASCULAR:  negative  GASTROINTESTINAL:  negative  GENITOURINARY:  positive for a Solis catheter in place. MUSCULOSKELETAL:  positive for  decreased range of motion and muscle weakness. NEUROLOGICAL:  positive for memory problems, speech problems, coordination problems, gait problems and weakness  BEHAVIOR/PSYCH:  negative  10 point system review otherwise negative    Objective:  BP (!) 131/57   Pulse 55   Temp 98.3 °F (36.8 °C) (Oral)   Resp 16   Ht 6' (1.829 m)   Wt 152 lb 8 oz (69.2 kg)   SpO2 99%   BMI 20.68 kg/m²   He was noted to be awake, alert, and in no acute distress. Orientation: Unable to fully evaluate due to his limited cognition and expressive aphasia. Mood: within normal limits  Affect: calm  General appearance: Patient is well nourished, well developed, well groomed and in no acute distress, appearing stated age, thin. Memory:   abnormal   Attention/Concentration: abnormal   Language:  abnormal - with moderate to severe expressive aphasia and mild to moderate receptive aphasia. HEART:  S1 and S2 with a regular rate and rhythm without murmur, gallop  or rub. LUNGS:  Clear to auscultation bilaterally without rales, rhonchi or  wheezes. ABDOMEN:  Positive bowel sounds in all four quadrants. His abdomen was  noted to be soft, nontender, without masses. Cranial Nerves:  VII: Facial strength: abnormal with respect to a moderate Right-sided facial drooping. XI: Shoulder shrug: no functional Right shoulder shrug. ROM:  abnormal - with respect to the Right uppper and lower limbs. Motor Exam: No acute changes noted with respect to the patient's limb strength. Tone:  Increased muscle tone noted at the Right hip and knee. Muscle bulk: within normal limits  Sensory:  Sensory intact  Coordination:   abnormal - with respect to his mobility.   Deep Tendon Reflexes: No acute changes noted.  Skin: warm and dry, no rash or erythema  Edema: None in either lower limb. Diagnostics:   Recent Results (from the past 24 hour(s))   POCT glucose    Collection Time: 01/20/20  5:18 PM   Result Value Ref Range    POC Glucose 209 (H) 70 - 108 mg/dl   POCT glucose    Collection Time: 01/20/20  8:15 PM   Result Value Ref Range    POC Glucose 246 (H) 70 - 108 mg/dl   POCT glucose    Collection Time: 01/21/20  8:36 AM   Result Value Ref Range    POC Glucose 151 (H) 70 - 108 mg/dl   POCT glucose    Collection Time: 01/21/20 12:37 PM   Result Value Ref Range    POC Glucose 176 (H) 70 - 108 mg/dl       Impression:  1. Status post cerebrovascular accident with an acute infarct being  located in the left romain-darin as noted on a brain MRI performed on  12/24/2019.  2.  Gait dysfunction. 3.  Deficits with respect to his activities of daily living. 4.  Significant speech and cognitive deficits. 5.  Current history of dysphagia. 6.  Current history of right upper and lower limb weakness. 7.  Current history of significant cardiac disease including an  estimated ejection fraction of 25%. The patient currently has a cardiac  LifeVest in place. 8.  History of severe major depression along with anxiety. 9.  History of a psychotic episode occurring in 2017, requiring a mental  hospitalization in [facility]. 10.  History of benign prostatic hypertrophy. 11.  History of anemia. 12.  History of gastroesophageal reflux disease. 13.  History of hypertension. 14.  History of bilateral inguinal surgical repair performed in 2018  15. Current history of anorexia. 16.  Current history of restless leg syndrome. Plan:  · He is to continue with his current inpatient rehab. program.  · A Rehab. team conference was held on 01/14/20. His discharge goal is home with his wife on 01/24/20. · His recent blood sugars have been stable with a range of 151 - 246.  Continue to closely monitor and adjust his medications as needed. · Started the patient on Baclofen 10 mg to be taken 3 times daily. This is to treat his increased muscle tone involving the Right lower limb. · Discontinued the Marinol 2.5 mg to be taken twice daily. This medication did not appear to increase his appetite. · Increased his Requip to 0.5 mg to be taken once daily. This is being used to treat his restless leg syndrome. · Increased his Lisinopril to 10 mg to be taken once daily due to his blood pressure remaining elevated. Also increased his Toprol XL to 50 mg to be taken once daily starting on 01/17/20. · A U/A C&S will be performed on 01/21/20. · Started the patient on Ritalin 5 mg to be taken at 8:00 A. M. and at [facility]. This was started to see if it would allow him to better participate with his inpatient rehab therapies. Since the Ritalin was started he has been more awake and able to concentrate during his therapy sessions. · O.T. has provided the patient a Right functional wrist and hand splint. · A BMP and a CBC with diff. were drawn on 01/20/20. All of his labs remain stable. Greater than 50% of the 30 minutes was spent with the patient and his wife on counseling and with respect to coordinating his current inpatient rehab. care and also managing his medical issues. The results of the Rehab. team conference held on 01/21/20 were discussed with the patient and his wife.      Missed Therapy Time:  · None    Michelle Blanco MD

## 2020-01-21 NOTE — PROGRESS NOTES
[address] [facility] THERAPY  [address] [facility]  DAILY NOTE    TIME   SLP Individual Minutes  Time In: 929  Time Out: 1130  Minutes: 25  Timed Code Treatment Minutes: 0 Minutes   DYSPHAGIA: 8 minutes   LINGUISTIC: 17 minutes     Date: 2020  Patient Name: Radha Solitario      CSN: [id number]   : 1940  (78 y.o.)  Gender: male   Referring Physician:  Dr. Memo Bro   Diagnosis: CVA  Secondary Diagnosis: Dysarthria, Dysphagia, Cognitive-Linguistic Deficits   Precautions: High Fall Risk, Aspiration precautions   Current Diet: Downgraded to pureed and mildly thick    Swallowing Strategies: Full Upright Position, Small Bite/Sip, No Straw, Multiple Swallow, Pulmonary Monitoring, Oral Care after all Meals, Supervision, Medication in Applesauce, Alternate Solids and Liquids, Limit Distractions and Monitor for Fatigue    Date of Last MBS: 19    Pain:  None reported     Subjective: Pt seen upright in bed with no family present. Significantly flat affect, poor vocal intensity and concerns for disengagement within therapeutic session this date. Short-Term Goals:  SHORT TERM GOAL #1:  Goal 1: Pt will tolerate a pureed diet and mildly thick liquids (no straws) and advanced minced solids with ST only (hx of silent aspiration with thin) without overt s/s of aspiration to meet nutritional/hydration measures safely. INTERVENTIONS: Advanced PO trials of diced peaches: x5 with pt demonstration of severely poor oral bolus breakdown resulting in poor formation and clearance of trials with build up of moderate stasis present. Pt with appropriate insight that peaches were \"hard\"; however, poor insight into need for clearance of prior bolus, use of liquid wash and risks for overpacking of bolus/stasis noted. Cues provided; however, pt requiring MULTIPLE repetitions to engage in/carryover use of skilled strategies.  No overt s/s of aspiration; however, d/t above deficits, pt continues to be inappropriate for further PO dietary upgrade. Pt to resume pureed diet and mildly (nectar) thick liquids. SHORT TERM GOAL #2:  Goal 2: Pt will complete labial/lingual/pharyngeal strengthening, coordionation, ROM exercises, DDK rates, rote speech tasks with SOS strategies and tongue twisters x10 for improved timeliness of oral bolus formation/transit, maximized speech intelligibility and improved airway protection. INTERVENTIONS: Recall of SOS strategies: 2/3 indep, 1/3 min cues     SHORT TERM GOAL #3:  Goal 3: Pt will complete immediate/delayed/working memory tasks with 80% accuracy, min cues for improved retention of newly learned medical information. INTERVENTIONS: DNT d/t focus on other goals     SHORT TERM GOAL #4:  Goal 4: Pt will complete problem solving, executive functioning and attention tasks (i.e. time/money management, scheduling, complex auditory/reading comprehension, etc) with 70% accuracy, mod cues for improved ADL/IADL management and safety awareness/insight. INTERVENTIONS: DNT d/t focus on other goals     SHORT TERM GOAL #5:  Goal 5: Pt will complete complex naming, verbal sequencing/description and sentence formulation/repetition tasks with 70% accuracy, mod cues for improved expression of basic/complex thoughts.     INTERVENTIONS: Filling in empty dialogue boxes for targeting of visual reasoning, verbal description and sentence formulation: 1/5 indep, 3/5 min cues, 1/5 max cues   *decreased visual reasoning x2  *poor initiation of task with cues to improve   *pt with missing articles/pronouns x1  *reliance on short utterance length with need for cues to utilize full/complete sentences     Verbal description of target words to maximize use/carryover of circumlocution: x3 trials with max cues required     Long-Term Goals:  Timeframe for Long-term Goals: 1 week     LONG TERM GOAL #1:  Goal 1: Pt will tolerate a minced/moist diet and thin liquids without overt s/s of aspiration to meet nutritional/hydration measures safely. ONGOING     LONG TERM GOAL #2:  Goal 2: Pt will improve [id number] skills to a supervision level of function for maximized expression of complex thoughts, independent completion of ADL/IADL responsibilities and appropriate transition to home setting with wife post discharge. ONGOING        Comprehension: 3 - Patient understands basic needs 50-74% of the time  Expression: 3 - Expresses basic ideas/needs 50-74% of the time  Social Interaction: 4 - Patient appropriate 75-90%+ of the time  Problem Solving: 3 - Patient solves simple/routine tasks 50%-74%  Memory: 3 - Patient remembers 50%-74% of the time     EDUCATION:  Learner: Patient  Education:  Reviewed ST goals and Plan of Care  Evaluation of Education: Verbalizes understanding, Demonstrates with assistance, Needs further instruction and Family not present    ASSESSMENT/PLAN:  Activity Tolerance:  Patient tolerance of  Treatment: fair. Flat affect, decreased engagement, poor functional carryover   Assessment/Plan: Patient progressing toward established goals. Continues to require skilled care of licensed speech pathologist to progress toward achievement of established goals and plan of care. .     Plan for Next Session: visual scanning/selective locating     Aline Segovia M.S. CCC-SLP [id number] 1/21/2020

## 2020-01-22 NOTE — PROGRESS NOTES
to reach out of SOPHIE and then transition to push up with R UE. Active muscle tension noted at shoulder and elbow, ( arm on body activities). Transitioned to body on arm tasks with R UE to slide UE into ABD and elbow extension and return to neutral position. Mod A for task completion. Initiated HEP for R UE self stretching of the hand with L hand with min cues for tech. Sh elevation and retraction. X 8 reps with min tactile cueing needed. ASSESSMENT:     Activity Tolerance:  Patient tolerance of  treatment: good. Discharge Recommendations: 24 hour supervision or assist, Continue to assess pending progress  Equipment Recommendations: Other: PT may need a drop arm BSC and shower bench  Plan: Times per week: 5x/wk for 90 min and 1x/wk for 30 min   Current Treatment Recommendations: Balance Training, Self-Care / ADL, ROM, Strengthening, Functional Mobility Training, Endurance Training, Neuromuscular Re-education, Safety Education & Training, Patient/Caregiver Education & Training, Wheelchair Mobility Training, Home Management Training    Patient Education  Patient Education: Home Exercise Program    Goals  Short term goals  Time Frame for Short term goals: 1 week   Short term goal 1: Pt will complete sit to stand transfers with mod assist of 1 with min vcs or RUE support to improve indep with LB clothing mgt   Short term goal 2: Pt will complete 5-7 min EOB ADL task with SBA for sitting balance & min vcs for midline posture to improve balance needed for ADLs. Short term goal 3: Pt will don UB clothing with SBA and mod vcs for romain dressing technique to improve indep with self care. Short term goal 4: Pt will tolerate standing >2 minutes in midline with CGA to improve midline awareness for completion of stand pivots to [facility]. Long term goals  Time Frame for Long term goals : 3-4 weeks   Long term goal 1: Pt will don LB clothing with CGA and min vcs for adaptive technique to ipmrove indep with self care. Long term goal 2: pt will complete stand pivot transfer to [facility] with CGA to improve indep with toileting. Long term goal 3: Pt will demonstrate 3/5 R elbow flexion to improve indep with donning a shirt. Following session, patient left in safe position with all fall risk precautions in place.

## 2020-01-22 NOTE — PLAN OF CARE
Problem: Pain:  Goal: Pain level will decrease  Description  Pain level will decrease  1/22/2020 0115 by Merna Krishnamurthy RN  Outcome: Met This Shift  Note:   Patient denies pain at this time. Agreeable to take PRN pain medications if needed.   Pain goal of \"No pain\"

## 2020-01-22 NOTE — PROGRESS NOTES
weakness, max assist of another at trunk to stabilize pt and keep posture. Wheelchair Mobility:  Stand By Assistance. Extremities Used: Left Upper Extremity and Left Lower Extremity  Type of Chair:Manual  Surface: Level Tile  Distance: 200 ft  Quality: Veers Right, Decreased Fluidity and max cues and min assist needed initially for pt to recognize need to use LUE only minimally as veering to RT. Once cued to not use LUE at all, then improved control noted.         EXERCISES:  The following exercises were completed to improve functional mobility: graded lifts from elevate mat. 1st attempted with pt placing katey hands on stool in front. Too much pushing from LUE noted. Graded lift without use of stool showed improved midline. Max assist with all trials. O.T:  ADL's:  Feeding: Setup(completed with non dominant hand )  Grooming: with set-up, with verbal cues  and with increased time for completion. Patient seated at sink to complete oral hygiene with hyrogen proxide and water mixture,completed with LUE, vcs for arousal, followed commands for safe technique. UE Bathing: Moderate assistance(and min A for sitting balance )  LE Bathing: Dependent/Total(Mod A of 1 for balance and total A of another )  UE Dressing: Minimal Assistance. Min A with threading R UE  LE Dressing: Moderate Assistance. Mod A with threading BLE  Toileting: Maximal Assistance. Toilet Transfer: Minimal Assistance, Moderate Assistance and X 2. Mod A of 1 Min of another, W/c <> BSC  Shower Transfer: Minimal Assistance, Moderate Assistance and X 2. Mod A of 1, min A of another, w/c <> shower bench.     Functional Mobility:  Bed mobility  Supine to Sit: Maximum assistance     Functional Mobility  Functional Mobility Comments: Not appropriate at this time  OTR to assess as appropriate      Balance:  Sitting Balance:  Supervision. Standing Balance  Time: 45 seconds,  30 seconds for LB clothing mgt.    Pt leaning right, constant cues to complete continuous trials despite poor oral clearance achieved. Required max cues for use of alternating liquids/purees in order to enhance clearance. Improved pt carryover with use of strategy as trials progressed. Certainly cannot r/o premature pharyngeal entry of liquid bolus with concerns for decreased bolus control s/p large liquid bolus consumption. Improved control with small, controlled drinks. Pt with presence of continuous, persistent coughing at END of meal trial with consumption of magic cup. Concerns for swallow decompensation. Skilled level education provided to pt re: skilled use of swallow strategies (I.e. starting with minced/moist trials and ending with purees to combat fatigue, focus on small/frequent meals, small bites/sips, slow rate, achievement of oral clearance with liquid/pureed wash, etc). Tray pulled to allow pt to rest given concerns for fatigue. Consumption of 50% of eggs, 50% of applesauce, 25% of magic cup and ~5 ounces of mildly thick liquids this date. Pt to resume minced/moist diet and mildly thick liquids with CLOSE pulmonary monitoring.        Behavioral Observation: Alert, Oriented and Dysarthric     ORAL MECHANISM EVALUATION:       Facial / Labial Impaired Right sided facial drooping with flaccidity. Decreased labial seal and strength    Lingual Impaired Decreased lingual ROM/strength and coordination.  Lingual deviation to right upon protrusion   Dentition WFL Decreased oral hygiene    Velum WFL     Vocal Quality Impaired Hoarse/breathy with reduced breath support and presence of frequent aphonic breaks    Sensation Not Tested     Cough Not Tested       PATIENT WAS EVALUATED USING:  Puree, hard solid, thin liquids by cup, mildly thick liquids by cup      ORAL PHASE:  Impaired:  Impaired AP Movement, Impaired Mastication and Reduced Bolus Formation     PHARYNGEAL PHASE:  Impaired:  Decreased Hyolaryngeal Elevation and Suspected Pharyngeal Residue     SIGNS AND SYMPTOMS OF Monitor for Fatigue                 RECOMMENDATIONS/ASSESSMENT:  DIAGNOSTIC IMPRESSIONS:  Pt presents with at least mild cognitive deficits evidenced by derived/adjusted MOCA score of 18/25. This is improved from acute care stay in which pt scored a 11/25 on 12/27. Deficits found within the domains of immediate/delayed recall, verbal reasoning, thought organization, decreased high level attention and math computation. Moderate expressive language deficits evidenced by impaired lexical retrieval, mental flexibility, verbal sequencing/description, short utterance length with reduced syntax and impaired sentence repetition. Pt with mild receptive language deficits evidenced by increasing difficulty following complex verbal commands, need for extra time d/t slowed mental processing and heavy reliance on repetitions. Pt reports hx of hearing aids. Denies need for hearing aids at this time. Pt with moderate dysarthria characterized by blended word boundaries, imprecise articulatory precision and omission of word endings. Moderate dysphonia evidenced by poor level of breath support, presence of aphonic breaks and hoarse/breathy vocal quality. Pt would highly benefit from ongoing ST intervention in order to progress [id number] functioning to a supervision level for safe, functional return to home setting and demonstration of independent ADL/IADL completion upon discharge.      Rehabilitation Potential: excellent     Comprehension: 3 - Patient understands basic needs 50-74%+ of the time  Expression: 3 - Expresses basic ideas/needs 50-74% of the time  Social Interaction: 4 - Patient appropriate 75-90%+ of the time  Problem Solving: 3 - Patient solves simple/routine tasks 50%-74%  Memory: 3 - Patient remembers 50%-74% of the time.       Review of Systems:  CONSTITUTIONAL:  negative  RESPIRATORY:  negative  CARDIOVASCULAR:  negative  GASTROINTESTINAL:  negative  GENITOURINARY:  positive for a Solis catheter in place. MUSCULOSKELETAL:  positive for  decreased range of motion and muscle weakness. NEUROLOGICAL:  positive for memory problems, speech problems, coordination problems, gait problems and weakness  BEHAVIOR/PSYCH:  negative  10 point system review otherwise negative    Objective:  BP (!) 141/67   Pulse 65   Temp 98.3 °F (36.8 °C) (Oral)   Resp 18   Ht 6' (1.829 m)   Wt 152 lb 9.6 oz (69.2 kg)   SpO2 92%   BMI 20.70 kg/m²   He was noted to be awake, alert, and in no acute distress. Orientation: Unable to fully evaluate due to his limited cognition and expressive aphasia. Mood: within normal limits  Affect: calm  General appearance: Patient is well nourished, well developed, well groomed and in no acute distress, appearing stated age, thin. Memory:   abnormal   Attention/Concentration: abnormal   Language:  abnormal - with moderate to severe expressive aphasia and mild to moderate receptive aphasia. HEART:  S1 and S2 with a regular rate and rhythm without murmur, gallop  or rub. LUNGS:  Clear to auscultation bilaterally without rales, rhonchi or  wheezes. ABDOMEN:  Positive bowel sounds in all four quadrants. His abdomen was  noted to be soft, nontender, without masses. Cranial Nerves:  VII: Facial strength: abnormal with respect to a moderate Right-sided facial drooping. XI: Shoulder shrug: no functional Right shoulder shrug. ROM:  abnormal - with respect to the Right uppper and lower limbs. Motor Exam: No acute changes noted with respect to the patient's limb strength. Tone:  Increased muscle tone noted at the Right hip and knee. Muscle bulk: within normal limits  Sensory:  Sensory intact  Coordination:   abnormal - with respect to his mobility. Deep Tendon Reflexes: No acute changes noted. Skin: warm and dry, no rash or erythema  Edema: None in either lower limb.       Diagnostics:   Recent Results (from the past 24 hour(s))   POCT glucose    Collection Time: 01/21/20  7:46 PM Result Value Ref Range    POC Glucose 168 (H) 70 - 108 mg/dl   Urine with Reflexed Micro    Collection Time: 01/21/20  9:00 PM   Result Value Ref Range    Glucose, Ur NEGATIVE NEGATIVE mg/dl    Bilirubin Urine NEGATIVE NEGATIVE    Ketones, Urine TRACE (A) NEGATIVE    Specific Gravity, Urine 1.023 1.002 - 1.03    Blood, Urine NEGATIVE NEGATIVE    pH, UA 5.0 5.0 - 9.0    Protein, UA 30 (A) NEGATIVE    Urobilinogen, Urine 1.0 0.0 - 1.0 eu/dl    Nitrite, Urine NEGATIVE NEGATIVE    Leukocyte Esterase, Urine MODERATE (A) NEGATIVE    Color, UA DK YELLOW (A) STRAW-YELL    Character, Urine CLOUDY (A) CLEAR-SL C    RBC, UA 5-10 0-2/hpf /hpf    WBC, UA 50-75 0-4/hpf /hpf    Epi Cells 5-10 3-5/hpf /hpf    Bacteria, UA NONE SEEN FEW/NONE S /hpf    Casts UA >15 HYALINE NONE SEEN /lpf    Crystals NONE SEEN NONE SEEN    Renal Epithelial, Urine NONE NONE SEEN    Yeast, UA NONE SEEN NONE SEEN    CASTS 2 0-4 C. GRAN NONE SEEN /lpf    MISCELLANEOUS 2 NONE SEEN    Culture, Reflexed, Urine    Collection Time: 01/21/20  9:00 PM   Result Value Ref Range    Organism gram negative bacilli (A)     Urine Culture Reflex Goodwin count: <10,000 CFU/mL     POCT glucose    Collection Time: 01/22/20  8:34 AM   Result Value Ref Range    POC Glucose 158 (H) 70 - 108 mg/dl   POCT glucose    Collection Time: 01/22/20 12:17 PM   Result Value Ref Range    POC Glucose 156 (H) 70 - 108 mg/dl       Impression:  1. Status post cerebrovascular accident with an acute infarct being  located in the left romain-darin as noted on a brain MRI performed on  12/24/2019.  2.  Gait dysfunction. 3.  Deficits with respect to his activities of daily living. 4.  Significant speech and cognitive deficits. 5.  Current history of dysphagia. 6.  Current history of right upper and lower limb weakness. 7.  Current history of significant cardiac disease including an  estimated ejection fraction of 25%. The patient currently has a cardiac  LifeVest in place.   8.  History of with the patient and his daughters on counseling and with respect to coordinating his current inpatient rehab. care and also managing his medical issues.      Missed Therapy Time:  · None    Everardo Christina MD

## 2020-01-22 NOTE — PLAN OF CARE
Problem: DISCHARGE BARRIERS  Goal: Patient's continuum of care needs are met  1/22/2020 1430 by MARCIN Stanley  Note:   SW received call from admission coordinator, demond Pope [facility]. Patient accepted for admission at facility for Friday, 01/24/2020. SW to follow and maintain involvement in discharge planning.

## 2020-01-22 NOTE — PROGRESS NOTES
[address] [address]  INPATIENT PHYSICAL THERAPY  DAILY NOTE  [facility] - 7E-56/056-A    Time In: 0730  Time Out: 0830  Timed Code Treatment Minutes: 60 Minutes  Minutes: 60          Date: 2020  Patient Name: Nicolas Georges,  Gender:  male        MRN: [id number]  : 1940  (78 y.o.)     Referring Practitioner: Dr. Korin Cardenas  Diagnosis: acute CVA  Additional Pertinent Hx: Pt admitted [facility] ED s/p fall. MRI+ for infarct Lt hemipons. Cardiology deemed need for life vest (2019),  Hx:  depression , anxiety     Prior Level of Function:  Lives With: Spouse  Type of Home: House  Home Layout: One level, Work area in basement  Home Access: Stairs to enter with rails  Entrance Stairs - Number of Steps: 2 with grab bar(Rt)  Home Equipment: Quad cane(used intermittently)   Bathroom Shower/Tub: Walk-in shower  Bathroom Toilet: Handicap height  Bathroom Equipment: Built-in shower seat    ADL Assistance: Independent  Ambulation Assistance: Independent  Transfer Assistance: Independent  Active : No  Additional Comments: Pt reports he completed his own self care and mobility with intermittent use of a quad cane. Pt states wife completed housekeeping, meal prep and finances. Restrictions/Precautions:  Restrictions/Precautions: General Precautions, Fall Risk, Modified Diet  Position Activity Restriction  Other position/activity restrictions: pt pushes to right, R romain, Rt neglect, poor sensation Rt hemibody    SUBJECTIVE: Pt was asleep in bed upon arrival into room. Pt pleasant and cooperative during session. More difficult to keep pt's eyes open during session. PAIN: No pain reported.     OBJECTIVE:  Bed Mobility:  Rolling to Right: Minimal Assistance, X 1, assist to rotate upper trunk, max assist for RLE foot placement, cues for proper foot placement and to reach LUE across midline to initiate roll,with increased time for completion   Supine to Sit: Moderate Assistance to elevate trunk, X 1, assist w/ BLE to ground due to being caught on bed rail, verbal cues for technique, with increased time for completion  Scooting: Moderate Assistance, X 1, verbal cues for technique    Transfers:  Sit to Stand: Maximum Assistance to RW, X 2, assist at RLE for TKE and stand, assist of another for and trunk elevation and placement of Rt hand in RW splint, cues for supporting Rt hand w/ Lt,  Verbal and manual cues for upright posture and for midline orientation  Stand to [address] [place] [address], X 2, assist for RLE control at knee, assist of another for descend and to remove Rt hand out of RW splint, cues for forward hip flexion to sit  Sit Pivot:Maximum Assistance bobath transfer, pt requires positioning setup,  Cues for supporting Rt hand w/ Lt, verbal and manual cues for upright posture and anterior weight shift    Balance:  Sitting Balance: pt sat at egde of mat while completing dynamic activity at min assist.  Pt reached for objects outside SOPHIE to Lt and then placed objects on table that was 3-4 inches across midline out in front. Min assist to return to midline orientation and upright posture once placing the object. Max verbal cues for posture. Standing Balance: Pt stood 2 times to RW w/ Rt hand splint at mod assist X 2. Pt required assist for maintaining midline orientation due to leaning to Rt. Assist of another for Rt terminal knee extension. Verbal cues for upright posture and midline orientation. Pt completed 3 mini squats during each stand. Muscle activation seen during initial part of ascend, but was unable to finish TKE w/o assistance. Exercise:  Each exercise completed 2X for 30 sec. Pt completed passive Rt heel cord stretches and passive shoulder girdle protraction and upward rotation. Exercises were completed for increased independence with functional mobility.     Functional Outcome Measures: Not completed       ASSESSMENT:  Assessment: Patient progressing Long term goals : 4 wks  Long term goal 1: Pt to go supine <->sit, +1 stand by assist , to get in/out of bed. Long term goal 2: Pt to get up/down from various seated surfaces, +1 min assist to get up to walk. Long term goal 3: Pt to perform stand/pivot transfer with use of RW +1 min assist to get in/out of w/c. Long term goal 4: Pt to walk with RW >= 50 ft, CGA to progress to home mobility  Long term goal 5: Pt to get in/out of car, +1 min assist for transportation needs. Long term goal 6: Pt to propel w/c >= 150 ft, SBA, around obstacles, for indoor mobility. Following session, patient left in safe position with all fall risk precautions in place.

## 2020-01-22 NOTE — PROGRESS NOTES
[address] [facility] THERAPY  [facility]  DAILY NOTE    TIME   SLP Individual Minutes  Time In: 0900  Time Out: 930  Minutes: 30  Timed Code Treatment Minutes: 10 Minutes   Dysphagia therapy: 20 minutes  Cognitive therapy: 10 minutes    Date: 2020  Patient Name: Gina Hubbard      CSN: [id number]   : 1940  (78 y.o.)  Gender: male   Referring Physician:  Dr. Charlette Trujillo   Diagnosis: CVA  Secondary Diagnosis: Dysarthria, Dysphagia, Cognitive-Linguistic Deficits   Precautions: High Fall Risk, Aspiration precautions   Current Diet: Downgraded to pureed and mildly thick    Swallowing Strategies: Full Upright Position, Small Bite/Sip, No Straw, Multiple Swallow, Pulmonary Monitoring, Oral Care after all Meals, Supervision, Medication in Applesauce, Alternate Solids and Liquids, Limit Distractions and Monitor for Fatigue    Date of Last MBS: 19    Pain:  None reported     Subjective: Pt sitting upright in wheelchair, having just completed breakfast meal. Lightbox therapy in use for approximately x20 minutes of therapy session. Continual encouragement required to maintain engagement within tasks. No family at bedside. Short-Term Goals:  SHORT TERM GOAL #1:  Goal 1: Pt will tolerate a pureed diet and mildly thick liquids (no straws) and advanced minced solids with ST only (hx of silent aspiration with thin) without overt s/s of aspiration to meet nutritional/hydration measures safely. INTERVENTIONS: In attempts to continue promoting adequate hydration measures, facilitated intake of nectar/mildly thick juice via cup throughout session. x6 trials consumed, correlating to a total of x4 oz. Continued evidence for delayed swallow onset with likely premature spillage/pharyngeal entry d/t consistent presence of audible swallow. Throat clear exhibited on final trial, however, voicing maintained dry/clear.      SHORT TERM GOAL #2:  Goal 2: Pt will complete labial/lingual/pharyngeal strengthening, coordionation, ROM exercises, DDK rates, rote speech tasks with SOS strategies and tongue twisters x10 for improved timeliness of oral bolus formation/transit, maximized speech intelligibility and improved airway protection. INTERVENTIONS: Oral motor lingual exercises targeted in attempts to improve overall bolus manipulation and formation:  -Lingual lateralization, outside: x10 reps, poor-fair success  -Lingual circles, clockwise: x10 reps, poor-fair success, decreased ROM  -Lingual tip elevation + depression: x10 reps, fair success  *max cues required throughout to ensure accuracy of placement with slow progression of repetitions  *rest breaks required; poor endurance noted    Effortful swallow: x10 reps, fair success    SHORT TERM GOAL #3:  Goal 3: Pt will complete immediate/delayed/working memory tasks with 80% accuracy, min cues for improved retention of newly learned medical information. INTERVENTIONS: Recall of SOS speech strategies: 1/3 independent, 2/3 given min cues to locate external visual aid    Orientation: 2/6 independent, 4/6 logical cues (month, year, JEFFREY, date)    Given functional photo scene r/t home setting, pt directed to recall pertinent imagery. Strategy of verbal highlighting implemented to aid in success. Immediate memory: 7/10 independent, 1/10 mod cues, 2/10 max cues  Delay x10 minutes: 9/10 independent, 1/10 min cues    SHORT TERM GOAL #4:  Goal 4: Pt will complete problem solving, executive functioning and attention tasks (i.e. time/money management, scheduling, complex auditory/reading comprehension, etc) with 70% accuracy, mod cues for improved ADL/IADL management and safety awareness/insight.      INTERVENTIONS: DNT d/t focus on additional STGs    Prior session:  Locating targets on menu: 1/5 indep, 1/5 repetition, 1/5 min-mod cues, 1/5 mod cues, 1/5 max cues     Answering questions related to medication label: 3/7 indep, 1/7 min Reviewed ST goals and Plan of Care  Evaluation of Education: Verbalizes understanding, Demonstrates with assistance and Needs further instruction    ASSESSMENT/PLAN:  Activity Tolerance:  Patient tolerance of  Treatment: fair. Flat affect, decreased engagement, poor functional carryover   Assessment/Plan: Patient progressing toward established goals. Continues to require skilled care of licensed speech pathologist to progress toward achievement of established goals and plan of care. .     Plan for Next Session: verbal description/complex naming, reading comprehension         Brittany Ibrahim M.A., [address] [place] [address]

## 2020-01-22 NOTE — PROGRESS NOTES
[address] [address]  INPATIENT PHYSICAL THERAPY  DAILY NOTE  [facility] - [id number]    Time In: 1430  Time Out: 1500  Timed Code Treatment Minutes: 30 Minutes  Minutes: 30          Date: 2020  Patient Name: Juan David Mccarty,  Gender:  male        MRN: [id number]  : 1940  (78 y.o.)     Referring Practitioner: Dr. Gwyn Barrios  Diagnosis: acute CVA  Additional Pertinent Hx: Pt admitted [facility] ED s/p fall. MRI+ for infarct Lt hemipons. Cardiology deemed need for life vest (2019),  Hx:  depression , anxiety     Prior Level of Function:  Lives With: Spouse  Type of Home: House  Home Layout: One level, Work area in basement  Home Access: Stairs to enter with rails  Entrance Stairs - Number of Steps: 2 with grab bar(Rt)  Home Equipment: Quad cane(used intermittently)   Bathroom Shower/Tub: Walk-in shower  Bathroom Toilet: Handicap height  Bathroom Equipment: Built-in shower seat    ADL Assistance: Independent  Ambulation Assistance: Independent  Transfer Assistance: Independent  Active : No  Additional Comments: Pt reports he completed his own self care and mobility with intermittent use of a quad cane. Pt states wife completed housekeeping, meal prep and finances. Restrictions/Precautions:  Restrictions/Precautions: General Precautions, Fall Risk, Modified Diet  Position Activity Restriction  Other position/activity restrictions: pt pushes to right, R romain, Rt neglect, poor sensation Rt hemibody    SUBJECTIVE: Pt was sitting at edge of mat in gym finishing up w/ OT. Pt's wife observed session. Pt was pleasant and cooperative during treatment. PAIN: No pain noted by pt.     OBJECTIVE:  Bed Mobility:  Rolling to Right: Minimal Assistance, X 1, assist rotate upper trunk, mas assist for positioning of RLE, cues to reach w/ LUE across midline to initiate roll, with increased time for completion  Supine to Sit: Moderate Assistance, X 1, assist to elevate surfaces, +1 min assist to get up to walk. Long term goal 3: Pt to perform stand/pivot transfer with use of RW +1 min assist to get in/out of w/c. Long term goal 4: Pt to walk with RW >= 50 ft, CGA to progress to home mobility  Long term goal 5: Pt to get in/out of car, +1 min assist for transportation needs. Long term goal 6: Pt to propel w/c >= 150 ft, SBA, around obstacles, for indoor mobility. Following session, patient left in safe position with all fall risk precautions in place.

## 2020-01-22 NOTE — PROGRESS NOTES
[address]  Occupational Therapy  Daily Note  Time:    Time In: 1115  Time Out: 1200  Timed Code Treatment Minutes: 39 Minutes  Minutes: 45          Date: 2020  Patient Name: Key Duran,   Gender: male      Room: [facility]56/056-A  MRN: [id number]  : 1940  (78 y.o.)  Referring Practitioner: Dr. Kishor Reddy   Diagnosis: CVA   Additional Pertinent Hx: Per ER note on 19:  78 y.o. male who presents to the Emergency Department via EMS for the evaluation of a CVA. The patient was found on the floor sitting up around 0630 this morning by his wife, who heard him fall. She reports a drooping mouth on the right side, slurred speech, and right sided weakness when she found him at 0630. The patient's last known well was 22:00 last night. The patient is unable to walk because his left leg is weak, and the patient had to be lifted into a chair after he was found on the floor. 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 Restrictions/Precautions:  Restrictions/Precautions: General Precautions, Fall Risk, Modified Diet  Position Activity Restriction  Other position/activity restrictions: pt pushes to right, R romain, Rt neglect, poor sensation Rt hemibody    SUBJECTIVE: cooperative, alert. Spouse present and supportive. Asking several appropriate positioning and equipment questions.      PAIN: 0/10: denied pain     COGNITION: Slow Processing, Decreased Recall, Decreased Insight, Inattention and Impaired Attention     ADL:   No ADL's completed this session. .     BALANCE:  Sitting Balance:  Stand By Assistance. seated EOM. Throughout session, therapist monitored L UE and LE to avoid placing into a pushing syndrome pattern.           TRANSFERS:  Squat Pivot: Moderate Assistance. sit pivot to and from w/c to EOM to EOB.  min A to transfer from the elevated mat to transfer to the w/c      FUNCTIONAL MOBILITY:  Assistive Device: Wheelchair  Assist

## 2020-01-22 NOTE — PROGRESS NOTES
[address] THERAPY  [facility]  DAILY NOTE    TIME   SLP Individual Minutes  Time In: 1200  Time Out: 1225  Minutes: 25  Timed Code Treatment Minutes: 25 Minutes     Date: 2020  Patient Name: Quoc Block      CSN: [id number]   : 1940  (78 y.o.)  Gender: male   Referring Physician:  Dr. Pato Carpenter   Diagnosis: CVA  Secondary Diagnosis: Dysarthria, Dysphagia, Cognitive-Linguistic Deficits   Precautions: High Fall Risk, Aspiration precautions   Current Diet: Downgraded to pureed and mildly thick    Swallowing Strategies: Full Upright Position, Small Bite/Sip, No Straw, Multiple Swallow, Pulmonary Monitoring, Oral Care after all Meals, Supervision, Medication in Applesauce, Alternate Solids and Liquids, Limit Distractions and Monitor for Fatigue    Date of Last MBS: 19    Pain:  None reported     Subjective: Pt sitting upright in wheelchair, awake and alert. MAX cues required for engagement within BID session at date with multiple rest breaks required. Short-Term Goals:  SHORT TERM GOAL #1:  Goal 1: Pt will tolerate a pureed diet and mildly thick liquids (no straws) and advanced minced solids with ST only (hx of silent aspiration with thin) without overt s/s of aspiration to meet nutritional/hydration measures safely. INTERVENTIONS: DNT d/t focus on additional STGs    Prior session:  In attempts to continue promoting adequate hydration measures, facilitated intake of nectar/mildly thick juice via cup throughout session. x6 trials consumed, correlating to a total of x4 oz. Continued evidence for delayed swallow onset with likely premature spillage/pharyngeal entry d/t consistent presence of audible swallow. Throat clear exhibited on final trial, however, voicing maintained dry/clear.      SHORT TERM GOAL #2:  Goal 2: Pt will complete labial/lingual/pharyngeal strengthening, coordionation, ROM exercises, DDK rates, rote speech tasks with SOS strategies and tongue twisters x10 for improved timeliness of oral bolus formation/transit, maximized speech intelligibility and improved airway protection. INTERVENTIONS: With implementation of SOS speech strategies, the following rote tasks were completed:  -Numerical counting 1-15: min cues x2 required to \"open mouth\" and \"speak loudly\"  -JEFFREY: mod cues x2 required to \"open mouth\" and \"speak loudly\"  *poor carryover of strategies with speech intelligibility best approximating 60% in known contexts    Prior session:  Oral motor lingual exercises targeted in attempts to improve overall bolus manipulation and formation:  -Lingual lateralization, outside: x10 reps, poor-fair success  -Lingual circles, clockwise: x10 reps, poor-fair success, decreased ROM  -Lingual tip elevation + depression: x10 reps, fair success  *max cues required throughout to ensure accuracy of placement with slow progression of repetitions  *rest breaks required; poor endurance noted    Effortful swallow: x10 reps, fair success    SHORT TERM GOAL #3:  Goal 3: Pt will complete immediate/delayed/working memory tasks with 80% accuracy, min cues for improved retention of newly learned medical information. INTERVENTIONS: Recall of photo scene imagery (x2 hour delay): 6/10 independent, 2/10 min cues, 1/10 mod cues, 1/10 F02  *continual prompting required to generate a response d/t decreased engagement and lethargy exhibited    AM session:  Recall of SOS speech strategies: 1/3 independent, 2/3 given min cues to locate external visual aid    Orientation: 2/6 independent, 4/6 logical cues (month, year, JEFFREY, date)    Given functional photo scene r/t home setting, pt directed to recall pertinent imagery. Strategy of verbal highlighting implemented to aid in success.   Immediate memory: 7/10 independent, 1/10 mod cues, 2/10 max cues  Delay x10 minutes: 9/10 independent, 1/10 min cues    SHORT TERM GOAL #4:  Goal 4: Pt will complete time  Social Interaction: 4 - Patient appropriate 75-90%+ of the time  Problem Solving: 3 - Patient solves simple/routine tasks 50%-74%  Memory: 3 - Patient remembers 50%-74% of the time     EDUCATION:  Learner: Patient and Significant Other  Education:  Reviewed ST goals and Plan of Care  Evaluation of Education: Verbalizes understanding, Demonstrates with assistance and Needs further instruction    ASSESSMENT/PLAN:  Activity Tolerance:  Patient tolerance of  Treatment: fair. Flat affect, decreased engagement, poor functional carryover   Assessment/Plan: Patient progressing toward established goals. Continues to require skilled care of licensed speech pathologist to progress toward achievement of established goals and plan of care. .     Plan for Next Session: dietary analysis, pharyngeal strengthening exercises       Claudia Michaels M.A., [facility]

## 2020-01-23 NOTE — PROGRESS NOTES
Physical Medicine & Rehabilitation   Progress Note    Chief Complaint: S/P [facility]. [facility]. with an acute infarct located in the Left hemipons. Subjective: Patient reported no current head, trunk, or limb pain. He slept O.K. last evening. His inpatient rehab. therapies are going well. Patient was evaluated today while he was sitting in his wheelchair in his room. He and his wife and 2 daughters had no acute concerns. The patient and his family were happy with the patient's progress that he has made while in the inpatient rehab unit. They understand that it will be very important that the patient continues to work with all of the inpatient rehab. therapists at the [facility]:  PT:   Bed Mobility:  Rolling to Left: Contact Guard Assistance, Minimal Assistance   Rolling to Right: Contact Guard Assistance, Minimal Assistance   Sit to Supine: Moderate Assistance, Maximum Assistance. Transfers:  Sit to Stand: Moderate Assistance +1, min+1 up from mat and then up from w/c into FELISHA stedy. Pt requiring mod assist for trunk elevation and then to gain TKE RLE. Once up, pt tends to retract RT hip, lean to Rt. With mirror cues and verbal cues, improved midline noted, though not maintained. Stand to Sit:Moderate Assistance +1, min +1. Pt showing difficulty allowing LLE to flex at hip/knee and to release LUE from walker, tending to push. Sit Pivot: Moderate Assistance +1 to either direction. Pt supporting Rt hand with LT,  Assist for more forward translation of wt over base combined with more dynamic trunk. Pt tends to lean to Rt, but improved alignment noted with intervention. Transfer performed using FELISHA stedy at end of session: +2 mod assist up to stand and then 1 to guide and +1 mod assist to maintain trunk balance to position the device.       Balance:   Standing Balance: Pt stood to RW at [employer]. [id number] Assist X 2 during static standing. Assist for trunk lean to the Rt and for upright posture.  Assist of mixture,completed with LUE, vcs for arousal, followed commands for safe technique. UE Bathing: Moderate assistance(and min A for sitting balance )  LE Bathing: Dependent/Total(Mod A of 1 for balance and total A of another )  UE Dressing: Minimal Assistance. Min A with threading R UE  LE Dressing: Moderate Assistance. Mod A with threading BLE  Toileting: Maximal Assistance. Toilet Transfer: Minimal Assistance, Moderate Assistance and X 2. Mod A of 1 Min of another, W/c <> BSC  Shower Transfer: Minimal Assistance, Moderate Assistance and X 2. Mod A of 1, min A of another, w/c <> shower bench.     Functional Mobility:  Bed mobility  Supine to Sit: Maximum assistance     Functional Mobility  Functional Mobility Comments: Not appropriate at this time  OTR to assess as appropriate      Balance:  Sitting Balance:  Supervision. Standing Balance  Time: 45 seconds,  30 seconds for LB clothing mgt. Pt leaning right, constant cues to look in mirror to correct midline. Transfers:  Sit to Stand:  Minimal Assistance, Maximum Assistance, X 2. with use of puma matute (only to/from [facility]   Stand to Sit: Moderate Assistance, X 1.   Squat Pivot: Minimal Assistance, Moderate Assistance, X 2. w/c <> EOM, w/c > EOB. VCs for R UE attn. Pt was able to direct w/c position and leg rest / lap tray management, requiring cues for brake management and arm rest.    BED MOBILITY:  Supine to Sit: Moderate Assistance for bringing BLEs off side of mat and elevating trunk  Sit to Supine: Minimal Assistance into bed EOS. Scooting: Maximum Assistance advancing his forward        Upper Extremity Assessment:   RUE AROM : (Pt 2-/5 scap elevation.  No shoulder AROM )     Sensation  Overall Sensation Status: (decreased proproception RUE, reports numbness )  RUE Tone: Hypotonic  LUE Tone: Normotonic  Coordination and Movement description: Fine motor impairments, Gross motor impairments, Right UE     Activity Tolerance: Patient limited by fatigue, minute (N=11)   Reasoning: verbal abstraction- 0/2  Sequencing: impaired  Thought Organization: at least mild impairment  Insight: fair   Attention: decreased high level selective attention   Math Computation: 3/5  Executive Functioning: unable to complete d/t extremity weakness     SWALLOWING:  Current Diet: Minced/moist diet and mildly thick liquids   Respiratory Status:   Independent    Skilled dysphagia therapy via direct meal intervention. Pt with multiple trials of scrambled eggs, applesauce, magic cup and mildly thick liquids by cup. Impulsivity with large bolus/liquid intake and fast rate of PO intake intermittently noted. Improved small bolus size and slowed rate of PO intake with skilled level education provided. Pt demonstration of slow/uncoordinated bolus breakdown, formation and transit resulting in mild-moderate oral stasis with heavy reliance on liquid/pureed wash to maximize oral clearance. Pt many times attempting to complete continuous trials despite poor oral clearance achieved. Required max cues for use of alternating liquids/purees in order to enhance clearance. Improved pt carryover with use of strategy as trials progressed. Certainly cannot r/o premature pharyngeal entry of liquid bolus with concerns for decreased bolus control s/p large liquid bolus consumption. Improved control with small, controlled drinks. Pt with presence of continuous, persistent coughing at END of meal trial with consumption of magic cup. Concerns for swallow decompensation. Skilled level education provided to pt re: skilled use of swallow strategies (I.e. starting with minced/moist trials and ending with purees to combat fatigue, focus on small/frequent meals, small bites/sips, slow rate, achievement of oral clearance with liquid/pureed wash, etc). Tray pulled to allow pt to rest given concerns for fatigue. Consumption of 50% of eggs, 50% of applesauce, 25% of magic cup and ~5 ounces of mildly thick liquids this date. Pt to resume minced/moist diet and mildly thick liquids with CLOSE pulmonary monitoring.        Behavioral Observation: Alert, Oriented and Dysarthric     ORAL MECHANISM EVALUATION:       Facial / Labial Impaired Right sided facial drooping with flaccidity. Decreased labial seal and strength    Lingual Impaired Decreased lingual ROM/strength and coordination. Lingual deviation to right upon protrusion   Dentition WFL Decreased oral hygiene    Velum WFL     Vocal Quality Impaired Hoarse/breathy with reduced breath support and presence of frequent aphonic breaks    Sensation Not Tested     Cough Not Tested       PATIENT WAS EVALUATED USING:  Puree, hard solid, thin liquids by cup, mildly thick liquids by cup      ORAL PHASE:  Impaired:  Impaired AP Movement, Impaired Mastication and Reduced Bolus Formation     PHARYNGEAL PHASE:  Impaired:  Decreased Hyolaryngeal Elevation and Suspected Pharyngeal Residue     SIGNS AND SYMPTOMS OF LARYNGEAL PENETRATION / ASPIRATION:  No signs/symptoms of aspiration evident in this evaluation, but cannot rule out silent aspiration.     IMPRESSIONS: Pt presents with moderate oral and mild-moderate pharyngeal dysphagia evidenced by the above skilled level findings. Pt demonstration of slow, uncoordinated bolus breakdown, formation and AP transit resulting in decreased bolus formation of hard solid trials which was NOT cleared s/p use of liquid wash with mild-moderate oral stasis remaining. Required extra time to achieve adequate clearance. No overt pocketing present. Pt with decreased laryngeal elevation upon manual palpation and concerns for pharyngeal stasis given presence of spontaneous multiple swallows. No overt s/s of aspiration s/p trials of hard solid, puree, thin liquids and mildly thick liquids; HOWEVER, MBS completed on 12/26 with demonstration of SILENT aspiration of thin by cup. Given hx of silent aspiration, pt inappropriate for completion of advanced liquid trials. all four quadrants. His abdomen was  noted to be soft, nontender, without masses. Cranial Nerves:  VII: Facial strength: abnormal with respect to a mild Right-sided facial droop. XI: Shoulder shrug: no functional Right shoulder shrug. ROM:  abnormal - with respect to the Right uppper and lower limbs. Motor Exam: No acute changes noted with respect to the patient's limb strength. Tone:  Increased muscle tone noted at the Right hip and knee. Muscle bulk: within normal limits  Sensory:  Sensory intact  Coordination:   abnormal - with respect to his mobility. Deep Tendon Reflexes: No acute changes noted. Skin: warm and dry, no rash or erythema  Edema: None in either lower limb.       Diagnostics:   Recent Results (from the past 24 hour(s))   POCT glucose    Collection Time: 01/22/20  5:24 PM   Result Value Ref Range    POC Glucose 203 (H) 70 - 108 mg/dl   POCT glucose    Collection Time: 01/22/20 10:24 PM   Result Value Ref Range    POC Glucose 219 (H) 70 - 108 mg/dl   Basic Metabolic Panel    Collection Time: 01/23/20  5:57 AM   Result Value Ref Range    Sodium 144 135 - 145 meq/L    Potassium 4.3 3.5 - 5.2 meq/L    Chloride 109 98 - 111 meq/L    CO2 23 23 - 33 meq/L    Glucose 144 (H) 70 - 108 mg/dL    BUN 35 (H) 7 - 22 mg/dL    CREATININE 1.0 0.4 - 1.2 mg/dL    Calcium 9.0 8.5 - 10.5 mg/dL   CBC Auto Differential    Collection Time: 01/23/20  5:57 AM   Result Value Ref Range    WBC 9.0 4.8 - 10.8 thou/mm3    RBC 3.53 (L) 4.70 - 6.10 mill/mm3    Hemoglobin 11.2 (L) 14.0 - 18.0 gm/dl    Hematocrit 36.6 (L) 42.0 - 52.0 %    .7 (H) 80.0 - 94.0 fL    MCH 31.7 26.0 - 33.0 pg    MCHC 30.6 (L) 32.2 - 35.5 gm/dl    RDW-CV 13.3 11.5 - 14.5 %    RDW-SD 49.9 (H) 35.0 - 45.0 fL    Platelets 828 572 - 904 thou/mm3    MPV 11.6 9.4 - 12.4 fL    Seg Neutrophils 67.9 %    Lymphocytes 17.7 %    Monocytes 6.9 %    Eosinophils 7.1 %    Basophils 0.1 %    Immature Granulocytes 0.3 %    Segs Absolute 6.1 1.8 - 7.7 thou/mm3 Lymphocytes Absolute 1.6 1.0 - 4.8 thou/mm3    Monocytes Absolute 0.6 0.4 - 1.3 thou/mm3    Eosinophils Absolute 0.6 (H) 0.0 - 0.4 thou/mm3    Basophils Absolute 0.0 0.0 - 0.1 thou/mm3    Immature Grans (Abs) 0.03 0.00 - 0.07 thou/mm3    nRBC 0 /100 wbc   Anion Gap    Collection Time: 01/23/20  5:57 AM   Result Value Ref Range    Anion Gap 12.0 8.0 - 16.0 meq/L   Glomerular Filtration Rate, Estimated    Collection Time: 01/23/20  5:57 AM   Result Value Ref Range    Est, Glom Filt Rate 72 (A) ml/min/1.73m2   POCT glucose    Collection Time: 01/23/20  7:24 AM   Result Value Ref Range    POC Glucose 168 (H) 70 - 108 mg/dl   POCT glucose    Collection Time: 01/23/20 11:44 AM   Result Value Ref Range    POC Glucose 203 (H) 70 - 108 mg/dl       Impression:  1. Status post cerebrovascular accident with an acute infarct being  located in the left romain-darin as noted on a brain MRI performed on  12/24/2019.  2.  Gait dysfunction. 3.  Deficits with respect to his activities of daily living. 4.  Significant speech and cognitive deficits. 5.  Current history of dysphagia. 6.  Current history of right upper and lower limb weakness. 7.  Current history of significant cardiac disease including an  estimated ejection fraction of 25%. The patient currently has a cardiac  LifeVest in place. 8.  History of severe major depression along with anxiety. 9.  History of a psychotic episode occurring in 2017, requiring a mental  hospitalization in [facility]. 10.  History of benign prostatic hypertrophy. 11.  History of anemia. 12.  History of gastroesophageal reflux disease. 13.  History of hypertension. 14.  History of bilateral inguinal surgical repair performed in 2018  15. Current history of anorexia. 16.  Current history of restless leg syndrome. Plan:  · He is to continue with his current inpatient rehab. program.  · A Rehab. team conference was held on 01/21/20.  His discharge goal is home with his wife on

## 2020-01-23 NOTE — PLAN OF CARE
Care plan reviewed with patient and  verbalize understanding of the plan of care and contribute to goal setting. Problem: Falls - Risk of:  Goal: Will remain free from falls  Description  Will remain free from falls  Outcome: Met This Shift  Note:   No falls     Problem: Falls - Risk of:  Goal: Absence of physical injury  Description  Absence of physical injury  Outcome: Met This Shift  Note:   No injuries     Problem: Risk for Impaired Skin Integrity  Goal: Tissue integrity - skin and mucous membranes  Description  Structural intactness and normal physiological function of skin and  mucous membranes.   Outcome: Met This Shift  Note:   intact     Problem: HEMODYNAMIC STATUS  Goal: Patient has stable vital signs and fluid balance  Outcome: Met This Shift  Note:   stable     Problem: IP BLADDER/VOIDING  Goal: STG - Patient demonstrates ability to take care of indwelling catheter  Outcome: Not Met This Shift  Note:   Family not present for ed     Problem: DISCHARGE BARRIERS  Goal: Patient's continuum of care needs are met  Outcome: Met This Shift  Note:   Needs met     Problem: Nutrition  Goal: Optimal nutrition therapy  Outcome: Met This Shift  Note:   adequate     Problem: Metabolic:  Goal: Ability to maintain clinical measurements within normal limits will improve  Description  Ability to maintain clinical measurements within normal limits will improve  Outcome: Met This Shift  Note:   Refr to labs     Problem: Nutritional:  Goal: Maintenance of adequate nutrition will improve  Description  Maintenance of adequate nutrition will improve  Outcome: Met This Shift  Note:   adequate     Problem: Nutritional:  Goal: Ability to identify appropriate dietary choices will improve  Description  Ability to identify appropriate dietary choices will improve  Outcome: Ongoing  Note:   Wife does meal orders     Problem: Pain:  Description  Pain management should include both nonpharmacologic and pharmacologic interventions. Goal: Pain level will decrease  Description  Pain level will decrease  Outcome: Met This Shift  Note:   denied     Problem: Pain:  Description  Pain management should include both nonpharmacologic and pharmacologic interventions. Goal: Control of acute pain  Description  Control of acute pain  Outcome: Completed     Problem: Pain:  Description  Pain management should include both nonpharmacologic and pharmacologic interventions.   Goal: Control of chronic pain  Description  Control of chronic pain  Outcome: Completed

## 2020-01-23 NOTE — PROGRESS NOTES
oral/buccal pocketing, decreased oral bolus control, pharyngeal stasis and hx of silent aspiration with completion of MBS) provided. Locations to purchase magic cup, prethickened liquids and thickener given. Pt/family provided with verbal/written instructions for skilled swallowing strategies (I.e. small bites/sips, alternating solids/liquids, double swallows, etc). AM session: Skilled dysphagia therapy via direct meal intervention. Pt with multiple trials of pureed Prydeinig toast, pureed eggs, magic cup and consumption of mildly thick cranberry juice. Pt demonstration of decreased bolus formation resulting in trace-mild oral stasis and right buccal pocketing. Encouraged to alternate sips of liquids in order to maximize overall oral clearance. Improved pt carryover/compliance with use of strategy as trials progressed. Pt able to achieve effective oral clearance when provided with extra time and liquid wash. Continued concerns for decreased bolus control with liquid consumption given intermittent slight audible swallow present. Pt with great tolerance of PO consumption this date with no overt s/s of aspiration. Vocal quality remained clear throughout meal intervention. Required at least moderate verbal cues for improved initiation and sustained feeding this date. Consumption of 100% of Prydeinig toast, 10% of eggs, 50% of magic cup and ~3 ounces of mildly thick liquids within ST session this date. D/t continued difficulties with bolus breakdown, formation and clearance of minced/moist solids and hx of silent aspiration of thin liquids on MBS completed on 12/26, recommendations for pt to resume pureed diet and mildly (nectar) thick liquids with intermittent supervision to encourage PO consumption and provide set up assistance, completion of close pulmonary monitoring and continued restriction on straws.      SHORT TERM GOAL #2:  Goal 2: Pt will complete labial/lingual/pharyngeal strengthening, coordionation, ROM exercises, DDK rates, rote speech tasks with SOS strategies and tongue twisters x10 for improved timeliness of oral bolus formation/transit, maximized speech intelligibility and improved airway protection. GOAL MET   INTERVENTIONS: Skilled level handouts, verbal instruction and demonstrations provided to pt/family re: outlined labial/lingual/pharyngeal exercises provided. Recommendations for x10 repetitions, 3 times daily. Rationale for completion to maximize oral bolus breakdown, formation, control and laryngeal elevation/airway protection. Recommendations for intensive skilled exercises x2 weeks with repeated MBS recommended as deemed appropriate by following SLP in SNF. Family verbalized understanding. Skilled level education re: skilled speech strategies, visual/verbal cues to maximize success and rote speech tasks to maximize carryover provided. Prior session: With implementation of SOS speech strategies, the following rote tasks were completed:  -Numerical counting 1-15: min cues x2 required to \"open mouth\" and \"speak loudly\"  -JEFFREY: mod cues x2 required to \"open mouth\" and \"speak loudly\"  *poor carryover of strategies with speech intelligibility best approximating 60% in known contexts    Oral motor lingual exercises targeted in attempts to improve overall bolus manipulation and formation:  -Lingual lateralization, outside: x10 reps, poor-fair success  -Lingual circles, clockwise: x10 reps, poor-fair success, decreased ROM  -Lingual tip elevation + depression: x10 reps, fair success  *max cues required throughout to ensure accuracy of placement with slow progression of repetitions  *rest breaks required; poor endurance noted    Effortful swallow: x10 reps, fair success    SHORT TERM GOAL #3:  Goal 3: Pt will complete immediate/delayed/working memory tasks with 80% accuracy, min cues for improved retention of newly learned medical information.  GOAL NOT CONSISTENTLY MET     INTERVENTIONS: Skilled level Gumaro Cuevas M.S. [facility] 1/23/2020

## 2020-01-23 NOTE — PROGRESS NOTES
[address]  Occupational Therapy  Daily Note  Time:    Time In: 1330  Time Out: 1400  Timed Code Treatment Minutes: 30 Minutes  Minutes: 30          Date: 2020  Patient Name: Mary Daigle,   Gender: male      Room: [facility]56/056-A  MRN: [id number]  : 1940  (78 y.o.)  Referring Practitioner: Dr. Kari Antonio   Diagnosis: CVA   Additional Pertinent Hx: Per ER note on 19:  78 y.o. male who presents to the Emergency Department via EMS for the evaluation of a CVA. The patient was found on the floor sitting up around 0630 this morning by his wife, who heard him fall. She reports a drooping mouth on the right side, slurred speech, and right sided weakness when she found him at 0630. The patient's last known well was 22:00 last night. The patient is unable to walk because his left leg is weak, and the patient had to be lifted into a chair after he was found on the floor. 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 Restrictions/Precautions:  Restrictions/Precautions: General Precautions, Fall Risk, Modified Diet  Position Activity Restriction  Other position/activity restrictions: pt pushes to right, R romain, Rt neglect, poor sensation Rt hemibody    SUBJECTIVE: cooperative. Family present and supportive. PAIN: 0/10: denied pain. COGNITION: Inattention and Decreased Problem Solving    ADL:   No ADL's completed this session. [place] Figures BALANCE:  Sitting Balance:  Supervision. Standing Balance: Minimal Assistance. within the puma plus lift aide and gait harness. BED MOBILITY:  Not Tested    TRANSFERS:  Sit to Stand: Moderate Assistance. x 1 within the puma plus lift aide. SBA of another for balance. Stand to Sit: Minimal Assistance. x 2 within the puma plus lift aide.       ADDITIONAL ACTIVITIES:  Completed neuro based activities standing x 4 min with min A x 2 for balance and completed supportive response training into R elbow within the puma plus lift aide to reach to the left to increase R scapular activation into ADD and slight depression. Good tolerance and shoulder activation noted with standing tasks. No increased pushing syndrome. Spouse asking about built up foam silverware to utilize to increase independence in self feeding. Provided pt with built up red foam.     ASSESSMENT:     Activity Tolerance:  Patient tolerance of  treatment: good. Discharge Recommendations: 24 hour supervision or assist, Continue to assess pending progress  Equipment Recommendations: Other: PT may need a drop arm BSC and shower bench  Plan: Times per week: 5x/wk for 90 min and 1x/wk for 30 min   Current Treatment Recommendations: Balance Training, Self-Care / ADL, ROM, Strengthening, Functional Mobility Training, Endurance Training, Neuromuscular Re-education, Safety Education & Training, Patient/Caregiver Education & Training, Wheelchair Mobility Training, Home Management Training    Patient Education  Patient Education: Reviewed Prior Education]\      Goals  Short term goals  Time Frame for Short term goals: 1 week   Short term goal 1: Pt will complete sit to stand transfers with mod assist of 1 with min vcs or RUE support to improve indep with LB clothing mgt   Short term goal 2: Pt will complete 5-7 min EOB ADL task with SBA for sitting balance & min vcs for midline posture to improve balance needed for ADLs. Short term goal 3: Pt will don UB clothing with SBA and mod vcs for romain dressing technique to improve indep with self care. Short term goal 4: Pt will tolerate standing >2 minutes in midline with CGA to improve midline awareness for completion of stand pivots to [facility]. Long term goals  Time Frame for Long term goals : 3-4 weeks   Long term goal 1: Pt will don LB clothing with CGA and min vcs for adaptive technique to ipmrove indep with self care.    Long term goal 2: pt will complete stand pivot transfer to [facility] with CGA to improve

## 2020-01-23 NOTE — PROGRESS NOTES
Nutrition Assessment    Type and Reason for Visit: Reassess(PO Monitor)    Nutrition Recommendations:   *Diet per SLP & MD.   *Continue Magic Cups TID. *Continue Multivitamin w/minerals, Vitamin C and Vitamin D as ordered. *Nursing to assist with meals.     Nutrition Assessment: Pt improving from a nutritional standpoint AEB pt consuming 75% or more on average of most meals over the past week and reports good acceptance of Magic Cups. Remains at risk for further nutritional compromise r/t new CVA, dysphagia with modified textures, and underlying medical condition (depression, DM).  Nutrition recommendations/interventions as per above. Malnutrition Assessment:  · Malnutrition Status: Meets the criteria for severe malnutrition  · Context: Chronic illness  · Findings of the 6 clinical characteristics of malnutrition (Minimum of 2 out of 6 clinical characteristics is required to make the diagnosis of moderate or severe Protein Calorie Malnutrition based on AND/ASPEN Guidelines):  1. Energy Intake-Less than or equal to 75% of estimated energy requirement, Greater than or equal to 1 month    2. Fat Loss-Severe subcutaneous fat loss, Orbital, Triceps, Fat overlying the ribs  3. Muscle Loss-Severe muscle mass loss, Temples (temporalis muscle), Clavicles (pectoralis and deltoids)    Nutrition Risk Level:  Moderate    Nutrient Needs:  · Estimated Daily Total Kcal: ~2040 kcals (30 kcal/kgm- 68kgm 1/2)  · Estimated Daily Protein (g): ~82 grams protein/kgm- 68kgm 1/2)    Nutrition Diagnosis:   · Problem: Severe malnutrition, In context of chronic illness  · Etiology: related to Insufficient energy/nutrient consumption     Signs and symptoms:  as evidenced by Severe muscle loss, Severe loss of subcutaneous fat, Diet history of poor intake    Objective Information:  · Nutrition-Focused Physical Findings: cachexic; po intake improved consuming most of his meals; needs assist with meals; SLP following; pt denies N/V; last BM x1 on 1/21. Rx includes: Humalog, Multivitamin w/minerals, Vitamin C, Vitamin D and Zofran PRN  · Wound Type: None  · Current Nutrition Therapies:  · Oral Diet Orders: Dysphagia Pureed (Dysphagia 1), Mildly Thick, No Straws   · Oral Diet intake: %, 51-75%(of most meals per EMR)  · Oral Nutrition Supplement (ONS) Orders: Frozen Oral Supplement(TID)  · ONS intake: %(pt reports he loves the Mixed [employer])  · Anthropometric Measures:  · Ht: 6' (182.9 cm)   · Current Body Wt: 152 lb 9.6 oz (69.2 kg)(1/22; +1 edema in extremities)  · Admission Body Wt: 150 lb 14.4 oz (68.4 kg)(1/2; no edema)  · Usual Body Wt: 157 lb (71.2 kg)(per EMR 6/11/19 and 5/6/19; per patient ~180# 1 year ago)  · % Weight Change: patient reports ~30# loss in 1 year (16% of body weight), EMR shows 7# loss in 6 months (4% loss of body weight)   · Ideal Body Wt: 178 lb (80.7 kg)   · BMI Classification: BMI 18.5 - 24.9 Normal Weight    Nutrition Interventions:   Continue current diet, Continue current ONS, Vitamin Supplement(Diet per SLP & MD. Continue Magic Cups TID. Continue Multivitamin w/minerals, Vitamin C and Vitamin D as ordered.)  Continued Inpatient Monitoring, Speech Therapy, Education Initiated, Coordination of Care(Encouraged po intake of meals and ONS at best effort)    Nutrition Evaluation:   · Evaluation: Progressing toward goals   · Goals: Patient will safely consume 75% or more of meals during LOS.      · Monitoring: Nutrition Progression, Meal Intake, Supplement Intake, Diet Tolerance, Skin Integrity, Weight, Pertinent Labs, Chewing/Swallowing, Monitor Bowel Function    Electronically signed by Melvin Jenkins RD, LD on 1/23/20 at 11:13 AM    Contact Number: [phone]

## 2020-01-23 NOTE — PLAN OF CARE
Problem: Nutrition  Goal: Optimal nutrition therapy  1/23/2020 1115 by Kristy Leos RD, LD  Outcome: Ongoing   Nutrition Problem: Severe malnutrition, In context of chronic illness  Intervention: Food and/or Nutrient Delivery: Continue current diet, Continue current ONS, Vitamin Supplement(Diet per SLP & MD. Continue Magic Cups TID. Continue Multivitamin w/minerals, Vitamin C and Vitamin D as ordered.)  Nutritional Goals: Patient will safely consume 75% or more of meals during LOS.

## 2020-01-23 NOTE — PROGRESS NOTES
[address] [address]  INPATIENT PHYSICAL THERAPY  DAILY NOTE  [facility] - [id number]    Time In: 0730  Time Out: 0830  Timed Code Treatment Minutes: 60 Minutes  Minutes: 60          Date: 2020  Patient Name: Juan Antonio Ballesteros,  Gender:  male        MRN: [id number]  : 1940  (78 y.o.)     Referring Practitioner: Dr. Alfred Zapata  Diagnosis: acute CVA  Additional Pertinent Hx: Pt admitted [facility] ED s/p fall. MRI+ for infarct Lt hemipons. Cardiology deemed need for life vest (2019),  Hx:  depression , anxiety     Prior Level of Function:  Lives With: Spouse  Type of Home: House  Home Layout: One level, Work area in basement  Home Access: Stairs to enter with rails  Entrance Stairs - Number of Steps: 2 with grab bar(Rt)  Home Equipment: Quad cane(used intermittently)   Bathroom Shower/Tub: Walk-in shower  Bathroom Toilet: Handicap height  Bathroom Equipment: Built-in shower seat    ADL Assistance: Independent  Ambulation Assistance: Independent  Transfer Assistance: Independent  Active : No  Additional Comments: Pt reports he completed his own self care and mobility with intermittent use of a quad cane. Pt states wife completed housekeeping, meal prep and finances.      Restrictions/Precautions:  Restrictions/Precautions: General Precautions, Fall Risk, Modified Diet  Position Activity Restriction  Other position/activity restrictions: pt pushes to right, R romain, Rt neglect, poor sensation Rt hemibody    SUBJECTIVE: pt in bed on arrival, initially very drowsy but increased level of alertness as session progressed    PAIN: 0/10: no c/o during session    OBJECTIVE:  Bed Mobility:  Rolling to Left: Minimal Assistance, with verbal cues , with increased time for completion   Rolling to Right: Moderate Assistance, with increased time for completion, additional assist to bring RLE to hooklying   Supine to Sit: Moderate Assistance, with verbal cues , with increased time progress to pre gait standing with RW support. Short term goal 5: Pt to stand with RW, maintain midline trunk, with stepping forward back with ea LE, mod assist with RLE  Short term goal 6: Pt to propel w/c >= 150 ft, SBA, for indoor mobility. MET, See LTG  Long term goals  Time Frame for Long term goals : 4 wks  Long term goal 1: Pt to go supine <->sit, +1 stand by assist , to get in/out of bed. Long term goal 2: Pt to get up/down from various seated surfaces, +1 min assist to get up to walk. Long term goal 3: Pt to perform stand/pivot transfer with use of RW +1 min assist to get in/out of w/c. Long term goal 4: Pt to walk with RW >= 50 ft, CGA to progress to home mobility  Long term goal 5: Pt to get in/out of car, +1 min assist for transportation needs. Long term goal 6: Pt to propel w/c >= 150 ft, SBA, around obstacles, for indoor mobility. Following session, patient left in safe position with all fall risk precautions in place.

## 2020-01-23 NOTE — PROGRESS NOTES
min vcs for midline posture to improve balance needed for ADLs. Short term goal 3: Pt will don UB clothing with SBA and mod vcs for romain dressing technique to improve indep with self care. Short term goal 4: Pt will tolerate standing >2 minutes in midline with CGA to improve midline awareness for completion of stand pivots to [facility]. Long term goals  Time Frame for Long term goals : 3-4 weeks   Long term goal 1: Pt will don LB clothing with CGA and min vcs for adaptive technique to ipmrove indep with self care. Long term goal 2: pt will complete stand pivot transfer to [facility] with CGA to improve indep with toileting. Long term goal 3: Pt will demonstrate 3/5 R elbow flexion to improve indep with donning a shirt. Following session, patient left in safe position with all fall risk precautions in place.

## 2020-01-23 NOTE — PROGRESS NOTES
[address] [address]  INPATIENT PHYSICAL THERAPY  DAILY NOTE  [facility] - [id number]    Time In: 1400  Time Out: 1430  Timed Code Treatment Minutes: 30 Minutes  Minutes: 30          Date: 2020  Patient Name: Francisco Javier Castrejon,  Gender:  male        MRN: [id number]  : 1940  (78 y.o.)     Referring Practitioner: Dr. Lara Arora  Diagnosis: acute CVA  Additional Pertinent Hx: Pt admitted [facility] ED s/p fall. MRI+ for infarct Lt hemipons. Cardiology deemed need for life vest (2019),  Hx:  depression , anxiety     Prior Level of Function:  Lives With: Spouse  Type of Home: House  Home Layout: One level, Work area in basement  Home Access: Stairs to enter with rails  Entrance Stairs - Number of Steps: 2 with grab bar(Rt)  Home Equipment: Quad cane(used intermittently)   Bathroom Shower/Tub: Walk-in shower  Bathroom Toilet: Handicap height  Bathroom Equipment: Built-in shower seat    ADL Assistance: Independent  Ambulation Assistance: Independent  Transfer Assistance: Independent  Active : No  Additional Comments: Pt reports he completed his own self care and mobility with intermittent use of a quad cane. Pt states wife completed housekeeping, meal prep and finances. Restrictions/Precautions:  Restrictions/Precautions: General Precautions, Fall Risk, Modified Diet  Position Activity Restriction  Other position/activity restrictions: pt pushes to right, R romain, Rt neglect, poor sensation Rt hemibody    SUBJECTIVE: pt sitting on mat finishing with OT on arrival, agrees to therapy session. Fatigued following session    PAIN: 0/10: no c/o during session    OBJECTIVE:  Bed Mobility:  Not Tested    Transfers:  Sit to Stand: Maximum Assistance, cues for hand placement, with verbal cues, to remove puma plus sling  Stand to Sit:Moderate Assistance, cues for hand placement, with verbal cues, to remove sling, cues for midline  Sit Pivot: Moderate Assistance, Maximum Functional Mobility Training, Safety Education & Training    Patient Education  Patient Education: Home Exercise Program, Transfers, Gait, Wheelchair Mobility, Verbal Exercise Instruction    Goals:  Patient goals : get walking  Short term goals  Time Frame for Short term goals: 1 wk  Short term goal 1: Pt to roll to either direction, SBA with cues for Rt hemibody position only, for position change in bed. Short term goal 2: Pt to go supine <->sit, +1 mod assist at trunk up and with RLE going to supine, to get in/out of bed. MET, See LTG  Short term goal 3: Pt to perform sit/pivot transfer, supporting Rt hand with LT, +1 min assist to get in/out of w/c. Short term goal 4: Pt to get up/down from various seated surfaces, +1 mod assist to progress to pre gait standing with RW support. Short term goal 5: Pt to stand with RW, maintain midline trunk, with stepping forward back with ea LE, mod assist with RLE  Short term goal 6: Pt to propel w/c >= 150 ft, SBA, for indoor mobility. MET, See LTG  Long term goals  Time Frame for Long term goals : 4 wks  Long term goal 1: Pt to go supine <->sit, +1 stand by assist , to get in/out of bed. Long term goal 2: Pt to get up/down from various seated surfaces, +1 min assist to get up to walk. Long term goal 3: Pt to perform stand/pivot transfer with use of RW +1 min assist to get in/out of w/c. Long term goal 4: Pt to walk with RW >= 50 ft, CGA to progress to home mobility  Long term goal 5: Pt to get in/out of car, +1 min assist for transportation needs. Long term goal 6: Pt to propel w/c >= 150 ft, SBA, around obstacles, for indoor mobility. Following session, patient left in safe position with all fall risk precautions in place.

## 2020-01-23 NOTE — PROGRESS NOTES
carryover/compliance with use of strategy as trials progressed. Pt able to achieve effective oral clearance when provided with extra time and liquid wash. Continued concerns for decreased bolus control with liquid consumption given intermittent slight audible swallow present. Pt with great tolerance of PO consumption this date with no overt s/s of aspiration. Vocal quality remained clear throughout meal intervention. Required at least moderate verbal cues for improved initiation and sustained feeding this date. Consumption of 100% of [place] toast, 10% of eggs, 50% of magic cup and ~3 ounces of mildly thick liquids within ST session this date. D/t continued difficulties with bolus breakdown, formation and clearance of minced/moist solids and hx of silent aspiration of thin liquids on MBS completed on 12/26, recommendations for pt to resume pureed diet and mildly (nectar) thick liquids with intermittent supervision to encourage PO consumption and provide set up assistance, completion of close pulmonary monitoring and continued restriction on straws. SHORT TERM GOAL #2:  Goal 2: Pt will complete labial/lingual/pharyngeal strengthening, coordionation, ROM exercises, DDK rates, rote speech tasks with SOS strategies and tongue twisters x10 for improved timeliness of oral bolus formation/transit, maximized speech intelligibility and improved airway protection.     INTERVENTIONS: DNT d/t focus on other goals   Prior session: With implementation of SOS speech strategies, the following rote tasks were completed:  -Numerical counting 1-15: min cues x2 required to \"open mouth\" and \"speak loudly\"  -JEFFREY: mod cues x2 required to \"open mouth\" and \"speak loudly\"  *poor carryover of strategies with speech intelligibility best approximating 60% in known contexts    Oral motor lingual exercises targeted in attempts to improve overall bolus manipulation and formation:  -Lingual lateralization, outside: x10 reps, poor-fair success  -Lingual circles, clockwise: x10 reps, poor-fair success, decreased ROM  -Lingual tip elevation + depression: x10 reps, fair success  *max cues required throughout to ensure accuracy of placement with slow progression of repetitions  *rest breaks required; poor endurance noted    Effortful swallow: x10 reps, fair success    SHORT TERM GOAL #3:  Goal 3: Pt will complete immediate/delayed/working memory tasks with 80% accuracy, min cues for improved retention of newly learned medical information. INTERVENTIONS: Recall of number of children: indep   Recall of location of children outside of [address] [place] [address] Road: 1/2 indep, 1/2 unable to elicit   Recall of names of children: 2/6 indep, 4/6 min cues   Recall of AM events with PT-- required at least moderate cues from therapy tech [place] to recall details of session. Prior session: Recall of photo scene imagery (x2 hour delay): 6/10 independent, 2/10 min cues, 1/10 mod cues, 1/10 F02  *continual prompting required to generate a response d/t decreased engagement and lethargy exhibited    SHORT TERM GOAL #4:  Goal 4: Pt will complete problem solving, executive functioning and attention tasks (i.e. time/money management, scheduling, complex auditory/reading comprehension, etc) with 70% accuracy, mod cues for improved ADL/IADL management and safety awareness/insight. INTERVENTIONS: DNT d/t focus on other goals   Prior session: Answering questions related to product label: 2/6 independent, 2/6 mod cues, 1/6 F02, 1/6 max cues  *decreased comprehension (reading and auditory)  *decreased reasoning  *decreased visual scanning/attention   *increased time required to locate visual targets    SHORT TERM GOAL #5:  Goal 5: Pt will complete complex naming, verbal sequencing/description and sentence formulation/repetition tasks with 70% accuracy, mod cues for improved expression of basic/complex thoughts.     INTERVENTIONS: DNT d/t focus on additional STGs    Long-Term Goals:  Timeframe for Long-term Goals: 1 week     LONG TERM GOAL #1:  Goal 1: Pt will tolerate a minced/moist diet and thin liquids without overt s/s of aspiration to meet nutritional/hydration measures safely. ONGOING     LONG TERM GOAL #2:  Goal 2: Pt will improve ualhzatrf-junyvibarr-rupnjshaofcax skills to a supervision level of function for maximized expression of complex thoughts, independent completion of ADL/IADL responsibilities and appropriate transition to home setting with wife post discharge. ONGOING        Comprehension: 3 - Patient understands basic needs 50-74% of the time  Expression: 3 - Expresses basic ideas/needs 50-74% of the time  Social Interaction: 4 - Patient appropriate 75-90%+ of the time  Problem Solving: 3 - Patient solves simple/routine tasks 50%-74%  Memory: 3 - Patient remembers 50%-74% of the time     EDUCATION:  Learner: Patient  Education:  Reviewed ST goals and Plan of Care  Evaluation of Education: Verbalizes understanding, Demonstrates with assistance, Needs further instruction and Family not present    ASSESSMENT/PLAN:  Activity Tolerance:  Patient tolerance of  Treatment: fair. Flat affect, decreased engagement, poor functional carryover   Assessment/Plan: Patient progressing toward established goals. Continues to require skilled care of licensed speech pathologist to progress toward achievement of established goals and plan of care. .     Plan for Next Session: selective attention, change making, memory, dysphagia education with family as able     Ragini Hair M.S. Antoniette Libman 1/23/2020

## 2020-01-23 NOTE — PROGRESS NOTES
PORTABLE PATIENT PROFILE  [place]  [id number]    MEDICAL DIAGNOSIS/CONDITION:   Patient Active Problem List   Diagnosis    Uncontrolled hypertension    Dysphagia    Acute urinary retention    Benign prostatic hyperplasia with lower urinary tract symptoms    Erosive esophagitis    Acute gastritis without hemorrhage    Duodenitis    Microscopic hematuria    Severe malnutrition (HCC)    Type 2 diabetes mellitus treated without insulin (HCC)    Anxiety disorder    Non-recurrent inguinal hernia of right side with obstruction and without gangrene    Lipoma of left shoulder    Inguinal hernia of left side without obstruction or gangrene    S/P right inguinal hernia repair    S/P left inguinal hernia repair    Left pontine stroke ([facility] [address]    Right hemiparesis (HCC)    Dysarthria    Hypokalemia    Chronic depression    Abnormal EKG    Elevated troponin    Acute kidney injury (HCC)    Leukocytosis    Hypernatremia    Thrombocytopenia (HCC)    Loose stools    Moderate protein-calorie malnutrition (HCC)    Acute cerebrovascular accident (CVA) ([facility] [address] [facility]    Metabolic acidosis       INSURANCE INFORMATION:  Payor: MEDICARE /  /  /     ADVANCED DIRECTIVES:   Advance Directives (For Healthcare)  Pre-existing DNR Comfort Care/DNR Arrest/DNI Order: No  Healthcare Directive: No, patient does not have an advance directive for healthcare treatment  Information on Healthcare Directives Requested: No  Patient Requests Assistance: No  Advance Directives: Pt. not interested at this time  Full Code     EMERGENCY CONTACT:       RISK FACTORS:   Social History     Tobacco Use    Smoking status: Never Smoker    Smokeless tobacco: Never Used   Substance Use Topics    Alcohol use: No       ALLERGIES:  No Known Allergies    FUNCTIONAL STATUS:                      COMMUNICATION  Comprehension: 3 - Patient understands basic needs 50-74% of the time  Expression: 3 - Expresses basic ideas/needs 50-74% of the

## 2020-01-24 NOTE — DISCHARGE SUMMARY
progressive and intensive neuro re-ed to improve midline orientation and R UE function. Feeding: with set-up and with verbal cues . min cues for swallowing precautions. Grooming: Stand By Assistance. seated to wash face. Bathing: Moderate Assistance. Pt with increased initiaiton for wasing R UE and max A with hand over hand tech to wash L UE iwth R UE. min A for washing LEs. standing with max A for washign bottom. Upper Extremity Dressing: Minimal Assistance. min increased initiation for romain tech. mod A for R UE into shirt. pt donned L UE into shirt and overhead. SBA  for balance. Lower Extremity Dressing: Dependent. in standing mod to max A x 1 with pushing to the Right, second person needed for  Clothing mgt up. Toileting: Dependent. in standing mod to max A x 1 with pushing to the Right, second person needed for cleaning after toileting tasks. Toilet Transfer: Maximum Assistance. For sit pivot to a drop arm BSC with min cues for supporting R UE with L UE .     BALANCE:  Sitting Balance:  Stand By Assistance. Standing Balance: Dependent. mod to max A x 1, with a second person needed with min A PRN for safe standing during ADLs.    BED MOBILITY:  Supine to Sit: Minimal Assistance    Scooting: Minimal Assistance min cues for positioning R romain body      TRANSFERS:  Sit to Stand: Moderate Assistance. x 2 sit to stand. Stand to Sit: Moderate Assistance. x 2     Speech therapy:  Comprehension: 3 - Patient understands basic needs 50-74% of the time  Expression: 3 - Expresses basic ideas/needs 50-74% of the time  Social Interaction: 4 - Patient appropriate 75-90%+ of the time  Problem Solving: 3 - Patient solves simple/routine tasks 50%-74%  Memory: 3 - Patient remembers 50%-74% of the time     Speech: Moderate dysarthria characterized by blended word boundaries and omission of word endings.  DDK rates completed with poor success d/t poor level of articulatory precision and reduced breath to attempt to achieve blood sugar control between . Hypertension management was undertaken with medication management on any patient with systolic blood pressure greater than 696 or diastolic blood pressure greater than 90. Hyperlipidemia was considered and the patient was started or continued on a statin medication for any LDL>100. Appropriate stroke prophylaxis was maintained throughout his rehabilitation stay. Appropriate DVT prophylaxis options were considered throughout his rehabilitation stay. A U/A C&S performed on 01/13/20 did demonstrate a U. T.I. This was treated with Macrobid 100 mg twice daily for 7 days. A repeat U/A C&S was performed after the completion of the Macrobid    Patient was started on Ritalin 5 mg on 01/14/20 to be taken twice daily. Once this was started, his attention and concentration toward his inpatient rehab. therapies greatly improved. Please continue the Ritalin as tolerated. He was transferred to a skilled nursing facility in a medically stable condition on 01/24/20. He is to continue to receive P.T., O.T., Speech, Nursing, and an Aide on a daily basis.     Consults:   Nephrology    Significant Diagnostics:   CBC with Differential:    Lab Results   Component Value Date    WBC 9.0 01/23/2020    RBC 3.53 01/23/2020    HGB 11.2 01/23/2020    HCT 36.6 01/23/2020     01/23/2020    .7 01/23/2020    MCH 31.7 01/23/2020    MCHC 30.6 01/23/2020    RDW 12.6 11/04/2019    NRBC 0 01/23/2020    SEGSPCT 67.9 01/23/2020    LYMPHOPCT 20.8 11/04/2019    MONOPCT 6.9 01/23/2020    MONOPCT 5.3 11/04/2019    EOSPCT 4.3 11/04/2019    BASOPCT 0.2 11/04/2019    MONOSABS 0.6 01/23/2020    LYMPHSABS 1.6 01/23/2020    EOSABS 0.6 01/23/2020    BASOSABS 0.0 01/23/2020     BMP:    Lab Results   Component Value Date     01/23/2020    K 4.3 01/23/2020     01/23/2020    CO2 23 01/23/2020    BUN 35 01/23/2020    LABALBU 3.6 12/26/2019    CREATININE 1.0 01/23/2020    CALCIUM 9.0 01/23/2020    LABGLOM 72 01/23/2020    GLUCOSE 144 01/23/2020    GLUCOSE 122 11/04/2019     Magnesium:    Lab Results   Component Value Date    MG 2.0 12/30/2019     U/A:    Lab Results   Component Value Date    COLORU DK YELLOW 01/21/2020    PROTEINU 30 01/21/2020    PHUR 5.0 01/21/2020    LABCAST NONE SEEN 12/28/2019    LABCAST NONE SEEN 12/28/2019    WBCUA 50-75 01/21/2020    WBCUA 0-5 06/27/2017    RBCUA 5-10 01/21/2020    MUCUS Present 06/27/2017    YEAST NONE SEEN 01/21/2020    BACTERIA NONE SEEN 01/21/2020    SPECGRAV 1.020 12/28/2019    LEUKOCYTESUR MODERATE 01/21/2020    UROBILINOGEN 1.0 01/21/2020    BILIRUBINUR NEGATIVE 01/21/2020    BLOODU NEGATIVE 01/21/2020    GLUCOSEU NEGATIVE 01/21/2020       Recent Labs     01/23/20 2029 01/24/20  0716 01/24/20  1233   POCGLU 244* 161* 225*       Cholesterol Panel:   Results in Past 30 Days  Result Component Current Result Ref Range Previous Result Ref Range   Cholesterol, Total 130 (12/26/2019) 100 - 199 mg/dL 134 (12/25/2019) 100 - 199 mg/dL   HDL 33 (12/26/2019) mg/dL 34 (12/25/2019) mg/dL   LDL Calculated 76 (12/26/2019) mg/dL 80 (12/25/2019) mg/dL   Triglycerides 107 (12/26/2019) 0 - 199 mg/dL 100 (12/25/2019) 0 - 199 mg/dL       Patient Instructions:     Follow-up visits: See after visit summary from hospitalization    Discharge Medications:   [facility] Medication Instructions XLN:[id number]    Printed on:01/24/20 1640   Medication Information                      Ascorbic Acid (VITAMIN C) 500 MG tablet  Take 500 mg by mouth daily             aspirin 81 MG chewable tablet  Take 1 tablet by mouth daily             atorvastatin (LIPITOR) 80 MG tablet  Take 1 tablet by mouth nightly             baclofen (LIORESAL) 10 MG tablet  Take 1 tablet by mouth 3 times daily             clopidogrel (PLAVIX) 75 MG tablet  Take 1 tablet by mouth daily             escitalopram (LEXAPRO) 20 MG tablet  Take 1 tablet by mouth daily

## 2020-01-24 NOTE — PROGRESS NOTES
Alert and oriented to person, place, time. Calm appropriate interaction with staff. Temperature 98.2 oral.  Pulse ox 97% on room air. Blood pressure 144/64 left upper arm. Apical pulse 68, distant, irregular. Respirations shallow, irregular 17. KIKO 3mm to 2mm brisk. Sclera white,  Mucous membranes pink, moist, intact. Smile symmetrical.  Tongue midline. Speech mumbled. States no swallowing difficulties or cough. Last bowel movement was 1-. Skin warm, dry, intact. Hand grasp left strong, right unable to perform. Arm drift negative left arm, right unable to perform. Skin turgor sluggish. Capillary refill less than three seconds. Life vest on around chest.  Abdomen flat, soft, non-tender to palpation. Solis draining clear, yellow fluid. States no pain or urgency with urination. Radial pulses strong, equal. Leg lift strong left, not able to perform right. Pedal push, pull weak left, not able to perform right. Pedal pulses weak bilaterally. Chandrika's sign negative bilaterally. No edema noted. Right heel red, blanchable pressure area. Bed in low position, wheels locked, side rails up times 2. Over the bed table, call light within reach. States no pain. States no further needs at this time.

## 2020-01-24 NOTE — PROGRESS NOTES
[address]      Date: 2020  Patient Name: Mell Grade,  Gender:  male        MRN: [id number]  : 1940  (78 y.o.)     Referring Practitioner: Dr. Mignon Rico  Diagnosis: acute CVA  Additional Pertinent Hx: Pt admitted [facility] ED s/p fall. MRI+ for infarct Lt hemipons. Cardiology deemed need for life vest (2019),  Hx:  depression , anxiety     Restrictions/Precautions:  Restrictions/Precautions: General Precautions, Fall Risk, Modified Diet            Position Activity Restriction  Other position/activity restrictions: pt pushes to right, R romain, Rt neglect, poor sensation Rt hemibody         Social/Functional:  Type of Home: House  Home Layout: One level, Work area in basement  Home Access: Stairs to enter with rails  Entrance Stairs - Number of Steps: 2 with grab bar(Rt)  Home Equipment: Quad cane(used intermittently)       Assessment:  Assessment: Pt met 2 STG during stay. Pt showed significant limitations in strength, transfers, bed mobility, increased tone Rt hemibody, decreased environmental awareness on Rt side, decreased midline awareness, and endurance. Pt displayed difficulty w/ alertness during sessions. Pt demonstrated improvements in sequencing to prepare for transfers, more alertness towards end of stay at the facility, and improved muscle activation in RLE during pivot transfers and sit<>stands. Pt would continue to benefit from skilled PT intervention to address areas of deficits and to improve functional mobility, strength, safety awareness, and balance. Equipment Recommendations:  Equipment Needed: Yes(continue to assess. Pt has q. cane)    Plan:   Discharge to SNF for continued skilled PT intervention.       Goals:  Short term goals  Time Frame for Short term goals: 1 wk  Short term goal 1: Pt to roll to either direction, SBA with cues for Rt hemibody position only, for

## 2020-01-24 NOTE — PLAN OF CARE
Problem: Falls - Risk of:  Goal: Will remain free from falls  Description  Will remain free from falls  Outcome: Ongoing   Patient resting in bed call light within reach. No concerns voiced by patient  Problem: Risk for Impaired Skin Integrity  Goal: Tissue integrity - skin and mucous membranes  Description  Structural intactness and normal physiological function of skin and  mucous membranes.   Outcome: Ongoing   Patient being turned and pillows supporting rt arm and under feet  Problem: IP BLADDER/VOIDING  Goal: STG - Patient demonstrates ability to take care of indwelling catheter  Outcome: Ongoing   Patient instructed on importance of pericare

## 2020-01-24 NOTE — PROGRESS NOTES
BSC with CGA to improve indep with toileting. NOT MET   Long term goal 3: Pt will demonstrate 3/5 R elbow flexion to improve indep with donning a shirt.  NOT MET

## 2020-01-24 NOTE — PROGRESS NOTES
Patient transferred by stretcher via [facility] to [facility] [address]. Patient's family given a copy of discharge instructions. No questions at this time. Life vest supplies with [facility] on stretcher; family bringing other belongings to [facility].

## 2020-01-24 NOTE — PROGRESS NOTES
Alert and oriented to person, place, time. Temperature 97.3 oral.  Pulse ox 96% on room air. Blood pressure 130/64 left upper arm. Respirations 20 shallow, irregular. Apical pulse 63 distant, irregular. Lung sounds clear anterior, posterior, lateral.  Smile symmetrical.  Tongue midline.

## 2020-03-08 PROBLEM — I69.90 LATE EFFECTS OF CVA (CEREBROVASCULAR ACCIDENT): Status: ACTIVE | Noted: 2020-01-01

## 2020-03-09 NOTE — PROGRESS NOTES
Conjunctiva/sclera: Conjunctivae normal.      Pupils: Pupils are equal, round, and reactive to light. Neck:      Musculoskeletal: Normal range of motion and neck supple. Cardiovascular:      Rate and Rhythm: Normal rate and regular rhythm. Pulses: Normal pulses. Heart sounds: Normal heart sounds. Pulmonary:      Effort: Pulmonary effort is normal.      Breath sounds: Normal breath sounds. Abdominal:      General: Abdomen is flat. Bowel sounds are normal.      Palpations: Abdomen is soft. Skin:     General: Skin is warm and dry. Neurological:      Mental Status: He is alert and oriented to person, place, and time. Psychiatric:         Mood and Affect: Mood normal.         Behavior: Behavior normal.      Comments: Diminished cognition. Right Ankle Exam     Muscle Strength   Dorsiflexion:  0/5  Plantar flexion:  0/5      Left Ankle Exam   Left ankle exam is normal.    Muscle Strength   Dorsiflexion:  5/5   Plantar flexion:  5/5       Right Knee Exam     Comments:  No functional movement at the Right leg. Left Knee Exam   Left knee exam is normal.      Right Hip Exam     Muscle Strength   Abduction: 0/5   Adduction: 0/5   Flexion: 0/5       Left Hip Exam     Muscle Strength   Abduction: 5/5   Adduction: 5/5   Flexion: 5/5       Back Exam     Other   Gait: abnormal     Comments:  Patient was seated in a wheelchair.       Right Hand Exam     Muscle Strength   Wrist extension: 0/5   Wrist flexion: 0/5   : 0/5       Left Hand Exam   Left hand exam is normal.    Muscle Strength   Wrist extension: 5/5   Wrist flexion: 5/5   :  5/5       Right Elbow Exam     Muscle Strength   Pronation:  0/5   Supination:  0/5       Left Elbow Exam   Left elbow exam is normal.    Muscle Strength   Pronation:  5/5   Supination:  5/5       Right Shoulder Exam     Muscle Strength   Abduction: 0/5   Internal rotation: 0/5   External rotation: 0/5   Supraspinatus: 0/5   Subscapularis: 0/5   Biceps: 0/5

## 2020-04-21 NOTE — CARE COORDINATION
Veronica 45 Transitions Initial Follow Up Call    Call within 2 business days of discharge: Yes    Patient: Chris Heck Patient : 1940   MRN: <[id number]>  Reason for Admission: CVA  Discharge Date: 20 RARS: Readmission Risk Score: 21      Last Discharge [address] [address]       Complaint Diagnosis Description Type Department Provider    20  Left pontine stroke [facility]) Admission (Discharged) Lenin Grace MD      [facility] handoff. Wife and caregiver stated patient was currently sleeping. Patient s/p CVA with late effects. Rt side paralysis, cognition and speech deficits. Wife denies c/o pain, respiratory or GI. Patient has a patent danielle. Urine yellow no sediment. BS this am 107. Mode of transport w/c. Appetite fair. [facility] active. Medications reviewed. PCP f/u 20. Wife had extensive care giver training per [facility]. No other concerns at this time.  NEHA Dhillon RN  Care Transition Nurse [phone]         Spoke with: [address]: [facility]    Non-face-to-face services provided:  Obtained and reviewed discharge summary and/or continuity of care documents    Care Transitions 24 Hour Call    Do you have a copy of your discharge instructions?:  Yes  Do you have all of your prescriptions and are they filled?:  Yes  Have you been contacted by a [address]?:  No  Have you scheduled your follow up appointment?:  Yes (Comment: Pcp 20)  Were you discharged with any Home Care or Post Acute Services:  Yes  Post Acute Services:  [address] [place] [address] Puma Rodriguez (Comment: [facility])  Patient DME:  Quad cane  Do you have support at home?:  Partner/Spouse/SO  Do you feel like you have everything you need to keep you well at home?:  Yes  Are you an active caregiver in your home?:  No  Care Transitions Interventions         Follow Up  Future Appointments   Date Time Provider Genesis Welsh   2020  1:00 PM Ricardo Gaines MD [facility] - [facility]

## 2020-04-21 NOTE — CARE COORDINATION
PER TEO EMAIL    No appointment or Medication Reconciliation    Facility Discharging: [facility] [address]  Discharge Date: 4/17/2020  Services at Discharge: [facility]     Brief Background:  Unable to ambulate, ALL transfers with puma steady standing lift, Unable to reposition self in bed, requires Min-Mod A for bed mobility, Max A -Dep for dressing/bathing/toileting. Able to feed self after set up using divided plate, mechanical soft diet, nectar thick liquids. Tolerates sitting in wheelchair up to 5 hours with supervision. Speech impaired but able to make needs known, cognition impaired. Home medical equipment ordered by facility- hospital bed, bedside commode, wheelchair with r side half lap tray. Wife reported to have purchased a puma steady standing lift. Pts wife had extensive care giver training. Facility recommended long term care, wife refused. Facility did not provide patient discharge summary or list of follow up appointments.

## 2020-04-25 NOTE — ED PROVIDER NOTES
[address]         Pt Name: Twila Jovel  MRN: [id number]  [place] 1940  Date of evaluation: 4/24/2020  Provider: Autumn Roberts MD    CHIEF COMPLAINT     No chief complaint on file. Nurses Notes reviewed and I agree except as noted in the HPI. HISTORY OF PRESENT ILLNESS    Twila Jovel is a [de-identified] y.o. male who presents following a witnessed cardiac arrest.  EMS was called at 7 PM.  At 7:05 PM the patient went into cardiac arrest.  EMS initiated CPR with compressions and oral airway and James device at 7:07 PM.  The patient was transported with continued compressions and administration of epinephrine and sodium bicarb in route. The patient was recently in a nursing home but has no known Covid 19 diagnosis and no reported fever preceding. This HPI was provided by the patient. REVIEW OF SYSTEMS     Review of Systems   Unable to perform ROS: Intubated     PAST MEDICAL HISTORY    has no past medical history on file. SURGICAL HISTORY      has no past surgical history on file. CURRENT MEDICATIONS       Previous Medications    No medications on file       ALLERGIES     has no allergies on file. FAMILY HISTORY     has no family status information on file. family history is not on file. SOCIAL HISTORY          PHYSICAL EXAM       ED Triage Vitals   BP Temp Temp Source Pulse Resp SpO2 Height Weight   04/24/20 1944 04/24/20 1947 04/24/20 1947 04/24/20 1933 -- 04/24/20 1930 -- --   120/70 103.7 °F (39.8 °C) Rectal 118  (!) 85 %        Physical Exam  Constitutional:       General: He is in acute distress. Appearance: He is well-developed. He is cachectic. He is ill-appearing and toxic-appearing. Interventions: He is intubated. Face mask in place. HENT:      Head: No raccoon eyes or Amaya's sign. Right Ear: Hearing normal. No drainage. Left Ear: Hearing normal. No drainage.    Eyes:      Extraocular Movements: Right eye: Abnormal extraocular motion present. Left eye: Abnormal extraocular motion present. Conjunctiva/sclera:      Right eye: Chemosis present. Left eye: Chemosis present. Pupils: Pupils are equal.      Right eye: Pupil is not reactive. Left eye: Pupil is not reactive. Neck:      Musculoskeletal: Normal range of motion and neck supple. Cardiovascular:      Rate and Rhythm: Bradycardia present. Rhythm irregular. Pulses: Decreased pulses. Femoral pulses are 2+ on the right side. Pulmonary:      Effort: He is intubated. Chest:      Chest wall: Deformity (Mechanical compressions in progress) present. Abdominal:      General: There is no distension. Genitourinary:     Comments: Solis catheter in place  Musculoskeletal:      Comments: IO and right shoulder   Skin:     Coloration: Skin is cyanotic and pale. Findings: Bruising and ecchymosis present. No rash (On exposed skin surfaces). Neurological:      Mental Status: He is unresponsive. Motor: Abnormal muscle tone present. Psychiatric:         Speech: He is noncommunicative. DIFFERENTIAL DIAGNOSIS:   Cardiopulmonary arrest, febrile illness, sepsis, COVID-19, urinary tract infection. DIAGNOSTIC RESULTS     None. EMERGENCY DEPARTMENT COURSE:   Vitals:    Vitals:    04/24/20 1957 04/24/20 1959 04/24/20 2001 04/24/20 2003   BP: (!) 71/45  (!) 47/38 92/77   Pulse:  93 71 (!) 46   Resp:       Temp:       TempSrc:       SpO2:           Orders Placed This Encounter   Medications    EPINEPHrine PF 1 MG/10ML injection    atropine injection    0.9 % sodium chloride infusion    EPINEPHrine PF 1 MG/10ML injection    amiodarone (CORDARONE) injection       Patient was seen and evaluated emergency department. He was met on arrival with CPR in progress. Airway was quickly converted to intubation with endotracheal tube.   After confirmation of placement and administration of epinephrine he was

## 2020-04-25 NOTE — PROGRESS NOTES
Assisted Anjum Mention CNP with intubation. 7.5 at the 26 secured with commercial tube graham. Chuyita Prey Positive color change and bilateral breath sounds noted. Pt was being bagged with Hepa filter when I got there, was bagged with Hepa filter and CO2 detector when intubated and then placed on ventilator with inspiratory filter on the inspiratory side and hepa filter on expiratory side.

## 2020-04-25 NOTE — ACP (ADVANCE CARE PLANNING)
Advance Care Planning   Advance Care Planning Clinical Specialist  Conversation Note      Date of ACP Conversation: 4/24/2020    ACP Clinical Specialist: Leandro Milan      Current Designated Health Care Decision Maker:   Fartun Hernandez Jovana Antonio) Marquis Foley (wife)   [phone]        Hospitalization  If your health were to worsen and it became clear that your chance of recovery was unlikely, what would your preferences be regarding hospitalization?:    Choice:  [x]  The patient would want hospitalization  []  The patient would prefer comfort-focused treatment without hospitalization. Ventilation  If you were in your present state of health and suddenly became very ill and were unable to breathe on your own, what would your preference be about the use of a ventilator (breathing machine) if it were available to you? If patient would desire the use of a ventilator (breathing machine), answer \"yes\", if not \"no\": NO    If your health were to worsen and it became clear that your chance of recovery was unlikely, would that change your answer? No    Resuscitation  CPR works best to restart the heart when there is a sudden event, like a heart attack, in someone who is otherwise healthy. Unfortunately, CPR does not typically restart the heart for people who have serious health conditions or who are very sick. In the event your heart stopped, would you want attempts to restart your heart (answer \"yes\") or would you prefer a natural death (answer \"no\")? no    If your health were to worsen and it became clear that your chance of recovery was unlikely, would that change your answer? No    [x] Yes  [] No   Educated Patient / Angeles Hartley regarding differences between Advance Directives and portable DNR orders.     Length of ACP Conversation in minutes:  83 minutes    Conversation Outcomes:  [x] ACP discussion completed  [] Existing advance directive reviewed with patient; no changes to patient's previously recorded wishes   [] New Advance Directive completed   [] Portable Do Not Rescitate prepared for Provider review and signature  [] POLST/POST/MOLST/MOST prepared for Provider review and signature    Follow-up plan:    [] Schedule follow-up conversation to continue planning  [] Referred individual to Provider for additional questions/concerns   [] Advised patient/agent/surrogate to review completed ACP document and update if needed with changes in condition, patient preferences or care setting     [x] This note routed to one or more involved healthcare providers     The patient entered the ED because of suspicion of exposure to another patient with COVID. Family explained, he was at the [facility] since Christmas (recovering from a stroke). - The patient returned home earlier this week. The children and wife shared comments about how \"he wasn't the same anymore. He didn't want to do anything but sleep. \" This morning he became violently ill and spiked a fever over 102. - The patient was unresponsive while in the ED. The family was moved to the family waiting area. While in the waiting room this staff inquired about that patient's AD's as part of the ACP Activator role. The pts wife informed me the patient is a DNR and they have POA docs on file. It was also confirmed that she was the POA. This staff explained that his DNR information must be provided to our medical team and that I would have the physician come and explain the procedures that will follow. - The physician (Dr. Margarita Florian) came and explained what activity had taken place thus far and that the pt currently has a pulse. If however, his pulse terminates again, based on the information of a DNR he will discontinue resuscitation measures. Family expressed understanding. The physician left that room and the family was gowning up to see the patient when his heart stopped the second time.  Family was notified of the patient's death per his DNR order.   - This staff then contacted the

## 2020-04-27 ENCOUNTER — TELEPHONE (OUTPATIENT)
Dept: FAMILY MEDICINE CLINIC | Age: 80
End: 2020-04-27

## 2020-04-27 NOTE — TELEPHONE ENCOUNTER
Spoke to Aleksandra at [facility] [employer] and notified her that Dr. Abelardo Cordova will sign the death certificate but will not be able to do it until Wednesday. She will drop it off at the [place] office on Tuesday at 10:00 AM, Maryan at that office notified. She will contact [employer] when they can pick it up.

## (undated) DEVICE — SKIN AFFIX SURG ADHESIVE 72/CS 0.55ML: Brand: MEDLINE

## (undated) DEVICE — DEVICE FIX L37CM PEEK SMOOTH CANN 30 ABSRB FAST FOR LAP

## (undated) DEVICE — APPLICATOR PREP 26ML 0.7% IOD POVACRYLEX 74% ISO ALC ST

## (undated) DEVICE — BREAST HERNIA PACK: Brand: MEDLINE INDUSTRIES, INC.

## (undated) DEVICE — GOWN,SIRUS,NON REINFRCD,LARGE,SET IN SL: Brand: MEDLINE

## (undated) DEVICE — BLADE CLIPPER GEN PURP NS

## (undated) DEVICE — PENROSE DRAIN 18 X .5" SILICONE: Brand: MEDLINE

## (undated) DEVICE — COVER ARMBRD W13XL28.5IN IMPERV BLU FOR OP RM

## (undated) DEVICE — GLOVE ORANGE PI 7   MSG9070

## (undated) DEVICE — GLOVE SURG SZ 65 THK91MIL LTX FREE SYN POLYISOPRENE